# Patient Record
Sex: MALE | Race: WHITE | Employment: OTHER | URBAN - METROPOLITAN AREA
[De-identification: names, ages, dates, MRNs, and addresses within clinical notes are randomized per-mention and may not be internally consistent; named-entity substitution may affect disease eponyms.]

---

## 2017-01-03 ENCOUNTER — HOME CARE VISIT (OUTPATIENT)
Dept: HOME HEALTH SERVICES | Facility: HOME HEALTH | Age: 72
End: 2017-01-03
Payer: MEDICARE

## 2017-01-03 ENCOUNTER — OFFICE VISIT (OUTPATIENT)
Dept: CARDIOLOGY CLINIC | Age: 72
End: 2017-01-03

## 2017-01-03 VITALS
SYSTOLIC BLOOD PRESSURE: 126 MMHG | HEIGHT: 74 IN | OXYGEN SATURATION: 96 % | BODY MASS INDEX: 18.84 KG/M2 | DIASTOLIC BLOOD PRESSURE: 74 MMHG | WEIGHT: 146.8 LBS | HEART RATE: 72 BPM | RESPIRATION RATE: 18 BRPM

## 2017-01-03 DIAGNOSIS — E78.5 DYSLIPIDEMIA: ICD-10-CM

## 2017-01-03 DIAGNOSIS — I25.10 CORONARY ARTERY DISEASE INVOLVING NATIVE CORONARY ARTERY OF NATIVE HEART WITHOUT ANGINA PECTORIS: Primary | ICD-10-CM

## 2017-01-03 DIAGNOSIS — M79.7 FIBROMYALGIA: ICD-10-CM

## 2017-01-03 NOTE — PROGRESS NOTES
Masood Foreman MD    [address] [place], [address] [facility]    Office [phone],[facility] [phone]  Pager [phone]    Mackenzie Morris is a 66 y.o. male is here for f/u visit for    Primary care physician:  Emili Barrett MD    Patient Active Problem List   Diagnosis Code    Zenker's diverticulum K22.5    Spinal stenosis of lumbar region M48.06    Osteoarthritis M19.90    Fibromyalgia M79.7    DM type 2 causing neurological disease ([facility] [address] E11.49    Neuropathy G62.9    Post-polio syndrome G14    Bronchitis, chronic ([facility] [address] J42    Hyperlipidemia E78.5    Elevated troponin R79.89    Chest pain R07.9    Hyponatremia E87.1       Chief complaint:  Chief Complaint   Patient presents with   [facility] Follow Up    CHF       Assessment:  CAD - Patient was recently admitted to [facility] for altered mental status. His cardiac enzymes were elevated ( Trop minimally elevated 0.18/0.18/0.15/0.12 . However CK and CK MB mild elevation)-  Sinus Bradycardia  DM  Hyperlipidemia  Post polio syndrome - LLE  Fibromyalgia     Plan:  Stress nuc study12/7/16 - Mildly abnormal/low risk scan -   ASA/Lipitor  Unable to tolerate B Blocker sec to sinus bradycardia  F/u 1year    Lab Results   Component Value Date/Time    Cholesterol, total 99 12/05/2016 11:14 AM    HDL Cholesterol 30 12/05/2016 11:14 AM    LDL, calculated 55.8 12/05/2016 11:14 AM    VLDL, calculated 13.2 12/05/2016 11:14 AM    Triglyceride 66 12/05/2016 11:14 AM    CHOL/HDL Ratio 3.3 12/05/2016 11:14 AM       Patient understands the plan. All questions were answered to the patient's satisfaction. Medication Side Effects and Warnings were discussed with patient: yes  Patient Labs were reviewed and or requested:  yes  Patient Past Records were reviewed and or requested: yes    I appreciate the opportunity to be involved in [address] [place] [address]. See note below for details.  Please do not hesitate to contact us with questions or concerns. Mary Alva MD    Cardiac Testing/ Procedures: A. Cardiac Cath/PCI:    B.ECHO/HANS: 12/5/16 - EF 55-60%Grade 1 DD    C. StressNuclear/Stress ECHO/Stress test:12/7/16 IMPRESSION:1. Mildly abnormal Lexiscan gated SPECT myocardial perfusion study. No significant reversible ischemia. 2.Stress and resting tomograms showed a medium sized, moderate, persistent  defect with partial reversibility involving the mid to distal  inferoapical/inferoseptal wall in the area typically involving the RCA. SSS=5,  SRS=3, SDS=2  3. Low risk scan    D. Vascular:    E. EP:    F. Miscellaneous:    Subjective:  Karen Madsen is a 66 y.o. male who returns for follow up visit. Doing well. No chest pain, dyspnea. States that he has not yet started taking the aspirin but will start taking it. ROS:  (bold if positive, if negative)             Medications before admission:    Current Outpatient Prescriptions   Medication Sig Dispense    gabapentin (NEURONTIN) 600 mg tablet Take 600 mg by mouth four (4) times daily.  fentaNYL (DURAGESIC) 50 mcg/hr PATCH 1 Patch by TransDERmal route every seventy-two (72) hours.  fluticasone-salmeterol (ADVAIR) 100-50 mcg/dose diskus inhaler Take 1 Puff by inhalation two (2) times a day.  oxyCODONE-acetaminophen (PERCOCET 7.5) 7.5-325 mg per tablet 1 tabs every  6 hrs as needed for pain. Maximum daily dose 4 tablets. 100 Tab    atorvastatin (LIPITOR) 10 mg tablet Take 10 mg by mouth daily.  meloxicam (MOBIC) 15 mg tablet Take 15 mg by mouth daily.  albuterol (PROAIR HFA) 90 mcg/actuation inhaler Take 2 Puffs by inhalation every four (4) hours as needed.  DULoxetine (CYMBALTA) 60 mg capsule Take 60 mg by mouth daily.  metFORMIN (GLUCOPHAGE) 500 mg tablet Take 500 mg by mouth daily.  aspirin 81 mg chewable tablet Take 1 Tab by mouth daily. 30 Tab     No current facility-administered medications for this visit.         Physical Exam:  Visit Vitals    BP 126/74 (BP 1 Location: Left arm)    Pulse 72    Resp 18    Ht 6' 2\" (1.88 m)    Wt 146 lb 12.8 oz (66.6 kg)    SpO2 96%    BMI 18.85 kg/m2          Gen: Well-developed, well-nourished, in no acute distress  Neck: Supple,No JVD, No Carotid Bruit,   Resp: No accessory muscle use, Clear breath sounds, No rales or rhonchi  Card: Regular Rate,Rythm,Normal S1, S2 + ,No Murmur No rubs or gallop. No thrills.    Abd:  Soft, non-tender, non-distended,BS+,   MSK: No cyanosis  Skin: No rashes    Neuro: Left lower extremity in brace-(post polio)  Psych:  alert,  anxious  LE: No edema    EKG:       LABS:        Lab Results   Component Value Date/Time    WBC 5.7 12/07/2016 05:10 AM    HGB 11.0 12/07/2016 05:10 AM    HCT 32.9 12/07/2016 05:10 AM    PLATELET 989 77/22/4314 05:10 AM    MCV 88.9 12/07/2016 05:10 AM     Lab Results   Component Value Date/Time    Sodium 135 12/07/2016 05:10 AM    Potassium 4.0 12/07/2016 05:10 AM    Chloride 100 12/07/2016 05:10 AM    CO2 25 12/07/2016 05:10 AM    Anion gap 10 12/07/2016 05:10 AM    Glucose 168 12/07/2016 05:10 AM    BUN 18 12/07/2016 05:10 AM    Creatinine 0.88 12/07/2016 05:10 AM    BUN/Creatinine ratio 20 12/07/2016 05:10 AM    GFR est AA >60 12/07/2016 05:10 AM    GFR est non-AA >60 12/07/2016 05:10 AM    Calcium 8.2 12/07/2016 05:10 AM       Lab Results   Component Value Date/Time    aPTT 33.3 12/06/2016 11:05 AM     Lab Results   Component Value Date/Time    INR 1.1 12/06/2016 11:05 AM    INR 1.0 03/20/2014 10:13 AM    INR 1.4 02/02/2010 02:25 AM    INR (POC) 1.0 05/16/2011 12:42 PM    Prothrombin time 11.2 12/06/2016 11:05 AM    Prothrombin time 10.4 03/20/2014 10:13 AM    Prothrombin time 14.4 02/02/2010 02:25 AM     No components found for: Shaan Solis MD

## 2017-01-03 NOTE — MR AVS SNAPSHOT
Visit Information Date & Time Provider Department Dept. Phone Encounter #  
 1/3/2017 10:00 AM Lois Soni MD [facility] [phone] [id number] Upcoming Health Maintenance Date Due  
 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  
  
 Not reviewed this visit You Were Diagnosed With   
  
 Codes Comments Coronary artery disease involving native coronary artery of native heart without angina pectoris    -  Primary ICD-10-CM: I25.10 ICD-9-CM: 414.01 Dyslipidemia     ICD-10-CM: E78.5 ICD-9-CM: 272.4 Fibromyalgia     ICD-10-CM: M79.7 ICD-9-CM: 729.1 Vitals BP Pulse Resp Height(growth percentile) Weight(growth percentile) SpO2  
 126/74 (BP 1 Location: Left arm) 72 18 6' 2\" (1.88 m) 146 lb 12.8 oz (66.6 kg) 96% BMI Smoking Status 18.85 kg/m2 Former Smoker Vitals History BMI and BSA Data Body Mass Index Body Surface Area  
 18.85 kg/m 2 1.86 m 2 Preferred Pharmacy Pharmacy Name Phone [id number] [place], [address] AT [facility] [phone] Your Updated Medication List  
  
   
 This list is accurate as of: 1/3/17 10:37 AM.  Always use your most recent med list.  
  
  
  
  
 aspirin 81 mg chewable tablet Take 1 Tab by mouth daily. atorvastatin 10 mg tablet Commonly known as:  LIPITOR Take 10 mg by mouth daily. CYMBALTA 60 mg capsule Generic drug:  DULoxetine Take 60 mg by mouth daily. fentaNYL 50 mcg/hr PATCH Commonly known as:  DURAGESIC  
1 Patch by TransDERmal route every seventy-two (72) hours. fluticasone-salmeterol 100-50 mcg/dose diskus inhaler Commonly known as:  ADVAIR Take 1 Puff by inhalation two (2) times a day.  
  
 gabapentin 600 mg tablet Commonly known as:  NEURONTIN Take 600 mg by mouth four (4) times daily. meloxicam 15 mg tablet Commonly known as:  MOBIC Take 15 mg by mouth daily. metFORMIN 500 mg tablet Commonly known as:  GLUCOPHAGE Take 500 mg by mouth daily. oxyCODONE-acetaminophen 7.5-325 mg per tablet Commonly known as:  PERCOCET 7.5  
1 tabs every  6 hrs as needed for pain. Maximum daily dose 4 tablets. PROAIR HFA 90 mcg/actuation inhaler Generic drug:  albuterol Take 2 Puffs by inhalation every four (4) hours as needed. To-Do List   
 01/03/2017 To Be Determined Appointment with Ever Quevedo PT at [address] [place] [address]  
  
 01/05/2017 To Be Determined Appointment with Ever Quevedo PT at [address] [place] [address] 1400  
  
 01/10/2017 To Be Determined Appointment with Ever Quevedo PT at [address] [place] [address],[facility] 1400  
  
 01/12/2017 To Be Determined Appointment with Ever Quevedo PT at [address] [place] [address] 1400 Introducing [facility]! [employer] introduces "[employer]" patient portal. Now you can access parts of your medical record, email your doctor's office, and request medication refills online.    
 
1. In your internet browser, go to [email or web address] 2. Click on the First Time User? Click Here link in the Sign In box. You will see the New Member Sign Up page. 3. Enter your Park[employer] Access Code exactly as it appears below. You will not need to use this code after youve completed the sign-up process. If you do not sign up before the expiration date, you must request a new code. · [employer] Access Code: [email or web address] Expires: 1/3/2017 11:03 AM 
 
4. Enter the last four digits of your Social Security Number (xxxx) and Date of Birth (mm/dd/yyyy) as indicated and click Submit. You will be taken to the next sign-up page. 5. Create a "[employer]t ID. This will be your Parkzzz login ID and cannot be changed, so think of one that is secure and easy to remember. 6. Create a [employer] password. You can change your password at any time. 7. Enter your Password Reset Question and Answer. This can be used at a later time if you forget your password. 8. Enter your e-mail address. You will receive e-mail notification when new information is available in [address] 9. Click Sign Up. You can now view and download portions of your medical record. 10. Click the Download Summary menu link to download a portable copy of your medical information. If you have questions, please visit the Frequently Asked Questions section of the [employer] website. Remember, Park[employer] is NOT to be used for urgent needs. For medical emergencies, dial 911. Now available from your iPhone and Android! Please provide this summary of care documentation to your next provider. Your primary care clinician is listed as Yolanda Yang. If you have any questions after today's visit, please call [phone].

## 2017-01-05 ENCOUNTER — HOME CARE VISIT (OUTPATIENT)
Dept: SCHEDULING | Facility: HOME HEALTH | Age: 72
End: 2017-01-05
Payer: MEDICARE

## 2017-01-05 VITALS — DIASTOLIC BLOOD PRESSURE: 68 MMHG | HEART RATE: 76 BPM | OXYGEN SATURATION: 95 % | SYSTOLIC BLOOD PRESSURE: 120 MMHG

## 2017-01-05 PROCEDURE — G0151 HHCP-SERV OF PT,EA 15 MIN: HCPCS

## 2017-01-12 ENCOUNTER — HOME CARE VISIT (OUTPATIENT)
Dept: SCHEDULING | Facility: HOME HEALTH | Age: 72
End: 2017-01-12
Payer: MEDICARE

## 2017-01-12 PROCEDURE — G0151 HHCP-SERV OF PT,EA 15 MIN: HCPCS

## 2017-01-13 VITALS
DIASTOLIC BLOOD PRESSURE: 62 MMHG | RESPIRATION RATE: 18 BRPM | OXYGEN SATURATION: 97 % | SYSTOLIC BLOOD PRESSURE: 118 MMHG | HEART RATE: 62 BPM | TEMPERATURE: 97.8 F

## 2017-04-10 ENCOUNTER — HOME HEALTH ADMISSION (OUTPATIENT)
Dept: HOME HEALTH SERVICES | Facility: HOME HEALTH | Age: 72
End: 2017-04-10

## 2017-04-10 ENCOUNTER — HOSPITAL ENCOUNTER (INPATIENT)
Age: 72
LOS: 3 days | Discharge: NURSING, REHAB OR LONG-TERM CARE | DRG: 189 | End: 2017-04-13
Attending: EMERGENCY MEDICINE | Admitting: INTERNAL MEDICINE
Payer: MEDICARE

## 2017-04-10 ENCOUNTER — APPOINTMENT (OUTPATIENT)
Dept: GENERAL RADIOLOGY | Age: 72
DRG: 189 | End: 2017-04-10
Attending: EMERGENCY MEDICINE
Payer: MEDICARE

## 2017-04-10 DIAGNOSIS — J44.1 ACUTE EXACERBATION OF CHRONIC OBSTRUCTIVE PULMONARY DISEASE (COPD) (HCC): ICD-10-CM

## 2017-04-10 DIAGNOSIS — J96.00 ACUTE RESPIRATORY FAILURE, UNSPECIFIED WHETHER WITH HYPOXIA OR HYPERCAPNIA (HCC): Primary | ICD-10-CM

## 2017-04-10 DIAGNOSIS — D72.829 LEUKOCYTOSIS, UNSPECIFIED TYPE: ICD-10-CM

## 2017-04-10 PROBLEM — F41.8 DEPRESSION WITH ANXIETY: Status: ACTIVE | Noted: 2017-04-10

## 2017-04-10 LAB
ALBUMIN SERPL BCP-MCNC: 3.6 G/DL (ref 3.5–5)
ALBUMIN/GLOB SERPL: 0.9 {RATIO} (ref 1.1–2.2)
ALP SERPL-CCNC: 69 U/L (ref 45–117)
ALT SERPL-CCNC: 25 U/L (ref 12–78)
ANION GAP BLD CALC-SCNC: 12 MMOL/L (ref 5–15)
ARTERIAL PATENCY WRIST A: YES
AST SERPL W P-5'-P-CCNC: 23 U/L (ref 15–37)
ATRIAL RATE: 87 BPM
BASE DEFICIT BLDA-SCNC: 3.5 MMOL/L
BASOPHILS # BLD AUTO: 0 K/UL (ref 0–0.1)
BASOPHILS # BLD: 0 % (ref 0–1)
BDY SITE: ABNORMAL
BILIRUB SERPL-MCNC: 0.4 MG/DL (ref 0.2–1)
BUN SERPL-MCNC: 13 MG/DL (ref 6–20)
BUN/CREAT SERPL: 15 (ref 12–20)
CALCIUM SERPL-MCNC: 8.9 MG/DL (ref 8.5–10.1)
CALCULATED P AXIS, ECG09: 76 DEGREES
CALCULATED R AXIS, ECG10: -69 DEGREES
CALCULATED T AXIS, ECG11: 63 DEGREES
CHLORIDE SERPL-SCNC: 103 MMOL/L (ref 97–108)
CO2 SERPL-SCNC: 24 MMOL/L (ref 21–32)
CREAT SERPL-MCNC: 0.85 MG/DL (ref 0.7–1.3)
DIAGNOSIS, 93000: NORMAL
DIFFERENTIAL METHOD BLD: ABNORMAL
EOSINOPHIL # BLD: 2.1 K/UL (ref 0–0.4)
EOSINOPHIL NFR BLD: 13 % (ref 0–7)
EPAP/CPAP/PEEP, PAPEEP: 6
ERYTHROCYTE [DISTWIDTH] IN BLOOD BY AUTOMATED COUNT: 13.7 % (ref 11.5–14.5)
FIO2 ON VENT: 60 %
GAS FLOW.O2 SETTING OXYMISER: 12 L/MIN
GLOBULIN SER CALC-MCNC: 3.8 G/DL (ref 2–4)
GLUCOSE BLD STRIP.AUTO-MCNC: 123 MG/DL (ref 65–100)
GLUCOSE BLD STRIP.AUTO-MCNC: 136 MG/DL (ref 65–100)
GLUCOSE BLD STRIP.AUTO-MCNC: 215 MG/DL (ref 65–100)
GLUCOSE BLD STRIP.AUTO-MCNC: 247 MG/DL (ref 65–100)
GLUCOSE SERPL-MCNC: 110 MG/DL (ref 65–100)
HCO3 BLDA-SCNC: 23 MMOL/L (ref 22–26)
HCT VFR BLD AUTO: 39.2 % (ref 36.6–50.3)
HGB BLD-MCNC: 12.1 G/DL (ref 12.1–17)
IPAP/PIP, IPAPIP: 12
LYMPHOCYTES # BLD AUTO: 14 % (ref 12–49)
LYMPHOCYTES # BLD: 2.3 K/UL (ref 0.8–3.5)
MAGNESIUM SERPL-MCNC: 2.4 MG/DL (ref 1.6–2.4)
MCH RBC QN AUTO: 28.8 PG (ref 26–34)
MCHC RBC AUTO-ENTMCNC: 30.9 G/DL (ref 30–36.5)
MCV RBC AUTO: 93.3 FL (ref 80–99)
MONOCYTES # BLD: 1 K/UL (ref 0–1)
MONOCYTES NFR BLD AUTO: 6 % (ref 5–13)
NEUTS SEG # BLD: 11 K/UL (ref 1.8–8)
NEUTS SEG NFR BLD AUTO: 67 % (ref 32–75)
P-R INTERVAL, ECG05: 146 MS
PCO2 BLDA: 45 MMHG (ref 35–45)
PH BLDA: 7.32 [PH] (ref 7.35–7.45)
PLATELET # BLD AUTO: 213 K/UL (ref 150–400)
PO2 BLDA: 323 MMHG (ref 80–100)
POTASSIUM SERPL-SCNC: 4.3 MMOL/L (ref 3.5–5.1)
PROT SERPL-MCNC: 7.4 G/DL (ref 6.4–8.2)
Q-T INTERVAL, ECG07: 374 MS
QRS DURATION, ECG06: 98 MS
QTC CALCULATION (BEZET), ECG08: 450 MS
RBC # BLD AUTO: 4.2 M/UL (ref 4.1–5.7)
RBC MORPH BLD: ABNORMAL
SAO2 % BLD: 100 % (ref 92–97)
SAO2% DEVICE SAO2% SENSOR NAME: ABNORMAL
SERVICE CMNT-IMP: ABNORMAL
SODIUM SERPL-SCNC: 139 MMOL/L (ref 136–145)
SPECIMEN SITE: ABNORMAL
VENTRICULAR RATE, ECG03: 87 BPM
WBC # BLD AUTO: 16.4 K/UL (ref 4.1–11.1)

## 2017-04-10 PROCEDURE — 36600 WITHDRAWAL OF ARTERIAL BLOOD: CPT | Performed by: EMERGENCY MEDICINE

## 2017-04-10 PROCEDURE — 77030013140 HC MSK NEB VYRM -A

## 2017-04-10 PROCEDURE — 96375 TX/PRO/DX INJ NEW DRUG ADDON: CPT

## 2017-04-10 PROCEDURE — 94640 AIRWAY INHALATION TREATMENT: CPT

## 2017-04-10 PROCEDURE — 65660000000 HC RM CCU STEPDOWN

## 2017-04-10 PROCEDURE — 74011250636 HC RX REV CODE- 250/636: Performed by: EMERGENCY MEDICINE

## 2017-04-10 PROCEDURE — 82803 BLOOD GASES ANY COMBINATION: CPT | Performed by: EMERGENCY MEDICINE

## 2017-04-10 PROCEDURE — 99285 EMERGENCY DEPT VISIT HI MDM: CPT

## 2017-04-10 PROCEDURE — 96374 THER/PROPH/DIAG INJ IV PUSH: CPT

## 2017-04-10 PROCEDURE — 94762 N-INVAS EAR/PLS OXIMTRY CONT: CPT

## 2017-04-10 PROCEDURE — 97161 PT EVAL LOW COMPLEX 20 MIN: CPT

## 2017-04-10 PROCEDURE — 87040 BLOOD CULTURE FOR BACTERIA: CPT | Performed by: EMERGENCY MEDICINE

## 2017-04-10 PROCEDURE — 94660 CPAP INITIATION&MGMT: CPT

## 2017-04-10 PROCEDURE — 74011250637 HC RX REV CODE- 250/637: Performed by: INTERNAL MEDICINE

## 2017-04-10 PROCEDURE — 77030013048 HC MSK CPAP W/HDGR FISP -B

## 2017-04-10 PROCEDURE — 97166 OT EVAL MOD COMPLEX 45 MIN: CPT

## 2017-04-10 PROCEDURE — 82962 GLUCOSE BLOOD TEST: CPT

## 2017-04-10 PROCEDURE — 83735 ASSAY OF MAGNESIUM: CPT | Performed by: EMERGENCY MEDICINE

## 2017-04-10 PROCEDURE — 97165 OT EVAL LOW COMPLEX 30 MIN: CPT

## 2017-04-10 PROCEDURE — 93005 ELECTROCARDIOGRAM TRACING: CPT

## 2017-04-10 PROCEDURE — 71010 XR CHEST PORT: CPT

## 2017-04-10 PROCEDURE — 74011250636 HC RX REV CODE- 250/636: Performed by: INTERNAL MEDICINE

## 2017-04-10 PROCEDURE — 74011000250 HC RX REV CODE- 250: Performed by: EMERGENCY MEDICINE

## 2017-04-10 PROCEDURE — 74011000250 HC RX REV CODE- 250: Performed by: INTERNAL MEDICINE

## 2017-04-10 PROCEDURE — 36415 COLL VENOUS BLD VENIPUNCTURE: CPT | Performed by: EMERGENCY MEDICINE

## 2017-04-10 PROCEDURE — 85025 COMPLETE CBC W/AUTO DIFF WBC: CPT | Performed by: EMERGENCY MEDICINE

## 2017-04-10 PROCEDURE — 97116 GAIT TRAINING THERAPY: CPT

## 2017-04-10 PROCEDURE — 77010033678 HC OXYGEN DAILY

## 2017-04-10 PROCEDURE — 80053 COMPREHEN METABOLIC PANEL: CPT | Performed by: EMERGENCY MEDICINE

## 2017-04-10 PROCEDURE — 97535 SELF CARE MNGMENT TRAINING: CPT

## 2017-04-10 PROCEDURE — 74011636637 HC RX REV CODE- 636/637: Performed by: INTERNAL MEDICINE

## 2017-04-10 RX ORDER — ONDANSETRON 2 MG/ML
4 INJECTION INTRAMUSCULAR; INTRAVENOUS
Status: DISCONTINUED | OUTPATIENT
Start: 2017-04-10 | End: 2017-04-13 | Stop reason: HOSPADM

## 2017-04-10 RX ORDER — IPRATROPIUM BROMIDE AND ALBUTEROL SULFATE 2.5; .5 MG/3ML; MG/3ML
3 SOLUTION RESPIRATORY (INHALATION)
COMMUNITY

## 2017-04-10 RX ORDER — DEXTROSE 50 % IN WATER (D50W) INTRAVENOUS SYRINGE
12.5-25 AS NEEDED
Status: DISCONTINUED | OUTPATIENT
Start: 2017-04-10 | End: 2017-04-13 | Stop reason: HOSPADM

## 2017-04-10 RX ORDER — IPRATROPIUM BROMIDE AND ALBUTEROL SULFATE 2.5; .5 MG/3ML; MG/3ML
SOLUTION RESPIRATORY (INHALATION)
Status: DISPENSED
Start: 2017-04-10 | End: 2017-04-10

## 2017-04-10 RX ORDER — FENTANYL 25 UG/1
1 PATCH TRANSDERMAL
Status: DISCONTINUED | OUTPATIENT
Start: 2017-04-10 | End: 2017-04-13 | Stop reason: HOSPADM

## 2017-04-10 RX ORDER — SODIUM CHLORIDE 0.9 % (FLUSH) 0.9 %
5-10 SYRINGE (ML) INJECTION EVERY 8 HOURS
Status: DISCONTINUED | OUTPATIENT
Start: 2017-04-10 | End: 2017-04-13 | Stop reason: HOSPADM

## 2017-04-10 RX ORDER — SODIUM CHLORIDE 0.9 % (FLUSH) 0.9 %
5-10 SYRINGE (ML) INJECTION AS NEEDED
Status: DISCONTINUED | OUTPATIENT
Start: 2017-04-10 | End: 2017-04-13 | Stop reason: HOSPADM

## 2017-04-10 RX ORDER — IPRATROPIUM BROMIDE AND ALBUTEROL SULFATE 2.5; .5 MG/3ML; MG/3ML
3 SOLUTION RESPIRATORY (INHALATION)
Status: DISCONTINUED | OUTPATIENT
Start: 2017-04-10 | End: 2017-04-13 | Stop reason: HOSPADM

## 2017-04-10 RX ORDER — ENOXAPARIN SODIUM 100 MG/ML
40 INJECTION SUBCUTANEOUS EVERY 24 HOURS
Status: DISCONTINUED | OUTPATIENT
Start: 2017-04-10 | End: 2017-04-13 | Stop reason: HOSPADM

## 2017-04-10 RX ORDER — MAGNESIUM SULFATE HEPTAHYDRATE 40 MG/ML
2 INJECTION, SOLUTION INTRAVENOUS
Status: COMPLETED | OUTPATIENT
Start: 2017-04-10 | End: 2017-04-10

## 2017-04-10 RX ORDER — FENTANYL 25 UG/1
1 PATCH TRANSDERMAL
Status: ON HOLD | COMMUNITY
End: 2017-04-13

## 2017-04-10 RX ORDER — GABAPENTIN 300 MG/1
600 CAPSULE ORAL 4 TIMES DAILY
Status: DISCONTINUED | OUTPATIENT
Start: 2017-04-10 | End: 2017-04-13 | Stop reason: HOSPADM

## 2017-04-10 RX ORDER — ATORVASTATIN CALCIUM 10 MG/1
10 TABLET, FILM COATED ORAL DAILY
Status: DISCONTINUED | OUTPATIENT
Start: 2017-04-10 | End: 2017-04-12

## 2017-04-10 RX ORDER — DIPHENHYDRAMINE HCL 25 MG
25 CAPSULE ORAL
Status: DISCONTINUED | OUTPATIENT
Start: 2017-04-10 | End: 2017-04-13 | Stop reason: HOSPADM

## 2017-04-10 RX ORDER — ACETAMINOPHEN 325 MG/1
650 TABLET ORAL
Status: DISCONTINUED | OUTPATIENT
Start: 2017-04-10 | End: 2017-04-13 | Stop reason: HOSPADM

## 2017-04-10 RX ORDER — NALOXONE HYDROCHLORIDE 0.4 MG/ML
0.4 INJECTION, SOLUTION INTRAMUSCULAR; INTRAVENOUS; SUBCUTANEOUS AS NEEDED
Status: DISCONTINUED | OUTPATIENT
Start: 2017-04-10 | End: 2017-04-13 | Stop reason: HOSPADM

## 2017-04-10 RX ORDER — FLUTICASONE FUROATE AND VILANTEROL 200; 25 UG/1; UG/1
1 POWDER RESPIRATORY (INHALATION) DAILY
Status: DISCONTINUED | OUTPATIENT
Start: 2017-04-11 | End: 2017-04-13 | Stop reason: HOSPADM

## 2017-04-10 RX ORDER — SODIUM CHLORIDE 9 MG/ML
50 INJECTION, SOLUTION INTRAVENOUS CONTINUOUS
Status: DISCONTINUED | OUTPATIENT
Start: 2017-04-10 | End: 2017-04-10

## 2017-04-10 RX ORDER — IPRATROPIUM BROMIDE AND ALBUTEROL SULFATE 2.5; .5 MG/3ML; MG/3ML
3 SOLUTION RESPIRATORY (INHALATION)
Status: DISCONTINUED | OUTPATIENT
Start: 2017-04-10 | End: 2017-04-10

## 2017-04-10 RX ORDER — LORAZEPAM 2 MG/ML
0.5 INJECTION INTRAMUSCULAR
Status: DISCONTINUED | OUTPATIENT
Start: 2017-04-10 | End: 2017-04-13 | Stop reason: HOSPADM

## 2017-04-10 RX ORDER — ZOLPIDEM TARTRATE 5 MG/1
5 TABLET ORAL
Status: DISCONTINUED | OUTPATIENT
Start: 2017-04-10 | End: 2017-04-13 | Stop reason: HOSPADM

## 2017-04-10 RX ORDER — MAGNESIUM SULFATE 100 %
4 CRYSTALS MISCELLANEOUS AS NEEDED
Status: DISCONTINUED | OUTPATIENT
Start: 2017-04-10 | End: 2017-04-13 | Stop reason: HOSPADM

## 2017-04-10 RX ORDER — FLUTICASONE FUROATE AND VILANTEROL 100; 25 UG/1; UG/1
1 POWDER RESPIRATORY (INHALATION) DAILY
Status: DISCONTINUED | OUTPATIENT
Start: 2017-04-10 | End: 2017-04-10

## 2017-04-10 RX ORDER — OXYCODONE AND ACETAMINOPHEN 7.5; 325 MG/1; MG/1
1 TABLET ORAL
Status: DISCONTINUED | OUTPATIENT
Start: 2017-04-10 | End: 2017-04-13 | Stop reason: HOSPADM

## 2017-04-10 RX ORDER — INSULIN LISPRO 100 [IU]/ML
INJECTION, SOLUTION INTRAVENOUS; SUBCUTANEOUS
Status: DISCONTINUED | OUTPATIENT
Start: 2017-04-10 | End: 2017-04-13 | Stop reason: HOSPADM

## 2017-04-10 RX ORDER — METFORMIN HYDROCHLORIDE 500 MG/1
500 TABLET ORAL DAILY
Status: DISCONTINUED | OUTPATIENT
Start: 2017-04-10 | End: 2017-04-13 | Stop reason: HOSPADM

## 2017-04-10 RX ORDER — DULOXETIN HYDROCHLORIDE 30 MG/1
60 CAPSULE, DELAYED RELEASE ORAL DAILY
Status: DISCONTINUED | OUTPATIENT
Start: 2017-04-10 | End: 2017-04-13 | Stop reason: HOSPADM

## 2017-04-10 RX ORDER — GUAIFENESIN 100 MG/5ML
81 LIQUID (ML) ORAL DAILY
Status: DISCONTINUED | OUTPATIENT
Start: 2017-04-10 | End: 2017-04-13 | Stop reason: HOSPADM

## 2017-04-10 RX ADMIN — Medication 10 ML: at 15:28

## 2017-04-10 RX ADMIN — METHYLPREDNISOLONE SODIUM SUCCINATE 125 MG: 125 INJECTION, POWDER, FOR SOLUTION INTRAMUSCULAR; INTRAVENOUS at 02:30

## 2017-04-10 RX ADMIN — OXYCODONE HYDROCHLORIDE AND ACETAMINOPHEN 1 TABLET: 7.5; 325 TABLET ORAL at 06:44

## 2017-04-10 RX ADMIN — SODIUM CHLORIDE 50 ML/HR: 900 INJECTION, SOLUTION INTRAVENOUS at 03:36

## 2017-04-10 RX ADMIN — INSULIN LISPRO 4 UNITS: 100 INJECTION, SOLUTION INTRAVENOUS; SUBCUTANEOUS at 16:30

## 2017-04-10 RX ADMIN — GABAPENTIN 600 MG: 300 CAPSULE ORAL at 21:30

## 2017-04-10 RX ADMIN — MAGNESIUM SULFATE HEPTAHYDRATE 2 G: 40 INJECTION, SOLUTION INTRAVENOUS at 02:32

## 2017-04-10 RX ADMIN — GABAPENTIN 600 MG: 300 CAPSULE ORAL at 10:02

## 2017-04-10 RX ADMIN — LORAZEPAM 0.5 MG: 2 INJECTION, SOLUTION INTRAMUSCULAR; INTRAVENOUS at 13:44

## 2017-04-10 RX ADMIN — METHYLPREDNISOLONE SODIUM SUCCINATE 80 MG: 125 INJECTION, POWDER, FOR SOLUTION INTRAMUSCULAR; INTRAVENOUS at 15:21

## 2017-04-10 RX ADMIN — IPRATROPIUM BROMIDE AND ALBUTEROL SULFATE 3 ML: .5; 3 SOLUTION RESPIRATORY (INHALATION) at 07:39

## 2017-04-10 RX ADMIN — AZITHROMYCIN MONOHYDRATE 500 MG: 500 INJECTION, POWDER, LYOPHILIZED, FOR SOLUTION INTRAVENOUS at 03:37

## 2017-04-10 RX ADMIN — ENOXAPARIN SODIUM 40 MG: 40 INJECTION SUBCUTANEOUS at 08:07

## 2017-04-10 RX ADMIN — ALBUTEROL SULFATE 1 DOSE: 2.5 SOLUTION RESPIRATORY (INHALATION) at 02:30

## 2017-04-10 RX ADMIN — GABAPENTIN 600 MG: 300 CAPSULE ORAL at 13:43

## 2017-04-10 RX ADMIN — DULOXETINE HYDROCHLORIDE 60 MG: 30 CAPSULE, DELAYED RELEASE ORAL at 08:06

## 2017-04-10 RX ADMIN — Medication 10 ML: at 21:30

## 2017-04-10 RX ADMIN — IPRATROPIUM BROMIDE AND ALBUTEROL SULFATE 3 ML: .5; 3 SOLUTION RESPIRATORY (INHALATION) at 03:28

## 2017-04-10 RX ADMIN — IPRATROPIUM BROMIDE AND ALBUTEROL SULFATE 3 ML: .5; 3 SOLUTION RESPIRATORY (INHALATION) at 23:46

## 2017-04-10 RX ADMIN — OXYCODONE HYDROCHLORIDE AND ACETAMINOPHEN 1 TABLET: 7.5; 325 TABLET ORAL at 18:42

## 2017-04-10 RX ADMIN — IPRATROPIUM BROMIDE AND ALBUTEROL SULFATE 3 ML: .5; 3 SOLUTION RESPIRATORY (INHALATION) at 15:38

## 2017-04-10 RX ADMIN — METHYLPREDNISOLONE SODIUM SUCCINATE 125 MG: 125 INJECTION, POWDER, FOR SOLUTION INTRAMUSCULAR; INTRAVENOUS at 08:05

## 2017-04-10 RX ADMIN — FLUTICASONE FUROATE AND VILANTEROL TRIFENATATE 1 PUFF: 100; 25 POWDER RESPIRATORY (INHALATION) at 10:06

## 2017-04-10 RX ADMIN — METFORMIN HYDROCHLORIDE 500 MG: 500 TABLET ORAL at 08:06

## 2017-04-10 RX ADMIN — GABAPENTIN 600 MG: 300 CAPSULE ORAL at 18:34

## 2017-04-10 RX ADMIN — IPRATROPIUM BROMIDE AND ALBUTEROL SULFATE 3 ML: .5; 3 SOLUTION RESPIRATORY (INHALATION) at 19:06

## 2017-04-10 RX ADMIN — ASPIRIN 81 MG CHEWABLE TABLET 81 MG: 81 TABLET CHEWABLE at 08:07

## 2017-04-10 RX ADMIN — ATORVASTATIN CALCIUM 10 MG: 10 TABLET, FILM COATED ORAL at 08:07

## 2017-04-10 RX ADMIN — LORAZEPAM 0.5 MG: 2 INJECTION, SOLUTION INTRAMUSCULAR; INTRAVENOUS at 21:30

## 2017-04-10 RX ADMIN — METHYLPREDNISOLONE SODIUM SUCCINATE 80 MG: 125 INJECTION, POWDER, FOR SOLUTION INTRAMUSCULAR; INTRAVENOUS at 23:05

## 2017-04-10 RX ADMIN — INSULIN LISPRO 4 UNITS: 100 INJECTION, SOLUTION INTRAVENOUS; SUBCUTANEOUS at 11:30

## 2017-04-10 RX ADMIN — IPRATROPIUM BROMIDE AND ALBUTEROL SULFATE 3 ML: .5; 3 SOLUTION RESPIRATORY (INHALATION) at 12:24

## 2017-04-10 NOTE — ED PROVIDER NOTES
HPI Comments: 74 y.o. male with past medical history significant for fibromyalgia, type 2 DM, chronic bronchitis who presents from home via EMS with chief complaint of shortness of breath. Patient complains of increased shortness of breath since yesterday. Patient also complains of a dry cough. EMS reports they gave the patient a nebulizer treatment and, upon its completion, put him on CPAP which the patient states has provided some relief. Patient denies any fevers. There are no other acute medical concerns at this time. Social hx: former smoker, no alcohol  PCP: Nando Loya MD    Note written by Delilah Saucedo, as dictated by En Kumar DO 2:20 AM     The history is provided by the patient and the EMS personnel. No  was used. Past Medical History:   Diagnosis Date    Arthritis     All joints    Bronchitis, chronic (HCC)     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)     Fibromyalgia     Hyperlipidemia     Neuropathy     Post-polio syndrome        Past Surgical History:   Procedure Laterality Date    ABDOMEN SURGERY PROC UNLISTED  2/2010    Feeding Tube. removed 2011    CHEST SURGERY PROCEDURE UNLISTED  2/2010    RVATS, Bronchoscopy    HX APPENDECTOMY      HX ORTHOPAEDIC  2000    Cervical Discectomy    HX ORTHOPAEDIC      Left Arm Multiple Surgeries for Infection    HX ORTHOPAEDIC  2006    right heel fracture    TOTAL HIP ARTHROPLASTY  2007    left         Family History:   Problem Relation Age of Onset    Family history unknown: Yes       Social History     Social History    Marital status:      Spouse name: N/A    Number of children: N/A    Years of education: N/A     Occupational History    Not on file.      Social History Main Topics    Smoking status: Former Smoker     Packs/day: 2.00     Years: 20.00     Quit date: 4/30/1970    Smokeless tobacco: Never Used    Alcohol use No    Drug use: No    Sexual activity: Not on file Other Topics Concern    Not on file     Social History Narrative     ALLERGIES: Trazodone    Review of Systems   Constitutional: Negative for appetite change, chills, fever and unexpected weight change. HENT: Negative for ear pain, hearing loss, rhinorrhea and trouble swallowing. Eyes: Negative for pain and visual disturbance. Respiratory: Positive for cough and shortness of breath. Negative for chest tightness. Cardiovascular: Negative for chest pain and palpitations. Gastrointestinal: Negative for abdominal distention, abdominal pain, blood in stool and vomiting. Genitourinary: Negative for dysuria, hematuria and urgency. Musculoskeletal: Negative for back pain and myalgias. Skin: Negative for rash. Neurological: Negative for dizziness, syncope, weakness and numbness. Psychiatric/Behavioral: Negative for confusion and suicidal ideas. All other systems reviewed and are negative. Vitals:    04/10/17 0218   BP: (!) 176/121   Pulse: 94   Resp: 30   SpO2: 98%   Weight: 68 kg (150 lb)   Height: 6' 1\" (1.854 m)            Physical Exam   Constitutional: He is oriented to person, place, and time. He appears well-developed and well-nourished. No distress. HENT:   Head: Normocephalic and atraumatic. Right Ear: External ear normal.   Left Ear: External ear normal.   Nose: Nose normal.   Mouth/Throat: Oropharynx is clear and moist. No oropharyngeal exudate. Eyes: Conjunctivae and EOM are normal. Pupils are equal, round, and reactive to light. Right eye exhibits no discharge. Left eye exhibits no discharge. No scleral icterus. Neck: Normal range of motion. Neck supple. No JVD present. No tracheal deviation present. Cardiovascular: Normal rate, regular rhythm, normal heart sounds and intact distal pulses. Exam reveals no gallop and no friction rub. No murmur heard. Pulmonary/Chest: Accessory muscle usage present. No stridor. Tachypnea noted.  He is in respiratory distress (obvious). He has decreased breath sounds. He has wheezes. He has no rhonchi. He has no rales. He exhibits no tenderness. Abdominal breathing  Diffuse wheezes in all lung fields  Diminished breath sounds at the bases   Abdominal: Soft. Bowel sounds are normal. He exhibits no distension. There is no tenderness. There is no rebound and no guarding. Musculoskeletal: Normal range of motion. He exhibits no edema or tenderness. Neurological: He is alert and oriented to person, place, and time. He has normal strength and normal reflexes. No cranial nerve deficit or sensory deficit. He exhibits normal muscle tone. Coordination normal. GCS eye subscore is 4. GCS verbal subscore is 5. GCS motor subscore is 6. Skin: Skin is warm and dry. No rash noted. He is not diaphoretic. No erythema. No pallor. Psychiatric: He has a normal mood and affect. His behavior is normal. Judgment and thought content normal.   Nursing note and vitals reviewed.   Note written by Delilah Nieves, as dictated by Mary Carmen Snow DO 2:23 AM      MDM  Number of Diagnoses or Management Options  Acute exacerbation of chronic obstructive pulmonary disease (COPD) ([facility] [address]):   Acute respiratory failure, unspecified whether with hypoxia or hypercapnia ([facility] [address]):   Leukocytosis, unspecified type:      Amount and/or Complexity of Data Reviewed  Clinical lab tests: ordered and reviewed  Tests in the radiology section of CPT®: ordered and reviewed  Tests in the medicine section of CPT®: ordered and reviewed  Obtain history from someone other than the patient: yes  Review and summarize past medical records: yes  Discuss the patient with other providers: yes (Hospitalist)  Independent visualization of images, tracings, or specimens: yes (ekg)    Risk of Complications, Morbidity, and/or Mortality  Presenting problems: high  Diagnostic procedures: moderate  Management options: moderate    Critical Care  Total time providing critical care: 30-74 minutes (40 minutes of CCT regardless of any procedures that might have been done.)    Patient Progress  Patient progress: improved    ED Course       Procedures    Chief Complaint   Patient presents with    Shortness of Breath       3:24 AM  The patients presenting problems have been discussed, and they are in agreement with the care plan formulated and outlined with them. I have encouraged them to ask questions as they arise throughout their visit. MEDICATIONS GIVEN:  Medications   sodium chloride (NS) flush 5-10 mL (not administered)   sodium chloride (NS) flush 5-10 mL (not administered)   albuterol-ipratropium (DUO-NEB) 2.5 mg-0.5 mg/3 ml nebulizer solution (not administered)   methylPREDNISolone (PF) (SOLU-MEDROL) injection 125 mg (not administered)   albuterol-ipratropium (DUO-NEB) 2.5 MG-0.5 MG/3 ML (not administered)   albuterol 5mg / ipratropium 0.5mg neb solution (1 Dose Nebulization Given 4/10/17 0230)   magnesium sulfate 2 g/50 ml IVPB (premix or compounded) (0 g IntraVENous IV Completed 4/10/17 0247)   methylPREDNISolone (PF) (SOLU-MEDROL) injection 125 mg (125 mg IntraVENous Given 4/10/17 0230)       LABS REVIEWED:  Recent Results (from the past 24 hour(s))   BLOOD GAS, ARTERIAL    Collection Time: 04/10/17  2:30 AM   Result Value Ref Range    pH 7.32 (L) 7.35 - 7.45      PCO2 45 35.0 - 45.0 mmHg    PO2 323 (H) 80 - 100 mmHg    O2  (H) 92 - 97 %    BICARBONATE 23 22 - 26 mmol/L    BASE DEFICIT 3.5 mmol/L    O2 METHOD BIPAP      FIO2 60 %    SET RATE 12      IPAP/PIP 12.0      EPAP/CPAP/PEEP 6.0      Sample source ARTERIAL      SITE LEFT RADIAL      MORA'S TEST YES      Critical value read back Drew Kearney MD    CBC WITH AUTOMATED DIFF    Collection Time: 04/10/17  2:40 AM   Result Value Ref Range    WBC 16.4 (H) 4.1 - 11.1 K/uL    RBC 4.20 4. 10 - 5.70 M/uL    HGB 12.1 12.1 - 17.0 g/dL    HCT 39.2 36.6 - 50.3 %    MCV 93.3 80.0 - 99.0 FL    MCH 28.8 26.0 - 34.0 PG    MCHC 30.9 30.0 - 36.5 g/dL RDW 13.7 11.5 - 14.5 %    PLATELET 394 259 - 575 K/uL    NEUTROPHILS 67 32 - 75 %    LYMPHOCYTES 14 12 - 49 %    MONOCYTES 6 5 - 13 %    EOSINOPHILS 13 (H) 0 - 7 %    BASOPHILS 0 0 - 1 %    ABS. NEUTROPHILS 11.0 (H) 1.8 - 8.0 K/UL    ABS. LYMPHOCYTES 2.3 0.8 - 3.5 K/UL    ABS. MONOCYTES 1.0 0.0 - 1.0 K/UL    ABS. EOSINOPHILS 2.1 (H) 0.0 - 0.4 K/UL    ABS. BASOPHILS 0.0 0.0 - 0.1 K/UL    DF SMEAR SCANNED      RBC COMMENTS NORMOCYTIC, NORMOCHROMIC     METABOLIC PANEL, COMPREHENSIVE    Collection Time: 04/10/17  2:40 AM   Result Value Ref Range    Sodium 139 136 - 145 mmol/L    Potassium 4.3 3.5 - 5.1 mmol/L    Chloride 103 97 - 108 mmol/L    CO2 24 21 - 32 mmol/L    Anion gap 12 5 - 15 mmol/L    Glucose 110 (H) 65 - 100 mg/dL    BUN 13 6 - 20 MG/DL    Creatinine 0.85 0.70 - 1.30 MG/DL    BUN/Creatinine ratio 15 12 - 20      GFR est AA >60 >60 ml/min/1.73m2    GFR est non-AA >60 >60 ml/min/1.73m2    Calcium 8.9 8.5 - 10.1 MG/DL    Bilirubin, total 0.4 0.2 - 1.0 MG/DL    ALT (SGPT) 25 12 - 78 U/L    AST (SGOT) 23 15 - 37 U/L    Alk. phosphatase 69 45 - 117 U/L    Protein, total 7.4 6.4 - 8.2 g/dL    Albumin 3.6 3.5 - 5.0 g/dL    Globulin 3.8 2.0 - 4.0 g/dL    A-G Ratio 0.9 (L) 1.1 - 2.2     MAGNESIUM    Collection Time: 04/10/17  2:40 AM   Result Value Ref Range    Magnesium 2.4 1.6 - 2.4 mg/dL       VITAL SIGNS:  Patient Vitals for the past 12 hrs:   Temp Pulse Resp BP SpO2   04/10/17 0315 - 70 21 128/68 98 %   04/10/17 0300 - 72 24 135/72 98 %   04/10/17 0245 - 75 25 131/75 98 %   04/10/17 0242 - 78 22 129/74 98 %   04/10/17 0218 96.4 °F (35.8 °C) 94 30 (!) 176/121 98 %       RADIOLOGY RESULTS:  The following have been ordered and reviewed:  XR CHEST PORT   Final Result        Study Result      EXAM: XR CHEST PORT     INDICATION: Shortness of breath since yesterday.     COMPARISON: 12/5/2016     TECHNIQUE: Portable AP upright chest view at 0244 hours     FINDINGS: Cardiac monitoring wires overlie the thorax.  The cardiomediastinal  contours are stable. The pulmonary vasculature is within normal limits.      The lungs and pleural spaces are clear. The bones and upper abdomen are stable.     IMPRESSION  IMPRESSION:     No acute process.        ED EKG interpretation:  Rhythm: normal sinus rhythm and Left anterior fascicular block; and regular . Rate (approx.): 87; Axis: normal; P wave: normal; QRS interval: normal ; ST/T wave: normal; Other findings: abnormal ekg, septal infarct. This EKG was interpreted by Inna Rai DO,ED Provider. CONSULTATIONS:   Hospitalist    PROGRESS NOTES:  Pt feeling better. Work of breathing decreased with improved breath sounds. Pt still requiring BiPAP. Discussed results and plan with patient. Patient will be admitted/observed for further evaluation and treatment. DIAGNOSIS:    1. Acute respiratory failure, unspecified whether with hypoxia or hypercapnia ([address]    2. Acute exacerbation of chronic obstructive pulmonary disease (COPD) (HCC)    3. Leukocytosis, unspecified type        PLAN:  Admit/obs    ED COURSE: The patients hospital course has been uncomplicated.

## 2017-04-10 NOTE — CONSULTS
Name: Kimberlee 788: [address]   : 1945 Admit Date: 4/10/2017   Phone: [phone]  Room: [facility] [place]   PCP: Elizabeth Griffith MD  MRN: [id number]   Date: 4/10/2017  Code: Full Code        HPI:    Chart and notes reviewed. Data reviewed. I review the patient's current medications in the medical record at each encounter. I have evaluated and examined the patient. 11:23 AM       History was obtained from patient. History of Present Illness:    Mr. Mone Tobias presents with SOB that worsened yesterday with cough and wheezing. Denies fever, chills, N/V/D. Hypoxic on arrival to ED. Needed BiPAP. Off BiPAP now and on 2L NC this morning. He has a history of COPD, DM, dementia, exertional and nocturnal hypoxia (2L NC), PNA, and former smoker. He is managed on Breo 200, Incruse, Mucinex, ProAir/DuoNebs. He has not been feeling well beginning in 2016. He was at [facility] in 2016 and had a bronchoscopy completed which was positive for influenza A. He was discharged and then went to [address] [place] [address] in 2017 for recurring symptoms. Treated over the last few months with multiple prednisone tapers, azithromycin, Cefepime, and Levaquin x 2. Afebrile  BP stable  Oxygen saturations 93% on 2L NC  WBC 16.4  HgB 12.1  K 4.3  Mag 2.4  ABG pH 7.32, pCO2 45, pO2 323, HCO3 23, sO2 100 on BiPAP 12/6 FiO2 60%  BC pending    Images: CXR showed no acute process.        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 removed     CHEST SURGERY PROCEDURE UNLISTED  2010    RVATS, Bronchoscopy    HX APPENDECTOMY      HX ORTHOPAEDIC      Cervical Discectomy  HX ORTHOPAEDIC      Left Arm Multiple Surgeries for Infection    HX ORTHOPAEDIC  2006    right heel fracture    TOTAL HIP ARTHROPLASTY  2007    left       Family History   Problem Relation Age of Onset    Family history unknown: Yes       Social History   Substance Use Topics    Smoking status: Former Smoker     Packs/day: 2.00     Years: 20.00     Quit date: 4/30/1970    Smokeless tobacco: Never Used    Alcohol use No       Allergies   Allergen Reactions    Trazodone Other (comments)     Caused penile erection that needed surgical correction       Current Facility-Administered Medications   Medication Dose Route Frequency    sodium chloride (NS) flush 5-10 mL  5-10 mL IntraVENous Q8H    sodium chloride (NS) flush 5-10 mL  5-10 mL IntraVENous PRN    albuterol-ipratropium (DUO-NEB) 2.5 mg-0.5 mg/3 ml nebulizer solution        azithromycin (ZITHROMAX) 500 mg in 0.9% sodium chloride (MBP/ADV) 250 mL  500 mg IntraVENous Q24H    aspirin chewable tablet 81 mg  81 mg Oral DAILY    atorvastatin (LIPITOR) tablet 10 mg  10 mg Oral DAILY    DULoxetine (CYMBALTA) capsule 60 mg  60 mg Oral DAILY    fluticasone-vilanterol (BREO ELLIPTA) 100mcg-25mcg/puff  1 Puff Inhalation DAILY    gabapentin (NEURONTIN) capsule 600 mg  600 mg Oral QID    metFORMIN (GLUCOPHAGE) tablet 500 mg  500 mg Oral DAILY    oxyCODONE-acetaminophen (PERCOCET 7.5) 7.5-325 mg per tablet 1 Tab  1 Tab Oral Q6H PRN    naloxone (NARCAN) injection 0.4 mg  0.4 mg IntraVENous PRN    zolpidem (AMBIEN) tablet 5 mg  5 mg Oral QHS PRN    glucose chewable tablet 16 g  4 Tab Oral PRN    dextrose (D50W) injection syrg 12.5-25 g  12.5-25 g IntraVENous PRN    glucagon (GLUCAGEN) injection 1 mg  1 mg IntraMUSCular PRN    0.9% sodium chloride infusion  50 mL/hr IntraVENous CONTINUOUS    acetaminophen (TYLENOL) tablet 650 mg  650 mg Oral Q4H PRN    diphenhydrAMINE (BENADRYL) capsule 25 mg  25 mg Oral Q4H PRN    ondansetron (ZOFRAN) injection 4 mg 4 mg IntraVENous Q4H PRN    enoxaparin (LOVENOX) injection 40 mg  40 mg SubCUTAneous Q24H    insulin lispro (HUMALOG) injection   SubCUTAneous AC&HS    albuterol-ipratropium (DUO-NEB) 2.5 MG-0.5 MG/3 ML  3 mL Nebulization Q4H PRN    methylPREDNISolone (PF) (SOLU-MEDROL) injection 80 mg  80 mg IntraVENous Q8H    albuterol-ipratropium (DUO-NEB) 2.5 MG-0.5 MG/3 ML  3 mL Nebulization Q4H RT         REVIEW OF SYSTEMS   Negative except as stated in the HPI. Physical Exam:   Visit Vitals    /57    Pulse 76    Temp 98.7 °F (37.1 °C)    Resp 20    Ht 6' 1\" (1.854 m)    Wt 66.3 kg (146 lb 2.6 oz)    SpO2 95%    BMI 19.28 kg/m2       General:  Alert, cooperative, no distress, appears stated age. Head:  Normocephalic, without obvious abnormality, atraumatic. Eyes:  Conjunctivae/corneas clear. Nose: Nares normal. Septum midline. Mucosa normal.    Throat: Lips, mucosa, and tongue normal.    Neck: Supple, symmetrical, trachea midline, no adenopathy. Lungs:   Diminished bilaterally with scattered wheezing   Chest wall:  No tenderness or deformity. Heart:  Regular rate and rhythm, S1, S2 normal, no murmur, click, rub or gallop. Abdomen:   Soft, non-tender. Bowel sounds normal. No masses,  No organomegaly. Extremities: Extremities normal, atraumatic, no cyanosis or edema. Pulses: 2+ and symmetric all extremities.    Skin: Skin color, texture, turgor normal. No rashes or lesions   Lymph nodes: Cervical, supraclavicular nodes normal.   Neurologic: Grossly nonfocal       Lab Results   Component Value Date/Time    Sodium 139 04/10/2017 02:40 AM    Potassium 4.3 04/10/2017 02:40 AM    Chloride 103 04/10/2017 02:40 AM    CO2 24 04/10/2017 02:40 AM    BUN 13 04/10/2017 02:40 AM    Creatinine 0.85 04/10/2017 02:40 AM    Glucose 110 04/10/2017 02:40 AM    Calcium 8.9 04/10/2017 02:40 AM    Magnesium 2.4 04/10/2017 02:40 AM    Phosphorus 3.3 02/02/2010 02:25 AM    Lactic acid 0.9 12/05/2016 11:43 AM Lab Results   Component Value Date/Time    WBC 16.4 04/10/2017 02:40 AM    HGB 12.1 04/10/2017 02:40 AM    PLATELET 436 89/30/6885 02:40 AM    MCV 93.3 04/10/2017 02:40 AM       Lab Results   Component Value Date/Time    INR 1.1 12/06/2016 11:05 AM    aPTT 33.3 12/06/2016 11:05 AM    AST (SGOT) 23 04/10/2017 02:40 AM    Alk.  phosphatase 69 04/10/2017 02:40 AM    Protein, total 7.4 04/10/2017 02:40 AM    Albumin 3.6 04/10/2017 02:40 AM    Globulin 3.8 04/10/2017 02:40 AM       No results found for: IRON, FE, TIBC, IBCT, PSAT, FERR    Lab Results   Component Value Date/Time    C-Reactive protein >25.00 02/02/2010 02:25 AM    TSH 1.06 06/21/2016 06:48 AM        Lab Results   Component Value Date/Time    PH 7.32 (L) 04/10/2017 02:30 AM    PCO2 45 04/10/2017 02:30 AM    PO2 323 (H) 04/10/2017 02:30 AM    HCO3 23 04/10/2017 02:30 AM    FIO2 60 04/10/2017 02:30 AM       Lab Results   Component Value Date/Time     05/16/2011 02:10 PM    CK-MB Index 0.7 12/06/2016 02:37 AM    Troponin-I, Qt. 0.12 12/06/2016 02:37 AM        Lab Results   Component Value Date/Time    Culture result: NO GROWTH AFTER 6 HOURS 04/10/2017 02:40 AM    Culture result: FEW  NORMAL RESPIRATORY YEIMY   12/07/2016 03:53 PM    Culture result: SCANT  YEAST   12/07/2016 03:53 PM       No results found for: TOXA1, RPR, HBCM, HBSAG, HAAB, HCAB, HCAB1, HAAT, G6PD, CRYAC, HIVGT, HIVR, HIV1, HIV12, HIVPC, HIVRPI    Lab Results   Component Value Date/Time    Vancomycin,trough 14.2 02/06/2010 09:30 PM    Vancomycin,trough 11.3 02/04/2010 02:50 AM     05/16/2011 02:10 PM     05/16/2011 11:40 AM       Lab Results   Component Value Date/Time    Color YELLOW/STRAW 12/05/2016 09:38 AM    Appearance CLEAR 12/05/2016 09:38 AM    pH (UA) 6.0 12/05/2016 09:38 AM    Protein 30 12/05/2016 09:38 AM    Glucose NEGATIVE  12/05/2016 09:38 AM    Ketone NEGATIVE  12/05/2016 09:38 AM    Bilirubin NEGATIVE  12/05/2016 09:38 AM    Blood MODERATE 12/05/2016 09:38 AM    Urobilinogen 1.0 12/05/2016 09:38 AM    Nitrites NEGATIVE  12/05/2016 09:38 AM    Leukocyte Esterase NEGATIVE  12/05/2016 09:38 AM    WBC 0-4 12/05/2016 09:38 AM    RBC 0-5 12/05/2016 09:38 AM    Bacteria NEGATIVE  12/05/2016 09:38 AM       IMPRESSION  · Acute hypoxia respiratory failure r/t COPD exacerbation  · Exertional and nocturnal hypoxia (2L NC)  · Chronic bronchitis  · DM  · Dementia    PLAN  · Maintain oxygen saturations >90%  · Respiratory viral panel ordered  · Continue Breo 200  · Hold home Incruse while on nebs  · Wean IV Solumedrol to 80 mg Q8  · Continue Azithromycin  · Continue nebs Q4  · PT/OT  · GI prophylaxis: not indicated  · DVT prophylaxis: Lovenox      Thank you for allowing us to participate in the care of this patient. We will be happy to follow along in his progress with you.     Jay Jay Aceves NP

## 2017-04-10 NOTE — PROGRESS NOTES
Patient became extremely anxious and started hollering for help from his room. RN and other staff entered room and found patient upset and SOB. RT was present, patient on Kristan Mendez RN present as well. Notified Dr. Radha Green for anxiety meds, waiting a return call. Will also move patient closer to nurses station. Will cont. To check frequently on patient and cont. To monitor.

## 2017-04-10 NOTE — PROGRESS NOTES
Cooper Mobley mack [place] [address]  [address] [facility], [address] [place] [address]  [phone]      Medical Progress Note      NAME: Nguyễn Fernandez   :  1945  MRM:  [id number]    Date/Time: 4/10/2017  3:04 PM         Subjective:     Chief Complaint:  I had anxiety    Rapid improvement of respiratory status overnight. Currently feels his breathing is close to baseline. Had an anxiety attack earlier today because he needed help and was unable to contact the nurses          Objective:       Vitals:          Last 24hrs VS reviewed since prior progress note.  Most recent are:    Visit Vitals    /72 (BP 1 Location: Right arm, BP Patient Position: At rest)    Pulse 71    Temp 98.5 °F (36.9 °C)    Resp 20    Ht 6' 1\" (1.854 m)    Wt 66.3 kg (146 lb 2.6 oz)    SpO2 96%    BMI 19.28 kg/m2     SpO2 Readings from Last 6 Encounters:   04/10/17 96%   17 97%   17 95%   17 96%   16 93%   16 97%    O2 Flow Rate (L/min): 2 l/min     Intake/Output Summary (Last 24 hours) at 04/10/17 1505  Last data filed at 04/10/17 1547   Gross per 24 hour   Intake              480 ml   Output              300 ml   Net              180 ml          Exam:     Physical Exam:    Gen:  Well-developed, well-nourished, in no acute distress  HEENT:  Pink conjunctivae, PERRL, hearing intact to voice, moist mucous membranes  Neck:  Supple, without masses, thyroid non-tender  Resp:  No accessory muscle use, clear breath sounds without wheezes rales or rhonchi  Card:  No murmurs, normal S1, S2 without thrills, bruits or peripheral edema  Abd:  Soft, non-tender, non-distended, normoactive bowel sounds are present  Musc:  No cyanosis or clubbing  Skin:  No rashes or ulcers, skin turgor is good  Neuro:  Cranial nerves 3-12 are grossly intact,  strength is 5/5 bilaterally and dorsi / plantarflexion is 5/5 bilaterally, follows commands appropriately  Psych:  Good insight, oriented to person, place and time, alert           Medications Reviewed: (see below)    Lab Data Reviewed: (see below)    ______________________________________________________________________    Medications:     Current Facility-Administered Medications   Medication Dose Route Frequency    sodium chloride (NS) flush 5-10 mL  5-10 mL IntraVENous Q8H    sodium chloride (NS) flush 5-10 mL  5-10 mL IntraVENous PRN    azithromycin (ZITHROMAX) 500 mg in 0.9% sodium chloride (MBP/ADV) 250 mL  500 mg IntraVENous Q24H    aspirin chewable tablet 81 mg  81 mg Oral DAILY    atorvastatin (LIPITOR) tablet 10 mg  10 mg Oral DAILY    DULoxetine (CYMBALTA) capsule 60 mg  60 mg Oral DAILY    gabapentin (NEURONTIN) capsule 600 mg  600 mg Oral QID    metFORMIN (GLUCOPHAGE) tablet 500 mg  500 mg Oral DAILY    oxyCODONE-acetaminophen (PERCOCET 7.5) 7.5-325 mg per tablet 1 Tab  1 Tab Oral Q6H PRN    naloxone (NARCAN) injection 0.4 mg  0.4 mg IntraVENous PRN    zolpidem (AMBIEN) tablet 5 mg  5 mg Oral QHS PRN    glucose chewable tablet 16 g  4 Tab Oral PRN    dextrose (D50W) injection syrg 12.5-25 g  12.5-25 g IntraVENous PRN    glucagon (GLUCAGEN) injection 1 mg  1 mg IntraMUSCular PRN    0.9% sodium chloride infusion  50 mL/hr IntraVENous CONTINUOUS    acetaminophen (TYLENOL) tablet 650 mg  650 mg Oral Q4H PRN    diphenhydrAMINE (BENADRYL) capsule 25 mg  25 mg Oral Q4H PRN    ondansetron (ZOFRAN) injection 4 mg  4 mg IntraVENous Q4H PRN    enoxaparin (LOVENOX) injection 40 mg  40 mg SubCUTAneous Q24H    insulin lispro (HUMALOG) injection   SubCUTAneous AC&HS    albuterol-ipratropium (DUO-NEB) 2.5 MG-0.5 MG/3 ML  3 mL Nebulization Q4H PRN    methylPREDNISolone (PF) (SOLU-MEDROL) injection 80 mg  80 mg IntraVENous Q8H    albuterol-ipratropium (DUO-NEB) 2.5 MG-0.5 MG/3 ML  3 mL Nebulization Q4H RT    [START ON 4/11/2017] fluticasone-vilanterol (BREO ELLIPTA) 200mcg-25mcg/puff  1 Puff Inhalation DAILY    LORazepam (ATIVAN) injection 0.5 mg  0.5 mg IntraVENous Q6H PRN    fentaNYL (DURAGESIC) 25 mcg/hr patch 1 Patch  1 Patch TransDERmal Q72H            Lab Review:     Recent Labs      04/10/17   0240   WBC  16.4*   HGB  12.1   HCT  39.2   PLT  213     Recent Labs      04/10/17   0240   NA  139   K  4.3   CL  103   CO2  24   GLU  110*   BUN  13   CREA  0.85   CA  8.9   MG  2.4   ALB  3.6   SGOT  23   ALT  25     No components found for: GLPOC         Assessment / Plan:     COPD exacerbation / Bronchitis, chronic / Leukocytosis: POA. Continue IV steroids weaned today, prn duonebs and azithromycin. advair for breo. Pulmonary following     Acute respiratory failure with hypoxia: with rapid improvement overnight. Pulmonary consult. Check 6 minute walk prior to discharge.      DM type 2 causing neurological disease: Diabetic diet and counseling.  SSI per protocol.  Continue home metformin. Check A1c.     Failure to Thrive in Adult - Needs nutritional support.     Spinal stenosis of lumbar region / Neuropathy / Post-polio syndrome / Osteoarthritis - Continue cymbalta and half dose of gabapentin. Fall precautions. PT/OT eval. Pt requesting fentanyl patch be restarted     Depression with anxiety / Fibromyalgia - Continue cymbalta     Hyperlipidemia - On atorvastatin at home. Check lipid panel.       Total time spent with patient: 30 [address] [place] [address] discussed with: Patient and Family    Discussed:  Care Plan    Prophylaxis:  Lovenox    Disposition:   PT, OT, RN           ___________________________________________________    Attending Physician: Patricia Sam MD

## 2017-04-10 NOTE — PROGRESS NOTES
I met with pt to discuss future discharge needs. I also discussed pt with attending to see if he thought he would be a candidate for inpatient pulmonary rehab. Per the intensivist,home health would be more appropriate for pt. than inpatient pulmonary rehab. Pt states he is on home oxygen through [address]. I reminded pt to have his wife bring in his portable tank when pt is discharged home. Pt states he had home health in the past through [facility] and was very happy with [facility]. Pt would like to have home health through [facility] again for skilled nursing for COPD management & PT for energy conservation. [facility] [address] [facility]. The plan is for pt to be transferred to remote telemetry today. Thank you.   Samantha Meza

## 2017-04-10 NOTE — PROGRESS NOTES
7773 Received telephone report on this pt from Meme ED RN. Pt will go to room IVCU 3 upon arrival.  TRANSFER - IN REPORT:    Verbal report received from Meme (name) on [address]  being received from ED (unit) for routine progression of care      Report consisted of patients Situation, Background, Assessment and   Recommendations(SBAR). Information from the following report(s) SBAR, Kardex, ED Summary and Procedure Summary, MAR, lines, respiratory status, cardiac rhythm sinus rhythm was reviewed with the receiving nurse. Opportunity for questions and clarification was provided. Assessment completed upon patients arrival to unit and care assumed. 0615 Pt arrived to unit, into room 3.       0700 Bedside and Verbal shift change report given to Claire (oncoming nurse) by Taryn Sharp (offgoing nurse). Report included the following information SBAR, Kardex, ED Summary and Cardiac Rhythm sinus rhythm.

## 2017-04-10 NOTE — PROGRESS NOTES
6171: Assisting primary RN, performed dual skin assessment on admission to ICU. Primary Nurse Jayme Gregory RN and [employer], RN performed a dual skin assessment on this patient Impairment noted- see wound doc flow sheet  Salomón score is 17    0620: Assisting primary RN, paged Dr. Danyel Sesay regarding pts SOB on admission. Received orders for PRN Duo nebs Q4H.  RT at bedside to administer

## 2017-04-10 NOTE — PROGRESS NOTES
[facility]: MED RECONCILIATION    Comments/Recommendations:    Mr. Brandon Schwab states that he is responsible for his own medications and reports 100% adherence.  He states that he does not rinse his mouth out after using advair, but was aware of it, and will do so going forward.  Mr. Brandon Schwab was taking Breo inhaler in between but was advised to continue taking Advair by his pulmonologist, and so he has been taking advair instead.  In March, he completed a 5 day course of tamiflu and prednisone taper. Medications added:     · Ipratropium-albuterol nebulizer solution    Medications removed:    · None    Medications adjusted:    · Fentanyl patch dose changed from 50mcg to 25mcg and he reports that he changes it every 4 days     Information obtained from: Patient, RxQuery, EMR    Allergies: Trazodone    Prior to Admission Medications:   Prior to Admission Medications   Prescriptions Last Dose Informant Patient Reported? Taking? DULoxetine (CYMBALTA) 60 mg capsule 2017 at AM Self Yes Yes   Sig: Take 60 mg by mouth daily. albuterol (PROAIR HFA) 90 mcg/actuation inhaler 2017 at Unknown time Self Yes Yes   Sig: Take 2 Puffs by inhalation every four (4) hours as needed. albuterol-ipratropium (DUO-NEB) 2.5 mg-0.5 mg/3 ml nebu 2017 at Unknown time Self Yes Yes   Sig: 3 mL by Nebulization route every four (4) hours as needed (SOB). aspirin 81 mg chewable tablet 2017 at AM Self No Yes   Sig: Take 1 Tab by mouth daily. atorvastatin (LIPITOR) 10 mg tablet 2017 at AM Self Yes Yes   Sig: Take 10 mg by mouth daily. fentaNYL (DURAGESIC) 25 mcg/hr [facility] 2017 Self Yes Yes   Si Patch by TransDERmal route. Every 4 days (96 hours)   fluticasone-salmeterol (ADVAIR) 100-50 mcg/dose diskus inhaler 2017 at AM Self Yes Yes   Sig: Take 1 Puff by inhalation two (2) times a day.   gabapentin (NEURONTIN) 600 mg tablet 2017 at PM Self Yes Yes   Sig: Take 600 mg by mouth four (4) times daily. meloxicam (MOBIC) 15 mg tablet 2017 at AM Self Yes Yes   Sig: Take 15 mg by mouth daily. metFORMIN (GLUCOPHAGE) 500 mg tablet 2017 at AM Self Yes Yes   Sig: Take 500 mg by mouth daily. oxyCODONE-acetaminophen (PERCOCET 7.5) 7.5-325 mg per tablet 2017 at PM Self No Yes   Si tabs every  6 hrs as needed for pain. Maximum daily dose 4 tablets.       Facility-Administered Medications: None     Thank you,  Chandler Draper, PharmD     Contact: [phone]

## 2017-04-10 NOTE — PROGRESS NOTES
Problem: Mobility Impaired (Adult and Pediatric)  Goal: *Acute Goals and Plan of Care (Insert Text)  Physical Therapy Goals  Initiated 4/10/2017  1. Patient will move from supine to sit and sit to supine , scoot up and down and roll side to side in bed with independence within 7 day(s). 2. Patient will transfer from bed to chair and chair to bed with independence using the least restrictive device within 7 day(s). 3. Patient will perform sit to stand with modified independence within 7 day(s). 4. Patient will ambulate with modified independence for 100 feet with the least restrictive device within 7 day(s). PHYSICAL THERAPY EVALUATION  Patient: Sagrario Lindsey (42 y.o. male)  Date: 4/10/2017  Primary Diagnosis: COPD exacerbation (HCC)        Precautions: fall         ASSESSMENT :  Based on the objective data described below, the patient presents with generalized weakness and debility. Sit on he edge of bed CGA, ambulate with rolling walker mod assist. OOB to chair as tolerated educate and  Instructed patient to continue active range of motion exercise on both legs while up on chair or on bed. Patient on 2 liters oxygen saturations ranges between 95% to 92%. Notified nurse who agreed to monitor patient and getting ready to be transfer out of IVCU . Patient will benefit from skilled intervention to address the above impairments.   Patients rehabilitation potential is considered to be Excellent  Factors which may influence rehabilitation potential include:   [ ]         None noted  [ ]         Mental ability/status  [X]         Medical condition  [ ]         Home/family situation and support systems  [X]         Safety awareness  [ ]         Pain tolerance/management  [ ]         Other:        PLAN :  Recommendations and Planned Interventions:  [X]           Bed Mobility Training             [ ]    Neuromuscular Re-Education  [X]           Transfer Training                   [ ]    Orthotic/Prosthetic Training  [X]           Gait Training                         [ ]    Modalities  [X]           Therapeutic Exercises           [ ]    Edema Management/Control  [X]           Therapeutic Activities            [ ]    Patient and Family Training/Education  [ ]           Other (comment):     Frequency/Duration: Patient will be followed by physical therapy  5 times a week to address goals. Discharge Recommendations: Pulmonary Rehab  Further Equipment Recommendations for Discharge: already has DME's       SUBJECTIVE:   Patient stated I feel tired.       OBJECTIVE DATA SUMMARY:   HISTORY:    Past Medical History:   Diagnosis Date    Anxiety and depression      Arthritis       All joints    Bronchitis, chronic (HCC)      Dementia      DM type 2 causing neurological disease ([facility] [address]      Fibromyalgia      Hyperlipidemia      Neuropathy      Post-polio syndrome       Past Surgical History:   Procedure Laterality Date    ABDOMEN SURGERY PROC UNLISTED   2/2010     Feeding Tube. removed 2011    CHEST SURGERY PROCEDURE UNLISTED   2/2010     RVATS, Bronchoscopy    HX APPENDECTOMY        HX ORTHOPAEDIC   2000     Cervical Discectomy    HX ORTHOPAEDIC         Left Arm Multiple Surgeries for Infection    HX ORTHOPAEDIC   2006     right heel fracture    TOTAL HIP ARTHROPLASTY   2007     left     Prior Level of Function/Home Situation: modified independent at home with occasional use of single point cane and rolling walker.    Personal factors and/or comorbidities impacting plan of care:      Home Situation  Home Environment: Private residence  # Steps to Enter: 3  One/Two Story Residence: Two story  # of Interior Steps: 14  Living Alone: No  Support Systems: Spouse/Significant Other/Partner  Patient Expects to be Discharged to[de-identified] Unknown  Current DME Used/Available at Home: Walker, rolling, Wheelchair, Jena Ly, straight, Commode, bedside, Shower chair (scooter; L LE AFO)  Tub or Shower Type: Tub/Shower combination EXAMINATION/PRESENTATION/DECISION MAKING:   Critical Behavior:  Neurologic State: Alert  Orientation Level: Oriented X4 (confusion at times)  Cognition: Follows commands, Appropriate safety awareness, Appropriate for age attention/concentration     Hearing: Auditory  Auditory Impairment: Hard of hearing, bilateral, Hearing aid(s)  Hearing Aids/Status: With patient     Range Of Motion:  AROM: Within functional limits           PROM: Within functional limits           Strength:    Strength: Generally decreased, functional                    Tone & Sensation:                                  Coordination:  Coordination: Within functional limits  Vision:      Functional Mobility:  Bed Mobility:  Rolling: Contact guard assistance  Supine to Sit: Contact guard assistance  Sit to Supine: Contact guard assistance  Scooting: Contact guard assistance  Transfers:  Sit to Stand: Moderate assistance  Stand to Sit: Moderate assistance  Stand Pivot Transfers: Moderate assistance     Bed to Chair: Moderate assistance              Balance:   Sitting: Intact  Standing: Impaired; With support  Standing - Static: Fair  Standing - Dynamic : Fair  Ambulation/Gait Training:  Distance (ft): 5 Feet (ft)  Assistive Device: Walker, rolling;Gait belt  Ambulation - Level of Assistance: Moderate assistance     Gait Description (WDL): Exceptions to WDL  Gait Abnormalities: Path deviations; Step to gait        Base of Support: Widened                          Therapeutic Exercises:    Instructed patient to continue active range of motion exercise on both legs while up on chair or on bed.          Functional Measure:  Tinetti test:      Sitting Balance: 1  Arises: 1  Attempts to Rise: 2  Immediate Standing Balance: 1  Standing Balance: 1  Nudged: 1  Eyes Closed: 1  Turn 360 Degrees - Continuous/Discontinuous: 0  Turn 360 Degrees - Steady/Unsteady: 0  Sitting Down: 1  Balance Score: 9  Indication of Gait: 1  R Step Length/Height: 0  L Step Length/Height: 0  R Foot Clearance: 1  L Foot Clearance: 1  Step Symmetry: 0  Step Continuity: 0  Path: 1  Trunk: 1  Walking Time: 0  Gait Score: 5  Total Score: 14         Tinetti Test and G-code impairment scale:  Percentage of Impairment CH     0%    CI     1-19% CJ     20-39% CK     40-59% CL     60-79% CM     80-99% CN      100%   Tinetti  Score 0-28 28 23-27 17-22 12-16 6-11 1-5 0          Tinetti Tool Score Risk of Falls  <19 = High Fall Risk  19-24 = Moderate Fall Risk  25-28 = Low Fall Risk  Tinetti ME. Performance-Oriented Assessment of Mobility Problems in Elderly Patients. [facility]; [id number]. (Scoring Description: PT Bulletin Feb. 10, 1993)     Older adults: Yakelin Jane et al, 2009; n = 1000 [facility] elderly evaluated with ABC, BETHANY, ADL, and IADL)  · Mean BETHANY score for males aged 69-68 years = 26.21(3.40)  · Mean BETHANY score for females age 69-68 years = 25.16(4.30)  · Mean BETHANY score for males over 80 years = 23.29(6.02)  · Mean BETHANY score for females over 80 years = 17.20(8.32)         G codes: In compliance with CMSs Claims Based Outcome Reporting, the following G-code set was chosen for this patient based on their primary functional limitation being treated: The outcome measure chosen to determine the severity of the functional limitation was the Tinetti with a score of 14/28 which was correlated with the impairment scale.       · Mobility - Walking and Moving Around:               - CURRENT STATUS:    CK - 40%-59% impaired, limited or restricted               - GOAL STATUS:           CJ - 20%-39% impaired, limited or restricted               - D/C STATUS:                       ---------------To be determined---------------      Physical Therapy Evaluation Charge Determination   History Examination Presentation Decision-Making   MEDIUM  Complexity : 1-2 comorbidities / personal factors will impact the outcome/ POC  LOW Complexity : 1-2 Standardized tests and measures addressing body structure, function, activity limitation and / or participation in recreation  MEDIUM Complexity : Evolving with changing characteristics  Other outcome measures tinetti  HIGH       Based on the above components, the patient evaluation is determined to be of the following complexity level: MEDIUM     Pain:  Pain Scale 1: Numeric (0 - 10)  Pain Intensity 1: 0              Activity Tolerance:   Good. Please refer to the flowsheet for vital signs taken during this treatment. After treatment:   [X]         Patient left in no apparent distress sitting up in chair  [ ]         Patient left in no apparent distress in bed  [X]         Call bell left within reach  [X]         Nursing notified  [ ]         Caregiver present  [ ]         Bed alarm activated      COMMUNICATION/EDUCATION:   The patients plan of care was discussed with: Occupational Therapist, Registered Nurse,  and patient. [X]         Fall prevention education was provided and the patient/caregiver indicated understanding. [X]         Patient/family have participated as able in goal setting and plan of care. [X]         Patient/family agree to work toward stated goals and plan of care. [ ]         Patient understands intent and goals of therapy, but is neutral about his/her participation. [ ]         Patient is unable to participate in goal setting and plan of care. Thank you for this referral.  Jonathan Ramos, PT,WCC.    Time Calculation: 24 mins

## 2017-04-10 NOTE — H&P
[address]  [phone]    Hospitalist Admission Note      NAME:  Judith Honeycutt   :   1945   MRN:  [id number]     PCP:  Tiago Burgess MD     Date/Time:  4/10/2017 3:29 AM          Subjective:     CHIEF COMPLAINT: dyspnea     HISTORY OF PRESENT ILLNESS:     Mr. Jc Cortez is a 74 y.o.  male who presented to the Emergency Department complaining of dyspnea. He is a poor historian due to dementia and illness. He was dyspneic tonight, and EMS found him hypoxic, requiring use of CPAP en route and he was placed on BiPAP here. ABG showed acidosis. He reports poor breathing for 6 months. Worse since 3 days, not improved by nebs, had appointment with pulmonary today. Workup otherwise unremarkable. Hx of COPD. We will admit him for management. Past Medical History:   Diagnosis Date    Anxiety and depression     Arthritis     All joints    Bronchitis, chronic (HCC)     Dementia     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)     Fibromyalgia     Hyperlipidemia     Neuropathy     Post-polio syndrome         Past Surgical History:   Procedure Laterality Date    ABDOMEN SURGERY PROC UNLISTED  2010    Feeding Tube. removed     CHEST SURGERY PROCEDURE UNLISTED  2010    RVATS, Bronchoscopy    HX APPENDECTOMY      HX ORTHOPAEDIC      Cervical Discectomy    HX ORTHOPAEDIC      Left Arm Multiple Surgeries for Infection    HX ORTHOPAEDIC  2006    right heel fracture    TOTAL HIP ARTHROPLASTY  2007    left       Social History   Substance Use Topics    Smoking status: Former Smoker     Packs/day: 2.00     Years: 20.00     Quit date: 1970    Smokeless tobacco: Never Used    Alcohol use No        Family History   Problem Relation Age of Onset    Family history unknown:  Yes        Allergies   Allergen Reactions    Trazodone Other (comments)     Caused penile erection that needed surgical correction        Prior to Admission medications    Medication Sig Start Date End Date Taking? Authorizing Provider   aspirin 81 mg chewable tablet Take 1 Tab by mouth daily. 12/9/16   Monty Anderson MD   gabapentin (NEURONTIN) 600 mg tablet Take 600 mg by mouth four (4) times daily. Historical Provider   fentaNYL (DURAGESIC) 50 mcg/hr PATCH 1 Patch by TransDERmal route every seventy-two (72) hours. Historical Provider   fluticasone-salmeterol (ADVAIR) 100-50 mcg/dose diskus inhaler Take 1 Puff by inhalation two (2) times a day. Historical Provider   oxyCODONE-acetaminophen (PERCOCET 7.5) 7.5-325 mg per tablet 1 tabs every  6 hrs as needed for pain. Maximum daily dose 4 tablets. 8/30/15   Chelle Garcia MD   atorvastatin (LIPITOR) 10 mg tablet Take 10 mg by mouth daily. Historical Provider   meloxicam (MOBIC) 15 mg tablet Take 15 mg by mouth daily. Olinda Whitlock MD   albuterol (PROAIR HFA) 90 mcg/actuation inhaler Take 2 Puffs by inhalation every four (4) hours as needed. Olinda Whitlock MD   DULoxetine (CYMBALTA) 60 mg capsule Take 60 mg by mouth daily. Historical Provider   metFORMIN (GLUCOPHAGE) 500 mg tablet Take 500 mg by mouth daily.     Historical Provider       Review of Systems:  (bold if positive, if negative)    Gen:  Eyes:  ENT:  CVS:  Pulm:  CoughdyspneasputumwheezingGI:    :    MS:  arthritisSkin:  Psych:  depression, anxietyEndo:    Hem:  Renal:    Neuro:        Objective:      VITALS:    Vital signs reviewed; most recent are:    Visit Vitals    /68    Pulse 70    Temp 96.4 °F (35.8 °C)    Resp 21    Ht 6' 1\" (1.854 m)    Wt 68 kg (150 lb)    SpO2 98%    BMI 19.79 kg/m2     SpO2 Readings from Last 6 Encounters:   04/10/17 98%   01/12/17 97%   01/05/17 95%   01/03/17 96%   12/30/16 93%   12/30/16 97%        No intake or output data in the 24 hours ending 04/10/17 2292     Exam:     Physical Exam:    Gen:  Thin, frail, in no acute distress  HEENT:  Pink conjunctivae, PERRL, hearing intact to voice, moist mucous membranes  Neck:  Supple, without masses, thyroid non-tender  Resp:  Mild accessory muscle use, bilateral breath sounds with wheezes  Card:  No murmurs, normal S1, S2 without thrills, bruits or peripheral edema  Abd:  Soft, non-tender, non-distended, normoactive bowel sounds are present, no mass  Lymph:  No cervical or inguinal adenopathy  Musc:  No cyanosis or clubbing  Skin:  No rashes or ulcers, skin turgor is good  Neuro:  Cranial nerves are grossly intact, general motor weakness, follows commands vaguely  Psych:  Poor insight, oriented to person, place and time, alert     Labs:    Recent Labs      04/10/17   0240   WBC  16.4*   HGB  12.1   HCT  39.2   PLT  213     Recent Labs      04/10/17   0240   NA  139   K  4.3   CL  103   CO2  24   GLU  110*   BUN  13   CREA  0.85   CA  8.9   MG  2.4   ALB  3.6   TBILI  0.4   SGOT  23   ALT  25     Lab Results   Component Value Date/Time    Glucose (POC) 180 12/09/2016 11:06 AM    Glucose (POC) 83 12/09/2016 07:29 AM     Recent Labs      04/10/17   0230   PH  7.32*   PCO2  45   PO2  323*   HCO3  23   FIO2  60     No results for input(s): INR in the last 72 hours. No lab exists for component: INREXT  All Micro Results     Procedure Component Value Units Date/Time    CULTURE, BLOOD [[id number]] Collected:  04/10/17 0240    Order Status:  Completed Specimen:  Blood Updated:  04/10/17 0248          I have reviewed previous records       Assessment and Plan:      COPD exacerbation / Bronchitis, chronic / Leukocytosis - POA, recurrent, unclear trigger. Continue IV steroids, prn duonebs. Continue Advair. Acute respiratory failure with hypoxia - Continue BiPAP as needed, wean to oxygen as tolerated. Admit to step-down with high risk of deterioration to intubation. Pulmonary consult. Check 6 minute walk prior to discharge.      DM type 2 causing neurological disease - Diabetic diet and counseling.  SSI per protocol.  Continue home metformin. Check A1c. Failure to Thrive in Adult - Needs nutritional support. Spinal stenosis of lumbar region / Neuropathy / Post-polio syndrome / Osteoarthritis - Continue cymbalta and half dose of gabapentin. Fall precautions. PT/OT eval.  At home has taken mobic, fentanyl patch and percocet. Depression with anxiety / Fibromyalgia - Continue cymbalta    Hyperlipidemia - On atorvastatin at home. Check lipid panel. Dementia - I suspect mild undiagnosed dementia. He handles his own meds, and throws them together in one bottle.       Telemetry reviewed:   normal sinus rhythm    Risk of deterioration: high      Total time spent with patient: 79 [address] discussed with: Patient, Nursing Staff and >50% of time spent in counseling and coordination of care    Discussed:  Care Plan       ___________________________________________________    Attending Physician: Jimmy Eagle MD

## 2017-04-10 NOTE — ED NOTES
TRANSFER - OUT REPORT:    Verbal report given to MADI Pacheco(name) on Karen Cabrera  being transferred to ICU 3(unit) for routine progression of care       Report consisted of patients Situation, Background, Assessment and   Recommendations(SBAR). Information from the following report(s) SBAR, Kardex, ED Summary, MAR, Recent Results and Cardiac Rhythm NSR was reviewed with the receiving nurse. Lines:   Peripheral IV 12/07/16 Right Forearm (Active)       Peripheral IV 12/07/16 Left Forearm (Active)       Peripheral IV 04/10/17 Left Antecubital (Active)   Site Assessment Clean, dry, & intact 4/10/2017  2:22 AM   Phlebitis Assessment 0 4/10/2017  2:22 AM   Infiltration Assessment 0 4/10/2017  2:22 AM   Dressing Status Clean, dry, & intact 4/10/2017  2:22 AM   Dressing Type Transparent 4/10/2017  2:22 AM   Hub Color/Line Status Pink;Flushed;Patent 4/10/2017  2:22 AM       Peripheral IV 04/10/17 Left Forearm (Active)   Site Assessment Clean, dry, & intact 4/10/2017  2:49 AM   Phlebitis Assessment 0 4/10/2017  2:49 AM   Infiltration Assessment 0 4/10/2017  2:49 AM   Dressing Status Clean, dry, & intact 4/10/2017  2:49 AM   Hub Color/Line Status Pink;Flushed 4/10/2017  2:49 AM   Action Taken Blood drawn 4/10/2017  2:49 AM        Opportunity for questions and clarification was provided.       Patient transported with:   Monitor  Registered Nurse   Nonrebreather/BiPAP

## 2017-04-10 NOTE — PROGRESS NOTES
Nutrition Assessment:    RECOMMENDATIONS/INTERVENTION(S):   Continue Diabetic/Cardiac diet  Adjust ONS-add Ensure HP BID (chocolate) and Mighty Shake (vanilla) once daily;  D/C Glucerna Shakes  Monitor BG, electrolytes  Will continue to monitor appetite/PO intake, diet tolerance, and acceptance of ONS    ASSESSMENT:   Noted consult. Chart reviewed. This is a 75 yo male admitted with acute respiratory failure, exac COPD, FTT. Hx DM, OA, HLD, dementia. Visited pt this afternoon, pleasant and talkative. Reports feeling ill with poor PO intake and weight loss since November, \"my clothes are too big. \"  Pt was unable to state amount of wt loss and timeframe. Observed temporal, clavicular, and BLE muscle wasting. Agreeable to try ONS while here in hospital.  Emphasized importance of adequate Kcal/Pro intake in relation to wt loss and COPD. Coupons for oral supplement provided. Labs/Meds reviewed. IVF infusing. On Solumedrol. -123. New A1c ordered, last 6.4% 12/2016. Brittaney blevins.      Meets Criteria for Severe Chronic Malnutrition as evidenced by:   [x] Severe muscle wasting, loss of subcutaneous fat   [x] Nutritional intake of <75% of recommended intake for >1 month   [] Weight loss of  >5% in 1 month, >7.5% in 3 months, >10% in 6 months, >20% in 1 year   [] Severe edema           SUBJECTIVE/OBJECTIVE:     Diet Order: Consistent carb, Cardiac (1800 calorie)  % Eaten:  Patient Vitals for the past 72 hrs:   % Diet Eaten   04/10/17 0927 75 %     Pertinent Medications: [] Reviewed    Chemistries:  Lab Results   Component Value Date/Time    Sodium 139 04/10/2017 02:40 AM    Potassium 4.3 04/10/2017 02:40 AM    Chloride 103 04/10/2017 02:40 AM    CO2 24 04/10/2017 02:40 AM    Anion gap 12 04/10/2017 02:40 AM    Glucose 110 04/10/2017 02:40 AM    BUN 13 04/10/2017 02:40 AM    Creatinine 0.85 04/10/2017 02:40 AM    BUN/Creatinine ratio 15 04/10/2017 02:40 AM    GFR est AA >60 04/10/2017 02:40 AM    GFR est non-AA >60 04/10/2017 02:40 AM    Calcium 8.9 04/10/2017 02:40 AM    AST (SGOT) 23 04/10/2017 02:40 AM    Alk. phosphatase 69 04/10/2017 02:40 AM    Protein, total 7.4 04/10/2017 02:40 AM    Albumin 3.6 04/10/2017 02:40 AM    Globulin 3.8 04/10/2017 02:40 AM    A-G Ratio 0.9 04/10/2017 02:40 AM    ALT (SGPT) 25 04/10/2017 02:40 AM      Anthropometrics: Height: 6' 1\" (185.4 cm) Weight: 66.3 kg (146 lb 2.6 oz)    IBW (%IBW): 83.6 kg (184 lb 4.9 oz) ( ) UBW (%UBW):   (  %)    BMI: Body mass index is 19.28 kg/(m^2). This BMI is indicative of:   [x] Underweight-for age    [] Normal    [] Overweight    []  Obesity    []  Extreme Obesity (BMI>40)  Estimated Nutrition Needs (Based on): 2144 Kcals/day (BMR (1457) x 1.3 AF + 250) , 84 g (-101 g/d (1.0-1.2 g/Kg IBW)) Protein  Carbohydrate: At Least 130 g/day  Fluids: 2150 mL/day    Last BM: ?   [x]Active     []Hyperactive  []Hypoactive       [] Absent   BS  Skin:    [x] Intact   [] Incision  [] Breakdown   [] DTI   [] Tears/Excoriation/Abrasion  []Edema [] Other:    Wt Readings from Last 30 Encounters:   04/10/17 66.3 kg (146 lb 2.6 oz)   01/03/17 66.6 kg (146 lb 12.8 oz)   12/11/16 68 kg (150 lb)   12/08/16 65.5 kg (144 lb 6.4 oz)   06/20/16 74.8 kg (165 lb)   08/29/15 79.4 kg (175 lb)   08/30/15 79.4 kg (175 lb)   08/28/15 72.6 kg (160 lb)   12/05/14 76.2 kg (168 lb)   07/22/14 77.3 kg (170 lb 6 oz)   03/27/14 77.1 kg (170 lb)   03/24/14 76.7 kg (169 lb 1 oz)   03/20/14 76.7 kg (169 lb 1 oz)   03/12/14 79.4 kg (175 lb)   05/16/11 79.4 kg (175 lb)   05/13/11 80.3 kg (177 lb)   04/23/11 81.6 kg (180 lb)   05/14/10 66.7 kg (147 lb)   04/30/10 67.3 kg (148 lb 6 oz)      NUTRITION DIAGNOSES:   Problem:  Underweight Inadequate protein-energy intake   Etiology: related to hx COPD     Signs/Symptoms: as evidenced by BMI 19.3 clavicular, BLE, & temporal muscle wasting observed    NUTRITION INTERVENTIONS:  Meals/Snacks: General/healthful diet   Supplements: Commercial supplement GOAL:   Pt ward consume >=75% meals and >50% ONS in next 3-5 days    Cultural, Hindu, or Ethnic Dietary Needs: None     LEARNING NEEDS (Diet, Food/Nutrient-Drug Interaction):    [x] None Identified   [x] Identified and Education Provided/Documented---adequate Kcal/Pro intake   [] Identified and Pt declined/was not appropriate      [] Interdisciplinary Care Plan Reviewed/Documented    [x] Discharge Needs:    See dc order--coupons provided   [] No Nutrition Related Discharge Needs    NUTRITION RISK:   Pt Is At Nutrition Risk  [x]     No Nutrition Risk Identified  []       PT SEEN FOR:    [x]  MD Consult: []Calorie Count      []Diabetic Diet Education        []Diet Education     []Electrolyte Management     [x]General Nutrition Management and Supplements     []Management of Tube Feeding     []TPN Recommendations    [x]  RN Referral:  [x]MST score >=2     []Enteral/Parenteral Nutrition PTA     []Pregnant: Gestational DM or Multigestation                 [] Pressure Ulcer      []  Low BMI      []  Length of Stay       [] Dysphagia Diet         [] Ventilator      []  Follow-Up            Cynthia Garner Back, [address]   Pager [phone]

## 2017-04-10 NOTE — PROGRESS NOTES
0715 - Bedside and Verbal shift change report given to Osiris Toro RN (oncoming nurse) by Saima Rodriguez RN (offgoing nurse). Report included the following information SBAR, Kardex, Intake/Output, MAR, Recent Results and Cardiac Rhythm afib.   0802 - Pt transitioned to NC 2L and bipap removed. 0850  - Pt sitting up in bed eating breakfast. No c/o shortness of breath or pain. O2 sats 94% 2L NC.  1045 - PT/OT at bedside evaluating pt at this time. 1100 - TRANSFER - OUT REPORT:    Verbal report given to Ara Gipson RN-charge nurse(name) on Karen Quiroz  being transferred to [facility](unit) for routine progression of care       Report consisted of patients Situation, Background, Assessment and   Recommendations(SBAR). Information from the following report(s) SBAR, Kardex, Intake/Output, MAR, Recent Results and Cardiac Rhythm NSR was reviewed with the receiving nurse. Lines:   Peripheral IV 12/07/16 Right Forearm (Active)       Peripheral IV 12/07/16 Left Forearm (Active)       Peripheral IV 04/10/17 Left Antecubital (Active)   Site Assessment Clean, dry, & intact 4/10/2017  8:02 AM   Phlebitis Assessment 0 4/10/2017  8:02 AM   Infiltration Assessment 0 4/10/2017  8:02 AM   Dressing Status Clean, dry, & intact 4/10/2017  8:02 AM   Dressing Type Transparent;Tape 4/10/2017  8:02 AM   Hub Color/Line Status Pink; Infusing 4/10/2017  8:02 AM   Action Taken Open ports on tubing capped 4/10/2017  8:02 AM   Alcohol Cap Used Yes 4/10/2017  8:02 AM       Peripheral IV 04/10/17 Left Forearm (Active)   Site Assessment Clean, dry, & intact 4/10/2017  8:02 AM   Phlebitis Assessment 0 4/10/2017  8:02 AM   Infiltration Assessment 0 4/10/2017  8:02 AM   Dressing Status Clean, dry, & intact 4/10/2017  8:02 AM   Dressing Type Transparent;Tape 4/10/2017  8:02 AM   Hub Color/Line Status Pink;Flushed;Capped 4/10/2017  8:02 AM   Action Taken Open ports on tubing capped 4/10/2017  8:02 AM   Alcohol Cap Used Yes 4/10/2017  8:02 AM Opportunity for questions and clarification was provided.       Patient transported with:   Monitor  O2 @ 2 liters  Registered Nurse  [facility]

## 2017-04-10 NOTE — ED TRIAGE NOTES
Patient arrives via EMS with c/o SOB throughout the day; worsening tonight. History of COPD. Arrives on CPAP. Received a neb in route. RT at bedside to apply BIPAP.

## 2017-04-10 NOTE — DIABETES MGMT
DTC Consult Note     POC Glucose last 24hrs:     Lab Results   Component Value Date/Time     (H) 04/10/2017 02:40 AM    GLUCPOC 215 (H) 04/10/2017 11:39 AM    GLUCPOC 123 (H) 04/10/2017 07:46 AM        Recommendations/ Comments: Pt was agreeable to suggestions, engaged during interview. Was given an opportunity to ask questions. Verbalized understanding of suggestions. Wife also at bedside and was engaged and was given opportunities to ask questions. Consult received from Bonnie Virgen MD for  [x]    Assessment of home management  []    Meal planning  []    Meter / Monitoring  []    New Diagnosis  []    Insulin education  []    Insulin pump notification  []    Medication recommendations  []    Hospital glucose management  []    Katiana De Anda  []    Outpatient Education - Information forwarded to Ante Up who will follow-up with pt 1 week after discharge     Hospital (inpatient) medications:  1. Correction Scale: Lispro (Humalog) Insulin Resistant Sensitivity scale to cover for glucose > 139 mg/dL before meals and for glucose >199 at bedtime      2. Metformin 500 mg daily     Assessment / Plan:     Chart reviewed and initial evaluation complete on Karen Wilson . Radha Paul is a 75 yo  male with a past medical history significant for  DM type 2, per Dr. Bonnie Virgen MD's H&P dated 4/10/2017. Per patient, spouse/SO and past medical records home medications are  1. Metformin 500mg daily              A1C:   Lab Results   Component Value Date/Time    Hemoglobin A1c 6.4 12/05/2016 09:23 AM    Hemoglobin A1c 6.1 08/30/2015 02:40 AM       Assessed and provided patient verbal and/or written information on the following  · Diabetes: disease process   · Blood sugar goals   · Causes of high / low blood glucose and treatment  · Lab results: A1C - target   · Blood sugar monitoring: Mr. Josefa Mohamud does not monitor blood glucose at home.     · Use of sliding scale / correction formula  · Use of insulin to carbohydrate ratio  · Sharps disposal  · Sick day guidelines  · Meal planning: currently, pt eats meals, please see Renata [place]. ADAIR Spence's note   · Activity guidelines   · Foot care  · Chronic complications and Standards of Care  · Delayed healing        Provided patient with the following: [x]    Diabetes Self-Care Guide                [x]    Outpatient DTC contact number               []    Glucometer                [x]    Insulin Education Guide               []    Carbohydrate Counting Guide               []    Sample meal plan                 []    Chair exercises guide               []    [employer] Product Catalog      Thank you.     Khari Kate, Certified Diabetes Educator    [phone]

## 2017-04-10 NOTE — PROGRESS NOTES
Problem: Self Care Deficits Care Plan (Adult)  Goal: *Acute Goals and Plan of Care (Insert Text)  Occupational Therapy Goals  Initiated 4/10/2017  1. Patient will perform grooming with supervision/set-up standing at sink >5 minutes within 7 day(s). 2. Patient will perform upper body dressing and bathing with supervision/set-up within 7 day(s). 3. Patient will perform lower body dressing and bathing with minimal assistance within 7 day(s). 4. Patient will perform toilet transfers with minimal assistance with best technique within 7 day(s). 5. Patient will perform all aspects of toileting with minimal assistance within 7 day(s). 6. Patient will participate in upper extremity therapeutic exercise/activities with supervision/set-up for 10 minutes within 7 day(s). 7. Patient will utilize energy conservation techniques during functional activities with minimum verbal cues within 7 day(s). OCCUPATIONAL THERAPY EVALUATION  Patient: Brittany Chacon (36 y.o. male)  Date: 4/10/2017  Primary Diagnosis: COPD exacerbation ([facility])        Precautions: fall         ASSESSMENT :  Based on the objective data described below, the patient presents with decreased activity tolerance in completing ADLs and functional mobility. Patient was admitted on 4/10 for SOB due to COPD exacerbation. 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 He wears an AFO L LE at baseline with reports of progressive worsening L LE neuropathy. PTA, patient lives with his wife, reports he managed all ADLs independently, occasionally used a RW for transfers however mostly using no AD for transfers within the home. He does admit to a history of frequent falls; confirming 7-8 falls per week on last admission (Dec 2016). Patient expressing frustration and tearful during tx regarding declining health since Nov 2016.   He is agreeable to only minimal activity today stating, \"I need to get my health better before I can do anything else. \"  Patient required supervision for bed mobility and up to mod A for stand-pivot transfer to chair. He was impulsive with OOB activity and not agreeable to RW use. He requires increased time for recovery due to SOB; Cues of pacing and pursed lip breathing for recovery required. He remained on 2L O2 with all activity, SpO2 remaining >90%. Patient currently requires setup to min A for UB ADLs and mod to max A for LB ADLs, including donning L LE brace. Patient would benefit from continued skilled OT to progress towards goals and improve overall independence. Patient will benefit from skilled intervention to address the above impairments. Patients rehabilitation potential is considered to be Good  Factors which may influence rehabilitation potential include:   [ ]             None noted  [X]             Mental ability/status  [ ]             Medical condition  [ ]             Home/family situation and support systems  [ ]             Safety awareness  [ ]             Pain tolerance/management  [ ]             Other:        PLAN :  Recommendations and Planned Interventions:  [X]               Self Care Training                  [X]        Therapeutic Activities  [X]               Functional Mobility Training    [ ]        Cognitive Retraining  [X]               Therapeutic Exercises           [X]        Endurance Activities  [X]               Balance Training                   [ ]        Neuromuscular Re-Education  [ ]               Visual/Perceptual Training     [X]   Home Safety Training  [X]               Patient Education                 [X]        Family Training/Education  [ ]               Other (comment):     Frequency/Duration: Patient will be followed by occupational therapy 5 times a week to address goals.   Discharge Recommendations: Rehab- pulmonary based vs. Home health pending progress  Further Equipment Recommendations for Discharge: none at this time        SUBJECTIVE:   Patient stated I am so frustrated with all of this since November.       OBJECTIVE DATA SUMMARY:   HISTORY:   Past Medical History:   Diagnosis Date    Anxiety and depression      Arthritis       All joints    Bronchitis, chronic (HCC)      Dementia      DM type 2 causing neurological disease ([facility] [address]      Fibromyalgia      Hyperlipidemia      Neuropathy      Post-polio syndrome       Past Surgical History:   Procedure Laterality Date    ABDOMEN SURGERY PROC UNLISTED   2/2010     Feeding Tube. removed 2011    CHEST SURGERY PROCEDURE UNLISTED   2/2010     RVATS, Bronchoscopy    HX APPENDECTOMY        HX ORTHOPAEDIC   2000     Cervical Discectomy    HX ORTHOPAEDIC         Left Arm Multiple Surgeries for Infection    HX ORTHOPAEDIC   2006     right heel fracture    TOTAL HIP ARTHROPLASTY   2007     left        Prior Level of Function/Home Situation: Patient lives with his wife. See assessment section for PLOF information. Home Situation  Home Environment: Private residence  # Steps to Enter: 3  One/Two Story Residence: Two story  # of Interior Steps: 14  Living Alone: No  Support Systems: Spouse/Significant Other/Partner  Patient Expects to be Discharged to[de-identified] Unknown  Current DME Used/Available at Home: Walker, rolling, Wheelchair, Jadine Stagers, straight, Commode, bedside, Shower chair (scooter; L LE AFO)  Tub or Shower Type: Tub/Shower combination  [X]  Right hand dominant             [ ]  Left hand dominant     EXAMINATION OF PERFORMANCE DEFICITS:  Cognitive/Behavioral Status:  Neurologic State: Alert  Orientation Level: Oriented X4 (intermittent confusion)  Cognition: Follows commands;Decreased attention/concentration;Poor safety awareness  Perception: Appears intact  Perseveration: No perseveration noted  Safety/Judgement: Decreased awareness of environment     Skin: Intact int he uppers     Edema: None noted in the uppers     Hearing:   Auditory  Auditory Impairment: Hard of hearing, bilateral, Hearing aid(s)  Hearing Aids/Status: With patient     Vision/Perceptual:    Tracking: Able to track stimulus in all quadrants w/o difficulty    Diplopia: No    Acuity: Within Defined Limits       Range of Motion:  Right shoulder flexion, ~110 degrees; Left shoulder flexion, ~90 degrees, otherwise WDL in the uppers     Strength:  B shoulder flexion, 2/5; otherwise 3/5 overall for the uppers     Coordination:  Fine Motor Skills-Upper: Left Impaired;Right Impaired   Gross Motor Skills-Upper: Left Impaired;Right Impaired       Tone & Sensation:  Tone: normal  Sensation: intact in the uppers     Balance:  Sitting: Intact  Standing: Impaired  Standing - Static: Fair  Standing - Dynamic : Fair     Functional Mobility and Transfers for ADLs:  Bed Mobility:  Rolling: Supervision  Supine to Sit: Supervision  Sit to Supine:  (pt remained up at end of tx)  Scooting: Supervision     Transfers:  Sit to Stand: Moderate assistance;Assist x1  Stand to Sit: Moderate assistance;Assist x1  Bed to Chair: Moderate assistance;Assist x1  Toilet Transfer : Moderate assistance;Assist x1     ADL Assessment:  Feeding: Supervision;Setup     Oral Facial Hygiene/Grooming: Supervision;Setup     Bathing: Moderate assistance     Upper Body Dressing: Minimum assistance     Lower Body Dressing: Maximum assistance     Toileting: Moderate assistance     Cognitive Retraining  Safety/Judgement: Decreased awareness of environment     Functional Measure:  Barthel Index:      Bathin  Bladder: 10  Bowels: 10  Groomin  Dressin  Feedin  Mobility: 0  Stairs: 0  Toilet Use: 5  Transfer (Bed to Chair and Back): 5  Total: 35         Barthel and G-code impairment scale:  Percentage of impairment CH  0% CI  1-19% CJ  20-39% CK  40-59% CL  60-79% CM  80-99% CN  100%   Barthel Score 0-100 100 99-80 79-60 59-40 20-39 1-19    0   Barthel Score 0-20 20 17-19 13-16 9-12 5-8 1-4 0      The Barthel ADL Index: Guidelines  1.  The index should be used as a record of what a patient does, not as a record of what a patient could do. 2. The main aim is to establish degree of independence from any help, physical or verbal, however minor and for whatever reason. 3. The need for supervision renders the patient not independent. 4. A patient's performance should be established using the best available evidence. Asking the patient, friends/relatives and nurses are the usual sources, but direct observation and common sense are also important. However direct testing is not needed. 5. Usually the patient's performance over the preceding 24-48 hours is important, but occasionally longer periods will be relevant. 6. Middle categories imply that the patient supplies over 50 per cent of the effort. 7. Use of aids to be independent is allowed. Rosina Smith., Barthel, D.W. ([id number]). Functional evaluation: the Barthel Index. [address] [facility] (14)2. Penny Alves go RICHARD Rice, Ashley Delgado., Geni Muñoz., [place], [address] [place] [address] Ave (1999). Measuring the change indisability after inpatient rehabilitation; comparison of the responsiveness of the Barthel Index and Functional [place] Measure. Journal of Neurology, Neurosurgery, and Psychiatry, 66(4), 448-496. Gabriele Rico, N.J.A, ANASTACIO Prince.ESTRELLITA, & Bijal Yao, M.A. (2004.) Assessment of post-stroke quality of life in cost-effectiveness studies: The usefulness of the Barthel Index and the EuroQoL-5D. Quality of Life Research, 13, 572-77         G codes: In compliance with CMSs Claims Based Outcome Reporting, the following G-code set was chosen for this patient based on their primary functional limitation being treated: The outcome measure chosen to determine the severity of the functional limitation was the Barthel Index with a score of 35/100 which was correlated with the impairment scale.       · Self Care:               - CURRENT STATUS:    CL - 60%-79% impaired, limited or restricted               - GOAL STATUS:           CK - 40%-59% impaired, limited or restricted               - D/C STATUS:                       ---------------To be determined---------------      Occupational Therapy Evaluation Charge Determination   History Examination Decision-Making   MEDIUM Complexity : Expanded review of history including physical, cognitive and psychosocial  history  MEDIUM Complexity : 3-5 performance deficits relating to physical, cognitive , or psychosocial skils that result in activity limitations and / or participation restrictions MEDIUM Complexity : Patient may present with comorbidities that affect occupational performnce. Miniml to moderate modification of tasks or assistance (eg, physical or verbal ) with assesment(s) is necessary to enable patient to complete evaluation       Based on the above components, the patient evaluation is determined to be of the following complexity level: MEDIUM  Pain:  Pain Scale 1: Numeric (0 - 10)  Pain Intensity 1: 0              Activity Tolerance:   Patient tolerated eval well. Please refer to the flowsheet for vital signs taken during this treatment. After treatment:   [X] Patient left in no apparent distress sitting up in chair  [ ] Patient left in no apparent distress in bed  [X] Call bell left within reach  [X] Nursing notified  [ ] Caregiver present  [ ] Bed alarm activated      COMMUNICATION/EDUCATION:   The patients plan of care was discussed with: Physical Therapist, Registered Nurse and patient. .  [X] Home safety education was provided and the patient/caregiver indicated understanding. [X] Patient/family have participated as able in goal setting and plan of care. [X] Patient/family agree to work toward stated goals and plan of care. [ ] Patient understands intent and goals of therapy, but is neutral about his/her participation. [ ] Patient is unable to participate in goal setting and plan of care.   This patients plan of care is appropriate for delegation to PETER.      Thank you for this referral.  Virgie Chance, OTR/L  Time Calculation: 19 mins

## 2017-04-10 NOTE — IP AVS SNAPSHOT
[address] [place] [address] 104 1007 [facility] 
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 Recent Documentation Height Weight BMI Smoking Status 1.854 m 66.3 kg 19.28 kg/m2 Former Smoker Unresulted Labs Order Current Status CULTURE, BLOOD Preliminary result Emergency Contacts Name Discharge Info Relation Home Work Mobile Asher 26 CAREGIVER [3] Spouse [3] [phone] Ezequiel Hope  Child [2]   [phone] About your hospitalization You were admitted on:  April 10, 2017 You last received care in the:  [facility]  MED SURG 2 You were discharged on:  April 13, 2017 Unit phone number:  [phone] Why you were hospitalized Your primary diagnosis was:  Copd Exacerbation (Hcc) Your diagnoses also included:  Leukocytosis, Spinal Stenosis Of Lumbar Region, Post-Polio Syndrome, Osteoarthritis, Neuropathy, Hyperlipidemia, Depression With Anxiety, 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), Fibromyalgia, Anxiety And Depression, Arthritis, Bronchitis, Chronic (Hcc), Dementia Providers Seen During Your Hospitalizations Provider Role Specialty Primary office phone Elsa Garcia DO Attending Provider Emergency Medicine [phone] Evelia Levine MD Attending Provider Internal Medicine [phone] Elizabet Erazo MD Attending Provider Internal Medicine [phone] Your Primary Care Physician (PCP) Primary Care Physician Office Phone Office Fax Vincenzo Alfaro [phone] Follow-up Information Follow up With Details Comments Contact Info  [address]  Skilled nursing & home PT for energy conservation [address] [facility] [address] [facility] [address] 
[phone] Ingrid Mojica MD   [address] [facility] 1007 [facility] 
[phone] Loreto Chew MD Schedule an appointment as soon as possible for a visit As needed [address] [facility] [address] 1400 [address] [place] [address] 
[phone] Current Discharge Medication List  
  
START taking these medications Dose & Instructions Dispensing Information Comments Morning Noon Evening Bedtime  
 azithromycin 500 mg Tab Commonly known as:  Cooper Patel Your last dose was: Your next dose is:    
   
   
 Dose:  500 mg Take 1 Tab by mouth daily for 3 days. Quantity:  3 Tab Refills:  0  
     
   
   
   
  
 predniSONE 10 mg tablet Commonly known as:  Sofie Barrios Your last dose was: Your next dose is: Take 6 tabs a day for 3 days, then 5 a day for 3 days, then 4 a day for 3 days, then 3 a day for 3 days, then 2 a day for 3 days, then 1 a day for 6 days. Quantity:  66 Tab Refills:  0 CONTINUE these medications which have CHANGED Dose & Instructions Dispensing Information Comments Morning Noon Evening Bedtime  
 fentaNYL 25 mcg/hr PATCH Commonly known as:  Yulissa Wing What changed:  when to take this Your last dose was: Your next dose is:    
   
   
 Dose:  1 Patch 1 Patch by TransDERmal route every seventy-two (72) hours for 10 days. Max Daily Amount: 1 Patch. Every 4 days (96 hours) Quantity:  3 Patch Refills:  0 CONTINUE these medications which have NOT CHANGED Dose & Instructions Dispensing Information Comments Morning Noon Evening Bedtime  
 albuterol-ipratropium 2.5 mg-0.5 mg/3 ml Nebu Commonly known as:  Kayli Bui Your last dose was: Your next dose is:    
   
   
 Dose:  3 mL  
3 mL by Nebulization route every four (4) hours as needed (SOB). Refills:  0 aspirin 81 mg chewable tablet Your last dose was: Your next dose is:    
   
   
 Dose:  81 mg Take 1 Tab by mouth daily. Quantity:  30 Tab Refills:  0  
     
   
   
   
  
 atorvastatin 10 mg tablet Commonly known as:  LIPITOR Your last dose was: Your next dose is:    
   
   
 Dose:  10 mg Take 10 mg by mouth daily. Refills:  0  
     
   
   
   
  
 CYMBALTA 60 mg capsule Generic drug:  DULoxetine Your last dose was: Your next dose is:    
   
   
 Dose:  60 mg Take 60 mg by mouth daily. Refills:  0  
     
   
   
   
  
 fluticasone-salmeterol 100-50 mcg/dose diskus inhaler Commonly known as:  ADVAIR Your last dose was: Your next dose is:    
   
   
 Dose:  1 Puff Take 1 Puff by inhalation two (2) times a day. Refills:  0  
     
   
   
   
  
 gabapentin 600 mg tablet Commonly known as:  NEURONTIN Your last dose was: Your next dose is:    
   
   
 Dose:  600 mg Take 600 mg by mouth four (4) times daily. Refills:  0  
     
   
   
   
  
 meloxicam 15 mg tablet Commonly known as:  MOBIC Your last dose was: Your next dose is:    
   
   
 Dose:  15 mg Take 15 mg by mouth daily. Refills:  0  
     
   
   
   
  
 metFORMIN 500 mg tablet Commonly known as:  GLUCOPHAGE Your last dose was: Your next dose is:    
   
   
 Dose:  500 mg Take 500 mg by mouth daily. Refills:  0  
     
   
   
   
  
 oxyCODONE-acetaminophen 7.5-325 mg per tablet Commonly known as:  PERCOCET 7.5 Your last dose was: Your next dose is:    
   
   
 1 tabs every  6 hrs as needed for pain. Maximum daily dose 4 tablets. Quantity:  10 Tab Refills:  0 Do not drive or operate heavy machinery within 8 hours of taking this medication. PROAIR HFA 90 mcg/actuation inhaler Generic drug:  albuterol Your last dose was: Your next dose is:    
   
   
 Dose:  2 Puff Take 2 Puffs by inhalation every four (4) hours as needed. Refills:  0 Where to Get Your Medications Information on where to get these meds will be given to you by the nurse or doctor. ! Ask your nurse or doctor about these medications  
  azithromycin 500 mg Tab  
 fentaNYL 25 mcg/hr PATCH  
 oxyCODONE-acetaminophen 7.5-325 mg per tablet  
 predniSONE 10 mg tablet Discharge Instructions Nutrition Recommendations for Discharge:          
 
Continue Oral Nutrition Supplements at discharge: Ensure Active High Protein for Muscle Health 2-3 daily between meals 
for 30 days unless otherwise directed by your Primary Care Physician. This product can be purchased at your 
 local grocery store or drug store and online. Buffy Rosen [facility], [address] Patient Discharge Instructions Sharee Gabriele / [id number] : 1945 Admitted 4/10/2017 Discharged: 2017 Primary Diagnoses Problem List as of 2017  Date Reviewed: 4/10/2017 Codes Class Noted - Resolved * (Principal)COPD exacerbation ([facility] [address] Depression with anxiety Anxiety and depression Arthritis Dementia Bronchitis, chronic ([address] Leukocytosis DM type 2 causing neurological disease ([facility] [address] Neuropathy Post-polio syndrome Hyperlipidemia Osteoarthritis Fibromyalgia Spinal stenosis of lumbar region Take Home Medications · It is important that you take the medication exactly as they are prescribed. · Keep your medication in the bottles provided by the pharmacist and keep a list of the medication names, dosages, and times to be taken in your wallet. · Do not take other medications without consulting your doctor. What to do at [facility] Recommended diet: Cardiac Diabetic Diet and Low fat, Low cholesterol Recommended activity: PT/OT Eval and Treat If you experience worse breathing, please follow up with your lung doctor. Follow-up with your PCP in a few weeks Information obtained by : 
I understand that if any problems occur once I am at home I am to contact my physician. I understand and acknowledge receipt of the instructions indicated above. Physician's or R.N.'s Signature                                                                  Date/Time Patient or Representative Signature                                                          Date/Time [facility]charge Orders [facility] Ann[facility] We are excited to announce that we are making your provider's discharge notes available to you in Gydget. You will see these notes when they are completed and signed by the physician that discharged you from your recent hospital stay. If you have any questions or concerns about any information you see in Gydget, please call the Health Information Department where you were seen or reach out to your Primary Care Provider for more information about your plan of care. Introducing [facility]! [facility] introduces Gydget patient portal. Now you can access parts of your medical record, email your doctor's office, and request medication refills online. 1. In your internet browser, go to [email or web address] 2. Click on the First Time User? Click Here link in the Sign In box. You will see the New Member Sign Up page. 3. Enter your Gydget Access Code exactly as it appears below. You will not need to use this code after youve completed the sign-up process.  If you do not sign up before the expiration date, you must request a new code. · SaleStream Access Code: [email or web address] Expires: 7/9/2017  3:19 AM 
 
4. Enter the last four digits of your Social Security Number (xxxx) and Date of Birth (mm/dd/yyyy) as indicated and click Submit. You will be taken to the next sign-up page. 5. Create a SaleStream ID. This will be your SaleStream login ID and cannot be changed, so think of one that is secure and easy to remember. 6. Create a SaleStream password. You can change your password at any time. 7. Enter your Password Reset Question and Answer. This can be used at a later time if you forget your password. 8. Enter your e-mail address. You will receive e-mail notification when new information is available in [address] 9. Click Sign Up. You can now view and download portions of your medical record. 10. Click the Download Summary menu link to download a portable copy of your medical information. If you have questions, please visit the Frequently Asked Questions section of the [employer]Stream website. Remember, SaleStream is NOT to be used for urgent needs. For medical emergencies, dial 911. Now available from your iPhone and Android! General Information Please provide this summary of care documentation to your next provider. Patient Signature:  ____________________________________________________________ Date:  ____________________________________________________________  
  
Stacia Meehan Provider Signature:  ____________________________________________________________ Date:  ____________________________________________________________

## 2017-04-11 LAB
ALBUMIN SERPL BCP-MCNC: 3.3 G/DL (ref 3.5–5)
ALBUMIN/GLOB SERPL: 1.1 {RATIO} (ref 1.1–2.2)
ALP SERPL-CCNC: 57 U/L (ref 45–117)
ALT SERPL-CCNC: 23 U/L (ref 12–78)
ANION GAP BLD CALC-SCNC: 10 MMOL/L (ref 5–15)
AST SERPL W P-5'-P-CCNC: 22 U/L (ref 15–37)
BILIRUB SERPL-MCNC: 0.1 MG/DL (ref 0.2–1)
BUN SERPL-MCNC: 18 MG/DL (ref 6–20)
BUN/CREAT SERPL: 24 (ref 12–20)
CALCIUM SERPL-MCNC: 8.6 MG/DL (ref 8.5–10.1)
CHLORIDE SERPL-SCNC: 105 MMOL/L (ref 97–108)
CHOLEST SERPL-MCNC: 134 MG/DL
CO2 SERPL-SCNC: 25 MMOL/L (ref 21–32)
CREAT SERPL-MCNC: 0.75 MG/DL (ref 0.7–1.3)
ERYTHROCYTE [DISTWIDTH] IN BLOOD BY AUTOMATED COUNT: 13.5 % (ref 11.5–14.5)
GLOBULIN SER CALC-MCNC: 3 G/DL (ref 2–4)
GLUCOSE BLD STRIP.AUTO-MCNC: 146 MG/DL (ref 65–100)
GLUCOSE BLD STRIP.AUTO-MCNC: 149 MG/DL (ref 65–100)
GLUCOSE BLD STRIP.AUTO-MCNC: 185 MG/DL (ref 65–100)
GLUCOSE BLD STRIP.AUTO-MCNC: 195 MG/DL (ref 65–100)
GLUCOSE SERPL-MCNC: 173 MG/DL (ref 65–100)
HCT VFR BLD AUTO: 34.5 % (ref 36.6–50.3)
HDLC SERPL-MCNC: 38 MG/DL
HDLC SERPL: 3.5 {RATIO} (ref 0–5)
HGB BLD-MCNC: 11.6 G/DL (ref 12.1–17)
LDLC SERPL CALC-MCNC: 82.8 MG/DL (ref 0–100)
LIPID PROFILE,FLP: NORMAL
MAGNESIUM SERPL-MCNC: 1.9 MG/DL (ref 1.6–2.4)
MCH RBC QN AUTO: 30.4 PG (ref 26–34)
MCHC RBC AUTO-ENTMCNC: 33.6 G/DL (ref 30–36.5)
MCV RBC AUTO: 90.3 FL (ref 80–99)
PHOSPHATE SERPL-MCNC: 3.9 MG/DL (ref 2.6–4.7)
PLATELET # BLD AUTO: 239 K/UL (ref 150–400)
POTASSIUM SERPL-SCNC: 4.7 MMOL/L (ref 3.5–5.1)
PROT SERPL-MCNC: 6.3 G/DL (ref 6.4–8.2)
RBC # BLD AUTO: 3.82 M/UL (ref 4.1–5.7)
SERVICE CMNT-IMP: ABNORMAL
SODIUM SERPL-SCNC: 140 MMOL/L (ref 136–145)
TRIGL SERPL-MCNC: 66 MG/DL (ref ?–150)
VLDLC SERPL CALC-MCNC: 13.2 MG/DL
WBC # BLD AUTO: 12.6 K/UL (ref 4.1–11.1)

## 2017-04-11 PROCEDURE — 83735 ASSAY OF MAGNESIUM: CPT | Performed by: INTERNAL MEDICINE

## 2017-04-11 PROCEDURE — 65660000000 HC RM CCU STEPDOWN

## 2017-04-11 PROCEDURE — 36415 COLL VENOUS BLD VENIPUNCTURE: CPT | Performed by: INTERNAL MEDICINE

## 2017-04-11 PROCEDURE — 94640 AIRWAY INHALATION TREATMENT: CPT

## 2017-04-11 PROCEDURE — 74011250636 HC RX REV CODE- 250/636: Performed by: INTERNAL MEDICINE

## 2017-04-11 PROCEDURE — 74011250637 HC RX REV CODE- 250/637: Performed by: NURSE PRACTITIONER

## 2017-04-11 PROCEDURE — 85027 COMPLETE CBC AUTOMATED: CPT | Performed by: INTERNAL MEDICINE

## 2017-04-11 PROCEDURE — 83036 HEMOGLOBIN GLYCOSYLATED A1C: CPT | Performed by: INTERNAL MEDICINE

## 2017-04-11 PROCEDURE — 74011636637 HC RX REV CODE- 636/637: Performed by: INTERNAL MEDICINE

## 2017-04-11 PROCEDURE — 84100 ASSAY OF PHOSPHORUS: CPT | Performed by: INTERNAL MEDICINE

## 2017-04-11 PROCEDURE — 82962 GLUCOSE BLOOD TEST: CPT

## 2017-04-11 PROCEDURE — 74011250637 HC RX REV CODE- 250/637: Performed by: INTERNAL MEDICINE

## 2017-04-11 PROCEDURE — 97535 SELF CARE MNGMENT TRAINING: CPT | Performed by: OCCUPATIONAL THERAPIST

## 2017-04-11 PROCEDURE — 97116 GAIT TRAINING THERAPY: CPT

## 2017-04-11 PROCEDURE — 74011000250 HC RX REV CODE- 250: Performed by: INTERNAL MEDICINE

## 2017-04-11 PROCEDURE — 97530 THERAPEUTIC ACTIVITIES: CPT

## 2017-04-11 PROCEDURE — 77010033678 HC OXYGEN DAILY

## 2017-04-11 PROCEDURE — 80053 COMPREHEN METABOLIC PANEL: CPT | Performed by: INTERNAL MEDICINE

## 2017-04-11 PROCEDURE — 80061 LIPID PANEL: CPT | Performed by: INTERNAL MEDICINE

## 2017-04-11 RX ADMIN — GABAPENTIN 600 MG: 300 CAPSULE ORAL at 15:13

## 2017-04-11 RX ADMIN — ENOXAPARIN SODIUM 40 MG: 40 INJECTION SUBCUTANEOUS at 09:44

## 2017-04-11 RX ADMIN — OXYCODONE HYDROCHLORIDE AND ACETAMINOPHEN 1 TABLET: 7.5; 325 TABLET ORAL at 04:12

## 2017-04-11 RX ADMIN — IPRATROPIUM BROMIDE AND ALBUTEROL SULFATE 3 ML: .5; 3 SOLUTION RESPIRATORY (INHALATION) at 07:31

## 2017-04-11 RX ADMIN — INSULIN LISPRO 3 UNITS: 100 INJECTION, SOLUTION INTRAVENOUS; SUBCUTANEOUS at 11:49

## 2017-04-11 RX ADMIN — IPRATROPIUM BROMIDE AND ALBUTEROL SULFATE 3 ML: .5; 3 SOLUTION RESPIRATORY (INHALATION) at 15:28

## 2017-04-11 RX ADMIN — ASPIRIN 81 MG CHEWABLE TABLET 81 MG: 81 TABLET CHEWABLE at 09:42

## 2017-04-11 RX ADMIN — METHYLPREDNISOLONE SODIUM SUCCINATE 60 MG: 125 INJECTION, POWDER, FOR SOLUTION INTRAMUSCULAR; INTRAVENOUS at 22:24

## 2017-04-11 RX ADMIN — DULOXETINE HYDROCHLORIDE 60 MG: 30 CAPSULE, DELAYED RELEASE ORAL at 09:42

## 2017-04-11 RX ADMIN — IPRATROPIUM BROMIDE AND ALBUTEROL SULFATE 3 ML: .5; 3 SOLUTION RESPIRATORY (INHALATION) at 19:00

## 2017-04-11 RX ADMIN — GABAPENTIN 600 MG: 300 CAPSULE ORAL at 09:42

## 2017-04-11 RX ADMIN — ATORVASTATIN CALCIUM 10 MG: 10 TABLET, FILM COATED ORAL at 09:42

## 2017-04-11 RX ADMIN — GABAPENTIN 600 MG: 300 CAPSULE ORAL at 22:24

## 2017-04-11 RX ADMIN — IPRATROPIUM BROMIDE AND ALBUTEROL SULFATE 3 ML: .5; 3 SOLUTION RESPIRATORY (INHALATION) at 23:06

## 2017-04-11 RX ADMIN — IPRATROPIUM BROMIDE AND ALBUTEROL SULFATE 3 ML: .5; 3 SOLUTION RESPIRATORY (INHALATION) at 11:51

## 2017-04-11 RX ADMIN — INSULIN LISPRO 3 UNITS: 100 INJECTION, SOLUTION INTRAVENOUS; SUBCUTANEOUS at 08:03

## 2017-04-11 RX ADMIN — OXYCODONE HYDROCHLORIDE AND ACETAMINOPHEN 1 TABLET: 7.5; 325 TABLET ORAL at 18:21

## 2017-04-11 RX ADMIN — FLUTICASONE FUROATE AND VILANTEROL TRIFENATATE 1 PUFF: 200; 25 POWDER RESPIRATORY (INHALATION) at 11:20

## 2017-04-11 RX ADMIN — METFORMIN HYDROCHLORIDE 500 MG: 500 TABLET ORAL at 09:43

## 2017-04-11 RX ADMIN — IPRATROPIUM BROMIDE AND ALBUTEROL SULFATE 3 ML: .5; 3 SOLUTION RESPIRATORY (INHALATION) at 03:49

## 2017-04-11 RX ADMIN — METHYLPREDNISOLONE SODIUM SUCCINATE 60 MG: 125 INJECTION, POWDER, FOR SOLUTION INTRAMUSCULAR; INTRAVENOUS at 15:14

## 2017-04-11 RX ADMIN — METHYLPREDNISOLONE SODIUM SUCCINATE 80 MG: 125 INJECTION, POWDER, FOR SOLUTION INTRAMUSCULAR; INTRAVENOUS at 08:05

## 2017-04-11 RX ADMIN — GABAPENTIN 600 MG: 300 CAPSULE ORAL at 18:21

## 2017-04-11 RX ADMIN — AZITHROMYCIN MONOHYDRATE 500 MG: 500 INJECTION, POWDER, LYOPHILIZED, FOR SOLUTION INTRAVENOUS at 03:34

## 2017-04-11 RX ADMIN — INSULIN LISPRO 2 UNITS: 100 INJECTION, SOLUTION INTRAVENOUS; SUBCUTANEOUS at 16:58

## 2017-04-11 RX ADMIN — OXYCODONE HYDROCHLORIDE AND ACETAMINOPHEN 1 TABLET: 7.5; 325 TABLET ORAL at 11:47

## 2017-04-11 NOTE — PROGRESS NOTES
4/11/2017 6:08 PM BRIANNA met with this pt today. Pt agreeable to [facility] Pulmonary rehab. Referral was sent via [address]. RUSTY Ortiz     4/11/2017 12:18 PM BRIANNA spoke with Dr. Eloisa Grimaldo. He states that he would like for this pt to be evaluated by [facility] for Pulmonary Rehab.    RUSTY Ortiz

## 2017-04-11 NOTE — PROGRESS NOTES
Pulmonary / Critical Care Progress Note    Name: Sharee Suazo MRN: [id number]   : 1945 Hospital: [facility]   Date: 2017 12:08 PM Admission: 4/10/2017     IMPRESSION:   · Acute hypoxic respiratory failure  · COPD exacerbation  · Chronic bronchitis  · DM  · Dementia  · Polio      PLAN:   · Maintain oxygen saturations > 90%  · Respiratory viral panel ordered  · Continue Breo 200  · Hold home Incruse while on DuoNebs  · Wean IV Solumedrol to 60 mg Q8  · Continue Azithromycin  · PT/OT  · Would benefit from Pulmonary Rehab; will place order for referral to [facility]. Rehab  · GI prophylaxis: not indicated  · DVT prophylaxis: Lovenox     SUBJECTIVE:      Review of Systems:   A comprehensive review of systems was negative except for that written in the HPI. Patient feeling much improved today. Very worried about having to come back to the hospital again. Less SOB with exertion. Sitting up in chair. Discussed pulmonary rehab and interested in hearing more information about it.     OBJECTIVE:    Afebrile  BP stable  Oxygen saturations 97% on 2L NC  WBC 12.6  HgB 11.6  Na 140  K 4.7  Creatinine 0.75  Mag 1.9  Respiratory viral panel ordered         Vital Signs:     Visit Vitals    /59 (BP 1 Location: Right arm, BP Patient Position: At rest)    Pulse 80    Temp 98.1 °F (36.7 °C)    Resp 20    Ht 6' 1\" (1.854 m)    Wt 66.3 kg (146 lb 2.6 oz)    SpO2 97%    BMI 19.28 kg/m2      Temp (24hrs), Av.2 °F (36.8 °C), Min:97.7 °F (36.5 °C), Max:98.6 °F (37 °C)     Intake/Output:    Last shift:      701 - 1900  In: -   Out: 300 [Urine:300]   Last 3 shifts: 1901 - 700  In: 480 [P.O.:480]  Out: 700 [Urine:700]     Intake/Output Summary (Last 24 hours) at 17 1208  Last data filed at 17 0718   Gross per 24 hour   Intake                0 ml   Output              700 ml   Net -700 ml       Physical Exam:                                                    General: in no apparent distress, alert and oriented times 3, no accessory muscles use for respiration    HEENT: EOMI, Sclera clear, anicteric and Trachea midline    Neck: normal    Lungs: scattered wheezes bilaterally, improved aeration from yesterday    Heart: S1S2 present, RRR, no gallops/ rub/ murmur    Abdomen: soft, non-tender, without masses or organomegaly, non-distended, bowel sounds normoactive    Extremity: negative, No clubbing, no cyanosis    Neuro: alert, Awake, follows commands     Imaging:     [x] I have personally reviewed the patients radiographs    [x] Reviewed radiologist's report                  ABG:  No results for input(s): PHI, PCO2I, PO2I, HCO3I, SO2I, FIO2I in the last 72 hours.     Labs:  Recent Labs      04/11/17   0333  04/10/17   0240   WBC  12.6*  16.4*   HGB  11.6*  12.1   HCT  34.5*  39.2   PLT  239  213     Recent Labs      04/11/17   0333  04/10/17   0240   NA  140  139   K  4.7  4.3   CL  105  103   CO2  25  24   GLU  173*  110*   BUN  18  13   CREA  0.75  0.85   CA  8.6  8.9   MG  1.9  2.4   PHOS  3.9   --    ALB  3.3*  3.6   SGOT  22  23   ALT  23  25                  Kallie Guevara NP  4/11/2017

## 2017-04-11 NOTE — PROGRESS NOTES
Problem: Mobility Impaired (Adult and Pediatric)  Goal: *Acute Goals and Plan of Care (Insert Text)  Physical Therapy Goals  Initiated 4/10/2017  1. Patient will move from supine to sit and sit to supine , scoot up and down and roll side to side in bed with independence within 7 day(s). 2. Patient will transfer from bed to chair and chair to bed with independence using the least restrictive device within 7 day(s). 3. Patient will perform sit to stand with modified independence within 7 day(s). 4. Patient will ambulate with modified independence for 100 feet with the least restrictive device within 7 day(s). PHYSICAL THERAPY TREATMENT  Patient: Jason Gambino (81 y.o. male)  Date: 4/11/2017  Diagnosis: COPD exacerbation (HCC) COPD exacerbation (HCC)       Precautions:    Chart, physical therapy assessment, plan of care and goals were reviewed. ASSESSMENT:  Pt comes to stand with CGA. Pt ambulated 20ft with RW CGA to min assist.Pt is slightly unsteady. Pt mobilizes on 3L of 02. Pt left sitting in chair. Pt progressing with mobility. Continue goals. Progression toward goals:  [ ]      Improving appropriately and progressing toward goals  [ ]      Improving slowly and progressing toward goals  [ ]      Not making progress toward goals and plan of care will be adjusted       PLAN:  Patient continues to benefit from skilled intervention to address the above impairments. Continue treatment per established plan of care. Discharge Recommendations:Pulmonary Rehab  Further Equipment Recommendations for Discharge:  rolling walker       SUBJECTIVE:          OBJECTIVE DATA SUMMARY:   Critical Behavior:  Neurologic State: Alert  Orientation Level: Oriented X4  Cognition: Follows commands  Safety/Judgement: Decreased awareness of environment  Functional Mobility Training:  Pt  Sitting in chair on arrival OF PT                           Transfers:      Pt CGA stand.            Balance:     Ambulation/Gait Training:   Pt ambulated 20ft with RW  Min assist.              Pain:  Pain Scale 1: Numeric (0 - 10)  Pain Intensity 1: 4  Pain Location 1: Generalized      Pain Description 1: Aching;Constant  Pain Intervention(s) 1: Medication (see MAR)  Activity Tolerance:   PT tolerated treatment  fairly well. Please refer to the flowsheet for vital signs taken during this treatment.   After treatment:   [X] Patient left in no apparent distress sitting up in chair  [ ] Patient left in no apparent distress in bed  [ ] Call bell left within reach  [ ] Nursing notified  [ ] Caregiver present  [ ] Bed alarm activated      COMMUNICATION/COLLABORATION:   The patients plan of care was discussed with: Physical Therapist     Erin Neal PTA   Time Calculation: 23 mins

## 2017-04-11 NOTE — PROGRESS NOTES
Bedside and Verbal shift change report given to Michaela Haddad (oncoming nurse) by Sasha Cage RN (offgoing nurse). Report included the following information SBAR, Kardex, Intake/Output, MAR and Recent Results   .

## 2017-04-11 NOTE — DIABETES MGMT
DTC:   Provided Mr. Karly Anderson with [address] [employer] per his request. His wife is aware of how to use meter and declined return demonstration. Mr. Karly Anderson will require the following prescriptions prior to discharge:  1. Accu-chek Shawna Plus Test Strips #100  2. Accu-chek FastClix Lancets (Drums) 100+2  -Brands are medically necessary    Thank you. Kurt Romano, [employer], [address] [place] [address]   [phone] (office)  [phone] (pager)

## 2017-04-11 NOTE — PROGRESS NOTES
TRANSFER - IN REPORT:    Verbal report received from RN(name) on Karen Mohamud  being received from ICU(unit) for routine progression of care      Report consisted of patients Situation, Background, Assessment and   Recommendations(SBAR). Information from the following report(s) SBAR, Kardex, Intake/Output, MAR, Recent Results and Alarm Parameters  was reviewed with the receiving nurse. Opportunity for questions and clarification was provided. Assessment completed upon patients arrival to unit and care assumed.    Primary Nurse Gia Crockett RN and Marc Mccollum RN performed a dual skin assessment on this patient No impairment noted  Salomón score is 19

## 2017-04-11 NOTE — PROGRESS NOTES
[facility]  [facility], [place], [address] [place] [address]  [phone]      Medical Progress Note      NAME: Esme Elam   :  1945  MRM:  [id number]    Date/Time: 2017           Subjective:     Chief Complaint:  I don't want rehab    No acute events. Breathing continues to improve slowly. Denies sputum pdn, cp, abd pain       Objective:       Vitals:          Last 24hrs VS reviewed since prior progress note.  Most recent are:    Visit Vitals    /59 (BP 1 Location: Right arm, BP Patient Position: At rest)    Pulse 80    Temp 98.1 °F (36.7 °C)    Resp 20    Ht 6' 1\" (1.854 m)    Wt 66.3 kg (146 lb 2.6 oz)    SpO2 97%    BMI 19.28 kg/m2     SpO2 Readings from Last 6 Encounters:   17 97%   17 97%   17 95%   17 96%   16 93%   16 97%    O2 Flow Rate (L/min): 2 l/min       Intake/Output Summary (Last 24 hours) at 17 1201  Last data filed at 17 0718   Gross per 24 hour   Intake                0 ml   Output              700 ml   Net             -700 ml          Exam:     Physical Exam:    Gen:  Well-developed, well-nourished, in no acute distress  HEENT:  Pink conjunctivae, PERRL, hearing intact to voice, moist mucous membranes  Neck:  Supple, without masses, thyroid non-tender  Resp:  No accessory muscle use, clear breath sounds without wheezes rales or rhonchi  Card:  No murmurs, normal S1, S2 without thrills, bruits or peripheral edema  Abd:  Soft, non-tender, non-distended, normoactive bowel sounds are present  Musc:  No cyanosis or clubbing  Skin:  No rashes or ulcers, skin turgor is good  Neuro:  Cranial nerves 3-12 are grossly intact,  strength is 5/5 bilaterally and dorsi / plantarflexion is 5/5 bilaterally, follows commands appropriately  Psych:  Good insight, oriented to person, place and time, alert           Medications Reviewed: (see below)    Lab Data Reviewed: (see below)    ______________________________________________________________________    Medications:     Current Facility-Administered Medications   Medication Dose Route Frequency    sodium chloride (NS) flush 5-10 mL  5-10 mL IntraVENous Q8H    sodium chloride (NS) flush 5-10 mL  5-10 mL IntraVENous PRN    azithromycin (ZITHROMAX) 500 mg in 0.9% sodium chloride (MBP/ADV) 250 mL  500 mg IntraVENous Q24H    aspirin chewable tablet 81 mg  81 mg Oral DAILY    atorvastatin (LIPITOR) tablet 10 mg  10 mg Oral DAILY    DULoxetine (CYMBALTA) capsule 60 mg  60 mg Oral DAILY    gabapentin (NEURONTIN) capsule 600 mg  600 mg Oral QID    metFORMIN (GLUCOPHAGE) tablet 500 mg  500 mg Oral DAILY    oxyCODONE-acetaminophen (PERCOCET 7.5) 7.5-325 mg per tablet 1 Tab  1 Tab Oral Q6H PRN    naloxone (NARCAN) injection 0.4 mg  0.4 mg IntraVENous PRN    zolpidem (AMBIEN) tablet 5 mg  5 mg Oral QHS PRN    glucose chewable tablet 16 g  4 Tab Oral PRN    dextrose (D50W) injection syrg 12.5-25 g  12.5-25 g IntraVENous PRN    glucagon (GLUCAGEN) injection 1 mg  1 mg IntraMUSCular PRN    acetaminophen (TYLENOL) tablet 650 mg  650 mg Oral Q4H PRN    diphenhydrAMINE (BENADRYL) capsule 25 mg  25 mg Oral Q4H PRN    ondansetron (ZOFRAN) injection 4 mg  4 mg IntraVENous Q4H PRN    enoxaparin (LOVENOX) injection 40 mg  40 mg SubCUTAneous Q24H    insulin lispro (HUMALOG) injection   SubCUTAneous AC&HS    albuterol-ipratropium (DUO-NEB) 2.5 MG-0.5 MG/3 ML  3 mL Nebulization Q4H PRN    methylPREDNISolone (PF) (SOLU-MEDROL) injection 80 mg  80 mg IntraVENous Q8H    albuterol-ipratropium (DUO-NEB) 2.5 MG-0.5 MG/3 ML  3 mL Nebulization Q4H RT    fluticasone-vilanterol (BREO ELLIPTA) 200mcg-25mcg/puff  1 Puff Inhalation DAILY    LORazepam (ATIVAN) injection 0.5 mg  0.5 mg IntraVENous Q6H PRN    fentaNYL (DURAGESIC) 25 mcg/hr patch 1 Patch  1 Patch TransDERmal Q72H            Lab Review:     Recent Labs      04/11/17   0333  04/10/17 0240   WBC  12.6*  16.4*   HGB  11.6*  12.1   HCT  34.5*  39.2   PLT  239  213     Recent Labs      04/11/17   0333  04/10/17   0240   NA  140  139   K  4.7  4.3   CL  105  103   CO2  25  24   GLU  173*  110*   BUN  18  13   CREA  0.75  0.85   CA  8.6  8.9   MG  1.9  2.4   PHOS  3.9   --    ALB  3.3*  3.6   SGOT  22  23   ALT  23  25     No components found for: GLPOC         Assessment / Plan:     COPD exacerbation / Bronchitis, chronic / Leukocytosis: POA. Continue IV steroids weaned again today, prn duonebs and azithromycin. advair for breo. Pulmonary following     Acute respiratory failure with hypoxia: with rapid improvement overnight. Pulmonary consult. Anxiety is contributing significantly to symptoms     DM type 2 causing neurological disease: Diabetic diet and counseling.  SSI per protocol.  Continue home metformin. Check A1c.     Failure to Thrive in Adult - Needs nutritional support.     Spinal stenosis of lumbar region / Neuropathy / Post-polio syndrome / Osteoarthritis - Continue cymbalta and half dose of gabapentin. Fall precautions. PT/OT eval.      Depression with anxiety / Fibromyalgia - Continue cymbalta     Hyperlipidemia - On atorvastatin at home. Check lipid panel.       Total time spent with patient: 30 [address] discussed with: Patient and Family    Discussed:  Care Plan    Prophylaxis:  Lovenox    Disposition:   PT, OT, RN           ___________________________________________________    Attending Physician: Dashawn Agee MD

## 2017-04-12 LAB
ANION GAP BLD CALC-SCNC: 10 MMOL/L (ref 5–15)
BUN SERPL-MCNC: 19 MG/DL (ref 6–20)
BUN/CREAT SERPL: 21 (ref 12–20)
CALCIUM SERPL-MCNC: 8.9 MG/DL (ref 8.5–10.1)
CHLORIDE SERPL-SCNC: 104 MMOL/L (ref 97–108)
CO2 SERPL-SCNC: 27 MMOL/L (ref 21–32)
CREAT SERPL-MCNC: 0.89 MG/DL (ref 0.7–1.3)
ERYTHROCYTE [DISTWIDTH] IN BLOOD BY AUTOMATED COUNT: 13.6 % (ref 11.5–14.5)
EST. AVERAGE GLUCOSE BLD GHB EST-MCNC: 148 MG/DL
GLUCOSE BLD STRIP.AUTO-MCNC: 135 MG/DL (ref 65–100)
GLUCOSE BLD STRIP.AUTO-MCNC: 141 MG/DL (ref 65–100)
GLUCOSE BLD STRIP.AUTO-MCNC: 145 MG/DL (ref 65–100)
GLUCOSE BLD STRIP.AUTO-MCNC: 175 MG/DL (ref 65–100)
GLUCOSE SERPL-MCNC: 170 MG/DL (ref 65–100)
HBA1C MFR BLD: 6.8 % (ref 4.2–6.3)
HCT VFR BLD AUTO: 35.6 % (ref 36.6–50.3)
HGB BLD-MCNC: 11.7 G/DL (ref 12.1–17)
MCH RBC QN AUTO: 29.6 PG (ref 26–34)
MCHC RBC AUTO-ENTMCNC: 32.9 G/DL (ref 30–36.5)
MCV RBC AUTO: 90.1 FL (ref 80–99)
PLATELET # BLD AUTO: 230 K/UL (ref 150–400)
POTASSIUM SERPL-SCNC: 4.1 MMOL/L (ref 3.5–5.1)
RBC # BLD AUTO: 3.95 M/UL (ref 4.1–5.7)
SERVICE CMNT-IMP: ABNORMAL
SODIUM SERPL-SCNC: 141 MMOL/L (ref 136–145)
WBC # BLD AUTO: 15.9 K/UL (ref 4.1–11.1)

## 2017-04-12 PROCEDURE — 97535 SELF CARE MNGMENT TRAINING: CPT | Performed by: OCCUPATIONAL THERAPIST

## 2017-04-12 PROCEDURE — 74011000250 HC RX REV CODE- 250: Performed by: INTERNAL MEDICINE

## 2017-04-12 PROCEDURE — 65270000029 HC RM PRIVATE

## 2017-04-12 PROCEDURE — 74011636637 HC RX REV CODE- 636/637: Performed by: INTERNAL MEDICINE

## 2017-04-12 PROCEDURE — 74011250636 HC RX REV CODE- 250/636: Performed by: NURSE PRACTITIONER

## 2017-04-12 PROCEDURE — 74011250636 HC RX REV CODE- 250/636: Performed by: INTERNAL MEDICINE

## 2017-04-12 PROCEDURE — 94640 AIRWAY INHALATION TREATMENT: CPT

## 2017-04-12 PROCEDURE — 82962 GLUCOSE BLOOD TEST: CPT

## 2017-04-12 PROCEDURE — 97530 THERAPEUTIC ACTIVITIES: CPT

## 2017-04-12 PROCEDURE — 74011250637 HC RX REV CODE- 250/637: Performed by: INTERNAL MEDICINE

## 2017-04-12 PROCEDURE — 85027 COMPLETE CBC AUTOMATED: CPT | Performed by: INTERNAL MEDICINE

## 2017-04-12 PROCEDURE — 36415 COLL VENOUS BLD VENIPUNCTURE: CPT | Performed by: INTERNAL MEDICINE

## 2017-04-12 PROCEDURE — 77030027138 HC INCENT SPIROMETER -A

## 2017-04-12 PROCEDURE — 97116 GAIT TRAINING THERAPY: CPT

## 2017-04-12 PROCEDURE — 80048 BASIC METABOLIC PNL TOTAL CA: CPT | Performed by: INTERNAL MEDICINE

## 2017-04-12 RX ORDER — GUAIFENESIN 600 MG/1
600 TABLET, EXTENDED RELEASE ORAL EVERY 12 HOURS
Status: DISCONTINUED | OUTPATIENT
Start: 2017-04-12 | End: 2017-04-13 | Stop reason: HOSPADM

## 2017-04-12 RX ORDER — PREDNISONE 20 MG/1
40 TABLET ORAL
Status: DISCONTINUED | OUTPATIENT
Start: 2017-04-19 | End: 2017-04-13 | Stop reason: HOSPADM

## 2017-04-12 RX ORDER — PREDNISONE 20 MG/1
20 TABLET ORAL
Status: DISCONTINUED | OUTPATIENT
Start: 2017-04-25 | End: 2017-04-13 | Stop reason: HOSPADM

## 2017-04-12 RX ORDER — PREDNISONE 20 MG/1
60 TABLET ORAL
Status: DISCONTINUED | OUTPATIENT
Start: 2017-04-13 | End: 2017-04-13 | Stop reason: HOSPADM

## 2017-04-12 RX ORDER — PREDNISONE 5 MG/1
10 TABLET ORAL
Status: DISCONTINUED | OUTPATIENT
Start: 2017-04-28 | End: 2017-04-13 | Stop reason: HOSPADM

## 2017-04-12 RX ADMIN — ASPIRIN 81 MG CHEWABLE TABLET 81 MG: 81 TABLET CHEWABLE at 08:28

## 2017-04-12 RX ADMIN — METHYLPREDNISOLONE SODIUM SUCCINATE 60 MG: 125 INJECTION, POWDER, FOR SOLUTION INTRAMUSCULAR; INTRAVENOUS at 18:06

## 2017-04-12 RX ADMIN — GABAPENTIN 600 MG: 300 CAPSULE ORAL at 08:28

## 2017-04-12 RX ADMIN — IPRATROPIUM BROMIDE AND ALBUTEROL SULFATE 3 ML: .5; 3 SOLUTION RESPIRATORY (INHALATION) at 20:39

## 2017-04-12 RX ADMIN — INSULIN LISPRO 4 UNITS: 100 INJECTION, SOLUTION INTRAVENOUS; SUBCUTANEOUS at 08:27

## 2017-04-12 RX ADMIN — INSULIN LISPRO 3 UNITS: 100 INJECTION, SOLUTION INTRAVENOUS; SUBCUTANEOUS at 12:14

## 2017-04-12 RX ADMIN — GUAIFENESIN 600 MG: 600 TABLET, EXTENDED RELEASE ORAL at 21:12

## 2017-04-12 RX ADMIN — IPRATROPIUM BROMIDE AND ALBUTEROL SULFATE 3 ML: .5; 3 SOLUTION RESPIRATORY (INHALATION) at 04:30

## 2017-04-12 RX ADMIN — IPRATROPIUM BROMIDE AND ALBUTEROL SULFATE 3 ML: .5; 3 SOLUTION RESPIRATORY (INHALATION) at 07:35

## 2017-04-12 RX ADMIN — DULOXETINE HYDROCHLORIDE 60 MG: 30 CAPSULE, DELAYED RELEASE ORAL at 08:28

## 2017-04-12 RX ADMIN — AZITHROMYCIN MONOHYDRATE 500 MG: 500 INJECTION, POWDER, LYOPHILIZED, FOR SOLUTION INTRAVENOUS at 04:52

## 2017-04-12 RX ADMIN — LORAZEPAM 0.5 MG: 2 INJECTION, SOLUTION INTRAMUSCULAR; INTRAVENOUS at 01:14

## 2017-04-12 RX ADMIN — OXYCODONE HYDROCHLORIDE AND ACETAMINOPHEN 1 TABLET: 7.5; 325 TABLET ORAL at 05:40

## 2017-04-12 RX ADMIN — IPRATROPIUM BROMIDE AND ALBUTEROL SULFATE 3 ML: .5; 3 SOLUTION RESPIRATORY (INHALATION) at 13:30

## 2017-04-12 RX ADMIN — METHYLPREDNISOLONE SODIUM SUCCINATE 60 MG: 125 INJECTION, POWDER, FOR SOLUTION INTRAMUSCULAR; INTRAVENOUS at 08:27

## 2017-04-12 RX ADMIN — GABAPENTIN 600 MG: 300 CAPSULE ORAL at 12:14

## 2017-04-12 RX ADMIN — Medication 10 ML: at 04:52

## 2017-04-12 RX ADMIN — OXYCODONE HYDROCHLORIDE AND ACETAMINOPHEN 1 TABLET: 7.5; 325 TABLET ORAL at 13:12

## 2017-04-12 RX ADMIN — OXYCODONE HYDROCHLORIDE AND ACETAMINOPHEN 1 TABLET: 7.5; 325 TABLET ORAL at 19:41

## 2017-04-12 RX ADMIN — FLUTICASONE FUROATE AND VILANTEROL TRIFENATATE 1 PUFF: 200; 25 POWDER RESPIRATORY (INHALATION) at 08:27

## 2017-04-12 RX ADMIN — GABAPENTIN 600 MG: 300 CAPSULE ORAL at 18:06

## 2017-04-12 RX ADMIN — ENOXAPARIN SODIUM 40 MG: 40 INJECTION SUBCUTANEOUS at 08:28

## 2017-04-12 RX ADMIN — ATORVASTATIN CALCIUM 10 MG: 10 TABLET, FILM COATED ORAL at 08:28

## 2017-04-12 RX ADMIN — METFORMIN HYDROCHLORIDE 500 MG: 500 TABLET ORAL at 08:28

## 2017-04-12 RX ADMIN — GABAPENTIN 600 MG: 300 CAPSULE ORAL at 21:12

## 2017-04-12 RX ADMIN — Medication 10 ML: at 21:30

## 2017-04-12 RX ADMIN — IPRATROPIUM BROMIDE AND ALBUTEROL SULFATE 3 ML: .5; 3 SOLUTION RESPIRATORY (INHALATION) at 05:13

## 2017-04-12 RX ADMIN — IPRATROPIUM BROMIDE AND ALBUTEROL SULFATE 3 ML: .5; 3 SOLUTION RESPIRATORY (INHALATION) at 15:53

## 2017-04-12 NOTE — PROGRESS NOTES
Problem: Self Care Deficits Care Plan (Adult)  Goal: *Acute Goals and Plan of Care (Insert Text)  Occupational Therapy Goals  Initiated 4/10/2017  1. Patient will perform grooming with supervision/set-up standing at sink >5 minutes within 7 day(s). 2. Patient will perform upper body dressing and bathing with supervision/set-up within 7 day(s). 3. Patient will perform lower body dressing and bathing with minimal assistance within 7 day(s). 4. Patient will perform toilet transfers with minimal assistance with best technique within 7 day(s). 5. Patient will perform all aspects of toileting with minimal assistance within 7 day(s). 6. Patient will participate in upper extremity therapeutic exercise/activities with supervision/set-up for 10 minutes within 7 day(s). 7. Patient will utilize energy conservation techniques during functional activities with minimum verbal cues within 7 day(s). OCCUPATIONAL THERAPY TREATMENT  Patient: Nguyễn Fernandez (6 y.o. male)  Date: 4/12/2017  Diagnosis: COPD exacerbation (HCC) COPD exacerbation (HCC)       Precautions:  fall      ASSESSMENT:  Pt is making slow, steady progress toward goals. He stated he was too exhausted to participate in OOB activity due to \"frequent coughing and busy morning\", but agreeable to energy conservation re-education. Pt stated he re-read over the handout last night. Recommend IP pulmonary rehab at discharge. Progression toward goals:  [ ]       Improving appropriately and progressing toward goals  [X]       Improving slowly and progressing toward goals  [ ]       Not making progress toward goals and plan of care will be adjusted       PLAN:  Patient continues to benefit from skilled intervention to address the above impairments. Continue treatment per established plan of care.   Discharge Recommendations:  Inpatient Pulmonary Rehab  Further Equipment Recommendations for Discharge:  TBD       SUBJECTIVE:   Patient stated I am too tired to do anything.       OBJECTIVE DATA SUMMARY:   Cognitive/Behavioral Status:  Neurologic State: Alert; Appropriate for age  Orientation Level: Oriented X4  Cognition: Appropriate for age attention/concentration; Follows commands  Perception: Appears intact  Perseveration: No perseveration noted  Safety/Judgement: Awareness of environment; Fall prevention; Insight into deficits     Functional Mobility and Transfers for ADLs:  Transfers:  Sit to Stand: Contact guard assistance        Balance:  Sitting: Intact  Standing: Intact; With support     ADL Intervention:  Patient re-instructed and indicated understanding energy conservation techniques to increase independence and safety during all ADLs for end goal of returning back home. Provided instruction body is like a jar of marbles, marbles represent energy, at end of day need as many marbles as possible to obtain a good night sleep, REM sleep is when the body repairs itself. This ensures a full jar of marbles, full of energy when wake up to start a new day. Use energy conservation techniques during ADLs so can increase participation in life activities patient prefers, to ensure more frequent good days. If having a bad day, evaluate tasks completed day before and re-plan how to save energy to complete same tasks, for example if going grocery shopping do not complete full bathing/dressing/grooming. Visual handout provided. Patient indicated understanding by stating tasks already completing to save energy ie sitting to don all clothing and taking rest breaks. Cognitive Retraining  Safety/Judgement: Awareness of environment; Fall prevention; Insight into deficits     Pain:  Pain Scale 1: Numeric (0 - 10)  Pain Intensity 1: 0  Pain Location 1: Generalized     Pain Description 1: Aching  Pain Intervention(s) 1: Medication (see MAR)     Activity Tolerance:   Fair  Please refer to the flowsheet for vital signs taken during this treatment.   After treatment:   [ ] Patient left in no apparent distress sitting up in chair  [X] Patient left in no apparent distress in bed  [X] Call bell left within reach  [X] Nursing notified  [ ] Caregiver present  [X] Bed alarm activated      COMMUNICATION/COLLABORATION:   The patients plan of care was discussed with: Physical Therapy Assistant and Registered Nurse     Gerardo Ho OT  Time Calculation: 15 mins

## 2017-04-12 NOTE — PROGRESS NOTES
Pulmonary / Critical Care Progress Note    Name: Zach Molina MRN: [id number]   : 1945 Hospital: [address]    Date: 2017 12:08 PM Admission: 4/10/2017     IMPRESSION:   · Acute hypoxic respiratory failure  · COPD exacerbation  · Chronic bronchitis  · DM  · Dementia  · Polio      PLAN:   · Maintain oxygen saturations > 90%  · Continue Breo 200  · Hold home Incruse while on DuoNebs  · Continue IV Solumedrol to 60 mg Q8; will switch to PO prednisone taper starting tomorrow  · Continue Azithromycin  · PT/OT  · Would benefit from Pulmonary Rehab; will place order for referral to [facility]. Rehab  · GI prophylaxis: not indicated  · DVT prophylaxis: Lovenox     Patient stable from a Pulmonary perspective. Will sign off and see again if needed. Plan to for [facility]. Will have him follow up with Dr. Love Sanz in clinic following rehab. SUBJECTIVE:      Review of Systems:   A comprehensive review of systems was negative except for that written in the HPI. Patient feeling improved again today. Feeling positive about pulmonary rehab. Still anxious about leaving hospital. Less SOB with exertion. Had some dry coughing this morning, but again improved from yesterday.      OBJECTIVE:    Afebrile  BP stable  Oxygen saturations 97% on 2L NC  WBC 15.6  HgB 11.7  Na 141  K 4.1  Creatinine 0.89     Vital Signs:     Visit Vitals    /69 (BP 1 Location: Right arm, BP Patient Position: At rest)    Pulse 83    Temp 97.9 °F (36.6 °C)    Resp 18    Ht 6' 1\" (1.854 m)    Wt 66.3 kg (146 lb 2.6 oz)    SpO2 97%    BMI 19.28 kg/m2      Temp (24hrs), Av.9 °F (36.6 °C), Min:97.3 °F (36.3 °C), Max:98.1 °F (36.7 °C)     Intake/Output:    Last shift:      701 - 1900  In: 240 [P.O.:240]  Out: -    Last 3 shifts: 04/10 1901 -  0700  In: -   Out: 8703 [Urine:1750]       Intake/Output Summary (Last 24 hours) at 04/12/17 0919  Last data filed at 04/12/17 7830   Gross per 24 hour   Intake              240 ml   Output             1050 ml   Net             -810 ml       Physical Exam:                                                    General: in no apparent distress, alert and oriented times 3, no accessory muscles use for respiration    HEENT: EOMI, Sclera clear, anicteric and Trachea midline    Neck: normal    Lungs: scattered expiratory wheezes bilaterally, improved aeration from yesterday    Heart: S1S2 present, RRR, no gallops/ rub/ murmur    Abdomen: soft, non-tender, without masses or organomegaly, non-distended, bowel sounds normoactive    Extremity: negative, No clubbing, no cyanosis    Neuro: alert, Awake, follows commands     Imaging:     [x] I have personally reviewed the patients radiographs    [x] Reviewed radiologist's report                  ABG:  No results for input(s): PHI, PCO2I, PO2I, HCO3I, SO2I, FIO2I in the last 72 hours.     Labs:  Recent Labs      04/12/17   0711  04/11/17   0333  04/10/17   0240   WBC  15.9*  12.6*  16.4*   HGB  11.7*  11.6*  12.1   HCT  35.6*  34.5*  39.2   PLT  230  239  213     Recent Labs      04/12/17   0711  04/11/17   0333  04/10/17   0240   NA  141  140  139   K  4.1  4.7  4.3   CL  104  105  103   CO2  27  25  24   GLU  170*  173*  110*   BUN  19  18  13   CREA  0.89  0.75  0.85   CA  8.9  8.6  8.9   MG   --   1.9  2.4   PHOS   --   3.9   --    ALB   --   3.3*  3.6   SGOT   --   22  23   ALT   --   23  25                  Lg Beltran NP  4/12/2017

## 2017-04-12 NOTE — ROUTINE PROCESS
1942: Bedside and Verbal shift change report given to [address] (oncoming nurse) by Alexandra BARRAZA (offgoing nurse). Report included the following information SBAR, Kardex, Intake/Output and Recent Results.

## 2017-04-12 NOTE — PROGRESS NOTES
Problem: Patient Education: Go to Patient Education Activity  Goal: Patient/Family Education  PHYSICAL THERAPY TREATMENT  Patient: Kerwin Delaney (87 y.o. male)  Date: 4/12/2017  Diagnosis: COPD exacerbation (HCC) COPD exacerbation ([facility] [address]       Precautions:    Chart, physical therapy assessment, plan of care and goals were reviewed. ASSESSMENT:  Pt comes to stand with CGA. Pt ambulated 100ft with RW CGA. Pts SPO2 decreased from 96% on 3L at rest to 86% on 3L with ambulation. pt recovers after  95% on 3L. Post amb.sitting. Pt is very concerned about respiratory condition. Pt hoping to go to Pulmonary Rehab at D/C. Progression toward goals:  [ ]      Improving appropriately and progressing toward goals   [X]      Improving slowly and progressing toward goals  [ ]      Not making progress toward goals and plan of care will be adjusted       PLAN:  Patient continues to benefit from skilled intervention to address the above impairments. Continue treatment per established plan of care. Discharge Recommendations:Pulmonary Rehab   Further Equipment Recommendations for Discharge:  rolling walker       SUBJECTIVE:          OBJECTIVE DATA SUMMARY:   Critical Behavior:  Neurologic State: Alert  Orientation Level: Oriented X4  Cognition: Follows commands (Periodic confusion)  Safety/Judgement: Awareness of environment, Fall prevention, Insight into deficits  Functional Mobility Training:  Bed Mobility:                                Transfers:  Sit to Stand: Contact guard assistance  Stand to Sit: Contact guard assistance                             Balance:  Sitting: Intact  Standing: Intact; With support  Ambulation/Gait Training:  Distance (ft): 140 Feet (ft)  Assistive Device: Gait belt;Walker, rolling  Ambulation - Level of Assistance: Contact guard assistance        Gait Abnormalities: Decreased step clearance        Base of Support: Narrowed     Speed/Tiffanie: Pace decreased (<100 feet/min)  Step Length: Right shortened;Left shortened              Pain:  Pain Scale 1: Numeric (0 - 10)  Pain Intensity 1: 9  Pain Location 1: Generalized     Pain Description 1: Aching  Pain Intervention(s) 1: Medication (see MAR)   Activity Tolerance:   Pt tolerated treatment fairly well. Please refer to the flowsheet for vital signs taken during this treatment.   After treatment:   [X] Patient left in no apparent distress sitting up in chair  [ ] Patient left in no apparent distress in bed  [ ] Call bell left within reach  [ ] Nursing notified  [ ] Caregiver present  [ ] Bed alarm activated      COMMUNICATION/COLLABORATION:   The patients plan of care was discussed with: Physical Therapist     Gladys Posada PTA   Time Calculation: 23 mins

## 2017-04-12 NOTE — PROGRESS NOTES
4/12/2017   4:25 PM  I spoke w/ spouse Kelly Kimbrough to update on D/C tomorrow if medically stable and accepted by [facility]. She will be at hospital by 11:00AM w/ pt clothing. CM will follow  Norm Mcintosh  3:13 PM  I left  for pt's spouse to update on D/C plan. I have arranged WC transport w/ O2 via [employer], to [facility]. Pt cost is $79.00 he states he has credit card for payment. CM will follow. Norm Mcintosh    12:01 PM  I spoke w/ Tiburcio Stringer for [facility] who was here to eval pt for pulmonary rehab,  She has reviewed and submitted clinicals to their MD, she expects acceptance. Pt has home O2 through [employer], he has no portable, he returned them since he did not use them in 1 mo. Tiburcio Stringer said they will arrange for portables when he is inpatient. CM will arrange [address] [place] [address] w/ O2 for transport. I spoke w/ pt. To discuss D/C plan, he is agreeable. CM will follow.   Norm Mcintosh  Case Management

## 2017-04-12 NOTE — PROGRESS NOTES
[facility]  [address] [facility], [place], [address] [place] [address]  [phone]      Medical Progress Note      NAME: Macie Mckenzie   :  1945  MRM:  [id number]    Date/Time: 2017  11:37 AM       Assessment and Plan:     COPD exacerbation / Bronchitis, chronic / Leukocytosis - POA. Slowly better. Continue IV steroids weaned again today, prn duonebs and azithromycin. advair for breo. Pulmonary following. Should go to pulm rehab tomorrow.      Acute respiratory failure with hypoxia - POA and persistent. Appreciate Pulmonary consult. Anxiety is contributing significantly to symptoms      DM type 2 causing neurological disease - Diabetic diet and counseling.  SSI per protocol.  Continue home metformin.      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 Needs nutritional support.      Spinal stenosis of lumbar region / Neuropathy / Post-polio syndrome / Osteoarthritis - Continue cymbalta and half dose of gabapentin. Fall precautions. PT/OT eval.  Will go to rehab       Dementia / Depression with anxiety / Fibromyalgia - Continue cymbalta      Hyperlipidemia - On atorvastatin at home. LDL <100 on lipid panel. Lacking cause to keep <100, stop statin. Subjective:     Chief Complaint:  Still ROME but overall better    ROS:  (bold if positive, if negative)    CoughSOB/ROME  Tolerating some PT  Tolerating Diet        Objective:     Last 24hrs VS reviewed since prior progress note.  Most recent are:    Visit Vitals    /71 (BP 1 Location: Right arm, BP Patient Position: At rest)    Pulse 95    Temp 98 °F (36.7 °C)    Resp 20    Ht 6' 1\" (1.854 m)    Wt 66.3 kg (146 lb 2.6 oz)    SpO2 95%    BMI 19.28 kg/m2     SpO2 Readings from Last 6 Encounters:   17 95%   17 97%   17 95%   17 96%   16 93%   16 97%    O2 Flow Rate (L/min): 2 l/min     Intake/Output Summary (Last 24 hours) at 17 1334  Last data filed at 17 3102 Gross per 24 hour   Intake              240 ml   Output             1050 ml   Net             -810 ml        Physical Exam:    Gen:  Thin, frail, in no acute distress  HEENT:  Pink conjunctivae, PERRL, hearing intact to voice, moist mucous membranes, NC oxygen on face  Neck:  Supple, without masses, thyroid non-tender  Resp:  No accessory muscle use, bilateral breath sounds with some wheezes  Card:  No murmurs, tachycardic S1, S2 without thrills, bruits or peripheral edema  Abd:  Soft, non-tender, non-distended, normoactive bowel sounds are present, no mass  Lymph:  No cervical or inguinal adenopathy  Musc:  No cyanosis or clubbing, L leg in brace  Skin:  No rashes or ulcers, skin turgor is good  Neuro:  Cranial nerves are grossly intact, general motor weakness, follows commands appropriately  Psych:  Good insight, oriented to person, place and time, alert    Telemetry reviewed:   normal sinus rhythm  __________________________________________________________________  Medications Reviewed: (see below)  Medications:     Current Facility-Administered Medications   Medication Dose Route Frequency    [START ON 4/13/2017] predniSONE (DELTASONE) tablet 60 mg  60 mg Oral DAILY WITH BREAKFAST    [START ON 4/16/2017] predniSONE (DELTASONE) tablet 50 mg  50 mg Oral DAILY WITH BREAKFAST    [START ON 4/19/2017] predniSONE (DELTASONE) tablet 40 mg  40 mg Oral DAILY WITH BREAKFAST    [START ON 4/22/2017] predniSONE (DELTASONE) tablet 30 mg  30 mg Oral DAILY WITH BREAKFAST    [START ON 4/25/2017] predniSONE (DELTASONE) tablet 20 mg  20 mg Oral DAILY WITH BREAKFAST    [START ON 4/28/2017] predniSONE (DELTASONE) tablet 10 mg  10 mg Oral DAILY WITH BREAKFAST    methylPREDNISolone (PF) (SOLU-MEDROL) injection 60 mg  60 mg IntraVENous Q8H    sodium chloride (NS) flush 5-10 mL  5-10 mL IntraVENous Q8H    sodium chloride (NS) flush 5-10 mL  5-10 mL IntraVENous PRN    azithromycin (ZITHROMAX) 500 mg in 0.9% sodium chloride (MBP/ADV) 250 mL  500 mg IntraVENous Q24H    aspirin chewable tablet 81 mg  81 mg Oral DAILY    DULoxetine (CYMBALTA) capsule 60 mg  60 mg Oral DAILY    gabapentin (NEURONTIN) capsule 600 mg  600 mg Oral QID    metFORMIN (GLUCOPHAGE) tablet 500 mg  500 mg Oral DAILY    oxyCODONE-acetaminophen (PERCOCET 7.5) 7.5-325 mg per tablet 1 Tab  1 Tab Oral Q6H PRN    naloxone (NARCAN) injection 0.4 mg  0.4 mg IntraVENous PRN    zolpidem (AMBIEN) tablet 5 mg  5 mg Oral QHS PRN    glucose chewable tablet 16 g  4 Tab Oral PRN    dextrose (D50W) injection syrg 12.5-25 g  12.5-25 g IntraVENous PRN    glucagon (GLUCAGEN) injection 1 mg  1 mg IntraMUSCular PRN    acetaminophen (TYLENOL) tablet 650 mg  650 mg Oral Q4H PRN    diphenhydrAMINE (BENADRYL) capsule 25 mg  25 mg Oral Q4H PRN    ondansetron (ZOFRAN) injection 4 mg  4 mg IntraVENous Q4H PRN    enoxaparin (LOVENOX) injection 40 mg  40 mg SubCUTAneous Q24H    insulin lispro (HUMALOG) injection   SubCUTAneous AC&HS    albuterol-ipratropium (DUO-NEB) 2.5 MG-0.5 MG/3 ML  3 mL Nebulization Q4H PRN    albuterol-ipratropium (DUO-NEB) 2.5 MG-0.5 MG/3 ML  3 mL Nebulization Q4H RT    fluticasone-vilanterol (BREO ELLIPTA) 200mcg-25mcg/puff  1 Puff Inhalation DAILY    LORazepam (ATIVAN) injection 0.5 mg  0.5 mg IntraVENous Q6H PRN    fentaNYL (DURAGESIC) 25 mcg/hr patch 1 Patch  1 Patch TransDERmal Q72H        Lab Data Reviewed: (see below)  Lab Review:     Recent Labs      04/12/17   0711  04/11/17   0333  04/10/17   0240   WBC  15.9*  12.6*  16.4*   HGB  11.7*  11.6*  12.1   HCT  35.6*  34.5*  39.2   PLT  230  239  213     Recent Labs      04/12/17   0711  04/11/17   0333  04/10/17   0240   NA  141  140  139   K  4.1  4.7  4.3   CL  104  105  103   CO2  27  25  24   GLU  170*  173*  110*   BUN  19  18  13   CREA  0.89  0.75  0.85   CA  8.9  8.6  8.9   MG   --   1.9  2.4   PHOS   --   3.9   --    ALB   --   3.3*  3.6   TBILI   --   0.1*  0.4   SGOT   --   22  23   ALT --   23  25     Lab Results   Component Value Date/Time    Glucose (POC) 175 04/12/2017 08:10 AM    Glucose (POC) 195 04/11/2017 08:48 PM    Glucose (POC) 185 04/11/2017 04:32 PM    Glucose (POC) 146 04/11/2017 11:26 AM    Glucose (POC) 149 04/11/2017 07:19 AM     Recent Labs      04/10/17   0230   PH  7.32*   PCO2  45   PO2  323*   HCO3  23   FIO2  60     No results for input(s): INR in the last 72 hours. No lab exists for component: INREXT  All Micro Results     Procedure Component Value Units Date/Time    CULTURE, BLOOD [[id number]] Collected:  04/10/17 0240    Order Status:  Completed Specimen:  Blood from Blood Updated:  04/12/17 0714     Special Requests: NO SPECIAL REQUESTS        Culture result: NO GROWTH 2 DAYS       RESPIRATORY VIRAL PANEL, PCR [[id number]] Collected:  04/10/17 0845    Order Status:  Canceled Specimen:  Sputum           I have reviewed notes of prior 24hr.     Other pertinent lab: none    Total time spent with patient: [address] [place] [address] discussed with: Patient, Care Manager, Nursing Staff and >50% of time spent in counseling and coordination of care    Discussed:  Care Plan and D/C Planning    Prophylaxis:  H2B/PPI    Disposition:  [facility]/Rehab           ___________________________________________________    Attending Physician: Jimmy Eagle MD

## 2017-04-12 NOTE — PROGRESS NOTES
Bedside and Verbal shift change report given to TIN (oncoming nurse) by Sofya Driver (offgoing nurse). Report included the following information SBAR, Kardex, Procedure Summary, Intake/Output, MAR, Accordion, Recent Results and Med Rec Status.

## 2017-04-13 VITALS
BODY MASS INDEX: 19.37 KG/M2 | TEMPERATURE: 97.5 F | HEART RATE: 82 BPM | WEIGHT: 146.16 LBS | HEIGHT: 73 IN | OXYGEN SATURATION: 92 % | SYSTOLIC BLOOD PRESSURE: 124 MMHG | RESPIRATION RATE: 18 BRPM | DIASTOLIC BLOOD PRESSURE: 64 MMHG

## 2017-04-13 LAB
GLUCOSE BLD STRIP.AUTO-MCNC: 122 MG/DL (ref 65–100)
GLUCOSE BLD STRIP.AUTO-MCNC: 131 MG/DL (ref 65–100)
SERVICE CMNT-IMP: ABNORMAL
SERVICE CMNT-IMP: ABNORMAL

## 2017-04-13 PROCEDURE — 74011250637 HC RX REV CODE- 250/637: Performed by: INTERNAL MEDICINE

## 2017-04-13 PROCEDURE — 77010033678 HC OXYGEN DAILY

## 2017-04-13 PROCEDURE — 74011250636 HC RX REV CODE- 250/636: Performed by: INTERNAL MEDICINE

## 2017-04-13 PROCEDURE — 74011636637 HC RX REV CODE- 636/637: Performed by: NURSE PRACTITIONER

## 2017-04-13 PROCEDURE — 82962 GLUCOSE BLOOD TEST: CPT

## 2017-04-13 PROCEDURE — 94640 AIRWAY INHALATION TREATMENT: CPT

## 2017-04-13 PROCEDURE — 74011000250 HC RX REV CODE- 250: Performed by: INTERNAL MEDICINE

## 2017-04-13 RX ORDER — FENTANYL 25 UG/1
1 PATCH TRANSDERMAL
Qty: 3 PATCH | Refills: 0 | Status: SHIPPED | OUTPATIENT
Start: 2017-04-13 | End: 2017-04-23

## 2017-04-13 RX ORDER — OXYCODONE AND ACETAMINOPHEN 7.5; 325 MG/1; MG/1
TABLET ORAL
Qty: 10 TAB | Refills: 0 | Status: ON HOLD | OUTPATIENT
Start: 2017-04-13 | End: 2021-01-25

## 2017-04-13 RX ORDER — PREDNISONE 10 MG/1
TABLET ORAL
Qty: 66 TAB | Refills: 0 | Status: ON HOLD | OUTPATIENT
Start: 2017-04-13 | End: 2021-01-25

## 2017-04-13 RX ORDER — AZITHROMYCIN 250 MG/1
500 TABLET, FILM COATED ORAL DAILY
Status: DISCONTINUED | OUTPATIENT
Start: 2017-04-14 | End: 2017-04-13 | Stop reason: HOSPADM

## 2017-04-13 RX ORDER — AZITHROMYCIN 500 MG/1
500 TABLET, FILM COATED ORAL DAILY
Qty: 3 TAB | Refills: 0 | Status: SHIPPED | OUTPATIENT
Start: 2017-04-13 | End: 2017-04-16

## 2017-04-13 RX ADMIN — OXYCODONE HYDROCHLORIDE AND ACETAMINOPHEN 1 TABLET: 7.5; 325 TABLET ORAL at 13:52

## 2017-04-13 RX ADMIN — AZITHROMYCIN MONOHYDRATE 500 MG: 500 INJECTION, POWDER, LYOPHILIZED, FOR SOLUTION INTRAVENOUS at 03:13

## 2017-04-13 RX ADMIN — GABAPENTIN 600 MG: 300 CAPSULE ORAL at 08:35

## 2017-04-13 RX ADMIN — GUAIFENESIN 600 MG: 600 TABLET, EXTENDED RELEASE ORAL at 08:33

## 2017-04-13 RX ADMIN — METFORMIN HYDROCHLORIDE 500 MG: 500 TABLET ORAL at 08:34

## 2017-04-13 RX ADMIN — DULOXETINE HYDROCHLORIDE 60 MG: 30 CAPSULE, DELAYED RELEASE ORAL at 08:33

## 2017-04-13 RX ADMIN — IPRATROPIUM BROMIDE AND ALBUTEROL SULFATE 3 ML: .5; 3 SOLUTION RESPIRATORY (INHALATION) at 09:17

## 2017-04-13 RX ADMIN — OXYCODONE HYDROCHLORIDE AND ACETAMINOPHEN 1 TABLET: 7.5; 325 TABLET ORAL at 03:12

## 2017-04-13 RX ADMIN — PREDNISONE 60 MG: 20 TABLET ORAL at 08:34

## 2017-04-13 RX ADMIN — IPRATROPIUM BROMIDE AND ALBUTEROL SULFATE 3 ML: .5; 3 SOLUTION RESPIRATORY (INHALATION) at 04:23

## 2017-04-13 RX ADMIN — ASPIRIN 81 MG CHEWABLE TABLET 81 MG: 81 TABLET CHEWABLE at 08:34

## 2017-04-13 RX ADMIN — LORAZEPAM 0.5 MG: 2 INJECTION, SOLUTION INTRAMUSCULAR; INTRAVENOUS at 10:47

## 2017-04-13 RX ADMIN — Medication 10 ML: at 06:00

## 2017-04-13 RX ADMIN — IPRATROPIUM BROMIDE AND ALBUTEROL SULFATE 3 ML: .5; 3 SOLUTION RESPIRATORY (INHALATION) at 00:43

## 2017-04-13 RX ADMIN — FLUTICASONE FUROATE AND VILANTEROL TRIFENATATE 1 PUFF: 200; 25 POWDER RESPIRATORY (INHALATION) at 08:40

## 2017-04-13 RX ADMIN — LORAZEPAM 0.5 MG: 2 INJECTION, SOLUTION INTRAMUSCULAR; INTRAVENOUS at 03:07

## 2017-04-13 RX ADMIN — GABAPENTIN 600 MG: 300 CAPSULE ORAL at 13:52

## 2017-04-13 RX ADMIN — IPRATROPIUM BROMIDE AND ALBUTEROL SULFATE 3 ML: .5; 3 SOLUTION RESPIRATORY (INHALATION) at 12:51

## 2017-04-13 RX ADMIN — ENOXAPARIN SODIUM 40 MG: 40 INJECTION SUBCUTANEOUS at 08:35

## 2017-04-13 NOTE — PROGRESS NOTES
Bedside and Verbal shift change report given to suhail rn (oncoming nurse) by jase echevarria rn (offgoing nurse). Report included the following information SBAR and MAR.

## 2017-04-13 NOTE — PROGRESS NOTES
Interdisciplinary team rounds were held 4/13/2017 with the following team members:Care Management, Nutrition, Physical Therapy and Clinical Coordinator and the primary RN. Plan of care discussed. See clinical pathway and/or care plan for interventions and desired outcomes.     Discharge today

## 2017-04-13 NOTE — DISCHARGE SUMMARY
Physician Discharge Summary     Patient ID:  Faby De La Rosa  [id number]  74 y.o.  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 Slowly better. Improved IV steroids weaned off to PO, continue prn duonebs and guaifenesin, and complete azithromycin. advair for breo. Pulmonary following. Can go to pulm rehab today.      Acute respiratory failure with hypoxia - POA and persistent. Appreciate Pulmonary consult. Anxiety is contributing significantly to symptoms      DM type 2 causing neurological disease - Diabetic diet and counseling.  SSI per protocol.  Continue home metformin.      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 Needs nutritional support.      Spinal stenosis of lumbar region / Neuropathy / Post-polio syndrome / Osteoarthritis - Continue cymbalta and half dose of gabapentin. Fall precautions. PT/OT eval. Will go to rehab       Dementia / Depression with anxiety / Fibromyalgia - Continue cymbalta      Hyperlipidemia - On atorvastatin at home. LDL <100 on lipid panel. Lacking cause to keep <100, stop statin. PCP: Swapnil Montes MD    Consults: Pulmonary/Intensive care and Rehabilitation Medicine    Significant Diagnostic Studies: See Hospital Course    Discharged to pulmonary rehab in improved condition.     Discharge Exam:   BP 138/69 (BP 1 Location: Left arm, BP Patient Position: At rest)    Pulse 83    Temp 97.7 °F (36.5 °C)    Resp 18    Ht 6' 1\" (1.854 m)    Wt 66.3 kg (146 lb 2.6 oz)    SpO2 96%    BMI 19.28 kg/m2      Gen: Thin, frail, in no acute distress  HEENT: Pink conjunctivae, PERRL, hearing intact to voice, moist mucous membranes, NC oxygen on face  Neck: Supple, without masses, thyroid non-tender  Resp: No accessory muscle use, bilateral breath sounds with some wheezes  Card: No murmurs, tachycardic S1, S2 without thrills, bruits or peripheral edema  Abd: Soft, non-tender, non-distended, normoactive bowel sounds are present, no mass  Lymph: No cervical or inguinal adenopathy  Musc: No cyanosis or clubbing, L leg in brace  Skin: No rashes or ulcers, skin turgor is good  Neuro: Cranial nerves are grossly intact, general motor weakness, follows commands appropriately  Psych: Good insight, oriented to person, place and time, alert    Patient Instructions:   Current Discharge Medication List      START taking these medications    Details   azithromycin (ZITHROMAX) 500 mg tab Take 1 Tab by mouth daily for 3 days. Qty: 3 Tab, Refills: 0      predniSONE (DELTASONE) 10 mg tablet Take 6 tabs a day for 3 days, then 5 a day for 3 days, then 4 a day for 3 days, then 3 a day for 3 days, then 2 a day for 3 days, then 1 a day for 6 days. Qty: 66 Tab, Refills: 0         CONTINUE these medications which have CHANGED    Details   fentaNYL (DURAGESIC) 25 mcg/hr PATCH 1 Patch by TransDERmal route every seventy-two (72) hours for 10 days. Max Daily Amount: 1 Patch. Every 4 days (96 hours)  Qty: 3 Patch, Refills: 0      oxyCODONE-acetaminophen (PERCOCET 7.5) 7.5-325 mg per tablet 1 tabs every  6 hrs as needed for pain. Maximum daily dose 4 tablets. Qty: 10 Tab, Refills: 0    Comments: Do not drive or operate heavy machinery within 8 hours of taking this medication.          CONTINUE these medications which have NOT CHANGED    Details albuterol-ipratropium (DUO-NEB) 2.5 mg-0.5 mg/3 ml nebu 3 mL by Nebulization route every four (4) hours as needed (SOB). aspirin 81 mg chewable tablet Take 1 Tab by mouth daily. Qty: 30 Tab, Refills: 0      gabapentin (NEURONTIN) 600 mg tablet Take 600 mg by mouth four (4) times daily. fluticasone-salmeterol (ADVAIR) 100-50 mcg/dose diskus inhaler Take 1 Puff by inhalation two (2) times a day. atorvastatin (LIPITOR) 10 mg tablet Take 10 mg by mouth daily. meloxicam (MOBIC) 15 mg tablet Take 15 mg by mouth daily. albuterol (PROAIR HFA) 90 mcg/actuation inhaler Take 2 Puffs by inhalation every four (4) hours as needed. DULoxetine (CYMBALTA) 60 mg capsule Take 60 mg by mouth daily. metFORMIN (GLUCOPHAGE) 500 mg tablet Take 500 mg by mouth daily. Activity: Activity as tolerated and PT/OT Eval and Treat  Diet: Cardiac Diet, Diabetic Diet and Low fat, Low cholesterol  Wound Care: None needed    Follow-up with your PCP and pulmonary in a few weeks.   Follow-up tests/labs - none    Signed:  Governor Bull MD  4/13/2017  11:50 AM

## 2017-04-13 NOTE — PROGRESS NOTES
4/13/2017 1:37 PM AVS, discharge summary, scripts, MARS, PT/OT notes and chest xray has been sent via [facility]. RUSTY Kiser     4/13/2017 11:30 AM BRIANNA has informed the pt, MADI Morris and Dr. Randy Marcelino. RUSTY Kiser    4/13/2017 11:20 AM Call received from Juliette Lorenzana with [facility]. This pt has been accepted to pulmonary rehab at [facility] today. Transport is set for 2:00PM.   Discharge orders and AVS can be faxed to [phone]. Nurse can call report to [phone]. RUSTY Kiser       4/13/2017 9:16 AM BRIANNA spoke with Juliette Lorenzana clinical liaison with [facility]. She states that she is still waiting for a response from her Md to give an answer on this pt's case. Pt has not officially been accepted to the [facility] program. Transport arranged by my colleague ahead of time in anticipation that he'll be accepted today. Transport is arranged for 2PM today with [facility].    RUSTY Kiser

## 2017-04-13 NOTE — PROGRESS NOTES
[facility]  [address] [facility], [place], [address] [place] [address]  [phone]      Medical Progress Note      NAME: Braeden Vega   :  1945  MRM:  [id number]    Date/Time: 2017  11:40 AM       Assessment and Plan:     COPD exacerbation / Bronchitis, chronic / Leukocytosis - POA. Slowly better. Improved IV steroids weaned off to PO, continue prn duonebs and guaifenesin, and complete azithromycin. advair for breo. Pulmonary following. Can go to pulm rehab today.      Acute respiratory failure with hypoxia - POA and persistent. Appreciate Pulmonary consult. Anxiety is contributing significantly to symptoms      DM type 2 causing neurological disease - Diabetic diet and counseling.  SSI per protocol.  Continue home metformin.      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 Needs nutritional support.      Spinal stenosis of lumbar region / Neuropathy / Post-polio syndrome / Osteoarthritis - Continue cymbalta and half dose of gabapentin. Fall precautions. PT/OT eval.  Will go to rehab       Dementia / Depression with anxiety / Fibromyalgia - Continue cymbalta      Hyperlipidemia - On atorvastatin at home. LDL <100 on lipid panel. Lacking cause to keep <100, stop statin. Subjective:     Chief Complaint:  Still ROME, but overall better    ROS:  (bold if positive, if negative)    CoughSOB/ROEM  Tolerating some PT  Tolerating Diet        Objective:     Last 24hrs VS reviewed since prior progress note.  Most recent are:    Visit Vitals    /69 (BP 1 Location: Left arm, BP Patient Position: At rest)    Pulse 83    Temp 97.7 °F (36.5 °C)    Resp 18    Ht 6' 1\" (1.854 m)    Wt 66.3 kg (146 lb 2.6 oz)    SpO2 96%    BMI 19.28 kg/m2     SpO2 Readings from Last 6 Encounters:   17 96%   17 97%   17 95%   17 96%   16 93%   16 97%    O2 Flow Rate (L/min): 3 l/min       Intake/Output Summary (Last 24 hours) at 17 1140  Last data filed at 04/13/17 7381   Gross per 24 hour   Intake                0 ml   Output             1000 ml   Net            -1000 ml        Physical Exam:    Gen:  Thin, frail, in no acute distress  HEENT:  Pink conjunctivae, PERRL, hearing intact to voice, moist mucous membranes, NC oxygen on face  Neck:  Supple, without masses, thyroid non-tender  Resp:  No accessory muscle use, bilateral breath sounds with some wheezes  Card:  No murmurs, tachycardic S1, S2 without thrills, bruits or peripheral edema  Abd:  Soft, non-tender, non-distended, normoactive bowel sounds are present, no mass  Lymph:  No cervical or inguinal adenopathy  Musc:  No cyanosis or clubbing, L leg in brace  Skin:  No rashes or ulcers, skin turgor is good  Neuro:  Cranial nerves are grossly intact, general motor weakness, follows commands appropriately  Psych:  Good insight, oriented to person, place and time, alert    Telemetry reviewed:   normal sinus rhythm  __________________________________________________________________  Medications Reviewed: (see below)  Medications:     Current Facility-Administered Medications   Medication Dose Route Frequency    predniSONE (DELTASONE) tablet 60 mg  60 mg Oral DAILY WITH BREAKFAST    [START ON 4/16/2017] predniSONE (DELTASONE) tablet 50 mg  50 mg Oral DAILY WITH BREAKFAST    [START ON 4/19/2017] predniSONE (DELTASONE) tablet 40 mg  40 mg Oral DAILY WITH BREAKFAST    [START ON 4/22/2017] predniSONE (DELTASONE) tablet 30 mg  30 mg Oral DAILY WITH BREAKFAST    [START ON 4/25/2017] predniSONE (DELTASONE) tablet 20 mg  20 mg Oral DAILY WITH BREAKFAST    [START ON 4/28/2017] predniSONE (DELTASONE) tablet 10 mg  10 mg Oral DAILY WITH BREAKFAST    guaiFENesin ER (MUCINEX) tablet 600 mg  600 mg Oral Q12H    sodium chloride (NS) flush 5-10 mL  5-10 mL IntraVENous Q8H    sodium chloride (NS) flush 5-10 mL  5-10 mL IntraVENous PRN    azithromycin (ZITHROMAX) 500 mg in 0.9% sodium chloride (MBP/ADV) 250 mL 500 mg IntraVENous Q24H    aspirin chewable tablet 81 mg  81 mg Oral DAILY    DULoxetine (CYMBALTA) capsule 60 mg  60 mg Oral DAILY    gabapentin (NEURONTIN) capsule 600 mg  600 mg Oral QID    metFORMIN (GLUCOPHAGE) tablet 500 mg  500 mg Oral DAILY    oxyCODONE-acetaminophen (PERCOCET 7.5) 7.5-325 mg per tablet 1 Tab  1 Tab Oral Q6H PRN    naloxone (NARCAN) injection 0.4 mg  0.4 mg IntraVENous PRN    zolpidem (AMBIEN) tablet 5 mg  5 mg Oral QHS PRN    glucose chewable tablet 16 g  4 Tab Oral PRN    dextrose (D50W) injection syrg 12.5-25 g  12.5-25 g IntraVENous PRN    glucagon (GLUCAGEN) injection 1 mg  1 mg IntraMUSCular PRN    acetaminophen (TYLENOL) tablet 650 mg  650 mg Oral Q4H PRN    diphenhydrAMINE (BENADRYL) capsule 25 mg  25 mg Oral Q4H PRN    ondansetron (ZOFRAN) injection 4 mg  4 mg IntraVENous Q4H PRN    enoxaparin (LOVENOX) injection 40 mg  40 mg SubCUTAneous Q24H    insulin lispro (HUMALOG) injection   SubCUTAneous AC&HS    albuterol-ipratropium (DUO-NEB) 2.5 MG-0.5 MG/3 ML  3 mL Nebulization Q4H PRN    albuterol-ipratropium (DUO-NEB) 2.5 MG-0.5 MG/3 ML  3 mL Nebulization Q4H RT    fluticasone-vilanterol (BREO ELLIPTA) 200mcg-25mcg/puff  1 Puff Inhalation DAILY    LORazepam (ATIVAN) injection 0.5 mg  0.5 mg IntraVENous Q6H PRN    fentaNYL (DURAGESIC) 25 mcg/hr patch 1 Patch  1 Patch TransDERmal Q72H        Lab Data Reviewed: (see below)  Lab Review:     Recent Labs      04/12/17   0711  04/11/17 0333   WBC  15.9*  12.6*   HGB  11.7*  11.6*   HCT  35.6*  34.5*   PLT  230  239     Recent Labs      04/12/17   0711  04/11/17 0333   NA  141  140   K  4.1  4.7   CL  104  105   CO2  27  25   GLU  170*  173*   BUN  19  18   CREA  0.89  0.75   CA  8.9  8.6   MG   --   1.9   PHOS   --   3.9   ALB   --   3.3*   TBILI   --   0.1*   SGOT   --   22   ALT   --   23     Lab Results   Component Value Date/Time    Glucose (POC) 122 04/13/2017 11:20 AM    Glucose (POC) 131 04/13/2017 07:28 AM Glucose (POC) 145 04/12/2017 09:03 PM    Glucose (POC) 135 04/12/2017 04:34 PM    Glucose (POC) 141 04/12/2017 12:07 PM     No results for input(s): PH, PCO2, PO2, HCO3, FIO2 in the last 72 hours. No results for input(s): INR in the last 72 hours. No lab exists for component: Angelita Clore  All Micro Results     Procedure Component Value Units Date/Time    CULTURE, BLOOD [[id number]] Collected:  04/10/17 0240    Order Status:  Completed Specimen:  Blood from Blood Updated:  04/12/17 0714     Special Requests: NO SPECIAL REQUESTS        Culture result: NO GROWTH 2 DAYS       RESPIRATORY VIRAL PANEL, PCR [[id number]] Collected:  04/10/17 0845    Order Status:  Canceled Specimen:  Sputum           I have reviewed notes of prior 24hr.     Other pertinent lab: none    Total time spent with patient: [address] discussed with: Patient, Care Manager, Nursing Staff and >50% of time spent in counseling and coordination of care    Discussed:  Care Plan and D/C Planning    Prophylaxis:  H2B/PPI    Disposition:  [facility]/Rehab           ___________________________________________________    Attending Physician: Carolin Saeed MD

## 2017-04-13 NOTE — PROGRESS NOTES
Bedside and Verbal shift change report given to [employer], RN (oncoming nurse) by Ezra Salgado RN (offgoing nurse). Report included the following information SBAR, Kardex, ED Summary, Intake/Output, Recent Results and Med Rec Status.

## 2017-04-13 NOTE — DISCHARGE INSTRUCTIONS
Nutrition Recommendations for Discharge:             Continue Oral Nutrition Supplements at discharge:   Ensure Active High Protein for Muscle Health 2-3 daily between meals  for 30 days unless otherwise directed by your Primary Care Physician. This product can be purchased at your   local grocery store or drug store and online. Bony [employer], [address] [place] [address]         Patient Discharge Instructions    Sherry Powell / [id number] : 1945    Admitted 4/10/2017 Discharged: 2017     Primary Diagnoses  Problem List as of 2017  Date Reviewed: 4/10/2017          Codes Class Noted - Resolved   * (Principal)COPD exacerbation ([facility]   Depression with anxiety   Anxiety and depression   Arthritis   Dementia   Bronchitis, chronic (HCC)   Leukocytosis   DM type 2 causing neurological disease ([facility]   Neuropathy   Post-polio syndrome   Hyperlipidemia   Osteoarthritis   Fibromyalgia   Spinal stenosis of lumbar region          Take Home Medications     · It is important that you take the medication exactly as they are prescribed. · Keep your medication in the bottles provided by the pharmacist and keep a list of the medication names, dosages, and times to be taken in your wallet. · Do not take other medications without consulting your doctor. What to do at Home    Recommended diet: Cardiac Diabetic Diet and Low fat, Low cholesterol    Recommended activity: PT/OT Eval and Treat    If you experience worse breathing, please follow up with your lung doctor. Follow-up with your PCP in a few weeks    Information obtained by :  I understand that if any problems occur once I am at home I am to contact my physician. I understand and acknowledge receipt of the instructions indicated above.                                                                                                                                            Physician's or R.N.'s Signature Date/Time                                                                                                                                              Patient or Representative Signature                                                          Date/Time

## 2017-04-13 NOTE — PROGRESS NOTES
1344: TRANSFER - OUT REPORT:    Verbal report given to Gabriella Khan (name) on [address]  being transferred to [facility]) for routine progression of care       Report consisted of patients Situation, Background, Assessment and   Recommendations(SBAR). Information from the following report(s) SBAR, Kardex, Intake/Output and Recent Results was reviewed with the receiving nurse. Opportunity for questions and clarification was provided. Patient transported with:  Monitor     -I have reviewed discharge instructions with the patient and spouse. The patient and spouse verbalized understanding. Discharge medications reviewed with patient and spouse and appropriate educational materials and side effects teaching were provided. Signed copy of d/c instructions placed in pt's chart. RX/H&P in transport packet.

## 2017-04-16 LAB
BACTERIA SPEC CULT: NORMAL
SERVICE CMNT-IMP: NORMAL

## 2017-11-01 ENCOUNTER — HOSPITAL ENCOUNTER (OUTPATIENT)
Dept: CT IMAGING | Age: 72
Discharge: HOME | End: 2017-11-01
Attending: INTERNAL MEDICINE
Payer: MEDICARE

## 2017-11-01 DIAGNOSIS — R93.89 ABNORMAL CHEST CT: ICD-10-CM

## 2017-11-01 LAB — CREAT BLD-MCNC: 1 MG/DL (ref 0.6–1.3)

## 2017-11-01 PROCEDURE — 74011636320 HC RX REV CODE- 636/320: Performed by: RADIOLOGY

## 2017-11-01 PROCEDURE — 82565 ASSAY OF CREATININE: CPT

## 2017-11-01 PROCEDURE — 71260 CT THORAX DX C+: CPT

## 2017-11-01 RX ADMIN — IOPAMIDOL 91 ML: 612 INJECTION, SOLUTION INTRAVENOUS at 10:45

## 2019-07-31 ENCOUNTER — HOSPITAL ENCOUNTER (OUTPATIENT)
Dept: CT IMAGING | Age: 74
Discharge: HOME | End: 2019-07-31
Attending: INTERNAL MEDICINE
Payer: MEDICARE

## 2019-07-31 DIAGNOSIS — R63.4 WEIGHT LOSS: ICD-10-CM

## 2019-07-31 DIAGNOSIS — R06.02 SOB (SHORTNESS OF BREATH): ICD-10-CM

## 2019-07-31 DIAGNOSIS — R63.4 LOSS OF WEIGHT: ICD-10-CM

## 2019-07-31 DIAGNOSIS — R06.02 SHORTNESS OF BREATH: ICD-10-CM

## 2019-07-31 LAB — CREAT BLD-MCNC: 0.7 MG/DL (ref 0.6–1.3)

## 2019-07-31 PROCEDURE — 74177 CT ABD & PELVIS W/CONTRAST: CPT

## 2019-07-31 PROCEDURE — 82565 ASSAY OF CREATININE: CPT

## 2019-07-31 PROCEDURE — 74011636320 HC RX REV CODE- 636/320: Performed by: RADIOLOGY

## 2019-07-31 RX ADMIN — IOPAMIDOL 100 ML: 755 INJECTION, SOLUTION INTRAVENOUS at 09:41

## 2019-09-18 ENCOUNTER — HOSPITAL ENCOUNTER (OUTPATIENT)
Dept: WOUND CARE | Age: 74
Discharge: HOME | End: 2019-09-18
Payer: MEDICARE

## 2019-09-18 VITALS
TEMPERATURE: 97.8 F | SYSTOLIC BLOOD PRESSURE: 170 MMHG | HEART RATE: 68 BPM | HEIGHT: 73 IN | DIASTOLIC BLOOD PRESSURE: 78 MMHG | BODY MASS INDEX: 19.88 KG/M2 | RESPIRATION RATE: 17 BRPM | WEIGHT: 150 LBS

## 2019-09-18 PROCEDURE — 11042 DBRDMT SUBQ TIS 1ST 20SQCM/<: CPT | Performed by: PODIATRIST

## 2019-09-18 PROCEDURE — 74011000250 HC RX REV CODE- 250: Performed by: PODIATRIST

## 2019-09-18 RX ORDER — AZITHROMYCIN 500 MG/1
500 TABLET, FILM COATED ORAL
COMMUNITY

## 2019-09-18 RX ORDER — GABAPENTIN 600 MG/1
TABLET ORAL 3 TIMES DAILY
COMMUNITY
End: 2021-01-14

## 2019-09-18 RX ADMIN — Medication: at 08:32

## 2019-09-18 NOTE — WOUND CARE
Kirby Hutchison DPM - [facility] [place], [address] - H&P     Assessment/Plan:  Type 2 Diabetes with foot ulcer (E11.621)  Non pressure chronic ulcer left foot to the level of fat (L97.522)    - Pt evaluated and treated. - Wound debrided as noted in the Procedure Note to healthy granular bleeding margins.  - Dressing consisting of  GV/BW2D applied. - Offloading achieved with AFO, noted needs to be modified by , gave him Rx for this. - F/U 1 week. Subjective:  Pt complains of wound to LT foot. Previous tx include nothing. Negative for fever, chills, nausea, vomiting, chest pain, shortness of breath. HPI: 66 y.o. 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 He just got a new AFO from  about 3 months ago and it rubbed and caused the ulceration. Needs AFO to walk as he has post polio syndrome. ROS:  Consitutional: no weight loss, night sweats, fatigue / malaise / lethargy. Musculoskeletal: no joint / extremity pain, misalignment, stiffness, decreased ROM, crepitus. Integument: No pruritis, rashes, lesions, LT foot wound.   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 removed     CHEST SURGERY PROCEDURE UNLISTED  2010    RVATS, Bronchoscopy    HX APPENDECTOMY      HX ORTHOPAEDIC      Cervical Discectomy    HX ORTHOPAEDIC      Left Arm Multiple Surgeries for Infection    HX ORTHOPAEDIC  2006    right heel fracture    TOTAL HIP ARTHROPLASTY  2007    left     Social History     Tobacco Use    Smoking status: Former Smoker     Packs/day: 2.00     Years: 20.00     Pack years: 40.00     Last attempt to quit: 1970     Years since quittin.4    Smokeless tobacco: Never Used   Substance Use Topics    Alcohol use: No       Social History     Substance and Sexual Activity   Alcohol Use No     Social History     Substance and Sexual Activity   Drug Use No      Social History     Tobacco Use   Smoking Status Former Smoker    Packs/day: 2.00    Years: 20.00    Pack years: 40.00    Last attempt to quit: 1970    Years since quittin.4   Smokeless Tobacco Never Used     Current Outpatient Medications   Medication Sig    AZITHROMYCIN PO Take  by mouth. 3 times a week    guaifenesin/pseudoephedrne HCl (MUCINEX D PO) Take  by mouth two (2) times a day.  gabapentin (NEURONTIN) 600 mg tablet Take  by mouth three (3) times daily.  mometasone furoate (ASMANEX HFA IN) Take  by inhalation.  glycopyrrolate/formoterol fum (BEVESPI AEROSPHERE IN) Take  by inhalation.  oxyCODONE-acetaminophen (PERCOCET 7.5) 7.5-325 mg per tablet 1 tabs every  6 hrs as needed for pain. Maximum daily dose 4 tablets.  predniSONE (DELTASONE) 10 mg tablet Take 6 tabs a day for 3 days, then 5 a day for 3 days, then 4 a day for 3 days, then 3 a day for 3 days, then 2 a day for 3 days, then 1 a day for 6 days.     albuterol-ipratropium (DUO-NEB) 2.5 mg-0.5 mg/3 ml nebu 3 mL by Nebulization route every four (4) hours as needed (SOB).    aspirin 81 mg chewable tablet Take 1 Tab by mouth daily.  gabapentin (NEURONTIN) 600 mg tablet Take 600 mg by mouth four (4) times daily.  fluticasone-salmeterol (ADVAIR) 100-50 mcg/dose diskus inhaler Take 1 Puff by inhalation two (2) times a day.  atorvastatin (LIPITOR) 10 mg tablet Take 10 mg by mouth daily.  meloxicam (MOBIC) 15 mg tablet Take 15 mg by mouth daily.  albuterol (PROAIR HFA) 90 mcg/actuation inhaler Take 2 Puffs by inhalation every four (4) hours as needed.  DULoxetine (CYMBALTA) 60 mg capsule Take 60 mg by mouth daily.  metFORMIN (GLUCOPHAGE) 500 mg tablet Take 500 mg by mouth daily. No current facility-administered medications for this encounter. Objective:  Visit Vitals  /78 (BP Patient Position: Sitting)   Pulse 68   Temp 97.8 °F (36.6 °C)   Resp 17   Ht 6' 1\" (1.854 m)   Wt 68 kg (150 lb)   BMI 19.79 kg/m²       Vascular:  B/L LE  DP 0/4; PT 0/4  capillary fill time brisk, pitting edema is present, skin temperature is cool, varicosities are present. Dermatological:    Wound: 1  Location: LT 5th metatarsal base  Measurements: per RN note  Margins: keratotic, no erythema/edema  Drainage: minimal serous  Odor: none  Wound base: granular  Lymphangitic streaking? No.  Undermining? No.  Sinus tracts? No.  Exposed bone? No.  Subcutaneous crepitation on palpation? No.    Nails are debrided. Skin is dry and scaly, exhibits hemosiderin deposition. Multiple dried scabs RLE. There is no maceration of the interspaces of the feet b/l. Neurological:  DTR are absent, protective sensation per 5.07 Iliamna Clemnetina monofilament is diminished, patient is AAOx3, mood is normal. Epicritic sensation is diminished. MMT 3/5 b/l LE. Orthopedic:  B/L LE are symmetric, ROM of ankle, STJ, 1st MTPJ is limited. B/L elevated arch height and varus ankle. Constitutional: Pt is a well developed, thin elderly  male. Sara January     Lymphatics: negative tenderness to palpation of neck/axillary/inguinal nodes. Imaging / Labs / Cx / Px:  N/a    Procedure Note:  Excisional debridement through level of fat. Location / Ulcer: LT foot  Indication: to remove non-viable tissue from wound bed. Consent in chart. Anesthesia: topical lidocaine  Instrument: #15  Residual necrosis: none  Bleeding: minimal  Hemostasis: pressure  Pre-Procedure Pain: 0  Post-Procedure Pain: 0  Area debrided < 20 cm sq. Pre-Debridement measurements: see nursing notes  Post-Debridement measurements: see nursing notes  This is part of a series of staged procedures in an attempt at limb salvage.

## 2019-09-18 NOTE — WOUND CARE
09/18/19 0827   Wound Foot Left;Lateral   Date First Assessed/Time First Assessed: 09/18/19 0827   Present on Hospital Admission: Yes  Wound Approximate Age at First Assessment (Weeks): 3 weeks  Primary Wound Type: Diabetic Ulcer  Location: Foot  Wound Location Orientation: Left;Lateral   Dressing Status Removed   Dressing Type Dry dressing   Wound Length (cm) 1.5 cm   Wound Width (cm) 1 cm   Wound Depth (cm) 0.3 cm   Wound Volume (cm^3) 0.45 cm^3   Condition of Base Purple   Condition of Edges Closed;Calloused   Drainage Amount Small   Drainage Color Serous   Wound Odor None   Ngoc-wound Assessment Purple   Cleansing and Cleansing Agents  Normal saline       Visit Vitals  /78 (BP Patient Position: Sitting)   Pulse 68   Temp 97.8 °F (36.6 °C)   Resp 17   Ht 6' 1\" (1.854 m)   Wt 68 kg (150 lb)   BMI 19.79 kg/m²

## 2019-09-25 ENCOUNTER — HOSPITAL ENCOUNTER (OUTPATIENT)
Dept: WOUND CARE | Age: 74
Discharge: HOME | End: 2019-09-25
Payer: MEDICARE

## 2019-09-25 VITALS
HEART RATE: 70 BPM | RESPIRATION RATE: 16 BRPM | TEMPERATURE: 98 F | SYSTOLIC BLOOD PRESSURE: 149 MMHG | DIASTOLIC BLOOD PRESSURE: 76 MMHG

## 2019-09-25 PROCEDURE — 11042 DBRDMT SUBQ TIS 1ST 20SQCM/<: CPT | Performed by: PODIATRIST

## 2019-09-25 NOTE — DISCHARGE INSTRUCTIONS
Discharge Instructions for  [facility]  [place] [address] [place], [address] [facility]  Telephone: [phone]    NAME:  Carin KEENE Sr 23:  1945  MEDICAL RECORD NUMBER:  [id number]  DATE:  9/18/2019    Wound Cleansing:   Do not scrub or use excessive force.   Cleanse wound prior to applying a clean dressing with:  [] Normal Saline [] Keep Wound Dry in Shower    [] Wound Cleanser   [] Cleanse wound with Mild Soap & Water  [] May Shower at Discharge   [] Other:   [x] cleanse with baby shampoo and rinse        [x] Other: gentian violet to wound bed and periwound    Dressings:           Wound Location left lat foot  [] Apply Primary Dressing:        [] Cover and Secure with:     [x] Gauze [] Betina [] Kerlix   [] Ace Wrap [x] Cover Roll Tape [] ABD     [x] Other: betadine wet to dry  [] exudry   [] Change dressing: [] Daily    [x] Every Other Day [] Three times per week   [] Once a week [] Do Not Change Dressing   [] Other:        []   Off-Loading:   [x] Off-loading when [x] walking  [] in bed [] sitting  [] Total non-weight bearing  [] Right Leg  [] Left Leg   [] Assistive Device [] Walker [] Cane  [] Wheelchair  [] Crutches   [] Surgical shoe    [] Podus Boot(s)   [] Foam Boot(s)  [] Roll About    [] Cast Boot [] CROW Boot  [x] Other: splint    Dietary:  [x] Diet as tolerated: [] Calorie Diabetic Diet: [] No Added Salt:  [x] Increase Protein: [] Other:   Activity:  [x] Activity as tolerated:  [] Patient has no activity restrictions     [] Strict Bedrest: [] Remain off Work:     [] May return to full duty work:                                   [] Return to work with restrictions:             Return Appointment:  [] Wound and dressing supply provider:   [] Home Healthcare:  [] nurse visit at wound center in *** days   [x] Return Appointment: With Dr. Rebceca Ch in  1 Week(s)  [x] call  to adjust splint    Electronically signed on 9/25/2019 at 8:43 AM Wound Care Center Information: Should you experience any significant changes in your wound(s) or have questions about your wound care, please contact the [facility] at [address] [place] [address] 8:00 am - 4:30. If you need help with your wound outside these hours and cannot wait until we are again available, contact your PCP or go to the hospital emergency room. PLEASE NOTE: IF YOU ARE UNABLE TO OBTAIN WOUND SUPPLIES, CONTINUE TO USE THE SUPPLIES YOU HAVE AVAILABLE UNTIL YOU ARE ABLE TO REACH US. IT IS MOST IMPORTANT TO KEEP THE WOUND COVERED AT ALL TIMES.      Physician Signature:_______________________    Date: ___________ Time:  ____________

## 2019-09-25 NOTE — WOUND CARE
09/25/19 6100 Wound Foot Left;Lateral  
Date First Assessed/Time First Assessed: 09/18/19 0827   Present on Hospital Admission: Yes  Wound Approximate Age at First Assessment (Weeks): 3 weeks  Primary Wound Type: Diabetic Ulcer  Location: Foot  Wound Location Orientation: Left;Lateral  
Dressing Status Removed Dressing Type Dry dressing 
(betadine wet to dry ) Wound Length (cm) 0.2 cm Wound Width (cm) 0.3 cm Wound Depth (cm) 0.1 cm Wound Volume (cm^3) 0.01 cm^3 Condition of Base Other (comment); Pink 
(gentacin violet ) Condition of Edges Calloused Drainage Amount Small Drainage Color Serous Wound Odor None Ngoc-wound Assessment Intact Cleansing and Cleansing Agents  Normal saline Visit Vitals /76 (BP 1 Location: Left arm, BP Patient Position: Sitting) Pulse 70 Temp 98 °F (36.7 °C) Resp 16

## 2019-09-25 NOTE — WOUND CARE
Rogers Henao, PAULA - Donis Garcia. Henderson Sandifer, [place] 8 Note Assessment/Plan: 
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 - Wound on foot smaller. 
- Abrasion on leg drying out. - Wound debrided as noted in the Procedure Note to healthy granular bleeding margins. 
- Dressing consisting of  GV/BW2D applied. - Offloading achieved with AFO which has been modified. 
- F/U 1 week. Subjective: 
Pt complains of wound to LT foot. Previous tx include nothing. Negative for fever, chills, nausea, vomiting, chest pain, shortness of breath. New abrasion to LT leg. Got brace modified by Camilla. HPI: 66 y.o. 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 He just got a new AFO from  about 3 months ago and it rubbed and caused the ulceration. Needs AFO to walk as he has post polio syndrome. ROS: 
Consitutional: no weight loss, night sweats, fatigue / malaise / lethargy. Musculoskeletal: no joint / extremity pain, misalignment, stiffness, decreased ROM, crepitus. Integument: No pruritis, rashes, lesions, LT foot wound. Psychiatric: No depression, anxiety, paranoia History: 
Wound Care Allergies Allergen Reactions  Trazodone Other (comments) Caused penile erection that needed surgical correction Family History Problem Relation Age of Onset Guanacomonieus.Bitter Arthritis-osteo Mother  Cancer Father  Lung Disease Father Past Medical History:  
Diagnosis Date  Anxiety and depression  Arthritis All joints  Bronchitis, chronic ([address]  Cancer ([address] skin cancer removed  Chronic obstructive pulmonary disease ([address]  Chronic pain  Dementia  DM type 2 causing neurological disease [address]  Fibromyalgia  Hyperlipidemia  Neuropathy  Post-polio syndrome Past Surgical History:  
Procedure Laterality Date Yessi Branch ABDOMEN SURGERY PROC UNLISTED  2010 Feeding Tube. removed   CHEST SURGERY PROCEDURE UNLISTED  2010 RVATS, Bronchoscopy  HX [address]CTOMY [address] Cervical Discectomy  HX ORTHOPAEDIC Left Arm Multiple Surgeries for Infection  HX ORTHOPAEDIC  2006  
 right heel fracture [facility] ARTHROPLASTY  2007  
 left Social History Tobacco Use  Smoking status: Former Smoker Packs/day: 2.00 Years: 20.00 Pack years: 40.00 Last attempt to quit: 1970 Years since quittin.4  Smokeless tobacco: Never Used Substance Use Topics  Alcohol use: No  
   
Social History Substance and Sexual Activity Alcohol Use No  
 
Social History Substance and Sexual Activity Drug Use No  
  
Social History Tobacco Use Smoking Status Former Smoker  Packs/day: 2.00  Years: 20.00  Pack years: 40.00  Last attempt to quit: 1970  Years since quittin.4 Smokeless Tobacco Never Used Current Outpatient Medications Medication Sig  AZITHROMYCIN PO Take  by mouth. 3 times a week  guaifenesin/pseudoephedrne HCl (MUCINEX D PO) Take  by mouth two (2) times a day.  gabapentin (NEURONTIN) 600 mg tablet Take  by mouth three (3) times daily.  mometasone furoate (ASMANEX HFA IN) Take  by inhalation.  glycopyrrolate/formoterol fum (BEVESPI AEROSPHERE IN) Take  by inhalation.  oxyCODONE-acetaminophen (PERCOCET 7.5) 7.5-325 mg per tablet 1 tabs every  6 hrs as needed for pain. Maximum daily dose 4 tablets.  predniSONE (DELTASONE) 10 mg tablet Take 6 tabs a day for 3 days, then 5 a day for 3 days, then 4 a day for 3 days, then 3 a day for 3 days, then 2 a day for 3 days, then 1 a day for 6 days.  albuterol-ipratropium (DUO-NEB) 2.5 mg-0.5 mg/3 ml nebu 3 mL by Nebulization route every four (4) hours as needed (SOB).  aspirin 81 mg chewable tablet Take 1 Tab by mouth daily.  gabapentin (NEURONTIN) 600 mg tablet Take 600 mg by mouth four (4) times daily.  fluticasone-salmeterol (ADVAIR) 100-50 mcg/dose diskus inhaler Take 1 Puff by inhalation two (2) times a day.  atorvastatin (LIPITOR) 10 mg tablet Take 10 mg by mouth daily.  meloxicam (MOBIC) 15 mg tablet Take 15 mg by mouth daily.  albuterol (PROAIR HFA) 90 mcg/actuation inhaler Take 2 Puffs by inhalation every four (4) hours as needed.  DULoxetine (CYMBALTA) 60 mg capsule Take 60 mg by mouth daily.  metFORMIN (GLUCOPHAGE) 500 mg tablet Take 500 mg by mouth daily. No current facility-administered medications for this encounter. Objective: 
Visit Vitals /76 (BP 1 Location: Left arm, BP Patient Position: Sitting) Pulse 70 Temp 98 °F (36.7 °C) Resp 16 Vascular: B/L LE 
DP 0/4; PT 0/4 
capillary fill time brisk, pitting edema is present, skin temperature is cool, varicosities are present. Dermatological: 
 
Wound: 1 Location: LT 5th metatarsal base Measurements: per RN note Margins: keratotic, no erythema/edema Drainage: minimal serous Odor: none Wound base: granular Lymphangitic streaking? No. 
Undermining? No. 
Sinus tracts? No. 
Exposed bone? No. 
Subcutaneous crepitation on palpation? No. 
 
Skin abrasion to LT shin, no associated erythema/edema/malodor, minimal sanguinous drainage. Nails are debrided. Skin is dry and scaly, exhibits hemosiderin deposition. Multiple dried scabs RLE. There is no maceration of the interspaces of the feet b/l. Neurological: DTR are absent, protective sensation per 5.07 [place] Clementina monofilament is diminished, patient is AAOx3, mood is normal. Epicritic sensation is diminished. MMT 3/5 b/l LE. Orthopedic: 
 B/L LE are symmetric, ROM of ankle, STJ, 1st MTPJ is limited. B/L elevated arch height and varus ankle. Constitutional: Pt is a well developed, thin elderly  male. Yamile Mares Lymphatics: negative tenderness to palpation of neck/axillary/inguinal nodes. Imaging / Maulik Hymen / Cx / Px: 
Cezar Montefisher Procedure Note: 
Excisional debridement through level of fat. Location / Ulcer: LT foot Indication: to remove non-viable tissue from wound bed. Consent in chart. Anesthesia: topical lidocaine Instrument: #15 Residual necrosis: none Bleeding: minimal 
Hemostasis: pressure Pre-Procedure Pain: 0 Post-Procedure Pain: 0 Area debrided < 20 cm sq. Pre-Debridement measurements: see nursing notes Post-Debridement measurements: see nursing notes This is part of a series of staged procedures in an attempt at limb salvage.

## 2019-10-02 ENCOUNTER — HOSPITAL ENCOUNTER (OUTPATIENT)
Dept: WOUND CARE | Age: 74
Discharge: HOME | End: 2019-10-02
Payer: MEDICARE

## 2019-10-02 VITALS
DIASTOLIC BLOOD PRESSURE: 71 MMHG | HEART RATE: 69 BPM | SYSTOLIC BLOOD PRESSURE: 130 MMHG | RESPIRATION RATE: 16 BRPM | TEMPERATURE: 97.4 F

## 2019-10-02 PROCEDURE — 11055 PARING/CUTG B9 HYPRKER LES 1: CPT | Performed by: PODIATRIST

## 2019-10-02 NOTE — WOUND CARE
10/02/19 0935 Wound Foot Left;Lateral  
Date First Assessed/Time First Assessed: 09/18/19 0827   Present on Hospital Admission: Yes  Wound Approximate Age at First Assessment (Weeks): 3 weeks  Primary Wound Type: Diabetic Ulcer  Location: Foot  Wound Location Orientation: Left;Lateral  
Dressing Status Removed Dressing Type Dry dressing Wound Length (cm) 0.1 cm Wound Width (cm) 0.1 cm Wound Depth (cm) 0.1 cm Wound Volume (cm^3) 0 cm^3 Condition of Base Other (comment) (unable to visualize base due to gentian violet) Condition of Edges Calloused Drainage Amount None Wound Odor None Cleansing and Cleansing Agents  Normal saline Visit Vitals /71 (BP 1 Location: Left arm, BP Patient Position: Sitting) Pulse 69 Temp 97.4 °F (36.3 °C) Resp 16

## 2019-10-02 NOTE — DISCHARGE INSTRUCTIONS
Discharge Instructions for  [facility]  [address] [place], [address] [facility]  Telephone: [phone]    NAME:  Carin KEENE Sr 23:  1945  DATE:  10/2/2019       Off-Loading:   [x] Off-loading when [x] walking  [] in bed [] sitting    [] Total non-weight bearing  [] Right Leg  [] Left Leg        Dietary:  [x] Diet as tolerated: [] Calorie Diabetic Diet: [] No Added Salt:  [x] Increase Protein: [] Other:     Activity:  [x] Activity as tolerated:  [] Patient has no activity restrictions     [] Strict Bedrest: [] Remain off Work:     [] May return to full duty work:                                   [] Return to work with restrictions:             Return Appointment:                     Nia Figueroa    []  Electronically signed on 10/2/2019 at 8:04 AM     [address] [place] [address] Information: Should you experience any significant changes in your wound(s) or have questions about your wound care, please contact the [address] [place] [address] at [address] [place] [address] 8:00 am - 4:30. If you need help with your wound outside these hours and cannot wait until we are again available, contact your PCP or go to the hospital emergency room. PLEASE NOTE: IF YOU ARE UNABLE TO OBTAIN WOUND SUPPLIES, CONTINUE TO USE THE SUPPLIES YOU HAVE AVAILABLE UNTIL YOU ARE ABLE TO REACH US. IT IS MOST IMPORTANT TO KEEP THE WOUND COVERED AT ALL TIMES.      Physician Signature:_______________________  {alicia HAMEED list :[phone]}

## 2019-10-02 NOTE — WOUND CARE
[address] Your treatment has been completed at [facility] outpatient wound clinic on 10/2/2019 
, per Dr. See Duncan. We know patients have several options when choosing a health care provider. We would like to express our sincere appreciation for having had the chance to be yours. More importantly, we enjoy having you as part of our family. We also hope your experience with us has surpassed your expectations and that you have been pleased with our service. We appreciate the confidence you've shown in selecting us as your health care provider and we will continue or commitment to provide the highest quality of service. Again, thank you for choosing us for your health care needs. If you have any further questions or concerns, please call [phone]. It has been our pleasure serving you and we hope to continue our relationship in the future, if the need occurs. Sincerely, [address] Team

## 2019-10-02 NOTE — WOUND CARE
Radha Tejeda DPM - [facility] ASHISH Sterling. Anival Uriarte, [place] 8 Note Assessment/Plan: 
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 - Wound on foot resolved. Abrasion on leg almost resolved. - Calluses x 2 sharply debrided. - F/U prn further issues. Subjective: 
Pt complains of wound to LT foot. Previous tx include nothing. Negative for fever, chills, nausea, vomiting, chest pain, shortness of breath. New abrasion to LT leg. Got brace modified by [employer], thinks it is doing well, notes new hard spot on LT hallux. HPI: 66 y.o. 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 He just got a new AFO from [employer] about 3 months ago and it rubbed and caused the ulceration. Needs AFO to walk as he has post polio syndrome. ROS: 
Consitutional: no weight loss, night sweats, fatigue / malaise / lethargy. Musculoskeletal: no joint / extremity pain, misalignment, stiffness, decreased ROM, crepitus. Integument: No pruritis, rashes, lesions, LT foot wound. Psychiatric: No depression, anxiety, paranoia History: 
Wound Care Allergies Allergen Reactions  Trazodone Other (comments) Caused penile erection that needed surgical correction Family History Problem Relation Age of Onset Shimon Duran Arthritis-osteo Mother  Cancer Father  Lung Disease Father Past Medical History:  
Diagnosis Date  Anxiety and depression  Arthritis All joints  Bronchitis, chronic ([address]  Cancer ([address] skin cancer removed  Chronic obstructive pulmonary disease ([address]  Chronic pain  Dementia ([address]  DM type 2 causing neurological disease ([address]  Fibromyalgia  Hyperlipidemia  Neuropathy  Post-polio syndrome Past Surgical History:  
Procedure Laterality Date Karie Peabody ABDOMEN SURGERY PROC UNLISTED  2010 Feeding Tube. removed   CHEST SURGERY PROCEDURE UNLISTED  2010 RVATS, Bronchoscopy  HX [address]CTOMY [address] Cervical Discectomy  HX ORTHOPAEDIC Left Arm Multiple Surgeries for Infection  HX ORTHOPAEDIC  2006  
 right heel fracture [facility] ARTHROPLASTY  2007  
 left Social History Tobacco Use  Smoking status: Former Smoker Packs/day: 2.00 Years: 20.00 Pack years: 40.00 Last attempt to quit: 1970 Years since quittin.4  Smokeless tobacco: Never Used Substance Use Topics  Alcohol use: No  
   
Social History Substance and Sexual Activity Alcohol Use No  
 
Social History Substance and Sexual Activity Drug Use No  
  
Social History Tobacco Use Smoking Status Former Smoker  Packs/day: 2.00  Years: 20.00  Pack years: 40.00  Last attempt to quit: 1970  Years since quittin.4 Smokeless Tobacco Never Used Current Outpatient Medications Medication Sig  AZITHROMYCIN PO Take  by mouth. 3 times a week  guaifenesin/pseudoephedrne HCl (MUCINEX D PO) Take  by mouth two (2) times a day.  gabapentin (NEURONTIN) 600 mg tablet Take  by mouth three (3) times daily.  mometasone furoate (ASMANEX HFA IN) Take  by inhalation.  glycopyrrolate/formoterol fum (BEVESPI AEROSPHERE IN) Take  by inhalation.  oxyCODONE-acetaminophen (PERCOCET 7.5) 7.5-325 mg per tablet 1 tabs every  6 hrs as needed for pain. Maximum daily dose 4 tablets.  predniSONE (DELTASONE) 10 mg tablet Take 6 tabs a day for 3 days, then 5 a day for 3 days, then 4 a day for 3 days, then 3 a day for 3 days, then 2 a day for 3 days, then 1 a day for 6 days.   
 albuterol-ipratropium (DUO-NEB) 2.5 mg-0.5 mg/3 ml nebu 3 mL by Nebulization route every four (4) hours as needed (SOB).  aspirin 81 mg chewable tablet Take 1 Tab by mouth daily.  gabapentin (NEURONTIN) 600 mg tablet Take 600 mg by mouth four (4) times daily.  fluticasone-salmeterol (ADVAIR) 100-50 mcg/dose diskus inhaler Take 1 Puff by inhalation two (2) times a day.  atorvastatin (LIPITOR) 10 mg tablet Take 10 mg by mouth daily.  meloxicam (MOBIC) 15 mg tablet Take 15 mg by mouth daily.  albuterol (PROAIR HFA) 90 mcg/actuation inhaler Take 2 Puffs by inhalation every four (4) hours as needed.  DULoxetine (CYMBALTA) 60 mg capsule Take 60 mg by mouth daily.  metFORMIN (GLUCOPHAGE) 500 mg tablet Take 500 mg by mouth daily. No current facility-administered medications for this encounter. Objective: 
Visit Vitals /71 (BP 1 Location: Left arm, BP Patient Position: Sitting) Pulse 69 Temp 97.4 °F (36.3 °C) Resp 16 Vascular: B/L LE 
DP 0/4; PT 0/4 
capillary fill time brisk, pitting edema is present, skin temperature is cool, varicosities are present. Dermatological: 
 
Wound: 1 Location: LT 5th metatarsal base Measurements: healed Skin abrasion to LT shin, no associated erythema/edema/malodor, dry with well adhered peeling scab. Calluses at LT hallux and lateral LT foot. Nails are debrided. Skin is dry and scaly, exhibits hemosiderin deposition. Multiple dried scabs RLE. There is no maceration of the interspaces of the feet b/l. Neurological: DTR are absent, protective sensation per 5.07 [place] Clementina monofilament is diminished, patient is AAOx3, mood is normal. Epicritic sensation is diminished. MMT 3/5 b/l LE. Orthopedic: B/L LE are symmetric, ROM of ankle, STJ, 1st MTPJ is limited. B/L elevated arch height and varus ankle. Constitutional: Pt is a well developed, thin elderly  male. Lesle Gulling Lymphatics: negative tenderness to palpation of neck/axillary/inguinal nodes. Imaging / Kizzy Jointer / Cx / Px: 
Rachael Orestes

## 2019-10-08 ENCOUNTER — OFFICE VISIT (OUTPATIENT)
Dept: CARDIOLOGY CLINIC | Age: 74
End: 2019-10-08

## 2019-10-08 VITALS
DIASTOLIC BLOOD PRESSURE: 70 MMHG | BODY MASS INDEX: 21.1 KG/M2 | SYSTOLIC BLOOD PRESSURE: 142 MMHG | WEIGHT: 159.2 LBS | OXYGEN SATURATION: 96 % | HEIGHT: 73 IN | HEART RATE: 85 BPM

## 2019-10-08 DIAGNOSIS — M79.7 FIBROMYALGIA: ICD-10-CM

## 2019-10-08 DIAGNOSIS — E78.5 DYSLIPIDEMIA: ICD-10-CM

## 2019-10-08 DIAGNOSIS — I25.10 ATHEROSCLEROSIS OF NATIVE CORONARY ARTERY OF NATIVE HEART WITHOUT ANGINA PECTORIS: Primary | ICD-10-CM

## 2019-10-08 RX ORDER — FENTANYL 25 UG/1
PATCH TRANSDERMAL
COMMUNITY
Start: 2019-10-01 | End: 2021-01-14

## 2019-10-08 NOTE — PROGRESS NOTES
Herman Bhat MD    [address] [facility] [place], [address] [facility]    Office [phone],YWE [phone]  Pager [phone]    Sharyle Admire is a 66 y.o. male is here for f/u visit. Primary care physician:  Paula Carrasco MD    Patient Active Problem List   Diagnosis Code    Spinal stenosis of lumbar region M48.061    Osteoarthritis M19.90    Fibromyalgia M79.7    DM type 2 causing neurological disease ([address] E11.49    Neuropathy G62.9    Post-polio syndrome G14    Hyperlipidemia E78.5    Leukocytosis D72.829    COPD exacerbation ([address] J44.1    Depression with anxiety F41.8    Anxiety and depression F41.9, F32.9    Arthritis M19.90    Dementia ([facility] F03.90    Bronchitis, chronic ([facility] [address]       Chief complaint:  Chief Complaint   Patient presents with    Coronary Mango Anshul       Dear Dr Root Officer,    I had the pleasure of seeing Mr Sharyle Admire in the office today. Assessment:  CAD - Patient was  admitted to [facility] 12/2016 for altered mental status. His cardiac enzymes were elevated ( Trop minimally elevated 0.18/0.18/0.15/0.12 . However CK and CK MB mild elevation)-Stress nuc study12/7/16 - Mildly abnormal/low risk scan -   Sinus Bradycardia  COPD  DM  Hyperlipidemia  Post polio syndrome - LLE   Fibromyalgia  On Chronic narcotic     Plan:    Does no take ASA daily/On chronic Prednisone  On Lipitor  Unable to tolerate B Blocker sec to sinus bradycardia  Lipids per PCP  F/u 1year      Patient understands the plan. All questions were answered to the patient's satisfaction. Medication Side Effects and Warnings were discussed with patient: yes  Patient Labs were reviewed and or requested:  yes  Patient Past Records were reviewed and or requested: yes    I appreciate the opportunity to be involved in [address] [place] [address]. See note below for details.  Please do not hesitate to contact us with questions or concerns. Ana Paula Stroud MD    Cardiac Testing/ Procedures: A. Cardiac Cath/PCI:    B.ECHO/HANS: 12/5/16 - EF 55-60%Grade 1 DD    C. StressNuclear/Stress ECHO/Stress test:12/7/16 IMPRESSION:1. Mildly abnormal Lexiscan gated SPECT myocardial perfusion study. No significant reversible ischemia. 2.Stress and resting tomograms showed a medium sized, moderate, persistent  defect with partial reversibility involving the mid to distal  inferoapical/inferoseptal wall in the area typically involving the RCA. SSS=5,  SRS=3, SDS=2  3. Low risk scan    D. Vascular:    E. EP:    F. Miscellaneous:    Subjective:  Karen Gonzalez is a 66 y.o. male who returns for follow up visit. Doing well. No chest pain. Chronic dyspnea on exertion    ROS:  (bold if positive, if negative)             Medications before admission:    Current Outpatient Medications   Medication Sig Dispense    fentaNYL (DURAGESIC) 25 mcg/hr PATCH      AZITHROMYCIN PO Take  by mouth. 3 times a week     guaifenesin/pseudoephedrne HCl (MUCINEX D PO) Take  by mouth two (2) times a day.  gabapentin (NEURONTIN) 600 mg tablet Take  by mouth three (3) times daily.  mometasone furoate (ASMANEX HFA IN) Take  by inhalation.  glycopyrrolate/formoterol fum (BEVESPI AEROSPHERE IN) Take  by inhalation.  oxyCODONE-acetaminophen (PERCOCET 7.5) 7.5-325 mg per tablet 1 tabs every  6 hrs as needed for pain. Maximum daily dose 4 tablets. 10 Tab    predniSONE (DELTASONE) 10 mg tablet Take 6 tabs a day for 3 days, then 5 a day for 3 days, then 4 a day for 3 days, then 3 a day for 3 days, then 2 a day for 3 days, then 1 a day for 6 days. 66 Tab    albuterol-ipratropium (DUO-NEB) 2.5 mg-0.5 mg/3 ml nebu 3 mL by Nebulization route every four (4) hours as needed (SOB).  gabapentin (NEURONTIN) 600 mg tablet Take 600 mg by mouth four (4) times daily.      fluticasone-salmeterol (ADVAIR) 100-50 mcg/dose diskus inhaler Take 1 Puff by inhalation two (2) times a day.  atorvastatin (LIPITOR) 10 mg tablet Take 10 mg by mouth daily.  meloxicam (MOBIC) 15 mg tablet Take 15 mg by mouth daily.  albuterol (PROAIR HFA) 90 mcg/actuation inhaler Take 2 Puffs by inhalation every four (4) hours as needed.  DULoxetine (CYMBALTA) 60 mg capsule Take 60 mg by mouth daily.  metFORMIN (GLUCOPHAGE) 500 mg tablet Take 500 mg by mouth daily. No current facility-administered medications for this visit. Physical Exam:  Visit Vitals  /70 (BP 1 Location: Left arm, BP Patient Position: Sitting)   Pulse 85   Ht 6' 1\" (1.854 m)   Wt 159 lb 3.2 oz (72.2 kg)   SpO2 96%   BMI 21.00 kg/m²          Gen: Well-developed, well-nourished, in no acute distress  Neck: Supple,No JVD, No Carotid Bruit,   Resp: No accessory muscle use,Dec AE bilat  Card: Regular Rate,Rythm,Normal S1, S2 + ,No Murmur No rubs or gallop. No thrills.    Abd:  Soft, non-tender, non-distended,BS+,   MSK: No cyanosis  Skin: No rashes    Neuro: Left lower extremity in brace-(post polio)  Psych:  alert,  anxious  LE: No edema    EKG: Sinus rhythm,  septal MI age undetermined, nonspecific ST-T changes      LABS:        Lab Results   Component Value Date/Time    WBC 15.9 (H) 04/12/2017 07:11 AM    HGB 11.7 (L) 04/12/2017 07:11 AM    HCT 35.6 (L) 04/12/2017 07:11 AM    PLATELET 317 61/25/9683 07:11 AM    MCV 90.1 04/12/2017 07:11 AM     Lab Results   Component Value Date/Time    Sodium 141 04/12/2017 07:11 AM    Potassium 4.1 04/12/2017 07:11 AM    Chloride 104 04/12/2017 07:11 AM    CO2 27 04/12/2017 07:11 AM    Anion gap 10 04/12/2017 07:11 AM    Glucose 170 (H) 04/12/2017 07:11 AM    BUN 19 04/12/2017 07:11 AM    Creatinine 0.89 04/12/2017 07:11 AM    BUN/Creatinine ratio 21 (H) 04/12/2017 07:11 AM    GFR est AA >60 04/12/2017 07:11 AM    GFR est non-AA >60 04/12/2017 07:11 AM    Calcium 8.9 04/12/2017 07:11 AM       Lab Results   Component Value Date/Time    aPTT 33.3 (H) 12/06/2016 11:05 AM Lab Results   Component Value Date/Time    INR 1.1 12/06/2016 11:05 AM    INR 1.0 03/20/2014 10:13 AM    INR 1.4 (H) 02/02/2010 02:25 AM    INR (POC) 1.0 05/16/2011 12:42 PM    Prothrombin time 11.2 (H) 12/06/2016 11:05 AM    Prothrombin time 10.4 03/20/2014 10:13 AM    Prothrombin time 14.4 (H) 02/02/2010 02:25 AM     No components found for: Fredy Pepe MD

## 2019-10-08 NOTE — PROGRESS NOTES
Leilani Fisher is a 66 y.o. male    Chief Complaint   Patient presents with    Coronary Artery Disease    Other     Fibromalgia,Dyslipidemia       Chest pain  No    SOB with exertion; patient has COPD    Dizziness No    Swelling patient states sometimes with numbness    Refills No    Visit Vitals  /70 (BP 1 Location: Left arm, BP Patient Position: Sitting)   Pulse 85   Ht 6' 1\" (1.854 m)   Wt 159 lb 3.2 oz (72.2 kg)   SpO2 96%   BMI 21.00 kg/m²       1. Have you been to the ER, urgent care clinic since your last visit? Hospitalized since your last visit? ED 4/10/17 due to COPD     2. Have you seen or consulted any other health care providers outside of the [address] [place] [address] since your last visit? Include any pap smears or colon screening.  No

## 2019-10-09 ENCOUNTER — HOSPITAL ENCOUNTER (OUTPATIENT)
Dept: GENERAL RADIOLOGY | Age: 74
Discharge: HOME | End: 2019-10-09
Attending: NURSE PRACTITIONER
Payer: MEDICARE

## 2019-10-09 DIAGNOSIS — R13.10 DYSPHAGIA: ICD-10-CM

## 2019-10-09 PROCEDURE — 74230 X-RAY XM SWLNG FUNCJ C+: CPT

## 2019-10-09 PROCEDURE — 92611 MOTION FLUOROSCOPY/SWALLOW: CPT

## 2019-10-09 NOTE — PROGRESS NOTES
[address]    Speech Pathology Modified barium swallow Study  Patient: Antonio Devlin (88 y.o. male)  Date: 10/9/2019  Referring Provider:  Jodie Nettles    SUBJECTIVE:   Patient was a poor historian. With cues, he admitted that sometimes he coughs at meals. Possibly for years    OBJECTIVE:   Past Medical History:   Past Medical History:   Diagnosis Date    Anxiety and depression     Arthritis     All joints    Bronchitis, chronic (HCC)     Cancer ([address]     skin cancer removed     Chronic obstructive pulmonary disease (HCC)     Chronic pain     Dementia ([address]     DM type 2 causing neurological disease ([address]     Fibromyalgia     Hyperlipidemia     Neuropathy     Post-polio syndrome      Past Surgical History:   Procedure Laterality Date    ABDOMEN SURGERY PROC UNLISTED  2/2010    Feeding Tube. removed 2011    CHEST SURGERY PROCEDURE UNLISTED  2/2010    RVATS, Bronchoscopy    HX APPENDECTOMY      HX ORTHOPAEDIC  2000    Cervical Discectomy    HX ORTHOPAEDIC      Left Arm Multiple Surgeries for Infection    HX ORTHOPAEDIC  2006    right heel fracture    TOTAL HIP ARTHROPLASTY  2007    left     Current Dietary Status:  Regular, thins  Radiologist: [place]  Film Views: Lateral  Patient Position: upright in hausted chair    Trial 1: Trial 2:   Consistency Presented: Thin liquid; Solid;Puree;Pill/Tablet     How Presented: Self-fed/presented;Straw;Spoon; Successive swallows      ORAL PHASE:      Bolus Acceptance: No impairment     Bolus Formation/Control: (max lingual movements to propel bolus a-p.  ):    :     Propulsion: Tongue pumping;Delayed (# of seconds); Discoordination(moderate issues)     Oral Residue: Other (comment)(under dentures, under tongue, in frontal  and lateral sulci.  BOT residue noted)   PHARYNGEAL PHASE:      Initiation of Swallow: Triggered at base of tongue     Timing: Pooling 1-5 sec     Penetration: (noted occasionally towards end of study ? fatigue from post polio syndrome. no immediate cough)appeared to be more related to alternating consistencies as well. Pharyngeal Clearance: (mild vallecular and pyriform residue s/p swallow from oral residue. reduced awareness. pyriform residue cleared with additional boluses, valleculare residue was still mild and  appeared to increase with study ?fatigue. )     Attempted Modifications: Alternate liquids/solids     Effective Modifications: Alternate liquids/solids            Patient had coughing in MBS that was not related to aspiration/penetration. No coughing with slight penetration. Trial 3: Trial 4:                            :    :                                                                        Decreased Tongue Base Retraction?: Yes(mild)  Laryngeal Elevation: WFL (within functional limits)  Aspiration/Penetration Score: 2 (Penetration/No residue-Contrast enters the airway penetrates, remains above the folds/cords, and is cleared)(cleared with force of swallow)  Pharyngeal Symmetry: Symmetrical  Pharyngeal-Esophageal Segment: Zenker's Diverticulum(small-patient did not mention this, but after study, found it in his H&P)               ASSESSMENT :  Based on the objective data described above, the patient presents with mild pharyngeal dysphagia with occasional trace start of penetration that cleared with force of swallow. This started later in study, so can not r/o effects of fatigue with post polio syndrome. He has mild-moderate oral dysphagia with significant lingual movements to propel boluses a-p, moderate oral residue (on, around and under dentures and tongue). Coughing did not appear related to aspiration. Noted a small Zenckers diverticulum, which was previously diagnosed in 2010 upon chart review. [place] PLAN/RECOMMENDATIONS :  Continue diet as tolerated.    Recommend  SLP to determine full meal tolerance, effects of fatigue, provide compensatory strategies to reduce aspiration risk and therapy for increased oral stripping. COMMUNICATION/EDUCATION:   The above findings and recommendations were discussed with: patient who verbalized understanding. Will need reinforcment and review due to memory issues.      Thank you for this referral.  Leonel Victoria SLP  Time Calculation: 30 mins           '

## 2020-10-22 ENCOUNTER — OFFICE VISIT (OUTPATIENT)
Dept: NEUROLOGY | Age: 75
End: 2020-10-22
Payer: MEDICARE

## 2020-10-22 VITALS
DIASTOLIC BLOOD PRESSURE: 82 MMHG | HEART RATE: 85 BPM | OXYGEN SATURATION: 97 % | SYSTOLIC BLOOD PRESSURE: 126 MMHG | WEIGHT: 159 LBS | BODY MASS INDEX: 20.98 KG/M2 | TEMPERATURE: 97.2 F

## 2020-10-22 DIAGNOSIS — R53.1 WEAKNESS GENERALIZED: ICD-10-CM

## 2020-10-22 DIAGNOSIS — G14 POST-POLIO SYNDROME: Primary | ICD-10-CM

## 2020-10-22 DIAGNOSIS — R29.6 FALLS FREQUENTLY: ICD-10-CM

## 2020-10-22 PROCEDURE — G9717 DOC PT DX DEP/BP F/U NT REQ: HCPCS | Performed by: PSYCHIATRY & NEUROLOGY

## 2020-10-22 PROCEDURE — 99205 OFFICE O/P NEW HI 60 MIN: CPT | Performed by: PSYCHIATRY & NEUROLOGY

## 2020-10-22 PROCEDURE — G8420 CALC BMI NORM PARAMETERS: HCPCS | Performed by: PSYCHIATRY & NEUROLOGY

## 2020-10-22 PROCEDURE — G8536 NO DOC ELDER MAL SCRN: HCPCS | Performed by: PSYCHIATRY & NEUROLOGY

## 2020-10-22 PROCEDURE — 1101F PT FALLS ASSESS-DOCD LE1/YR: CPT | Performed by: PSYCHIATRY & NEUROLOGY

## 2020-10-22 PROCEDURE — G8428 CUR MEDS NOT DOCUMENT: HCPCS | Performed by: PSYCHIATRY & NEUROLOGY

## 2020-10-22 NOTE — PROGRESS NOTES
Name: Daryl ROSE      :  1945    PCP:   David Colby MD      Referring:   Ru Avery MD/ [facility]  MRN:   [id number]    Chief Complaint:   Chief Complaint   Patient presents with    Peripheral Neuropathy    Neurologic Problem       HISTORY OF PRESENT ILLNESS:     This is a 66 y.o. male with  has a past medical history of Anxiety and depression, Arthritis, Bronchitis, chronic ([address] Cancer ([address] Chronic obstructive pulmonary disease ([address] Chronic pain, Dementia ([address] DM type 2 causing neurological disease ([address] Fibromyalgia, Hyperlipidemia, Neuropathy, and Post-polio syndrome. who presents to discuss Peripheral Neuropathy and Neurologic Problem    Son accompanies patient and attempts to help with history. Patient directs the majority of conversation but it's hard to follow him when he's trying to relay symptoms. No records from Dr Jonnie Gee available to me to review at present (none at  or scanned into media section). From what I can put together, patient (and sister) both developed polio as children. Pt says it caused him to have weakness of both legs (left > right), wears AFO on left. 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 On questioning, says he saw a Neurologist around that time and was dx with having \"chronic neuropathy. \" He also developed COPD secondary to his post-polio (not a smoker). He has been under care of Dr Brock/ [facility]/ Rehab for many years and patient say that the last time he spoke with Dr Jonnie Gee (pt says 1.5 yrs ago, then he also says 2 week ago and mentions the [employer]) he was advised to see Neurologist for whatever symptoms he was describing. Again, no outside records to know what was discussed. Today he reports that he has numbness in feet and when he's sleeping his toes feel tight, like a knot. Dropping things very easily.   Very unsteady over past 6 months, falling. Son says mobility, ambulation, stability has gotten worse over past year. Starting to hallucinate. Son says patient has said thing to him like people are plotting against him or Lucero Aguirre being in the house,\" and that patient gets agitated. Patient cuts off son abruptly and says he didn't come her to talk about those things, wants to focus on his neurology symptoms. Pt does carry a Dx of Dementia but no further information in that regard. He is not on any dementia medication. Not sure if he had formal neuropsychological testing. Son says PCP/ Dr Patience Jones is retiring in 2021 and wanted to help patient get situated with the right providers/ specialists before he retires. Pt has started seeing Dr Asa Maria for his pain management. Pt taking Gabapentin 600 mg QID and Percocet 7.5 mg QID for pain. He also says he has a fentanyl patch (25 mcg/ hour) that he hasn't been using for the past few weeks and plans to ask his Pain Specialist to continue it.       Complete Review of Systems: + anxiety, depression, hearing problems, memory loss, movement disorder, neuropathy, skin problems, dyspnea, vision problem, muscle pain/ muscle weakness, falls, joint pain, fatigue; otherwise as noted in HPI     PMHx:  Past Medical History:   Diagnosis Date    Anxiety and depression     Arthritis     All joints    Bronchitis, chronic (HCC)     Cancer ([facility]     skin cancer removed     Chronic obstructive pulmonary disease ([facility]     Chronic pain     Dementia ([facility]     DM type 2 causing neurological disease ([facility]     Fibromyalgia     Hyperlipidemia     Neuropathy     Post-polio syndrome         PSH:  Past Surgical History:   Procedure Laterality Date    ABDOMEN SURGERY PROC UNLISTED  2/2010    Feeding Tube. removed 2011    CHEST SURGERY PROCEDURE UNLISTED  2/2010    RVATS, Bronchoscopy    HX APPENDECTOMY      HX ORTHOPAEDIC  2000    Cervical Discectomy    HX ORTHOPAEDIC      Left Arm Multiple Surgeries for Infection    HX ORTHOPAEDIC  2006    right heel fracture    TOTAL HIP ARTHROPLASTY  2007    left       FHx:  family history includes Arthritis-osteo in his mother; Cancer in his father; Lung Disease in his father. SHx:  reports that he quit smoking about 50 years ago. He has a 40.00 pack-year smoking history. He has never used smokeless tobacco. He reports that he does not drink alcohol or use drugs. Allergies: Allergies   Allergen Reactions    Trazodone Other (comments)     Caused penile erection that needed surgical correction       Meds:     Current Outpatient Medications   Medication Sig    fentaNYL (DURAGESIC) 25 mcg/hr PATCH     AZITHROMYCIN PO Take  by mouth. 3 times a week    guaifenesin/pseudoephedrne HCl (MUCINEX D PO) Take  by mouth two (2) times a day.  gabapentin (NEURONTIN) 600 mg tablet Take  by mouth three (3) times daily.  mometasone furoate (ASMANEX HFA IN) Take  by inhalation.  glycopyrrolate/formoterol fum (BEVESPI AEROSPHERE IN) Take  by inhalation.  oxyCODONE-acetaminophen (PERCOCET 7.5) 7.5-325 mg per tablet 1 tabs every  6 hrs as needed for pain. Maximum daily dose 4 tablets.  predniSONE (DELTASONE) 10 mg tablet Take 6 tabs a day for 3 days, then 5 a day for 3 days, then 4 a day for 3 days, then 3 a day for 3 days, then 2 a day for 3 days, then 1 a day for 6 days.  albuterol-ipratropium (DUO-NEB) 2.5 mg-0.5 mg/3 ml nebu 3 mL by Nebulization route every four (4) hours as needed (SOB).  gabapentin (NEURONTIN) 600 mg tablet Take 600 mg by mouth four (4) times daily.  fluticasone-salmeterol (ADVAIR) 100-50 mcg/dose diskus inhaler Take 1 Puff by inhalation two (2) times a day.  atorvastatin (LIPITOR) 10 mg tablet Take 10 mg by mouth daily.  meloxicam (MOBIC) 15 mg tablet Take 15 mg by mouth daily.  albuterol (PROAIR HFA) 90 mcg/actuation inhaler Take 2 Puffs by inhalation every four (4) hours as needed.     DULoxetine (CYMBALTA) 60 mg capsule Take 60 mg by mouth daily.  metFORMIN (GLUCOPHAGE) 500 mg tablet Take 500 mg by mouth daily. PHYSICAL EXAM    Vitals:    10/22/20 0913   BP: 126/82   BP 1 Location: Left arm   BP Patient Position: Sitting   Pulse: 85   Temp: 97.2 °F (36.2 °C)   SpO2: 97%   Weight: 72.1 kg (159 lb)       General:  Awake, alert, cooperative, talks somewhat tangentially   Head:  Normocephalic, atraumatic. Eyes:  Conjunctivae/corneas clear   Lungs:  Heart:  Not examined  Not examined   Extremities: No edema. Skin: No rashes    Neurologic Exam       Language: normal  Memory:  Alert, oriented to self, son    Cranial Nerves:  I: smell Not tested   II: visual fields Full to confrontation   II: pupils Equal, round, reactive to light   II: optic disc Not examined   III,VII: ptosis none   III,IV,VI: extraocular muscles  normal   V: facial light touch sensation  normal   VII: facial muscle function  symmetric   VIII: hearing Symmetric; using hearing aids   IX: soft palate elevation  Not examined   XI: sternocleidomastoid strength 5/5   XII: tongue  Not examined      Motor:   Upper exts: mild, age-related loss of muscle mass; 4/5 biceps and handgrip, can't abduct arms. RLE: 2/5 hip flexion, 1/5 dorsiflexion/ plantar flexion. LLE: 1-2/5 hip flexion, 0/5 dorsiflexion/ plantar flexion. Sensory: reduced temp up to knees, reduced vibration in feet and knees. Intact Temp, vibration in hands    Cerebellar:   Mild resting tremor in right leg/ foot  No visible resting tremor in arms  Mild cogwheel rigidity on left, minimal to none on right  No significant postural tremors (can really extend arms/ hands)    Reflexes: 1+ biceps, 1+ patellars, 0 right achilles (flaccid, ? Achilles tendon rupture, trace left achilles). Plantar response: neutral bilaterally    Gait: max assist to stand (uses rolling walker), left foot floppy w/o AFO, flops right foot mildly  Romberg not attempted.      ASSESSMENT AND PLAN ICD-10-CM ICD-9-CM    1. Post-polio syndrome  G14 138 MRI CERV SPINE WO CONT      EMG LIMITED   2. Weakness generalized  R53.1 780.79 MRI BRAIN WO CONT      MRI CERV SPINE WO CONT      EMG LIMITED   3. Falls frequently  R29.6 V15.88 MRI BRAIN WO CONT      MRI CERV SPINE WO CONT      EMG LIMITED       Referred to Dr Siria Childs / Neuromuscular for EMG re progressive weakness  (post-polio syndrome +/- other neuromuscular disorder). Check MRI C-spine to eval for any cervical cause of progressive weakness    Had resting right leg tremor and pt/ son report that one of his hands tremors at rest. ? Parkinsons, ? Parkinson-related dementia. Check MRI Brain w/o contrast re: hallucinations, delusions, hx of dementia.        Pt signed MAYA so I can get last 2 clinic notes from Dr Kwaku Kraft office/ [address]    F/u in 6 weeks to go over results      Signed By: Radha Ocampo MD     October 22, 2020

## 2020-10-26 ENCOUNTER — TELEPHONE (OUTPATIENT)
Dept: CARDIOLOGY CLINIC | Age: 75
End: 2020-10-26

## 2020-10-26 NOTE — TELEPHONE ENCOUNTER
Patient calling to schedule a test - patient does not know what test and I do not see an order. Please advise.     Best # [phone]

## 2020-11-12 ENCOUNTER — OFFICE VISIT (OUTPATIENT)
Dept: NEUROLOGY | Age: 75
End: 2020-11-12

## 2020-11-12 DIAGNOSIS — R53.1 WEAKNESS GENERALIZED: Primary | ICD-10-CM

## 2020-11-12 NOTE — LETTER
2020 1:04 PM 
 
Patient:  Ness Fernández YOB: 1945 Date of Visit: 2020 Dear Valeria Ruby MD 
[address] [facility] [address] [address] VIA Facsimile: [phone]: Thank you for referring Mr. Jim Gleason to me for EMG/NCS. EMG/ NCS Report [facility] - INPATIENT [address] [facility], [address] [place] Ph: [phone] FAX: [phone] Test Date:  2020 Patient: Katlin Martinez : 1945 Physician: Nano Booth MD  
Sex: Male Height: ' \" Ref Phys: Petra Bello M.D. ID#: [id number] Weight:  lbs. Technician: Emma Sage Patient History / Exam: 
Patient comes in with worsening of gait for the past 5 yrs associated with development of numbness in both feet. (+) post polio as a child affecting the lower extremities. (+) diabetes (+) lower back surgery (+) neck surgery. Exam: Patient awake, alert, (+) hearing loss (+) UE 4/5;(+) atrophy of FDI muscles and lower leg muscles. LE 1/5; Sensory reduced. Gait: uses walker. Patient is coming for neuropathy evaluation. EMG & NCV Findings: 
Evaluation of the left Fibular motor nerve showed no response (Ankle), no response (B Fib), and no response (Poplt). The right Fibular motor nerve showed no response (Ankle) and no response (B Fib). The left Fibular TA motor and the right Fibular TA motor nerves showed normal distal onset latency (L4.1, R3.7 ms), reduced amplitude (L1.1, R2.6 mV), and normal conduction velocity (Poplit-Fib Head, L40, R71 m/s). The right median motor nerve showed normal distal onset latency (4.3 ms), reduced amplitude (1.6 mV), and normal conduction velocity (Elbow-Wrist, 50 m/s).   The left tibial motor and the right tibial motor nerves showed normal distal onset latency (L5.4, R4.5 ms), normal amplitude (L5.6, R4.1 mV), and decreased conduction velocity (Knee-Ankle, L37, R33 m/s). The right ulnar motor nerve showed normal distal onset latency (3.0 ms), reduced amplitude (2.7 mV), decreased conduction velocity (B Elbow-Wrist, 48 m/s), and decreased conduction velocity (A Elbow-B Elbow, 45 m/s). The right median sensory and the right ulnar sensory nerves showed no response (Wrist). The right radial sensory nerve showed normal distal peak latency (2.0 ms) and normal amplitude (14.7 µV). The left Sup Fibular sensory nerve showed no response (Lower leg) and no response (Site 2). The right Sup Fibular sensory nerve showed no response (Lower leg). The left sural sensory and the right sural sensory nerves showed no response (Calf) and no response (Site 2). F Wave studies indicate that the right tibial F wave has prolonged latency (58.49 ms). All remaining F Wave latencies were within normal limits. All F Wave left vs. right side latency differences were within normal limits. H-reflex studies indicate that the left tibial H-reflex has no response. The right tibial H-reflex has no response. Needle evaluation of the right first dorsal interosseous, the right abductor pollicis brevis, the right abductor hallucis, the right anterior tibialis, the right vastus lateralis, and the right gluteus medius muscles showed diminished recruitment, Incr Duration, and increased motor unit amplitude. The right extensor indicis, the right biceps, the right triceps, and the right deltoid muscles showed diminished recruitment. The right extensor digitorum brevis muscle showed recruitment, - Duration, motor unit amplitude, and polyphasic potentials. The right medial gastrocnemius muscle showed recruitment and Incr Duration. All remaining muscles (as indicated in the following table) showed no evidence of electrical instability. Impression: ABNORMAL Extensive electrodiagnostic examination of the right upper and lower extremities, with additional nerve conduction study of the left lower extremity, shows the followin) Chronic, without active, motor axon loss changes in all muscles examined along with corresponding reduction of motor amplitude responses, consistent with history of polio (post-polio syndrome) 2) Absent sensory responses in both lower extremities and upper extremity (except for radial sensory response) consistent with superimposed generalized sensorimotor polyneuropathy, more likely axon loss in type. Severity difficult to determine due to above findings. Carrie Loo MD 
Diplomate, American Board of Psychiatry and Neurology Diplomate, Neuromuscular Medicine Diplomate, [address] [place] [address] Board of Electrodiagnostic Medicine Director, [address] [place] [address] Accredited Laboratory with Exemplary Status Nerve Conduction Studies Anti Sensory Summary Table Stim Site NR Peak (ms) Norm Peak (ms) P-T Amp (µV) Norm P-T Amp Site1 Site2 Dist (cm) Right Median Anti Sensory (2nd Digit)  27.1°C Wrist NR  <4  >13 Wrist 2nd Digit 14.0 Right Radial Anti Sensory (Base 1st Digit)  28.9°C Wrist    2.0 <2.8 14.7 >11 Wrist Base 1st Digit 10.0 Left Sup Fibular Anti Sensory (Lat ankle)  30.6°C Lower leg NR  <4.6  >4 Lower leg Lat ankle 10.0 Site 2 NR Right Sup Fibular Anti Sensory (Lat ankle)  29.9°C Lower leg NR  <4.6  >4 Lower leg Lat ankle 10.0 Left Sural Anti Sensory (Lat Mall)  31.3°C Calf NR  <4.5  >4.0 Calf Lat Mall 14.0 Site 2 NR Right Sural Anti Sensory (Lat Mall)  30.8°C Calf NR  <4.5  >4.0 Calf Lat Mall 14.0 Site 2 NR Right Ulnar Anti Sensory (5th Digit)  28°C Wrist NR  <4.0  >9 Wrist 5th Digit 14.0 Motor Summary Table Stim Site NR Onset (ms) Norm Onset (ms) O-P Amp (mV) Norm O-P Amp Amp (Prev) (%) Site1 Site2 Dist (cm) Gallito (m/s) Norm Gallito (m/s) Left Fibular Motor (Ext Dig Brev)  30.2°C Ankle NR  <6.5  >1.1  Ankle Ext Dig Brev 8.0 B Fib NR      B Fib Ankle 0.0  >38 Poplt NR      Poplt B Fib 10.0  >42 Right Fibular Motor (Ext Dig Brev)  29.6°C Ankle NR  <6.5  >1.1  Ankle Ext Dig Brev 8.0 B Fib NR      B Fib Ankle 0.0  >38 Lat ankle    13.8  0.0 Left Fibular TA Motor (Tib Ant)  23.1°C Fib Head    4.1 <4.5 1.1 >3.0 100.0 Fib Head Tib Ant 10.0 Poplit    6.6  1.0  90.9 Poplit Fib Head 10.0 40 >40 Right Fibular TA Motor (Tib Ant)  25.9°C Fib Head    3.7 <4.5 2.6 >3.0 100.0 Fib Head Tib Ant 10.0 Poplit    5.1  2.3  88.5 Poplit Fib Head 10.0 71 >40 Right Median Motor (Abd Poll Brev)  28.8°C Wrist    4.3 <4.5 1.6 >4.1 100.0 Wrist Abd Poll Brev 8.0 Elbow    8.7  1.7  106.3 Elbow Wrist 22.0 50 >49 Left Tibial Motor (Abd Law Brev)  29.9°C Ankle    5.4 <6.1 5.6 >1.1 100.0 Ankle Abd Law Brev 8.0 Knee    15.3  4.0  71.4 Knee Ankle 37.0 37 >39 Right Tibial Motor (Abd Law Brev)  29.2°C Ankle    4.5 <6.1 4.1 >1.1 100.0 Ankle Abd Law Brev 8.0 Knee    16.1  3.3  80.5 Knee Ankle 38.0 33 >39 Right Ulnar Motor (Abd Dig Minimi)  29.7°C Wrist    3.0 <3.1 2.7 >7.0 100.0 Wrist Abd Dig Minimi 8.0  >50 B Elbow    7.3  2.2  81.5 B Elbow Wrist 20.5 48 >50 A Elbow    9.5  2.5  113.6 A Elbow B Elbow 10.0 45 >50 F Wave Studies NR F-Lat (ms) Lat Norm (ms) L-R F-Lat (ms) L-R Lat Norm Left Tibial (Mrkrs) (Abd Hallucis)  29.6°C  
   54.13 <56 4.36 <5.7 Right Tibial (Mrkrs) (Abd Hallucis)  29°C  
   58.49 <56 4.36 <5.7 Right Ulnar (Mrkrs) (Abd Dig Min)  29.2°C  
   29.60 <32  <2.5 H Reflex Studies NR H-Lat (ms) L-R H-Lat (ms) L-R Lat Norm Left Tibial (Gastroc)  29.5°C  
NR   <2.0 Right Tibial (Gastroc)  28.9°C NR   <2.0 EMG Side Muscle Nerve Root Ins Act Fibs Psw Recrt Duration Amp Poly Comment Right 1stDorInt Ulnar C8-T1 Nml Nml Nml Reduced Incr Incr Nml Right ExtIndicis Radial (Post Int) C7-8 Nml Nml Nml Reduced Nml Nml Nml   
 Right Abd Poll Brev Median C8-T1 Nml Nml Nml Reduced Incr Incr Nml Right Biceps Musculocut C5-6 Nml Nml Nml Reduced Nml Nml Nml Right Triceps Radial C6-7-8 Nml Nml Nml Reduced Nml Nml Nml Right Deltoid Axillary C5-6 Nml Nml Nml Reduced Nml Nml Nml Right Lower Cerv Parasp Rami C7,T1 Nml Nml Nml Nml Nml Nml Nml Right Ext Dig Brev Dp Br Peron L5, S1 Nml Nml Nml No MUAP - - - Right AbdHallucis MedPlantar S1-2 Nml Nml Nml Reduced Incr Incr Nml Right AntTibialis Dp Br Peron L4-5 Nml Nml Nml Reduced Incr Incr Nml Right MedGastroc Tibial S1-2 Nml Nml Nml SMU Incr Nml Nml Right VastusLat Femoral L2-4 Nml Nml Nml Reduced Incr Incr Nml Right GluteusMed SupGluteal L4-S1 Nml Nml Nml Reduced Incr Incr Nml Right Lower Lumb Parasp Rami L5,S1 Nml Nml Nml Nml Nml Nml Nml Nerve Conduction Studies Anti Sensory Left/Right Comparison Stim Site L Lat (ms) R Lat (ms) L-R Lat (ms) L Amp (µV) R Amp (µV) L-R Amp (%) Site1 Site2 L Gallito (m/s) R Gallito (m/s) L-R Gallito (m/s) Median Anti Sensory (2nd Digit)  27.1°C Wrist       Wrist 2nd Digit Radial Anti Sensory (Base 1st Digit)  28.9°C Wrist  1.5   14.7  Wrist Base 1st Digit  67 Sup Fibular Anti Sensory (Lat ankle)  30.6°C Lower leg       Lower leg Lat ankle Site 2 Sural Anti Sensory (Lat Mall)  31.3°C Calf       Calf Lat Mall Site 2 Ulnar Anti Sensory (5th Digit)  28°C Wrist       Wrist 5th Digit Motor Left/Right Comparison Stim Site L Lat (ms) R Lat (ms) L-R Lat (ms) L Amp (mV) R Amp (mV) L-R Amp (%) Site1 Site2 L Gallito (m/s) R Gallito (m/s) L-R Gallito (m/s) Fibular Motor (Ext Dig Brev)  30.2°C Ankle       Ankle Ext Dig Brev     
B Fib       B Fib Ankle Poplt       Poplt B Fib Fibular TA Motor (Tib Ant)  23.1°C Fib Head 4.1 3.7 0.4 1.1 2.6 57.7 Fib Head Tib Ant Poplit 6.6 5.1 1.5 1.0 2.3 56.5 Poplit Fib Head 40 71 31 Median Motor (Abd Poll Brev)  28.8°C  
 Wrist  4.3   1.6  Wrist Abd Poll Brev     
Elbow  8.7   1.7  Elbow Wrist  50 Tibial Motor (Abd Law Brev)  29.9°C Ankle 5.4 4.5 0.9 5.6 4.1 26.8 Ankle Abd Law Brev     
Knee 15.3 16.1 0.8 4.0 3.3 17.5 Knee Ankle 37 33 4 Ulnar Motor (Abd Dig Minimi)  29.7°C Wrist  3.0   2.7  Wrist Abd Dig Minimi B Elbow  7.3   2.2  B Elbow Wrist  48 A Elbow  9.5   2.5  A Elbow B Elbow  45 Waveforms:

## 2020-11-12 NOTE — PROCEDURES
EMG/ NCS Report  [facility] - INPATIENT  [place]  [place], [address] [place], [address]   Ph: [phone]   FAX: [phone]  Test Date:  2020    Patient: Salina Montana : 1945 Physician: Reyes Horning, MD   Sex: Male Height: ' \" Ref Phys: Ethan Johnson M.D.   ID#: [id number] Weight:  lbs. Technician: Gauri Lowe     Patient History / Exam:  Patient comes in with worsening of gait for the past 5 yrs associated with development of numbness in both feet. (+) post polio as a child affecting the lower extremities. (+) diabetes (+) lower back surgery (+) neck surgery. Exam: Patient awake, alert, (+) hearing loss (+) UE 4/5;(+) atrophy of FDI muscles and lower leg muscles. LE 1/5; Sensory reduced. Gait: uses walker. Patient is coming for neuropathy evaluation. EMG & NCV Findings:  Evaluation of the left Fibular motor nerve showed no response (Ankle), no response (B Fib), and no response (Poplt). The right Fibular motor nerve showed no response (Ankle) and no response (B Fib). The left Fibular TA motor and the right Fibular TA motor nerves showed normal distal onset latency (L4.1, R3.7 ms), reduced amplitude (L1.1, R2.6 mV), and normal conduction velocity (Poplit-Fib Head, L40, R71 m/s). The right median motor nerve showed normal distal onset latency (4.3 ms), reduced amplitude (1.6 mV), and normal conduction velocity (Elbow-Wrist, 50 m/s). The left tibial motor and the right tibial motor nerves showed normal distal onset latency (L5.4, R4.5 ms), normal amplitude (L5.6, R4.1 mV), and decreased conduction velocity (Knee-Ankle, L37, R33 m/s). The right ulnar motor nerve showed normal distal onset latency (3.0 ms), reduced amplitude (2.7 mV), decreased conduction velocity (B Elbow-Wrist, 48 m/s), and decreased conduction velocity (A Elbow-B Elbow, 45 m/s).   The right median sensory and the right ulnar sensory nerves showed no response (Wrist). The right radial sensory nerve showed normal distal peak latency (2.0 ms) and normal amplitude (14.7 µV). The left Sup Fibular sensory nerve showed no response (Lower leg) and no response (Site 2). The right Sup Fibular sensory nerve showed no response (Lower leg). The left sural sensory and the right sural sensory nerves showed no response (Calf) and no response (Site 2). F Wave studies indicate that the right tibial F wave has prolonged latency (58.49 ms). All remaining F Wave latencies were within normal limits. All F Wave left vs. right side latency differences were within normal limits. H-reflex studies indicate that the left tibial H-reflex has no response. The right tibial H-reflex has no response. Needle evaluation of the right first dorsal interosseous, the right abductor pollicis brevis, the right abductor hallucis, the right anterior tibialis, the right vastus lateralis, and the right gluteus medius muscles showed diminished recruitment, Incr Duration, and increased motor unit amplitude. The right extensor indicis, the right biceps, the right triceps, and the right deltoid muscles showed diminished recruitment. The right extensor digitorum brevis muscle showed recruitment, - Duration, motor unit amplitude, and polyphasic potentials. The right medial gastrocnemius muscle showed recruitment and Incr Duration. All remaining muscles (as indicated in the following table) showed no evidence of electrical instability.         Impression:  ABNORMAL    Extensive electrodiagnostic examination of the right upper and lower extremities, with additional nerve conduction study of the left lower extremity, shows the followin) Chronic, without active, motor axon loss changes in all muscles examined along with corresponding reduction of motor amplitude responses, consistent with history of polio (post-polio syndrome)    2) Absent sensory responses in both lower extremities and upper extremity (except for radial sensory response) consistent with superimposed generalized sensorimotor polyneuropathy, more likely axon loss in type. Severity difficult to determine due to above findings.            Carrie Loo MD  Diplomate, American Board of Psychiatry and Neurology  Diplomate, Neuromuscular Medicine  Diplomate, American Board of Electrodiagnostic Medicine  Director, [address] [place] [address] Accredited Laboratory with Exemplary Status          Nerve Conduction Studies  Anti Sensory Summary Table     Stim Site NR Peak (ms) Norm Peak (ms) P-T Amp (µV) Norm P-T Amp Site1 Site2 Dist (cm)   Right Median Anti Sensory (2nd Digit)  27.1°C   Wrist NR  <4  >13 Wrist 2nd Digit 14.0   Right Radial Anti Sensory (Base 1st Digit)  28.9°C   Wrist    2.0 <2.8 14.7 >11 Wrist Base 1st Digit 10.0   Left Sup Fibular Anti Sensory (Lat ankle)  30.6°C   Lower leg NR  <4.6  >4 Lower leg Lat ankle 10.0   Site 2 NR          Right Sup Fibular Anti Sensory (Lat ankle)  29.9°C   Lower leg NR  <4.6  >4 Lower leg Lat ankle 10.0   Left Sural Anti Sensory (Lat Mall)  31.3°C   Calf NR  <4.5  >4.0 Calf Lat Mall 14.0   Site 2 NR          Right Sural Anti Sensory (Lat Mall)  30.8°C   Calf NR  <4.5  >4.0 Calf Lat Mall 14.0   Site 2 NR          Right Ulnar Anti Sensory (5th Digit)  28°C   Wrist NR  <4.0  >9 Wrist 5th Digit 14.0     Motor Summary Table     Stim Site NR Onset (ms) Norm Onset (ms) O-P Amp (mV) Norm O-P Amp Amp (Prev) (%) Site1 Site2 Dist (cm) Gallito (m/s) Norm Gallito (m/s)   Left Fibular Motor (Ext Dig Brev)  30.2°C   Ankle NR  <6.5  >1.1  Ankle Ext Dig Brev 8.0     B Fib NR      B Fib Ankle 0.0  >38   Poplt NR      Poplt B Fib 10.0  >42   Right Fibular Motor (Ext Dig Brev)  29.6°C   Ankle NR  <6.5  >1.1  Ankle Ext Dig Brev 8.0     B Fib NR      B Fib Ankle 0.0  >38   Lat ankle    13.8  0.0          Left Fibular TA Motor (Tib Ant)  23.1°C   Fib Head    4.1 <4.5 1.1 >3.0 100.0 Fib Head Tib Ant 10.0     Poplit    6.6  1.0  90.9 Poplit Fib Head 10.0 40 >40   Right Fibular TA Motor (Tib Ant)  25.9°C   Fib Head    3.7 <4.5 2.6 >3.0 100.0 Fib Head Tib Ant 10.0     Poplit    5.1  2.3  88.5 Poplit Fib Head 10.0 71 >40   Right Median Motor (Abd Poll Brev)  28.8°C   Wrist    4.3 <4.5 1.6 >4.1 100.0 Wrist Abd Poll Brev 8.0     Elbow    8.7  1.7  106.3 Elbow Wrist 22.0 50 >49   Left Tibial Motor (Abd Law Brev)  29.9°C   Ankle    5.4 <6.1 5.6 >1.1 100.0 Ankle Abd Law Brev 8.0     Knee    15.3  4.0  71.4 Knee Ankle 37.0 37 >39   Right Tibial Motor (Abd Law Brev)  29.2°C   Ankle    4.5 <6.1 4.1 >1.1 100.0 Ankle Abd Law Brev 8.0     Knee    16.1  3.3  80.5 Knee Ankle 38.0 33 >39   Right Ulnar Motor (Abd Dig Minimi)  29.7°C   Wrist    3.0 <3.1 2.7 >7.0 100.0 Wrist Abd Dig Minimi 8.0  >50   B Elbow    7.3  2.2  81.5 B Elbow Wrist 20.5 48 >50   A Elbow    9.5  2.5  113.6 A Elbow B Elbow 10.0 45 >50     F Wave Studies     NR F-Lat (ms) Lat Norm (ms) L-R F-Lat (ms) L-R Lat Norm   Left Tibial (Mrkrs) (Abd Hallucis)  29.6°C      54.13 <56 4.36 <5.7   Right Tibial (Mrkrs) (Abd Hallucis)  29°C      58.49 <56 4.36 <5.7   Right Ulnar (Mrkrs) (Abd Dig Min)  29.2°C      29.60 <32  <2.5     H Reflex Studies     NR H-Lat (ms) L-R H-Lat (ms) L-R Lat Norm   Left Tibial (Gastroc)  29.5°C   NR   <2.0   Right Tibial (Gastroc)  28.9°C   NR   <2.0     EMG     Side Muscle Nerve Root Ins Act Fibs Psw Recrt Duration Amp Poly Comment   Right 1stDorInt Ulnar C8-T1 Nml Nml Nml Reduced Incr Incr Nml    Right ExtIndicis Radial (Post Int) C7-8 Nml Nml Nml Reduced Nml Nml Nml    Right Abd Poll Brev Median C8-T1 Nml Nml Nml Reduced Incr Incr Nml    Right Biceps Musculocut C5-6 Nml Nml Nml Reduced Nml Nml Nml    Right Triceps Radial C6-7-8 Nml Nml Nml Reduced Nml Nml Nml    Right Deltoid Axillary C5-6 Nml Nml Nml Reduced Nml Nml Nml    Right Lower Cerv Parasp Rami C7,T1 Nml Nml Nml Nml Nml Nml Nml    Right Ext Dig Brev Dp Br Peron L5, S1 Nml Nml Nml No MUAP - - -    Right AbdHallucis MedPlantar S1-2 Nml Nml Nml Reduced Incr Incr Nml    Right AntTibialis Dp Br Peron L4-5 Nml Nml Nml Reduced Incr Incr Nml    Right MedGastroc Tibial S1-2 Nml Nml Nml SMU Incr Nml Nml    Right VastusLat Femoral L2-4 Nml Nml Nml Reduced Incr Incr Nml    Right GluteusMed SupGluteal L4-S1 Nml Nml Nml Reduced Incr Incr Nml    Right Lower Lumb Parasp Rami L5,S1 Nml Nml Nml Nml Nml Nml Nml                Nerve Conduction Studies  Anti Sensory Left/Right Comparison     Stim Site L Lat (ms) R Lat (ms) L-R Lat (ms) L Amp (µV) R Amp (µV) L-R Amp (%) Site1 Site2 L Gallito (m/s) R Gallito (m/s) L-R Gallito (m/s)   Median Anti Sensory (2nd Digit)  27.1°C   Wrist       Wrist 2nd Digit      Radial Anti Sensory (Base 1st Digit)  28.9°C   Wrist  1.5   14.7  Wrist Base 1st Digit  67    Sup Fibular Anti Sensory (Lat ankle)  30.6°C   Lower leg       Lower leg Lat ankle      Site 2              Sural Anti Sensory (Lat Mall)  31.3°C   Calf       Calf Lat Mall      Site 2              Ulnar Anti Sensory (5th Digit)  28°C   Wrist       Wrist 5th Digit        Motor Left/Right Comparison     Stim Site L Lat (ms) R Lat (ms) L-R Lat (ms) L Amp (mV) R Amp (mV) L-R Amp (%) Site1 Site2 L Gallito (m/s) R Gallito (m/s) L-R Gallito (m/s)   Fibular Motor (Ext Dig Brev)  30.2°C   Ankle       Ankle Ext Dig Brev      B Fib       B Fib Ankle      Poplt       Poplt B Fib      Fibular TA Motor (Tib Ant)  23.1°C   Fib Head 4.1 3.7 0.4 1.1 2.6 57.7 Fib Head Tib Ant      Poplit 6.6 5.1 1.5 1.0 2.3 56.5 Poplit Fib Head 40 71 31   Median Motor (Abd Poll Brev)  28.8°C   Wrist  4.3   1.6  Wrist Abd Poll Brev      Elbow  8.7   1.7  Elbow Wrist  50    Tibial Motor (Abd Law Brev)  29.9°C   Ankle 5.4 4.5 0.9 5.6 4.1 26.8 Ankle Abd Law Brev      Knee 15.3 16.1 0.8 4.0 3.3 17.5 Knee Ankle 37 33 4   Ulnar Motor (Abd Dig Minimi)  29.7°C   Wrist  3.0   2.7  Wrist Abd Dig Minimi      B Elbow  7.3   2.2  B Elbow Wrist  48    A Elbow  9.5   2.5  A Elbow B Elbow  45          Waveforms:

## 2020-11-19 ENCOUNTER — TELEPHONE (OUTPATIENT)
Dept: NEUROLOGY | Age: 75
End: 2020-11-19

## 2020-11-19 NOTE — TELEPHONE ENCOUNTER
----- Message from Edgardo Mortensen sent at 11/19/2020  4:19 PM EST -----  Regarding: Dr Tina Tillman first and last Matteo Calvert, pts son      Reason for call: regarding going over the two tests results with himself and his father together.       Callback required yes/no and why:yes for reason given above,       Best contact number(s):[phone]      Details to clarify the request:best time to call is between 9-12pm tomorrow or 3:30- 206 [address] [place] [address]

## 2020-11-19 NOTE — TELEPHONE ENCOUNTER
----- Message from Baylee Clark sent at 11/19/2020  4:18 PM EST -----  Regarding: Dr. Blas Don first and last name: Nadia Farmer      Reason for call: Pt following-up on neuropathy test results.        Callback required yes/no and why: Yes      Best contact number(s): [phone] or [phone]      Details to clarify the request:      Baylee Clark

## 2020-11-23 ENCOUNTER — VIRTUAL VISIT (OUTPATIENT)
Dept: NEUROLOGY | Age: 75
End: 2020-11-23
Payer: MEDICARE

## 2020-11-23 DIAGNOSIS — G14 POST-POLIO SYNDROME: ICD-10-CM

## 2020-11-23 DIAGNOSIS — R46.89 COGNITIVE AND BEHAVIORAL CHANGES: ICD-10-CM

## 2020-11-23 DIAGNOSIS — R53.1 WEAKNESS GENERALIZED: Primary | ICD-10-CM

## 2020-11-23 DIAGNOSIS — R41.89 COGNITIVE AND BEHAVIORAL CHANGES: ICD-10-CM

## 2020-11-23 PROCEDURE — 99442 PR PHYS/QHP TELEPHONE EVALUATION 11-20 MIN: CPT | Performed by: PSYCHIATRY & NEUROLOGY

## 2020-11-23 NOTE — PROGRESS NOTES
Lara Sepulveda is a 66 y.o. male evaluated via audio only technology on 2020. Consent: He and/or his health care decision maker is aware that he may receive a bill for this audio only encounter, depending on his insurance coverage, and has provided verbal consent to proceed: Yes    I communicated with the patient and/or health care decision maker about the nature and details of the following: EMG results      Subjective:     Karen Chung is a 66 y.o. male who was seen for Results    Pt with hx of Post-polio syndrome    See initial clinic note dated 2020 for full details. Son calling on behalf of patient/ Father (who carries a dx of Dementia) to discuss EMG results    MRI C-spine and Brain have not been completed yet    EMG (2020): ABNORMAL     Extensive electrodiagnostic examination of the right upper and lower extremities, with additional nerve conduction study of the left lower extremity, shows the followin) Chronic, without active, motor axon loss changes in all muscles examined along with corresponding reduction of motor amplitude responses, consistent with history of polio (post-polio syndrome)     2) Absent sensory responses in both lower extremities and upper extremity (except for radial sensory response) consistent with superimposed generalized sensorimotor polyneuropathy, more likely axon loss in type.  Severity difficult to determine due to above findings.        Discussed above findings with Son, Patient on conference call  Son says he isn't aware of any noticeable changes since the office visit 10-11 days ago  Son wasn't aware MRIs havent' been completed  He says he will help Patient get that scheduled so we can go over at next visit  I touched on the memory topic, which son was hesitant to discuss as he says pt is \"sensitive\" to that topic  He would like his father to have memory testing done to gauge where he's at cognitively  Discussed it could be either brief memory test during office visit or the full neuropsych testing  Son prefers to do the brief memory testing at next follow up visit    Brief ROS: as noted above    Allergies: Allergies   Allergen Reactions    Trazodone Other (comments)     Caused penile erection that needed surgical correction       Medications:   Current Outpatient Medications   Medication Sig    fentaNYL (DURAGESIC) 25 mcg/hr PATCH     AZITHROMYCIN PO Take  by mouth. 3 times a week    guaifenesin/pseudoephedrne HCl (MUCINEX D PO) Take  by mouth two (2) times a day.  gabapentin (NEURONTIN) 600 mg tablet Take  by mouth three (3) times daily.  mometasone furoate (ASMANEX HFA IN) Take  by inhalation.  glycopyrrolate/formoterol fum (BEVESPI AEROSPHERE IN) Take  by inhalation.  oxyCODONE-acetaminophen (PERCOCET 7.5) 7.5-325 mg per tablet 1 tabs every  6 hrs as needed for pain. Maximum daily dose 4 tablets.  predniSONE (DELTASONE) 10 mg tablet Take 6 tabs a day for 3 days, then 5 a day for 3 days, then 4 a day for 3 days, then 3 a day for 3 days, then 2 a day for 3 days, then 1 a day for 6 days.  albuterol-ipratropium (DUO-NEB) 2.5 mg-0.5 mg/3 ml nebu 3 mL by Nebulization route every four (4) hours as needed (SOB).  gabapentin (NEURONTIN) 600 mg tablet Take 600 mg by mouth four (4) times daily.  fluticasone-salmeterol (ADVAIR) 100-50 mcg/dose diskus inhaler Take 1 Puff by inhalation two (2) times a day.  atorvastatin (LIPITOR) 10 mg tablet Take 10 mg by mouth daily.  meloxicam (MOBIC) 15 mg tablet Take 15 mg by mouth daily.  albuterol (PROAIR HFA) 90 mcg/actuation inhaler Take 2 Puffs by inhalation every four (4) hours as needed.  DULoxetine (CYMBALTA) 60 mg capsule Take 60 mg by mouth daily.  metFORMIN (GLUCOPHAGE) 500 mg tablet Take 500 mg by mouth daily.      PMHx:   Past Medical History:   Diagnosis Date    Anxiety and depression     Arthritis     All joints    Bronchitis, chronic (HCC)     Cancer ([facility] skin cancer removed     Chronic obstructive pulmonary disease (HCC)     Chronic pain     [facility]     DM type 2 causing neurological disease ([facility]     Fibromyalgia     Hyperlipidemia     Neuropathy     Post-polio syndrome      PSHx:  has a past surgical history that includes hx orthopaedic (2000); hx orthopaedic; pr total hip arthroplasty (2007); hx orthopaedic (2006); pr chest surgery procedure unlisted (2/2010); pr abdomen surgery proc unlisted (2/2010); and hx appendectomy. SocHx:  reports that he quit smoking about 50 years ago. He has a 40.00 pack-year smoking history. He has never used smokeless tobacco. He reports that he does not drink alcohol or use drugs. FHx: family history includes Arthritis-osteo in his mother; Cancer in his father; Lung Disease in his father. Assessment & Plan:       ICD-10-CM ICD-9-CM    1. Weakness generalized  R53.1 780.79    2. Post-polio syndrome  G14 138    3. Cognitive and behavioral changes  R41.89 799.59     R46.89 312.9        Pt/ Son to schedule MRI Brain and Cervical Spine to evaluate for progressive gait difficulty, cognitive changes, hallucinations/ delusions. Follow up to go over those results. Will do MMSE at that visit for objective measure of memory. I affirm this is a Patient-Initiated Episode with a Patient who has not had a related appointment within my department in the past 7 days or scheduled within the next 24 hours.     Total Time: minutes: 11-20 minutes    Note: not billable if this call serves to triage the patient into an appointment for the relevant concern      Jody Robert MD

## 2020-12-02 ENCOUNTER — TELEPHONE (OUTPATIENT)
Dept: NEUROLOGY | Age: 75
End: 2020-12-02

## 2020-12-02 NOTE — TELEPHONE ENCOUNTER
----- Message from Alex Rosales sent at 12/2/2020 11:17 AM EST -----  Regarding: Dr Brooks Fill first and last Portia Quiet, pts spouse      Reason for call:said she had to cancel tomorrow visit to go over the results of her  MRI and EMG, he has not had his MRI yet and does not know who to call,  pts wife wants to confirm the MRI order is in the system and will also try central scheduling       Callback required yes/no and why:yes for reason given above      Best contact number(s):[phone]      Details to clarify the request: pts wife said they want to do a phone only  virtual visit to discuss his results       Alex Rosales

## 2020-12-04 ENCOUNTER — HOSPITAL ENCOUNTER (OUTPATIENT)
Dept: MRI IMAGING | Age: 75
Discharge: HOME | End: 2020-12-04
Attending: PSYCHIATRY & NEUROLOGY
Payer: MEDICARE

## 2020-12-04 DIAGNOSIS — R53.1 WEAKNESS GENERALIZED: ICD-10-CM

## 2020-12-04 DIAGNOSIS — G14 POST-POLIO SYNDROME: ICD-10-CM

## 2020-12-04 DIAGNOSIS — R29.6 FALLS FREQUENTLY: ICD-10-CM

## 2020-12-04 PROCEDURE — 72141 MRI NECK SPINE W/O DYE: CPT

## 2020-12-04 PROCEDURE — 70551 MRI BRAIN STEM W/O DYE: CPT

## 2020-12-11 ENCOUNTER — TELEPHONE (OUTPATIENT)
Dept: NEUROLOGY | Age: 75
End: 2020-12-11

## 2020-12-11 DIAGNOSIS — G95.9 CERVICAL MYELOPATHY (HCC): ICD-10-CM

## 2020-12-11 DIAGNOSIS — M48.02 CERVICAL SPINAL STENOSIS: ICD-10-CM

## 2020-12-11 DIAGNOSIS — R53.1 WEAKNESS GENERALIZED: Primary | ICD-10-CM

## 2020-12-11 NOTE — TELEPHONE ENCOUNTER
Brain MRI looks normal for patient's age (no evidence of stroke, tumor, et [employer]). MRI C-spine shows severe spinal canal stenosis at the C2-3 and C3-4 level (upper cervical spine) with pressure on spinal cord. He will need to see a Neurosurgeon to discuss surgical treatment options for the cervical spinal stenosis. Entered referral to see Dr Sindhu Paniagua/ Neurosurgery at [facility] to review MRI C-spine findings and discuss any surgical options.

## 2020-12-22 NOTE — TELEPHONE ENCOUNTER
Called patient. Reviewed results. Faxed information to dr. Hobson Pin office.  Patient will contact them for appt

## 2021-01-06 ENCOUNTER — TRANSCRIBE ORDER (OUTPATIENT)
Dept: SCHEDULING | Age: 76
End: 2021-01-06

## 2021-01-06 DIAGNOSIS — Z98.1 HISTORY OF FUSION OF CERVICAL SPINE: Primary | ICD-10-CM

## 2021-01-14 ENCOUNTER — VIRTUAL VISIT (OUTPATIENT)
Dept: NEUROLOGY | Age: 76
End: 2021-01-14
Payer: MEDICARE

## 2021-01-14 DIAGNOSIS — M48.02 CERVICAL SPINAL STENOSIS: ICD-10-CM

## 2021-01-14 DIAGNOSIS — G14 POST-POLIO SYNDROME: ICD-10-CM

## 2021-01-14 DIAGNOSIS — M48.061 SPINAL STENOSIS OF LUMBAR REGION, UNSPECIFIED WHETHER NEUROGENIC CLAUDICATION PRESENT: ICD-10-CM

## 2021-01-14 PROCEDURE — 99442 PR PHYS/QHP TELEPHONE EVALUATION 11-20 MIN: CPT | Performed by: PSYCHIATRY & NEUROLOGY

## 2021-01-14 NOTE — PROGRESS NOTES
Karen Wilson (: 1945) is a 66 y.o. male, established patient, here for evaluation of the following chief complaint(s):  Results     This visit was conducted via audio only technology on 2021. Consent: He and/or his health care decision maker is aware that he may receive a bill for this audio only encounter, depending on his insurance coverage, and has provided verbal consent to proceed: Yes    Patient identification was verified at the start of the visit: YES    I affirm this is a Patient-Initiated Episode with a Patient who has not had a related appointment within my department in the past 7 days or scheduled within the next 24 hours. ASSESSMENT/PLAN:  1. Cervical spinal stenosis    2. Post-polio syndrome    3. Spinal stenosis of lumbar region, unspecified whether neurogenic claudication present      No additional Neurology workup as patient has undergone EMG testing which did not reveal another neuromuscular cause for his weakness (ie; only post-polio syndrome). MRI Brain was normal for age. MRI C-spine as detailed below. Plans in place for patient to pursue C-spine surgery with Dr Jennifer Green, then have inpatient rehab (per wife). Follow up with Dr Zhao [facility]/ [facility] as scheduled         SUBJECTIVE/OBJECTIVE:  HPI    66 y.o. male with hx of Post-Polio Syndrome. Telephone visit to go over MRI C-spine findings. I called Patient and Wife to go over MRI C-spine findings. Discussed with them both via their speakerphone that MRI cervical spine showed severe spinal stenosis at the C3-C4 level with possible compression of the spinal cord at the same level (possible subtle increase signal within the spinal cord, per radiology). I had referred him on 2020 to see Dr. Zach Kang Neurosurgery to discuss these findings and any potential surgical options.   Wife says that they did meet with Dr. Jennifer Green and there is a plan in place for him to have surgery sometime later this month.  Wife had some questions regarding what to expect from surgery. She tells me that her understanding from discussion with Dr. Emy Paul is that surgery will hopefully further weakness, possibly improve current weakness. I discussed with patient wife that from a neurology standpoint the reason to pursue cervical spine surgery in the setting of severe cervical spinal stenosis is 1) to reduce pressure on the spinal cord which may be causing weakness, 2) to prevent developing new or additional weakness related to the severe spinal stenosis, and 3) avoid a catastrophic outcome of possible spinal cord injury if the patient were to fall and injure his head and neck with the current degree of spinal stenosis (possibly pinching his spinal cord in that situation). The patient's wife also asked whether he could expect any change or improvement in his overall pain levels. I discussed with him that he may not experience any reduction in his pain with the surgery but that can be dealt with after surgery by patient seeing pain management. She expressed she says she has a better understanding of what to expect from the surgery. Review of Systems     No flowsheet data found.     Past Medical History:   Diagnosis Date    Anxiety and depression     Arthritis     All joints    Bronchitis, chronic (HCC)     Cancer ([address]     skin cancer removed     Chronic obstructive pulmonary disease (HCC)     Chronic pain     Dementia ([address]     DM type 2 causing neurological disease ([address]     Fibromyalgia     Hyperlipidemia     Neuropathy     Post-polio syndrome      Past Surgical History:   Procedure Laterality Date    ABDOMEN SURGERY PROC UNLISTED  2/2010    Feeding Tube. removed 2011    CHEST SURGERY PROCEDURE UNLISTED  2/2010    RVATS, Bronchoscopy    HX APPENDECTOMY      HX ORTHOPAEDIC  2000    Cervical Discectomy    HX ORTHOPAEDIC      Left Arm Multiple Surgeries for Infection    HX ORTHOPAEDIC  2006    right heel fracture    TOTAL HIP ARTHROPLASTY  2007    left     Allergies   Allergen Reactions    Trazodone Other (comments)     Caused penile erection that needed surgical correction     Current Outpatient Medications   Medication Sig Dispense Refill    AZITHROMYCIN PO Take  by mouth. 3 times a week      guaifenesin/pseudoephedrne HCl (MUCINEX D PO) Take  by mouth two (2) times a day.  mometasone furoate (ASMANEX HFA IN) Take  by inhalation.  glycopyrrolate/formoterol fum (BEVESPI AEROSPHERE IN) Take  by inhalation.  oxyCODONE-acetaminophen (PERCOCET 7.5) 7.5-325 mg per tablet 1 tabs every  6 hrs as needed for pain. Maximum daily dose 4 tablets. 10 Tab 0    predniSONE (DELTASONE) 10 mg tablet Take 6 tabs a day for 3 days, then 5 a day for 3 days, then 4 a day for 3 days, then 3 a day for 3 days, then 2 a day for 3 days, then 1 a day for 6 days. 66 Tab 0    albuterol-ipratropium (DUO-NEB) 2.5 mg-0.5 mg/3 ml nebu 3 mL by Nebulization route every four (4) hours as needed (SOB).  gabapentin (NEURONTIN) 600 mg tablet Take 600 mg by mouth four (4) times daily.  fluticasone-salmeterol (ADVAIR) 100-50 mcg/dose diskus inhaler Take 1 Puff by inhalation two (2) times a day.  atorvastatin (LIPITOR) 10 mg tablet Take 10 mg by mouth daily.  meloxicam (MOBIC) 15 mg tablet Take 15 mg by mouth daily.  albuterol (PROAIR HFA) 90 mcg/actuation inhaler Take 2 Puffs by inhalation every four (4) hours as needed.  DULoxetine (CYMBALTA) 60 mg capsule Take 60 mg by mouth daily.  metFORMIN (GLUCOPHAGE) 500 mg tablet Take 500 mg by mouth daily. Physical Exam: not applicable               Karen SHERRIE Deandra Dial is being evaluated by a Virtual Visit (audio only) encounter to address concerns as mentioned above. A caregiver was present when appropriate.  Due to this being a TeleHealth encounter (During LNK-27 public health emergency), evaluation of the following organ systems was limited: Vitals/Constitutional/EENT/Resp/CV/GI//MS/Neuro/Skin/Heme-Lymph-Imm. Pursuant to the emergency declaration under the [address] [place] [address] authority and the [employer] Act, this Virtual Visit was conducted with patient's (and/or legal guardian's) consent, to reduce the patient's risk of exposure to COVID-19 and provide necessary medical care. The patient (and/or legal guardian) has also been advised to contact this office for worsening conditions or problems, and seek emergency medical treatment and/or call 911 if deemed necessary. Total Time: minutes: 21-30 minutes    Note: not billable if this call serves to triage the patient into an appointment for the relevant concern      An electronic signature was used to authenticate this note.   -- Xander Bethea MD

## 2021-01-17 ENCOUNTER — HOSPITAL ENCOUNTER (OUTPATIENT)
Dept: PREADMISSION TESTING | Age: 76
Discharge: HOME | End: 2021-01-17
Payer: MEDICARE

## 2021-01-17 PROCEDURE — U0003 INFECTIOUS AGENT DETECTION BY NUCLEIC ACID (DNA OR RNA); SEVERE ACUTE RESPIRATORY SYNDROME CORONAVIRUS 2 (SARS-COV-2) (CORONAVIRUS DISEASE [COVID-19]), AMPLIFIED PROBE TECHNIQUE, MAKING USE OF HIGH THROUGHPUT TECHNOLOGIES AS DESCRIBED BY CMS-2020-01-R: HCPCS

## 2021-01-18 ENCOUNTER — HOSPITAL ENCOUNTER (OUTPATIENT)
Dept: PREADMISSION TESTING | Age: 76
Discharge: HOME | DRG: 460 | End: 2021-01-18
Payer: MEDICARE

## 2021-01-18 ENCOUNTER — HOSPITAL ENCOUNTER (OUTPATIENT)
Dept: CT IMAGING | Age: 76
Discharge: HOME | DRG: 460 | End: 2021-01-18
Attending: NEUROLOGICAL SURGERY
Payer: MEDICARE

## 2021-01-18 VITALS
WEIGHT: 164.46 LBS | HEART RATE: 72 BPM | SYSTOLIC BLOOD PRESSURE: 159 MMHG | BODY MASS INDEX: 22.28 KG/M2 | DIASTOLIC BLOOD PRESSURE: 80 MMHG | TEMPERATURE: 98.6 F | RESPIRATION RATE: 20 BRPM | HEIGHT: 72 IN | OXYGEN SATURATION: 96 %

## 2021-01-18 DIAGNOSIS — Z98.1 HISTORY OF FUSION OF CERVICAL SPINE: ICD-10-CM

## 2021-01-18 LAB
ABO + RH BLD: NORMAL
ALBUMIN SERPL-MCNC: 3.9 G/DL (ref 3.5–5)
ALBUMIN/GLOB SERPL: 1.3 {RATIO} (ref 1.1–2.2)
ALP SERPL-CCNC: 100 U/L (ref 45–117)
ALT SERPL-CCNC: 28 U/L (ref 12–78)
ANION GAP SERPL CALC-SCNC: 5 MMOL/L (ref 5–15)
APPEARANCE UR: CLEAR
APTT PPP: 24.1 SEC (ref 22.1–31)
AST SERPL-CCNC: 18 U/L (ref 15–37)
ATRIAL RATE: 80 BPM
BACTERIA URNS QL MICRO: NEGATIVE /HPF
BASOPHILS # BLD: 0.1 K/UL (ref 0–0.1)
BASOPHILS NFR BLD: 0 % (ref 0–1)
BILIRUB SERPL-MCNC: 0.3 MG/DL (ref 0.2–1)
BILIRUB UR QL: NEGATIVE
BLOOD GROUP ANTIBODIES SERPL: NORMAL
BUN SERPL-MCNC: 22 MG/DL (ref 6–20)
BUN/CREAT SERPL: 23 (ref 12–20)
CALCIUM SERPL-MCNC: 9.1 MG/DL (ref 8.5–10.1)
CALCULATED P AXIS, ECG09: 71 DEGREES
CALCULATED R AXIS, ECG10: -55 DEGREES
CALCULATED T AXIS, ECG11: 62 DEGREES
CHLORIDE SERPL-SCNC: 108 MMOL/L (ref 97–108)
CO2 SERPL-SCNC: 25 MMOL/L (ref 21–32)
COLOR UR: ABNORMAL
CREAT SERPL-MCNC: 0.97 MG/DL (ref 0.7–1.3)
DIAGNOSIS, 93000: NORMAL
DIFFERENTIAL METHOD BLD: ABNORMAL
EOSINOPHIL # BLD: 0.1 K/UL (ref 0–0.4)
EOSINOPHIL NFR BLD: 0 % (ref 0–7)
EPITH CASTS URNS QL MICRO: ABNORMAL /LPF
ERYTHROCYTE [DISTWIDTH] IN BLOOD BY AUTOMATED COUNT: 12.9 % (ref 11.5–14.5)
EST. AVERAGE GLUCOSE BLD GHB EST-MCNC: 126 MG/DL
GLOBULIN SER CALC-MCNC: 3 G/DL (ref 2–4)
GLUCOSE SERPL-MCNC: 113 MG/DL (ref 65–100)
GLUCOSE UR STRIP.AUTO-MCNC: NEGATIVE MG/DL
HBA1C MFR BLD: 6 % (ref 4–5.6)
HCT VFR BLD AUTO: 39.4 % (ref 36.6–50.3)
HGB BLD-MCNC: 12.8 G/DL (ref 12.1–17)
HGB UR QL STRIP: NEGATIVE
HYALINE CASTS URNS QL MICRO: ABNORMAL /LPF (ref 0–5)
IMM GRANULOCYTES # BLD AUTO: 0 K/UL (ref 0–0.04)
IMM GRANULOCYTES NFR BLD AUTO: 0 % (ref 0–0.5)
INR PPP: 1 (ref 0.9–1.1)
KETONES UR QL STRIP.AUTO: ABNORMAL MG/DL
LEUKOCYTE ESTERASE UR QL STRIP.AUTO: NEGATIVE
LYMPHOCYTES # BLD: 1.5 K/UL (ref 0.8–3.5)
LYMPHOCYTES NFR BLD: 12 % (ref 12–49)
MCH RBC QN AUTO: 31.1 PG (ref 26–34)
MCHC RBC AUTO-ENTMCNC: 32.5 G/DL (ref 30–36.5)
MCV RBC AUTO: 95.9 FL (ref 80–99)
MONOCYTES # BLD: 0.8 K/UL (ref 0–1)
MONOCYTES NFR BLD: 7 % (ref 5–13)
NEUTS SEG # BLD: 9.9 K/UL (ref 1.8–8)
NEUTS SEG NFR BLD: 81 % (ref 32–75)
NITRITE UR QL STRIP.AUTO: NEGATIVE
NRBC # BLD: 0 K/UL (ref 0–0.01)
NRBC BLD-RTO: 0 PER 100 WBC
P-R INTERVAL, ECG05: 162 MS
PH UR STRIP: 6 [PH] (ref 5–8)
PLATELET # BLD AUTO: 265 K/UL (ref 150–400)
PMV BLD AUTO: 10.2 FL (ref 8.9–12.9)
POTASSIUM SERPL-SCNC: 5 MMOL/L (ref 3.5–5.1)
PROT SERPL-MCNC: 6.9 G/DL (ref 6.4–8.2)
PROT UR STRIP-MCNC: ABNORMAL MG/DL
PROTHROMBIN TIME: 10.3 SEC (ref 9–11.1)
Q-T INTERVAL, ECG07: 374 MS
QRS DURATION, ECG06: 108 MS
QTC CALCULATION (BEZET), ECG08: 431 MS
RBC # BLD AUTO: 4.11 M/UL (ref 4.1–5.7)
RBC #/AREA URNS HPF: ABNORMAL /HPF (ref 0–5)
SODIUM SERPL-SCNC: 138 MMOL/L (ref 136–145)
SP GR UR REFRACTOMETRY: 1.03 (ref 1–1.03)
SPECIMEN EXP DATE BLD: NORMAL
THERAPEUTIC RANGE,PTTT: NORMAL SECS (ref 58–77)
UA: UC IF INDICATED,UAUC: ABNORMAL
UROBILINOGEN UR QL STRIP.AUTO: 0.2 EU/DL (ref 0.2–1)
VENTRICULAR RATE, ECG03: 80 BPM
WBC # BLD AUTO: 12.3 K/UL (ref 4.1–11.1)
WBC URNS QL MICRO: ABNORMAL /HPF (ref 0–4)

## 2021-01-18 PROCEDURE — 86850 RBC ANTIBODY SCREEN: CPT

## 2021-01-18 PROCEDURE — 85025 COMPLETE CBC W/AUTO DIFF WBC: CPT

## 2021-01-18 PROCEDURE — 72125 CT NECK SPINE W/O DYE: CPT

## 2021-01-18 PROCEDURE — 81001 URINALYSIS AUTO W/SCOPE: CPT

## 2021-01-18 PROCEDURE — 80053 COMPREHEN METABOLIC PANEL: CPT

## 2021-01-18 PROCEDURE — 85730 THROMBOPLASTIN TIME PARTIAL: CPT

## 2021-01-18 PROCEDURE — 36415 COLL VENOUS BLD VENIPUNCTURE: CPT

## 2021-01-18 PROCEDURE — 93005 ELECTROCARDIOGRAM TRACING: CPT

## 2021-01-18 PROCEDURE — 85610 PROTHROMBIN TIME: CPT

## 2021-01-18 PROCEDURE — 83036 HEMOGLOBIN GLYCOSYLATED A1C: CPT

## 2021-01-18 NOTE — H&P
Preoperative Evaluation                     History and Physical with Surgical Risk Stratification     1/18/2021    CC: Leg weakness, frequent falls  Surgery: C2-4 LAMINECTOMY, C2-T2 INSTRUMENTED FUSION    HPI:   Venice Ibarra is a 66 y.o. male referred for pre-operative evaluation by Dr. Hilary Major for surgery on 1/2/21. Mr. Deandra Dial notes he has had several previous back and neck surgeries. He was doing well until 6 months ago when he developed difficulty with fine motor skills causing him to drop things frequently. He started having frequent falls. 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 He notes his left foot is turned inward making walking difficult. He uses a walker to help. He notes he also has numbness in both hands. The symptoms he is experiencing is causing a disruption to his life and making it difficult to do things for himself. Him and his wife live alone but his two sons will be coming in to help after surgery. He is followed by [facility] for his COPD which according to last note is stable. The patient was evaluated in the surgeon's office and it was determined that the most appropriate plan of care is to proceed with surgical intervention. Patient's PCP Cecy Starr MD    Review of Systems     Constitutional: Negative for chills and fever  HENT: Negative for congestion and sore throat  Eyes: negative for blurred vision and double vision  Respiratory: Negative for cough, shortness of breath and wheezing  Mouth: Negative for loose, broken or chipped teeth. Cardiovascular: Negative for chest pain and palpitations  Gastrointestinal: Negative for abdominal pain, nausea, diarrhea & constipation  Genitourinary: Negative for dysuria and hematuria  Musculoskeletal: Back pain  Skin: Negative for rash, open wounds. Neurological: Negative for dizziness, tremors and headaches.  Numbness, falls  Psychiatric: The patient is not nervous/anxious. Inherent Risk of Surgery     Surgical risk:  Intermediate  Low:  EIntermediate:   Spinal surgery  Patient Cardiac Risk Assessment     Revised Cardiac Risk Index (RCRI)  Hx of CVD  Rate if cardiac death, nonfatal MI, nonfatal cardiac arrest by number of risk factor- 1.0%    JOSLYN/AHA 2007 Guidelines:   1) Surgery Emergency, Non-cardiac -> to surgery  2) If not, look at clinical predictors    Intermediate Minor   Abnormal EKGDiabetes  Blood Thinner: NA    METS      EQUAL TO 4 Care for self Walk indoors around house Walk 2-3 blocks on level ground (2-3 mph) Light work around house (dust, dishes)     Other Risk Factors:   Screening for ETOH use:  Done and low risk  Smoking status:  Former     Personal or FH of bleeding problems:  No  Personal or FH of blood clots:  No  Personal or FH of anesthesia problems:   No    Pulmonary Risk:  Asthma or COPD:  Yes  Body mass index is 22.31 kg/m². Known ABDELRAHMAN:  No    Past Medical, Surgical, Social History     Allergies: Allergies   Allergen Reactions    Trazodone Other (comments)     Caused penile erection that needed surgical correction       Medication Documentation Review Audit     Reviewed by Jessica Delacruz RN (Registered Nurse) on 01/18/21 at [phone]    Medication Sig Documenting Provider Last Dose Status Taking? albuterol (PROAIR HFA) 90 mcg/actuation inhaler Take 2 Puffs by inhalation every four (4) hours as needed. Other, MD Olinda  Active    albuterol-ipratropium (DUO-NEB) 2.5 mg-0.5 mg/3 ml nebu 3 mL by Nebulization route every four (4) hours as needed (SOB). Provider, Historical  Active    atorvastatin (LIPITOR) 10 mg tablet Take 10 mg by mouth daily. Provider, Historical  Active    AZITHROMYCIN PO Take  by mouth. 3 times a week M-W-F Provider, Historical  Active    DULoxetine (CYMBALTA) 60 mg capsule Take 60 mg by mouth two (2) times a day.  Provider, Historical  Active    fluticasone-salmeterol (ADVAIR) 100-50 mcg/dose diskus inhaler Take 1 Puff by inhalation two (2) times a day. Provider, Historical  Active    gabapentin (NEURONTIN) 600 mg tablet Take 600 mg by mouth four (4) times daily. Provider, Historical  Active    glycopyrrolate/formoterol fum (BEVESPI AEROSPHERE IN) Take  by inhalation two (2) times a day. Provider, Historical  Active    guaifenesin/pseudoephedrne HCl (MUCINEX D PO) Take  by mouth two (2) times daily as needed. Provider, Historical  Active    meloxicam (MOBIC) 15 mg tablet Take 15 mg by mouth daily. Other, MD Olinda 1/18/2021 Active No   metFORMIN (GLUCOPHAGE) 500 mg tablet Take 500 mg by mouth daily. Provider, Historical  Active            Med Note (Mabel Paulino   Sat Aug 29, 2015  1:20 PM) . mometasone furoate (ASMANEX HFA IN) Take  by inhalation two (2) times a day. Provider, Historical  Active    oxyCODONE-acetaminophen (PERCOCET 7.5) 7.5-325 mg per tablet 1 tabs every  6 hrs as needed for pain. Maximum daily dose 4 tablets. Rina Cruz MD  Active    predniSONE (DELTASONE) 10 mg tablet Take 6 tabs a day for 3 days, then 5 a day for 3 days, then 4 a day for 3 days, then 3 a day for 3 days, then 2 a day for 3 days, then 1 a day for 6 days.  Rina Cruz MD  Active                 Past Medical History:   Diagnosis Date    Anxiety and depression     BCC (basal cell carcinoma of skin)     Bronchitis, chronic (HCC)     Chronic obstructive pulmonary disease (HCC)     Chronic pain     Fibromyalgia     Generalized arthritis     Hyperlipidemia     Neuropathy     Post-polio syndrome     Type 2 diabetes mellitus ([facility] [address]      Past Surgical History:   Procedure Laterality Date    HX APPENDECTOMY      HX CERVICAL DISKECTOMY  2000    HX FRACTURE TX Right 2006    Heel    HX LUMBAR LAMINECTOMY  2014    HX ORTHOPAEDIC Left     Arm multiple surgeries    HX OTHER SURGICAL      Penile surgery post trazodone use    HX THYROIDECTOMY  05/13/2010    HX WRIST FRACTURE TX Right 07/22/2014    IR BRONCHOSCOPY  02/2010    RI ABDOMEN SURGERY PROC UNLISTED  2010    Feeding Tube- removed 2011    NV TOTAL HIP ARTHROPLASTY Left      Social History     Tobacco Use    Smoking status: Former Smoker     Packs/day: 2.00     Years: 20.00     Pack years: 40.00     Quit date: 1970     Years since quittin.7    Smokeless tobacco: Never Used   Substance Use Topics    Alcohol use: Not Currently    Drug use: No     Family History   Problem Relation Age of Onset    Arthritis-osteo Mother     Cancer Father     Lung Disease Father        Objective     Vitals:    21 1530   BP: (!) 159/80   Pulse: 72   Resp: 20   Temp: 98.6 °F (37 °C)   SpO2: 96%   Weight: 74.6 kg (164 lb 7.4 oz)   Height: 6' (1.829 m)       Constitutional:  Appears well,  No Acute Distress, Vitals noted  Psychiatric:   Affect normal, Alert and Oriented to person/place/time    Eyes:   Pupils equally round and reactive, EOMI, conjunctiva clear, eyelids normal  ENT:   External ears and nose normal, teeth normal, gums normal, TMs and Orophyarynx normal  Neck:   General inspection and Thyroid normal.  No abnormal cervical or supraclavicular nodes    Lungs:   Clear to auscultation, good respiratory effort  Heart: Ausculation normal.  Regular rhythm. No cardiac murmurs. No carotid bruits or palpable thrills  Chest wall normal  Musculoskeletal: Gait antalgic.   weak bilaterally  Extremities:   Without edema, good peripheral pulses  Skin:   Warm to palpation, without rashes, bruising, or suspicious lesions     Recent Results (from the past 72 hour(s))   NOVEL CORONAVIRUS (COVID-19)    Collection Time: 21  7:54 AM   Result Value Ref Range    SARS-CoV-2 Not Detected Not Detected     EKG, 12 LEAD, INITIAL    Collection Time: 21  2:11 PM   Result Value Ref Range    Ventricular Rate 80 BPM    Atrial Rate 80 BPM    P-R Interval 162 ms    QRS Duration 108 ms    Q-T Interval 374 ms    QTC Calculation (Bezet) 431 ms    Calculated P Axis 71 degrees Calculated R Axis -55 degrees    Calculated T Axis 62 degrees    Diagnosis       Sinus rhythm with marked sinus arrhythmia  Left anterior fascicular block  Minimal voltage criteria for LVH, may be normal variant  Septal infarct (cited on or before 09-FEB-2010)  Abnormal ECG  When compared with ECG of 10-APR-2017 02:25,  No significant change was found  Confirmed by Navneet Hardy MD. ([id number]) on 1/18/2021 5:33:53 PM     CBC WITH AUTOMATED DIFF    Collection Time: 01/18/21  3:04 PM   Result Value Ref Range    WBC 12.3 (H) 4.1 - 11.1 K/uL    RBC 4.11 4.10 - 5.70 M/uL    HGB 12.8 12.1 - 17.0 g/dL    HCT 39.4 36.6 - 50.3 %    MCV 95.9 80.0 - 99.0 FL    MCH 31.1 26.0 - 34.0 PG    MCHC 32.5 30.0 - 36.5 g/dL    RDW 12.9 11.5 - 14.5 %    PLATELET 722 233 - 690 K/uL    MPV 10.2 8.9 - 12.9 FL    NRBC 0.0 0  WBC    ABSOLUTE NRBC 0.00 0.00 - 0.01 K/uL    NEUTROPHILS 81 (H) 32 - 75 %    LYMPHOCYTES 12 12 - 49 %    MONOCYTES 7 5 - 13 %    EOSINOPHILS 0 0 - 7 %    BASOPHILS 0 0 - 1 %    IMMATURE GRANULOCYTES 0 0.0 - 0.5 %    ABS. NEUTROPHILS 9.9 (H) 1.8 - 8.0 K/UL    ABS. LYMPHOCYTES 1.5 0.8 - 3.5 K/UL    ABS. MONOCYTES 0.8 0.0 - 1.0 K/UL    ABS. EOSINOPHILS 0.1 0.0 - 0.4 K/UL    ABS. BASOPHILS 0.1 0.0 - 0.1 K/UL    ABS. IMM. GRANS. 0.0 0.00 - 0.04 K/UL    DF AUTOMATED     METABOLIC PANEL, COMPREHENSIVE    Collection Time: 01/18/21  3:04 PM   Result Value Ref Range    Sodium 138 136 - 145 mmol/L    Potassium 5.0 3.5 - 5.1 mmol/L    Chloride 108 97 - 108 mmol/L    CO2 25 21 - 32 mmol/L    Anion gap 5 5 - 15 mmol/L    Glucose 113 (H) 65 - 100 mg/dL    BUN 22 (H) 6 - 20 MG/DL    Creatinine 0.97 0.70 - 1.30 MG/DL    BUN/Creatinine ratio 23 (H) 12 - 20      GFR est AA >60 >60 ml/min/1.73m2    GFR est non-AA >60 >60 ml/min/1.73m2    Calcium 9.1 8.5 - 10.1 MG/DL    Bilirubin, total 0.3 0.2 - 1.0 MG/DL    ALT (SGPT) 28 12 - 78 U/L    AST (SGOT) 18 15 - 37 U/L    Alk.  phosphatase 100 45 - 117 U/L    Protein, total 6.9 6.4 - 8.2 g/dL Albumin 3.9 3.5 - 5.0 g/dL    Globulin 3.0 2.0 - 4.0 g/dL    A-G Ratio 1.3 1.1 - 2.2     HEMOGLOBIN A1C WITH EAG    Collection Time: 01/18/21  3:04 PM   Result Value Ref Range    Hemoglobin A1c 6.0 (H) 4.0 - 5.6 %    Est. average glucose 126 mg/dL   PROTHROMBIN TIME + INR    Collection Time: 01/18/21  3:04 PM   Result Value Ref Range    INR 1.0 0.9 - 1.1      Prothrombin time 10.3 9.0 - 11.1 sec   PTT    Collection Time: 01/18/21  3:04 PM   Result Value Ref Range    aPTT 24.1 22.1 - 31.0 sec    aPTT, therapeutic range     58.0 - 77.0 SECS   URINALYSIS W/ REFLEX CULTURE    Collection Time: 01/18/21  3:04 PM    Specimen: Urine   Result Value Ref Range    Color YELLOW/STRAW      Appearance CLEAR CLEAR      Specific gravity 1.028 1.003 - 1.030      pH (UA) 6.0 5.0 - 8.0      Protein TRACE (A) NEG mg/dL    Glucose Negative NEG mg/dL    Ketone TRACE (A) NEG mg/dL    Bilirubin Negative NEG      Blood Negative NEG      Urobilinogen 0.2 0.2 - 1.0 EU/dL    Nitrites Negative NEG      Leukocyte Esterase Negative NEG      WBC 0-4 0 - 4 /hpf    RBC 0-5 0 - 5 /hpf    Epithelial cells FEW FEW /lpf    Bacteria Negative NEG /hpf    UA:UC IF INDICATED CULTURE NOT INDICATED BY UA RESULT CNI      Hyaline cast 0-2 0 - 5 /lpf   TYPE & SCREEN    Collection Time: 01/18/21  3:04 PM   Result Value Ref Range    Crossmatch Expiration 01/24/2021,2359     ABO/Rh(D) A POSITIVE     Antibody screen NEG        Assessment and Plan     Assessment/Plan:   1) Cervical Stenosis  2) Pre-Operative Evaluation    Labs and EKG reviewed. MRSA pending    WBC slightly elevated- routed to surgeon via EMR    Preoperative Clearance  Per RCRI, the patient has a 1.0% risk of cardiac death, nonfatal MI, nonfatal cardiac arrest based on one risk factors. Per ACC/AHA guidelines, patient is intermediate risk for a(n) intermediate risk surgery and may proceed to planned surgery with the above noted risk.     Amber Tinajero NP

## 2021-01-18 NOTE — H&P (VIEW-ONLY)
Preoperative Evaluation History and Physical with Surgical Risk Stratification 1/18/2021 CC: Leg weakness, frequent falls Surgery: C2-4 LAMINECTOMY, C2-T2 INSTRUMENTED FUSION 
 
HPI:  
Cleveland Forde is a 66 y.o. male referred for pre-operative evaluation by Dr. Erik Roberts for surgery on 1/2/21. Mr. Marilin Ahn notes he has had several previous back and neck surgeries. He was doing well until 6 months ago when he developed difficulty with fine motor skills causing him to drop things frequently. He started having frequent falls. 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 He notes his left foot is turned inward making walking difficult. He uses a walker to help. He notes he also has numbness in both hands. The symptoms he is experiencing is causing a disruption to his life and making it difficult to do things for himself. Him and his wife live alone but his two sons will be coming in to help after surgery. He is followed by [facility] for his COPD which according to last note is stable. The patient was evaluated in the surgeon's office and it was determined that the most appropriate plan of care is to proceed with surgical intervention. Patient's PCP Barnabas Bernheim, MD 
 
Review of Systems Constitutional: Negative for chills and fever HENT: Negative for congestion and sore throat Eyes: negative for blurred vision and double vision Respiratory: Negative for cough, shortness of breath and wheezing Mouth: Negative for loose, broken or chipped teeth. Cardiovascular: Negative for chest pain and palpitations Gastrointestinal: Negative for abdominal pain, nausea, diarrhea & constipation Genitourinary: Negative for dysuria and hematuria Musculoskeletal: Back pain Skin: Negative for rash, open wounds. Neurological: Negative for dizziness, tremors and headaches. Numbness, falls Psychiatric: The patient is not nervous/anxious. Inherent Risk of Surgery Surgical risk:  Intermediate Low:  EIntermediate:   Spinal surgery Patient Cardiac Risk Assessment Revised Cardiac Risk Index (RCRI) Hx of CVD Rate if cardiac death, nonfatal MI, nonfatal cardiac arrest by number of risk factor- 1.0% JOSLYN/AHA 2007 Guidelines:  
1) Surgery Emergency, Non-cardiac -> to surgery 2) If not, look at clinical predictors Intermediate Minor Abnormal EKGDiabetes Blood Thinner: NA 
 
METS  
 
 EQUAL TO 4 Care for self Walk indoors around house Walk 2-3 blocks on level ground (2-3 mph) Light work around house (dust, dishes) Other Risk Factors:  
Screening for ETOH use:  Done and low risk Smoking status:  Former Personal or FH of bleeding problems:  No 
Personal or FH of blood clots:  No 
Personal or FH of anesthesia problems:   No 
 
Pulmonary Risk: 
Asthma or COPD:  Yes Body mass index is 22.31 kg/m². Known ABDELRAHMAN:  No 
 
Past Medical, Surgical, Social History Allergies: Allergies Allergen Reactions  Trazodone Other (comments) Caused penile erection that needed surgical correction Medication Documentation Review Audit Reviewed by Deb Romeo RN (Registered Nurse) on 01/18/21 at 4045 Medication Sig Documenting Provider Last Dose Status Taking? albuterol (PROAIR HFA) 90 mcg/actuation inhaler Take 2 Puffs by inhalation every four (4) hours as needed. Other, MD Olinda  Active   
albuterol-ipratropium (DUO-NEB) 2.5 mg-0.5 mg/3 ml nebu 3 mL by Nebulization route every four (4) hours as needed (SOB). Provider, Historical  Active   
atorvastatin (LIPITOR) 10 mg tablet Take 10 mg by mouth daily. Provider, Historical  Active AZITHROMYCIN PO Take  by mouth. 3 times a week M-W-F Provider, Historical  Active DULoxetine (CYMBALTA) 60 mg capsule Take 60 mg by mouth two (2) times a day. Provider, Historical  Active fluticasone-salmeterol (ADVAIR) 100-50 mcg/dose diskus inhaler Take 1 Puff by inhalation two (2) times a day. Provider, Historical  Active   
gabapentin (NEURONTIN) 600 mg tablet Take 600 mg by mouth four (4) times daily. Provider, Historical  Active   
glycopyrrolate/formoterol fum (BEVESPI AEROSPHERE IN) Take  by inhalation two (2) times a day. Provider, Historical  Active   
guaifenesin/pseudoephedrne HCl (MUCINEX D PO) Take  by mouth two (2) times daily as needed. Provider, Historical  Active   
meloxicam (MOBIC) 15 mg tablet Take 15 mg by mouth daily. Other, MD Olinda 1/18/2021 Active No  
metFORMIN (GLUCOPHAGE) 500 mg tablet Take 500 mg by mouth daily. Provider, Historical  Active Med Note (Allison Mckeon   Sat Aug 29, 2015  1:20 PM) . mometasone furoate (ASMANEX HFA IN) Take  by inhalation two (2) times a day. Provider, Historical  Active   
oxyCODONE-acetaminophen (PERCOCET 7.5) 7.5-325 mg per tablet 1 tabs every  6 hrs as needed for pain. Maximum daily dose 4 tablets. Kaycee Gutiérrez MD  Active   
predniSONE (DELTASONE) 10 mg tablet Take 6 tabs a day for 3 days, then 5 a day for 3 days, then 4 a day for 3 days, then 3 a day for 3 days, then 2 a day for 3 days, then 1 a day for 6 days. Kaycee Gutiérrez MD  Active Past Medical History:  
Diagnosis Date  Anxiety and depression  BCC (basal cell carcinoma of skin)  Bronchitis, chronic ([address]  Chronic obstructive pulmonary disease ([address]  Chronic pain  Fibromyalgia  Generalized arthritis  Hyperlipidemia  Neuropathy  Post-polio syndrome  Type 2 diabetes mellitus ([address] Past Surgical History:  
Procedure Laterality Date  HX APPENDECTOMY  HX CERVICAL DISKECTOMY  2000 [place] FRACTURE TX Right 2006 Heel  HX LUMBAR LAMINECTOMY  2014  HX ORTHOPAEDIC Left Arm multiple surgeries  HX OTHER SURGICAL Penile surgery post trazodone use  HX THYROIDECTOMY  05/13/2010  HX WRIST FRACTURE TX Right 2014  IR BRONCHOSCOPY  2010  NE ABDOMEN SURGERY PROC UNLISTED  2010 Feeding Tube- removed   NE TOTAL HIP ARTHROPLASTY Left  Social History Tobacco Use  Smoking status: Former Smoker Packs/day: 2.00 Years: 20.00 Pack years: 40.00 Quit date: 1970 Years since quittin.7  Smokeless tobacco: Never Used Substance Use Topics  Alcohol use: Not Currently  Drug use: No  
 
Family History Problem Relation Age of Onset Dina Curl Arthritis-osteo Mother  Cancer Father  Lung Disease Father Objective Vitals:  
 21 1530 BP: (!) 159/80 Pulse: 72 Resp: 20 Temp: 98.6 °F (37 °C) SpO2: 96% Weight: 74.6 kg (164 lb 7.4 oz) Height: 6' (1.829 m) Constitutional:  Appears well,  No Acute Distress, Vitals noted Psychiatric:   Affect normal, Alert and Oriented to person/place/time Eyes:   Pupils equally round and reactive, EOMI, conjunctiva clear, eyelids normal 
ENT:   External ears and nose normal, teeth normal, gums normal, TMs and Orophyarynx normal 
Neck:   General inspection and Thyroid normal.  No abnormal cervical or supraclavicular nodes Lungs:   Clear to auscultation, good respiratory effort Heart: Ausculation normal.  Regular rhythm. No cardiac murmurs. No carotid bruits or palpable thrills Chest wall normal 
Musculoskeletal: Gait antalgic.  weak bilaterally Extremities:   Without edema, good peripheral pulses Skin:   Warm to palpation, without rashes, bruising, or suspicious lesions Recent Results (from the past 72 hour(s)) NOVEL CORONAVIRUS (COVID-19) Collection Time: 21  7:54 AM  
Result Value Ref Range SARS-CoV-2 Not Detected Not Detected EKG, 12 LEAD, INITIAL Collection Time: 21  2:11 PM  
Result Value Ref Range Ventricular Rate 80 BPM  
 Atrial Rate 80 BPM  
 P-R Interval 162 ms QRS Duration 108 ms Q-T Interval 374 ms QTC Calculation (Bezet) 431 ms Calculated P Axis 71 degrees Calculated R Axis -55 degrees Calculated T Axis 62 degrees Diagnosis Sinus rhythm with marked sinus arrhythmia Left anterior fascicular block Minimal voltage criteria for LVH, may be normal variant Septal infarct (cited on or before 09-FEB-2010) Abnormal ECG When compared with ECG of 10-APR-2017 02:25, No significant change was found Confirmed by Navneet Tomas MD. ([id number]) on 1/18/2021 5:33:53 PM 
  
CBC WITH AUTOMATED DIFF Collection Time: 01/18/21  3:04 PM  
Result Value Ref Range WBC 12.3 (H) 4.1 - 11.1 K/uL  
 RBC 4.11 4.10 - 5.70 M/uL  
 HGB 12.8 12.1 - 17.0 g/dL HCT 39.4 36.6 - 50.3 % MCV 95.9 80.0 - 99.0 FL  
 MCH 31.1 26.0 - 34.0 PG  
 MCHC 32.5 30.0 - 36.5 g/dL  
 RDW 12.9 11.5 - 14.5 % PLATELET 640 163 - 013 K/uL MPV 10.2 8.9 - 12.9 FL  
 NRBC 0.0 0  WBC ABSOLUTE NRBC 0.00 0.00 - 0.01 K/uL NEUTROPHILS 81 (H) 32 - 75 % LYMPHOCYTES 12 12 - 49 % MONOCYTES 7 5 - 13 % EOSINOPHILS 0 0 - 7 % BASOPHILS 0 0 - 1 % IMMATURE GRANULOCYTES 0 0.0 - 0.5 % ABS. NEUTROPHILS 9.9 (H) 1.8 - 8.0 K/UL  
 ABS. LYMPHOCYTES 1.5 0.8 - 3.5 K/UL  
 ABS. MONOCYTES 0.8 0.0 - 1.0 K/UL  
 ABS. EOSINOPHILS 0.1 0.0 - 0.4 K/UL  
 ABS. BASOPHILS 0.1 0.0 - 0.1 K/UL  
 ABS. IMM. GRANS. 0.0 0.00 - 0.04 K/UL  
 DF AUTOMATED METABOLIC PANEL, COMPREHENSIVE Collection Time: 01/18/21  3:04 PM  
Result Value Ref Range Sodium 138 136 - 145 mmol/L Potassium 5.0 3.5 - 5.1 mmol/L Chloride 108 97 - 108 mmol/L  
 CO2 25 21 - 32 mmol/L Anion gap 5 5 - 15 mmol/L Glucose 113 (H) 65 - 100 mg/dL BUN 22 (H) 6 - 20 MG/DL Creatinine 0.97 0.70 - 1.30 MG/DL  
 BUN/Creatinine ratio 23 (H) 12 - 20 GFR est AA >60 >60 ml/min/1.73m2 GFR est non-AA >60 >60 ml/min/1.73m2 Calcium 9.1 8.5 - 10.1 MG/DL  Bilirubin, total 0.3 0.2 - 1.0 MG/DL  
 ALT (SGPT) 28 12 - 78 U/L  
 AST (SGOT) 18 15 - 37 U/L  
 Alk. phosphatase 100 45 - 117 U/L Protein, total 6.9 6.4 - 8.2 g/dL Albumin 3.9 3.5 - 5.0 g/dL Globulin 3.0 2.0 - 4.0 g/dL A-G Ratio 1.3 1.1 - 2.2 HEMOGLOBIN A1C WITH EAG Collection Time: 01/18/21  3:04 PM  
Result Value Ref Range Hemoglobin A1c 6.0 (H) 4.0 - 5.6 % Est. average glucose 126 mg/dL PROTHROMBIN TIME + INR Collection Time: 01/18/21  3:04 PM  
Result Value Ref Range INR 1.0 0.9 - 1.1 Prothrombin time 10.3 9.0 - 11.1 sec PTT Collection Time: 01/18/21  3:04 PM  
Result Value Ref Range aPTT 24.1 22.1 - 31.0 sec  
 aPTT, therapeutic range     58.0 - 77.0 SECS  
URINALYSIS W/ REFLEX CULTURE Collection Time: 01/18/21  3:04 PM  
 Specimen: Urine Result Value Ref Range Color YELLOW/STRAW Appearance CLEAR CLEAR Specific gravity 1.028 1.003 - 1.030    
 pH (UA) 6.0 5.0 - 8.0 Protein TRACE (A) NEG mg/dL Glucose Negative NEG mg/dL Ketone TRACE (A) NEG mg/dL Bilirubin Negative NEG Blood Negative NEG Urobilinogen 0.2 0.2 - 1.0 EU/dL Nitrites Negative NEG Leukocyte Esterase Negative NEG    
 WBC 0-4 0 - 4 /hpf  
 RBC 0-5 0 - 5 /hpf Epithelial cells FEW FEW /lpf Bacteria Negative NEG /hpf  
 UA:UC IF INDICATED CULTURE NOT INDICATED BY UA RESULT CNI Hyaline cast 0-2 0 - 5 /lpf  
TYPE & SCREEN Collection Time: 01/18/21  3:04 PM  
Result Value Ref Range Crossmatch Expiration 01/24/2021,2359 ABO/Rh(D) A POSITIVE Antibody screen NEG Assessment and Plan Assessment/Plan:  
1) Cervical Stenosis 2) Pre-Operative Evaluation Labs and EKG reviewed. MRSA pending WBC slightly elevated- routed to surgeon via EMR Preoperative Clearance Per RCRI, the patient has a 1.0% risk of cardiac death, nonfatal MI, nonfatal cardiac arrest based on one risk factors. Per ACC/AHA guidelines, patient is intermediate risk for a(n) intermediate risk surgery and may proceed to planned surgery with the above noted risk.  
 
Rhoda Baum NP

## 2021-01-18 NOTE — PERIOP NOTES
[address] , [address] [facility]   MAIN OR                                  [phone]   MAIN PRE OP                          [phone]                                                                                AMBULATORY PRE OP          [phone]  PRE-ADMISSION TESTING    [phone]   Surgery Date:  Thursday, January 21th*       Is surgery arrival time given by surgeon? NO  If NO, [facility] staff will call you between 3 and 7pm the day before your surgery with your arrival time. (If your surgery is on a Monday, we will call you the Friday before.)    Call [phone] after 7pm Monday-Friday if you did not receive this call.     INSTRUCTIONS BEFORE YOUR SURGERY   When You  Arrive Arrive at the [address] [facility] on the day of your surgery  Have your insurance card, photo ID, and any copayment (if needed)   Food   and   Drink NO food or drink after midnight the night before surgery    This means NO water, gum, mints, coffee, juice, etc.  No alcohol (beer, wine, liquor) 24 hours before and after surgery   Medications to   TAKE   Morning of Surgery MEDICATIONS TO TAKE THE MORNING OF SURGERY WITH A SIP OF WATER:    Duloxetine   Gabapentin   Oxycodone   Prednisone   Albuterol-Ipratropium Nebulizer   Advair   Asmanex   Bevespi Sphere     Medications  To  STOP      7 days before surgery  Non-Steroidal anti-inflammatory Drugs (NSAID's): for example, Ibuprofen (Advil, Motrin), Naproxen (Aleve)   Aspirin, if taking for pain    Herbal supplements, vitamins, and fish oil   Other:  (Pain medications not listed above, including Tylenol may be taken)        Bathing Clothing  Jewelry  Valuables      If you shower the morning of surgery, please do not apply anything to your skin (lotions, powders, deodorant, or makeup, especially mascara)   Follow Chlorhexidine Care Fusion body wash instructions provided to you during PAT appointment. Begin 3 days prior to surgery.  Do not shave or trim anywhere 24 hours before surgery   Wear your hair loose or down; no pony-tails, buns, or metal hair clips   Wear loose, comfortable, clean clothes   Wear glasses instead of contacts  [employer] money, valuables, and jewelry, including body piercings, at home    Spending the Night    ADMITS: If your doctor is keeping you in the hospital after surgery, leave personal belongings/luggage in your car until you have a hospital room number. Hospital discharge time is 12 noon  Drivers must be here before 12 noon unless you are told differently   Special Instructions DO NOT TAKE GLUCOPHAGE ON THE DAY OF SURGERY  COVID19 Preautions     Follow all instructions so your surgery wont be cancelled. Please, be on time. If a situation occurs and you are delayed the day of surgery, call [phone] or [phone]. If your physical condition changes (like a fever, cold, flu, etc.) call your surgeon. Home medication(s) reviewed and verified via   [facility] [phone]   during PAT appointment. The patient was contacted by      IN-PERSON  The patient verbalizes understanding of all instructions and      DOES NOT   need reinforcement.

## 2021-01-19 LAB — SARS-COV-2, COV2NT: NOT DETECTED

## 2021-01-20 ENCOUNTER — ANESTHESIA EVENT (OUTPATIENT)
Dept: SURGERY | Age: 76
DRG: 460 | End: 2021-01-20
Payer: MEDICARE

## 2021-01-20 LAB
BACTERIA SPEC CULT: NORMAL
BACTERIA SPEC CULT: NORMAL
SERVICE CMNT-IMP: NORMAL

## 2021-01-21 ENCOUNTER — ANESTHESIA (OUTPATIENT)
Dept: SURGERY | Age: 76
DRG: 460 | End: 2021-01-21
Payer: MEDICARE

## 2021-01-21 ENCOUNTER — APPOINTMENT (OUTPATIENT)
Dept: GENERAL RADIOLOGY | Age: 76
DRG: 460 | End: 2021-01-21
Attending: NEUROLOGICAL SURGERY
Payer: MEDICARE

## 2021-01-21 ENCOUNTER — HOSPITAL ENCOUNTER (INPATIENT)
Age: 76
LOS: 5 days | Discharge: NURSING, REHAB OR LONG-TERM CARE | DRG: 460 | End: 2021-01-26
Attending: NEUROLOGICAL SURGERY | Admitting: NEUROLOGICAL SURGERY
Payer: MEDICARE

## 2021-01-21 PROBLEM — G95.9 CERVICAL MYELOPATHY (HCC): Status: ACTIVE | Noted: 2021-01-21

## 2021-01-21 LAB
GLUCOSE BLD STRIP.AUTO-MCNC: 128 MG/DL (ref 65–100)
GLUCOSE BLD STRIP.AUTO-MCNC: 142 MG/DL (ref 65–100)
SERVICE CMNT-IMP: ABNORMAL
SERVICE CMNT-IMP: ABNORMAL

## 2021-01-21 PROCEDURE — 82962 GLUCOSE BLOOD TEST: CPT

## 2021-01-21 PROCEDURE — C1713 ANCHOR/SCREW BN/BN,TIS/BN: HCPCS | Performed by: NEUROLOGICAL SURGERY

## 2021-01-21 PROCEDURE — 65270000029 HC RM PRIVATE

## 2021-01-21 PROCEDURE — 77030026438 HC STYL ET INTUB CARD -A: Performed by: ANESTHESIOLOGY

## 2021-01-21 PROCEDURE — 77030038222 HC BUR MIDSREX MEDT -D: Performed by: NEUROLOGICAL SURGERY

## 2021-01-21 PROCEDURE — 77030008684 HC TU ET CUF COVD -B: Performed by: ANESTHESIOLOGY

## 2021-01-21 PROCEDURE — L0172 CERV COL SR FOAM 2PC PRE OTS: HCPCS | Performed by: NEUROLOGICAL SURGERY

## 2021-01-21 PROCEDURE — 2709999900 HC NON-CHARGEABLE SUPPLY: Performed by: NEUROLOGICAL SURGERY

## 2021-01-21 PROCEDURE — 76060000039 HC ANESTHESIA 4 TO 4.5 HR: Performed by: NEUROLOGICAL SURGERY

## 2021-01-21 PROCEDURE — 74011250636 HC RX REV CODE- 250/636: Performed by: NURSE ANESTHETIST, CERTIFIED REGISTERED

## 2021-01-21 PROCEDURE — 74011250637 HC RX REV CODE- 250/637: Performed by: NEUROLOGICAL SURGERY

## 2021-01-21 PROCEDURE — 00NW0ZZ RELEASE CERVICAL SPINAL CORD, OPEN APPROACH: ICD-10-PCS | Performed by: NEUROLOGICAL SURGERY

## 2021-01-21 PROCEDURE — 77030028271 HC SRGFL HEMSTAT MTRX KT J&J -C: Performed by: NEUROLOGICAL SURGERY

## 2021-01-21 PROCEDURE — 76010000175 HC OR TIME 4 TO 4.5 HR INTENSV-TIER 1: Performed by: NEUROLOGICAL SURGERY

## 2021-01-21 PROCEDURE — 77030020061 HC IV BLD WRMR ADMIN SET 3M -B: Performed by: ANESTHESIOLOGY

## 2021-01-21 PROCEDURE — 2709999900 HC NON-CHARGEABLE SUPPLY

## 2021-01-21 PROCEDURE — 0RG4071 FUSION OF CERVICOTHORACIC VERTEBRAL JOINT WITH AUTOLOGOUS TISSUE SUBSTITUTE, POSTERIOR APPROACH, POSTERIOR COLUMN, OPEN APPROACH: ICD-10-PCS | Performed by: NEUROLOGICAL SURGERY

## 2021-01-21 PROCEDURE — 74011250636 HC RX REV CODE- 250/636: Performed by: ANESTHESIOLOGY

## 2021-01-21 PROCEDURE — 77030018673: Performed by: NEUROLOGICAL SURGERY

## 2021-01-21 PROCEDURE — 76210000002 HC OR PH I REC 3 TO 3.5 HR: Performed by: NEUROLOGICAL SURGERY

## 2021-01-21 PROCEDURE — 77030003029 HC SUT VCRL J&J -B: Performed by: NEUROLOGICAL SURGERY

## 2021-01-21 PROCEDURE — 74011000250 HC RX REV CODE- 250: Performed by: NEUROLOGICAL SURGERY

## 2021-01-21 PROCEDURE — 77030005513 HC CATH URETH FOL11 MDII -B: Performed by: NEUROLOGICAL SURGERY

## 2021-01-21 PROCEDURE — 77030040922 HC BLNKT HYPOTHRM STRY -A

## 2021-01-21 PROCEDURE — 77030034479 HC ADH SKN CLSR PRINEO J&J -B: Performed by: NEUROLOGICAL SURGERY

## 2021-01-21 PROCEDURE — 77030019908 HC STETH ESOPH SIMS -A: Performed by: ANESTHESIOLOGY

## 2021-01-21 PROCEDURE — 77030029099 HC BN WAX SSPC -A: Performed by: NEUROLOGICAL SURGERY

## 2021-01-21 PROCEDURE — 77030031139 HC SUT VCRL2 J&J -A: Performed by: NEUROLOGICAL SURGERY

## 2021-01-21 PROCEDURE — 74011250636 HC RX REV CODE- 250/636: Performed by: NEUROLOGICAL SURGERY

## 2021-01-21 PROCEDURE — 74011000250 HC RX REV CODE- 250: Performed by: NURSE ANESTHETIST, CERTIFIED REGISTERED

## 2021-01-21 PROCEDURE — 94640 AIRWAY INHALATION TREATMENT: CPT

## 2021-01-21 PROCEDURE — 77030040361 HC SLV COMPR DVT MDII -B

## 2021-01-21 PROCEDURE — 0RG2071 FUSION OF 2 OR MORE CERVICAL VERTEBRAL JOINTS WITH AUTOLOGOUS TISSUE SUBSTITUTE, POSTERIOR APPROACH, POSTERIOR COLUMN, OPEN APPROACH: ICD-10-PCS | Performed by: NEUROLOGICAL SURGERY

## 2021-01-21 PROCEDURE — 00NX0ZZ RELEASE THORACIC SPINAL CORD, OPEN APPROACH: ICD-10-PCS | Performed by: NEUROLOGICAL SURGERY

## 2021-01-21 PROCEDURE — 77030013079 HC BLNKT BAIR HGGR 3M -A: Performed by: ANESTHESIOLOGY

## 2021-01-21 PROCEDURE — 0RG6071 FUSION OF THORACIC VERTEBRAL JOINT WITH AUTOLOGOUS TISSUE SUBSTITUTE, POSTERIOR APPROACH, POSTERIOR COLUMN, OPEN APPROACH: ICD-10-PCS | Performed by: NEUROLOGICAL SURGERY

## 2021-01-21 DEVICE — IMPLANTABLE DEVICE: Type: IMPLANTABLE DEVICE | Site: SPINE CERVICAL | Status: FUNCTIONAL

## 2021-01-21 DEVICE — SCREW SPNL L14MM DIA3.5MM OCCIPITOCERVICOTHORACIC TI: Type: IMPLANTABLE DEVICE | Site: SPINE CERVICAL | Status: FUNCTIONAL

## 2021-01-21 DEVICE — SET SCR SPNL TI INNR FOR HD TO HD CONN 3.5MM ROD OCT SYS: Type: IMPLANTABLE DEVICE | Site: SPINE CERVICAL | Status: FUNCTIONAL

## 2021-01-21 DEVICE — SCREW SPNL OCCIPITOCERVICOTHORACIC TI INNR FOR 3.5MM ROD: Type: IMPLANTABLE DEVICE | Site: SPINE CERVICAL | Status: FUNCTIONAL

## 2021-01-21 DEVICE — GRAFT BNE SUB 15GM GRN CA COMPND PTTY AND SILICATE BASE INJ: Type: IMPLANTABLE DEVICE | Site: SPINE CERVICAL | Status: FUNCTIONAL

## 2021-01-21 DEVICE — SCREW SPNL L12MM DIA3.5MM MINI OCCIPITOCERVICOTHORACIC TI: Type: IMPLANTABLE DEVICE | Site: SPINE CERVICAL | Status: FUNCTIONAL

## 2021-01-21 DEVICE — NUT SPNL TI OUTER FOR HD TO HD CONN 3.5MM ROD OCT SYS: Type: IMPLANTABLE DEVICE | Site: SPINE CERVICAL | Status: FUNCTIONAL

## 2021-01-21 DEVICE — ROD SPNL L120MM DIA5.5MM TI STR SMOOTH FOR OCT SYS: Type: IMPLANTABLE DEVICE | Site: SPINE CERVICAL | Status: FUNCTIONAL

## 2021-01-21 RX ORDER — POLYETHYLENE GLYCOL 3350 17 G/17G
17 POWDER, FOR SOLUTION ORAL DAILY
Status: DISCONTINUED | OUTPATIENT
Start: 2021-01-22 | End: 2021-01-26 | Stop reason: HOSPADM

## 2021-01-21 RX ORDER — SODIUM CHLORIDE 0.9 % (FLUSH) 0.9 %
5-40 SYRINGE (ML) INJECTION AS NEEDED
Status: DISCONTINUED | OUTPATIENT
Start: 2021-01-21 | End: 2021-01-26 | Stop reason: HOSPADM

## 2021-01-21 RX ORDER — IPRATROPIUM BROMIDE AND ALBUTEROL SULFATE 2.5; .5 MG/3ML; MG/3ML
3 SOLUTION RESPIRATORY (INHALATION)
Status: DISCONTINUED | OUTPATIENT
Start: 2021-01-22 | End: 2021-01-22

## 2021-01-21 RX ORDER — EPHEDRINE SULFATE/0.9% NACL/PF 50 MG/5 ML
SYRINGE (ML) INTRAVENOUS AS NEEDED
Status: DISCONTINUED | OUTPATIENT
Start: 2021-01-21 | End: 2021-01-21 | Stop reason: HOSPADM

## 2021-01-21 RX ORDER — DULOXETIN HYDROCHLORIDE 30 MG/1
60 CAPSULE, DELAYED RELEASE ORAL 2 TIMES DAILY
Status: DISCONTINUED | OUTPATIENT
Start: 2021-01-21 | End: 2021-01-26 | Stop reason: HOSPADM

## 2021-01-21 RX ORDER — BUDESONIDE 0.5 MG/2ML
500 INHALANT ORAL
Status: DISCONTINUED | OUTPATIENT
Start: 2021-01-21 | End: 2021-01-26 | Stop reason: HOSPADM

## 2021-01-21 RX ORDER — HYDROMORPHONE HYDROCHLORIDE 1 MG/ML
.25-1 INJECTION, SOLUTION INTRAMUSCULAR; INTRAVENOUS; SUBCUTANEOUS
Status: DISCONTINUED | OUTPATIENT
Start: 2021-01-21 | End: 2021-01-21 | Stop reason: HOSPADM

## 2021-01-21 RX ORDER — PROPOFOL 10 MG/ML
INJECTION, EMULSION INTRAVENOUS AS NEEDED
Status: DISCONTINUED | OUTPATIENT
Start: 2021-01-21 | End: 2021-01-21 | Stop reason: HOSPADM

## 2021-01-21 RX ORDER — ATORVASTATIN CALCIUM 10 MG/1
10 TABLET, FILM COATED ORAL DAILY
Status: DISCONTINUED | OUTPATIENT
Start: 2021-01-22 | End: 2021-01-26 | Stop reason: HOSPADM

## 2021-01-21 RX ORDER — LIDOCAINE HYDROCHLORIDE 20 MG/ML
INJECTION, SOLUTION EPIDURAL; INFILTRATION; INTRACAUDAL; PERINEURAL AS NEEDED
Status: DISCONTINUED | OUTPATIENT
Start: 2021-01-21 | End: 2021-01-21 | Stop reason: HOSPADM

## 2021-01-21 RX ORDER — PSEUDOEPHEDRINE HCL 30 MG
60 TABLET ORAL
Status: DISCONTINUED | OUTPATIENT
Start: 2021-01-21 | End: 2021-01-26 | Stop reason: HOSPADM

## 2021-01-21 RX ORDER — LIDOCAINE HYDROCHLORIDE 10 MG/ML
0.1 INJECTION, SOLUTION EPIDURAL; INFILTRATION; INTRACAUDAL; PERINEURAL AS NEEDED
Status: DISCONTINUED | OUTPATIENT
Start: 2021-01-21 | End: 2021-01-21 | Stop reason: HOSPADM

## 2021-01-21 RX ORDER — FENTANYL CITRATE 50 UG/ML
INJECTION, SOLUTION INTRAMUSCULAR; INTRAVENOUS AS NEEDED
Status: DISCONTINUED | OUTPATIENT
Start: 2021-01-21 | End: 2021-01-21 | Stop reason: HOSPADM

## 2021-01-21 RX ORDER — IPRATROPIUM BROMIDE AND ALBUTEROL SULFATE 2.5; .5 MG/3ML; MG/3ML
3 SOLUTION RESPIRATORY (INHALATION)
Status: DISCONTINUED | OUTPATIENT
Start: 2021-01-21 | End: 2021-01-22

## 2021-01-21 RX ORDER — SODIUM CHLORIDE, SODIUM LACTATE, POTASSIUM CHLORIDE, CALCIUM CHLORIDE 600; 310; 30; 20 MG/100ML; MG/100ML; MG/100ML; MG/100ML
100 INJECTION, SOLUTION INTRAVENOUS CONTINUOUS
Status: DISCONTINUED | OUTPATIENT
Start: 2021-01-21 | End: 2021-01-21 | Stop reason: HOSPADM

## 2021-01-21 RX ORDER — ARFORMOTEROL TARTRATE 15 UG/2ML
15 SOLUTION RESPIRATORY (INHALATION)
Status: DISCONTINUED | OUTPATIENT
Start: 2021-01-21 | End: 2021-01-26 | Stop reason: HOSPADM

## 2021-01-21 RX ORDER — BUDESONIDE 0.5 MG/2ML
500 INHALANT ORAL
Status: DISCONTINUED | OUTPATIENT
Start: 2021-01-21 | End: 2021-01-21 | Stop reason: SDUPTHER

## 2021-01-21 RX ORDER — DIAZEPAM 5 MG/1
5 TABLET ORAL
Status: DISPENSED | OUTPATIENT
Start: 2021-01-21 | End: 2021-01-23

## 2021-01-21 RX ORDER — METFORMIN HYDROCHLORIDE 500 MG/1
500 TABLET ORAL DAILY
Status: DISCONTINUED | OUTPATIENT
Start: 2021-01-22 | End: 2021-01-26 | Stop reason: HOSPADM

## 2021-01-21 RX ORDER — GLYCOPYRROLATE 0.2 MG/ML
INJECTION INTRAMUSCULAR; INTRAVENOUS AS NEEDED
Status: DISCONTINUED | OUTPATIENT
Start: 2021-01-21 | End: 2021-01-21 | Stop reason: HOSPADM

## 2021-01-21 RX ORDER — SENNOSIDES 8.6 MG/1
2 TABLET ORAL
Status: DISCONTINUED | OUTPATIENT
Start: 2021-01-21 | End: 2021-01-26 | Stop reason: HOSPADM

## 2021-01-21 RX ORDER — PHENYLEPHRINE HCL IN 0.9% NACL 0.4MG/10ML
SYRINGE (ML) INTRAVENOUS AS NEEDED
Status: DISCONTINUED | OUTPATIENT
Start: 2021-01-21 | End: 2021-01-21 | Stop reason: HOSPADM

## 2021-01-21 RX ORDER — IPRATROPIUM BROMIDE 0.5 MG/2.5ML
500 SOLUTION RESPIRATORY (INHALATION)
Status: DISCONTINUED | OUTPATIENT
Start: 2021-01-21 | End: 2021-01-21

## 2021-01-21 RX ORDER — SUCCINYLCHOLINE CHLORIDE 20 MG/ML
INJECTION INTRAMUSCULAR; INTRAVENOUS AS NEEDED
Status: DISCONTINUED | OUTPATIENT
Start: 2021-01-21 | End: 2021-01-21 | Stop reason: HOSPADM

## 2021-01-21 RX ORDER — MIDAZOLAM HYDROCHLORIDE 1 MG/ML
INJECTION, SOLUTION INTRAMUSCULAR; INTRAVENOUS AS NEEDED
Status: DISCONTINUED | OUTPATIENT
Start: 2021-01-21 | End: 2021-01-21 | Stop reason: HOSPADM

## 2021-01-21 RX ORDER — BUPIVACAINE HYDROCHLORIDE AND EPINEPHRINE 5; 5 MG/ML; UG/ML
INJECTION, SOLUTION EPIDURAL; INTRACAUDAL; PERINEURAL AS NEEDED
Status: DISCONTINUED | OUTPATIENT
Start: 2021-01-21 | End: 2021-01-21 | Stop reason: HOSPADM

## 2021-01-21 RX ORDER — NALOXONE HYDROCHLORIDE 0.4 MG/ML
0.4 INJECTION, SOLUTION INTRAMUSCULAR; INTRAVENOUS; SUBCUTANEOUS AS NEEDED
Status: DISCONTINUED | OUTPATIENT
Start: 2021-01-21 | End: 2021-01-26 | Stop reason: HOSPADM

## 2021-01-21 RX ORDER — PROPOFOL 10 MG/ML
INJECTION, EMULSION INTRAVENOUS
Status: DISCONTINUED | OUTPATIENT
Start: 2021-01-21 | End: 2021-01-21 | Stop reason: HOSPADM

## 2021-01-21 RX ORDER — KETAMINE HYDROCHLORIDE 10 MG/ML
INJECTION, SOLUTION INTRAMUSCULAR; INTRAVENOUS AS NEEDED
Status: DISCONTINUED | OUTPATIENT
Start: 2021-01-21 | End: 2021-01-21 | Stop reason: HOSPADM

## 2021-01-21 RX ORDER — HYDROXYZINE HYDROCHLORIDE 10 MG/1
10 TABLET, FILM COATED ORAL
Status: DISCONTINUED | OUTPATIENT
Start: 2021-01-21 | End: 2021-01-26 | Stop reason: HOSPADM

## 2021-01-21 RX ORDER — SODIUM CHLORIDE 0.9 % (FLUSH) 0.9 %
5-40 SYRINGE (ML) INJECTION EVERY 8 HOURS
Status: DISCONTINUED | OUTPATIENT
Start: 2021-01-21 | End: 2021-01-26 | Stop reason: HOSPADM

## 2021-01-21 RX ORDER — ROCURONIUM BROMIDE 10 MG/ML
INJECTION, SOLUTION INTRAVENOUS AS NEEDED
Status: DISCONTINUED | OUTPATIENT
Start: 2021-01-21 | End: 2021-01-21 | Stop reason: HOSPADM

## 2021-01-21 RX ORDER — CYCLOBENZAPRINE HCL 10 MG
10 TABLET ORAL
Status: DISCONTINUED | OUTPATIENT
Start: 2021-01-21 | End: 2021-01-26 | Stop reason: HOSPADM

## 2021-01-21 RX ORDER — GABAPENTIN 300 MG/1
600 CAPSULE ORAL 4 TIMES DAILY
Status: DISCONTINUED | OUTPATIENT
Start: 2021-01-21 | End: 2021-01-26 | Stop reason: HOSPADM

## 2021-01-21 RX ORDER — ONDANSETRON 2 MG/ML
4 INJECTION INTRAMUSCULAR; INTRAVENOUS
Status: ACTIVE | OUTPATIENT
Start: 2021-01-21 | End: 2021-01-22

## 2021-01-21 RX ORDER — HYDROMORPHONE HCL/0.9% NACL/PF 0.5 MG/ML
PLASTIC BAG, INJECTION (ML) INTRAVENOUS CONTINUOUS
Status: DISCONTINUED | OUTPATIENT
Start: 2021-01-21 | End: 2021-01-22

## 2021-01-21 RX ORDER — DEXAMETHASONE SODIUM PHOSPHATE 4 MG/ML
INJECTION, SOLUTION INTRA-ARTICULAR; INTRALESIONAL; INTRAMUSCULAR; INTRAVENOUS; SOFT TISSUE AS NEEDED
Status: DISCONTINUED | OUTPATIENT
Start: 2021-01-21 | End: 2021-01-21 | Stop reason: HOSPADM

## 2021-01-21 RX ORDER — SODIUM CHLORIDE 9 MG/ML
125 INJECTION, SOLUTION INTRAVENOUS CONTINUOUS
Status: DISCONTINUED | OUTPATIENT
Start: 2021-01-21 | End: 2021-01-22

## 2021-01-21 RX ORDER — ALBUTEROL SULFATE 0.83 MG/ML
2.5 SOLUTION RESPIRATORY (INHALATION)
Status: DISCONTINUED | OUTPATIENT
Start: 2021-01-21 | End: 2021-01-21

## 2021-01-21 RX ORDER — GUAIFENESIN 600 MG/1
1200 TABLET, EXTENDED RELEASE ORAL
Status: DISCONTINUED | OUTPATIENT
Start: 2021-01-21 | End: 2021-01-26 | Stop reason: HOSPADM

## 2021-01-21 RX ADMIN — Medication 80 MCG: at 09:12

## 2021-01-21 RX ADMIN — KETAMINE HYDROCHLORIDE 20 MG: 10 INJECTION INTRAMUSCULAR; INTRAVENOUS at 07:45

## 2021-01-21 RX ADMIN — WATER 2 G: 1 INJECTION INTRAMUSCULAR; INTRAVENOUS; SUBCUTANEOUS at 13:47

## 2021-01-21 RX ADMIN — LIDOCAINE HYDROCHLORIDE 50 MG: 20 INJECTION, SOLUTION EPIDURAL; INFILTRATION; INTRACAUDAL; PERINEURAL at 07:45

## 2021-01-21 RX ADMIN — KETAMINE HYDROCHLORIDE 10 MG: 10 INJECTION INTRAMUSCULAR; INTRAVENOUS at 08:56

## 2021-01-21 RX ADMIN — ALBUTEROL SULFATE 2.5 MG: 2.5 SOLUTION RESPIRATORY (INHALATION) at 17:13

## 2021-01-21 RX ADMIN — MIDAZOLAM HYDROCHLORIDE 2 MG: 2 INJECTION, SOLUTION INTRAMUSCULAR; INTRAVENOUS at 07:39

## 2021-01-21 RX ADMIN — DEXAMETHASONE SODIUM PHOSPHATE 8 MG: 4 INJECTION, SOLUTION INTRAMUSCULAR; INTRAVENOUS at 07:55

## 2021-01-21 RX ADMIN — KETAMINE HYDROCHLORIDE 10 MG: 10 INJECTION INTRAMUSCULAR; INTRAVENOUS at 10:54

## 2021-01-21 RX ADMIN — KETAMINE HYDROCHLORIDE 10 MG: 10 INJECTION INTRAMUSCULAR; INTRAVENOUS at 08:15

## 2021-01-21 RX ADMIN — Medication 80 MCG: at 08:31

## 2021-01-21 RX ADMIN — KETAMINE HYDROCHLORIDE 10 MG: 10 INJECTION INTRAMUSCULAR; INTRAVENOUS at 08:44

## 2021-01-21 RX ADMIN — DIAZEPAM 5 MG: 5 TABLET ORAL at 17:18

## 2021-01-21 RX ADMIN — GABAPENTIN 600 MG: 300 CAPSULE ORAL at 22:18

## 2021-01-21 RX ADMIN — KETAMINE HYDROCHLORIDE 10 MG: 10 INJECTION INTRAMUSCULAR; INTRAVENOUS at 08:01

## 2021-01-21 RX ADMIN — Medication 10 ML: at 22:18

## 2021-01-21 RX ADMIN — ARFORMOTEROL TARTRATE 15 MCG: 15 SOLUTION RESPIRATORY (INHALATION) at 23:30

## 2021-01-21 RX ADMIN — BUDESONIDE 500 MCG: 0.5 INHALANT RESPIRATORY (INHALATION) at 23:30

## 2021-01-21 RX ADMIN — KETAMINE HYDROCHLORIDE 10 MG: 10 INJECTION INTRAMUSCULAR; INTRAVENOUS at 10:08

## 2021-01-21 RX ADMIN — KETAMINE HYDROCHLORIDE 10 MG: 10 INJECTION INTRAMUSCULAR; INTRAVENOUS at 09:23

## 2021-01-21 RX ADMIN — KETAMINE HYDROCHLORIDE 10 MG: 10 INJECTION INTRAMUSCULAR; INTRAVENOUS at 09:53

## 2021-01-21 RX ADMIN — SENNOSIDES 17.2 MG: 8.6 TABLET, FILM COATED ORAL at 22:19

## 2021-01-21 RX ADMIN — HYDROMORPHONE HYDROCHLORIDE 0.5 MG: 1 INJECTION, SOLUTION INTRAMUSCULAR; INTRAVENOUS; SUBCUTANEOUS at 19:40

## 2021-01-21 RX ADMIN — KETAMINE HYDROCHLORIDE 10 MG: 10 INJECTION INTRAMUSCULAR; INTRAVENOUS at 11:24

## 2021-01-21 RX ADMIN — GLYCOPYRROLATE 0.2 MG: 0.2 INJECTION INTRAMUSCULAR; INTRAVENOUS at 08:03

## 2021-01-21 RX ADMIN — Medication 10 MG: at 09:41

## 2021-01-21 RX ADMIN — PHENYLEPHRINE HYDROCHLORIDE 20 MCG/MIN: 10 INJECTION INTRAVENOUS at 09:41

## 2021-01-21 RX ADMIN — IPRATROPIUM BROMIDE AND ALBUTEROL SULFATE 3 ML: .5; 2.5 SOLUTION RESPIRATORY (INHALATION) at 23:25

## 2021-01-21 RX ADMIN — Medication 80 MCG: at 08:01

## 2021-01-21 RX ADMIN — FENTANYL CITRATE 50 MCG: 50 INJECTION, SOLUTION INTRAMUSCULAR; INTRAVENOUS at 08:15

## 2021-01-21 RX ADMIN — KETAMINE HYDROCHLORIDE 10 MG: 10 INJECTION INTRAMUSCULAR; INTRAVENOUS at 08:29

## 2021-01-21 RX ADMIN — Medication 80 MCG: at 08:35

## 2021-01-21 RX ADMIN — Medication 10 MG: at 08:28

## 2021-01-21 RX ADMIN — GABAPENTIN 600 MG: 300 CAPSULE ORAL at 17:54

## 2021-01-21 RX ADMIN — HYDROMORPHONE HYDROCHLORIDE 0.5 MG: 1 INJECTION, SOLUTION INTRAMUSCULAR; INTRAVENOUS; SUBCUTANEOUS at 13:46

## 2021-01-21 RX ADMIN — Medication: at 13:11

## 2021-01-21 RX ADMIN — ROCURONIUM BROMIDE 5 MG: 10 INJECTION INTRAVENOUS at 07:45

## 2021-01-21 RX ADMIN — FENTANYL CITRATE 100 MCG: 50 INJECTION, SOLUTION INTRAMUSCULAR; INTRAVENOUS at 07:45

## 2021-01-21 RX ADMIN — Medication 40 MCG: at 09:24

## 2021-01-21 RX ADMIN — PROPOFOL 100 MCG/KG/MIN: 10 INJECTION, EMULSION INTRAVENOUS at 07:53

## 2021-01-21 RX ADMIN — HYDROMORPHONE HYDROCHLORIDE 0.5 MG: 1 INJECTION, SOLUTION INTRAMUSCULAR; INTRAVENOUS; SUBCUTANEOUS at 13:09

## 2021-01-21 RX ADMIN — SODIUM CHLORIDE, POTASSIUM CHLORIDE, SODIUM LACTATE AND CALCIUM CHLORIDE 100 ML/HR: 600; 310; 30; 20 INJECTION, SOLUTION INTRAVENOUS at 07:03

## 2021-01-21 RX ADMIN — SODIUM CHLORIDE 125 ML/HR: 9 INJECTION, SOLUTION INTRAVENOUS at 13:10

## 2021-01-21 RX ADMIN — KETAMINE HYDROCHLORIDE 10 MG: 10 INJECTION INTRAMUSCULAR; INTRAVENOUS at 09:38

## 2021-01-21 RX ADMIN — KETAMINE HYDROCHLORIDE 10 MG: 10 INJECTION INTRAMUSCULAR; INTRAVENOUS at 10:38

## 2021-01-21 RX ADMIN — CEFAZOLIN SODIUM 2 G: 1 POWDER, FOR SOLUTION INTRAMUSCULAR; INTRAVENOUS at 08:25

## 2021-01-21 RX ADMIN — FENTANYL CITRATE 50 MCG: 50 INJECTION, SOLUTION INTRAMUSCULAR; INTRAVENOUS at 08:35

## 2021-01-21 RX ADMIN — FENTANYL CITRATE 50 MCG: 50 INJECTION, SOLUTION INTRAMUSCULAR; INTRAVENOUS at 08:32

## 2021-01-21 RX ADMIN — DULOXETINE HYDROCHLORIDE 60 MG: 30 CAPSULE, DELAYED RELEASE ORAL at 22:18

## 2021-01-21 RX ADMIN — SODIUM CHLORIDE, POTASSIUM CHLORIDE, SODIUM LACTATE AND CALCIUM CHLORIDE: 600; 310; 30; 20 INJECTION, SOLUTION INTRAVENOUS at 10:46

## 2021-01-21 RX ADMIN — ROCURONIUM BROMIDE 15 MG: 10 INJECTION INTRAVENOUS at 08:25

## 2021-01-21 RX ADMIN — WATER 2 G: 1 INJECTION INTRAMUSCULAR; INTRAVENOUS; SUBCUTANEOUS at 22:18

## 2021-01-21 RX ADMIN — Medication 40 MCG: at 08:03

## 2021-01-21 RX ADMIN — KETAMINE HYDROCHLORIDE 10 MG: 10 INJECTION INTRAMUSCULAR; INTRAVENOUS at 09:07

## 2021-01-21 RX ADMIN — PROPOFOL 120 MG: 10 INJECTION, EMULSION INTRAVENOUS at 07:46

## 2021-01-21 RX ADMIN — SUCCINYLCHOLINE CHLORIDE 120 MG: 20 INJECTION, SOLUTION INTRAMUSCULAR; INTRAVENOUS at 07:46

## 2021-01-21 RX ADMIN — KETAMINE HYDROCHLORIDE 10 MG: 10 INJECTION INTRAMUSCULAR; INTRAVENOUS at 11:09

## 2021-01-21 RX ADMIN — KETAMINE HYDROCHLORIDE 10 MG: 10 INJECTION INTRAMUSCULAR; INTRAVENOUS at 10:25

## 2021-01-21 RX ADMIN — Medication 10 MG: at 07:58

## 2021-01-21 NOTE — BRIEF OP NOTE
Brief Postoperative Note    Patient: Dina Hirsch  YOB: 1945  MRN: [id number]    Date of Procedure: 1/21/2021     Pre-Op Diagnosis: STENOSIS    Post-Op Diagnosis: cervical stenosis and myelopathy    Procedure(s):  C2-4 LAMINECTOMY, C2-T2 INSTRUMENTED FUSION    Surgeon(s):  Rosa Slo MD    Surgical Assistant: Surg Asst-1: Juan Ordoñez  Nurse Practitioner: Dhaval Reaves NP    Anesthesia: General     Estimated Blood Loss (mL): 871     Complications: npne    Specimens: * No specimens in log *     Implants:   Implant Name Type Inv. Item Serial No.  Lot No. LRB No. Used Action   signafuse   NA [id number] [facility] [id number] N/A 1 Implanted   LINDA SPNL L120MM DIA5. 5MM TI STR SMOOTH FOR OCT SYS - SNA  LINDA SPNL L120MM DIA5. 5MM TI STR SMOOTH FOR OCT SYS NA [employer]NJ DEPUY SYNTHES SPINE_WD NA N/A 2 Implanted   SCREW SPNL L12MM DIA3. 5MM MINI OCCIPITOCERVICOTHORACIC TI - SNA  SCREW SPNL L12MM DIA3. 5MM MINI OCCIPITOCERVICOTHORACIC TI NA JNJ DEPUY SYNTHES SPINE_WD NA N/A 2 Implanted       Drains:   Hemovac Posterior Neck (Active)   Site Assessment Clean, dry, & intact 01/21/21 1155   Dressing Status Clean, dry, & intact 01/21/21 1155   Status Patent;Draining 01/21/21 1155       Findings: stenosis    Electronically Signed by Olivier De Leon MD on 1/21/2021 at 12:06 PM

## 2021-01-21 NOTE — OP NOTES
[facility]  OPERATIVE REPORT    Name:  Ryan Amaya  MR#:  [id number]  :  1945  ACCOUNT #:  [de-identified]  DATE OF SERVICE:  2021    PREOPERATIVE DIAGNOSES:  Cervical stenosis, myelopathy, previous C4 to C7 fusion, C7-T1 spondylolisthesis. POSTOPERATIVE DIAGNOSES:  Cervical stenosis, myelopathy, previous C4 to C7 fusion, C7-T1 spondylolisthesis. PROCEDURES PERFORMED:  C2 to C4 laminectomy and C2 to T2 instrumented fusion. R T1-2 foraminotomy    SURGEON:  Josh Castrejon MD PhD    ASSISTANT:  Carmen Tsang    ANESTHESIA:  General.    COMPLICATIONS:  None. IMPLANTS:  Instrumentation system:  DePuy RocketBuxeer. ESTIMATED BLOOD LOSS:  300 mL. FINDINGS:  Stenosis. HISTORY:  The patient is a 53-year-old gentleman who has had previous cervical surgery. He has developed severe stenosis above this area at the C2 to C4 level with cord compression and signs of progressive myelopathy, balance problems, hand weakness, loss of dexterity and coordination. He has also developed spondylolisthesis below the fusion at C7-T1. Given the progressive myelopathy and cord compression, he was not felt appropriate for further conservative treatment and was offered surgery. Our plan was to decompress the affected area. He had multiple anterior previous surgeries and so further anterior approaches were not felt to be safe. As a result, we elected for posterior decompression from C2 to C4 with posterior instrumented arthrodesis. Given the spondylolisthesis further down, this was felt appropriate to include as well, necessitating C2 to C4 laminectomy and CT to T2 instrumented fusion. The risks, benefits, and alternatives were discussed. Informed consent was obtained.   The risks included but were not limited to bleeding, infection, nerve or spinal injury, need for further surgery including CSF leak repair, pseudoarthrosis, hardware malposition repair, recurrent or additional problems. Prior to the procedure, the patient received prophylactic antibiotics and bilateral lower extremity SCDs were placed. Antibiotics will be discontinued within 24 hours and the SCDs will be continued while he is nonambulatory. PROCEDURE:  The patient was positively identified in the preop holding area, brought to the operating theater. After successful induction of anesthesia, he was placed in Penn pins and placed prone on the operating table. All pressure points were adequately padded. The arms were padded and tucked loose. The posterior cervical region was prepped and draped in the usual sterile fashion. A midline incision was marked out and infiltrated with local anesthetic with epinephrine. This was opened with a scalpel and carried down to the soft tissues with electrocautery. The midline was entered and careful dissection was used to get down to the spinous processes. We dissected out the spinous process of C2 down to T2 and dissection was carried out to the lateral border of the lateral masses and transverse processes of T1 and T2. A self-retaining retractor was placed. A Leksell rongeur was used to remove the spinous process of C2, C3, and the upper portion of C4. Careful laminectomy was completed with small Kerrisons in order to avoid any undue pressure on the dura. Decompression was carried out until the spinal cord was completely decompressed. After this was completed, we created pathways for the lateral mass screws on both sides as well as T1 and T2 pedicle screws. In order to directly palpate the R T2 pedicle a small foraminotomy was performed. Screws were also placed in the lateral masses at C2. The C2 screws were 22-24 mm long. The lateral mass screws all were between 12 and 14 mm with the exception of the left C4 which was 16 mm. T1 and T2 pedicle screws were placed as well. These range anywhere from 22-28 mm.   All these had excellent purchase and there was no evidence of any significant breakout. If there was any breakout on the lateral masses, a shorter screw was used. After all the screws were placed, final imaging studies were taken showing the hardware to be in appropriate position. The wound was copiously irrigated with antibiotic irrigation. The facet joints were rasped. Rods were measured and placed bilaterally. Screw caps were affixed and final-tightened. A crosslink was applied at C5 and final-tightened. The lateral masses, lamina, transverse processes, and facet joints were decorticated with a high-speed drill and covered with a combination of morselized autograft bone from the laminectomy as well as Signafuse allograft. The spinous process at C5 was also removed with a crosslink and morselized. After the autograft and allograft were placed over all the decorticated regions, a medium Hemovac drain was placed and tunneled out through the skin. The fascia was closed with 0 Vicryl, the subcutaneous tissue with 2-0 Vicryl, and the skin was closed with Dermabond and tape and covered with a sterile dressing. The drain was secured. The patient was then placed in a hard collar, flipped back on the gurney, removed from the pins, extubated, and taken to the recovery room in stable condition. At the end of the procedure, all counts were correct.       Mica Winchester MD PhD      [id number]  D:  01/21/2021 12:12  T:  01/21/2021 16:19  JOB #:  [id number]

## 2021-01-21 NOTE — INTERVAL H&P NOTE
Update History & Physical 
 
The Patient's History and Physical of January 20 was reviewed with the patient and I examined the patient. There was no change. The surgical site was confirmed by the patient and me. Plan:  The risk, benefits, expected outcome, and alternative to the recommended procedure have been discussed with the patient. Patient understands and wants to proceed with the procedure.  
 
Electronically signed by Milan Muñoz MD on 1/21/2021 at 7:15 AM

## 2021-01-21 NOTE — PERIOP NOTES
Went to waiting area to inform family of post o status but no one was present, I then called son Uday Bond as listed as contact by cell phone as ;isted but no reply. I left voice note.

## 2021-01-21 NOTE — PERIOP NOTES
Patient fitted with bilateral hearing aids, dentures and glasses in pacu to aid in  In post op management

## 2021-01-21 NOTE — ANESTHESIA PREPROCEDURE EVALUATION
Relevant Problems   No relevant active problems       Anesthetic History   No history of anesthetic complications            Review of Systems / Medical History  Patient summary reviewed and pertinent labs reviewed    Pulmonary    COPD: moderate               Neuro/Psych         Psychiatric history     Cardiovascular              CAD    Exercise tolerance: >4 METS     GI/Hepatic/Renal  Within defined limits              Endo/Other    Diabetes: type 2    Arthritis     Other Findings            Physical Exam    Airway  Mallampati: I  TM Distance: 4 - 6 cm  Neck ROM: normal range of motion   Mouth opening: Normal     Cardiovascular    Rhythm: regular  Rate: normal         Dental    Dentition: Full lower dentures and Full upper dentures     Pulmonary  Breath sounds clear to auscultation               Abdominal  GI exam deferred       Other Findings            Anesthetic Plan    ASA: 3  Anesthesia type: general          Induction: Intravenous  Anesthetic plan and risks discussed with: Patient

## 2021-01-22 PROBLEM — J44.1 COPD EXACERBATION (HCC): Status: RESOLVED | Noted: 2017-04-10 | Resolved: 2021-01-22

## 2021-01-22 PROBLEM — D64.9 ANEMIA: Status: ACTIVE | Noted: 2021-01-22

## 2021-01-22 PROBLEM — F41.8 DEPRESSION WITH ANXIETY: Status: RESOLVED | Noted: 2017-04-10 | Resolved: 2021-01-22

## 2021-01-22 LAB
ANION GAP SERPL CALC-SCNC: 3 MMOL/L (ref 5–15)
BUN SERPL-MCNC: 12 MG/DL (ref 6–20)
BUN/CREAT SERPL: 16 (ref 12–20)
CALCIUM SERPL-MCNC: 8.2 MG/DL (ref 8.5–10.1)
CHLORIDE SERPL-SCNC: 109 MMOL/L (ref 97–108)
CO2 SERPL-SCNC: 27 MMOL/L (ref 21–32)
CREAT SERPL-MCNC: 0.75 MG/DL (ref 0.7–1.3)
ERYTHROCYTE [DISTWIDTH] IN BLOOD BY AUTOMATED COUNT: 12.9 % (ref 11.5–14.5)
GLUCOSE BLD STRIP.AUTO-MCNC: 114 MG/DL (ref 65–100)
GLUCOSE BLD STRIP.AUTO-MCNC: 128 MG/DL (ref 65–100)
GLUCOSE BLD STRIP.AUTO-MCNC: 99 MG/DL (ref 65–100)
GLUCOSE SERPL-MCNC: 101 MG/DL (ref 65–100)
HCT VFR BLD AUTO: 33.9 % (ref 36.6–50.3)
HGB BLD-MCNC: 10.8 G/DL (ref 12.1–17)
LDH SERPL L TO P-CCNC: 175 U/L (ref 85–241)
MCH RBC QN AUTO: 30.9 PG (ref 26–34)
MCHC RBC AUTO-ENTMCNC: 31.9 G/DL (ref 30–36.5)
MCV RBC AUTO: 97.1 FL (ref 80–99)
NRBC # BLD: 0 K/UL (ref 0–0.01)
NRBC BLD-RTO: 0 PER 100 WBC
PLATELET # BLD AUTO: 215 K/UL (ref 150–400)
PMV BLD AUTO: 10 FL (ref 8.9–12.9)
POTASSIUM SERPL-SCNC: 4.2 MMOL/L (ref 3.5–5.1)
RBC # BLD AUTO: 3.49 M/UL (ref 4.1–5.7)
SERVICE CMNT-IMP: ABNORMAL
SERVICE CMNT-IMP: ABNORMAL
SERVICE CMNT-IMP: NORMAL
SODIUM SERPL-SCNC: 139 MMOL/L (ref 136–145)
TSH SERPL DL<=0.05 MIU/L-ACNC: 2.68 UIU/ML (ref 0.36–3.74)
WBC # BLD AUTO: 14.1 K/UL (ref 4.1–11.1)

## 2021-01-22 PROCEDURE — 83036 HEMOGLOBIN GLYCOSYLATED A1C: CPT

## 2021-01-22 PROCEDURE — 51798 US URINE CAPACITY MEASURE: CPT

## 2021-01-22 PROCEDURE — 97535 SELF CARE MNGMENT TRAINING: CPT

## 2021-01-22 PROCEDURE — 82962 GLUCOSE BLOOD TEST: CPT

## 2021-01-22 PROCEDURE — 65270000029 HC RM PRIVATE

## 2021-01-22 PROCEDURE — 82746 ASSAY OF FOLIC ACID SERUM: CPT

## 2021-01-22 PROCEDURE — 74011250637 HC RX REV CODE- 250/637: Performed by: NEUROLOGICAL SURGERY

## 2021-01-22 PROCEDURE — 74011250636 HC RX REV CODE- 250/636: Performed by: NEUROLOGICAL SURGERY

## 2021-01-22 PROCEDURE — 97530 THERAPEUTIC ACTIVITIES: CPT

## 2021-01-22 PROCEDURE — 82607 VITAMIN B-12: CPT

## 2021-01-22 PROCEDURE — 85027 COMPLETE CBC AUTOMATED: CPT

## 2021-01-22 PROCEDURE — 94640 AIRWAY INHALATION TREATMENT: CPT

## 2021-01-22 PROCEDURE — 74011250636 HC RX REV CODE- 250/636: Performed by: INTERNAL MEDICINE

## 2021-01-22 PROCEDURE — 83540 ASSAY OF IRON: CPT

## 2021-01-22 PROCEDURE — 80048 BASIC METABOLIC PNL TOTAL CA: CPT

## 2021-01-22 PROCEDURE — 74011000250 HC RX REV CODE- 250: Performed by: INTERNAL MEDICINE

## 2021-01-22 PROCEDURE — 83615 LACTATE (LD) (LDH) ENZYME: CPT

## 2021-01-22 PROCEDURE — 84443 ASSAY THYROID STIM HORMONE: CPT

## 2021-01-22 PROCEDURE — 82728 ASSAY OF FERRITIN: CPT

## 2021-01-22 PROCEDURE — 74011000258 HC RX REV CODE- 258: Performed by: INTERNAL MEDICINE

## 2021-01-22 PROCEDURE — 74011000250 HC RX REV CODE- 250: Performed by: NEUROLOGICAL SURGERY

## 2021-01-22 PROCEDURE — 74011000250 HC RX REV CODE- 250: Performed by: HOSPITALIST

## 2021-01-22 PROCEDURE — 97165 OT EVAL LOW COMPLEX 30 MIN: CPT

## 2021-01-22 PROCEDURE — 36415 COLL VENOUS BLD VENIPUNCTURE: CPT

## 2021-01-22 PROCEDURE — 83010 ASSAY OF HAPTOGLOBIN QUANT: CPT

## 2021-01-22 PROCEDURE — 94760 N-INVAS EAR/PLS OXIMETRY 1: CPT

## 2021-01-22 RX ORDER — ACETAMINOPHEN 500 MG
500 TABLET ORAL
Status: DISCONTINUED | OUTPATIENT
Start: 2021-01-22 | End: 2021-01-26 | Stop reason: HOSPADM

## 2021-01-22 RX ORDER — HYDROMORPHONE HYDROCHLORIDE 1 MG/ML
1 INJECTION, SOLUTION INTRAMUSCULAR; INTRAVENOUS; SUBCUTANEOUS
Status: DISCONTINUED | OUTPATIENT
Start: 2021-01-22 | End: 2021-01-26 | Stop reason: HOSPADM

## 2021-01-22 RX ORDER — OXYCODONE HYDROCHLORIDE 5 MG/1
10 TABLET ORAL
Status: DISCONTINUED | OUTPATIENT
Start: 2021-01-22 | End: 2021-01-25

## 2021-01-22 RX ORDER — OXYCODONE HYDROCHLORIDE 5 MG/1
5 TABLET ORAL
Status: DISCONTINUED | OUTPATIENT
Start: 2021-01-22 | End: 2021-01-25

## 2021-01-22 RX ORDER — HYDROMORPHONE HYDROCHLORIDE 1 MG/ML
0.5 INJECTION, SOLUTION INTRAMUSCULAR; INTRAVENOUS; SUBCUTANEOUS
Status: DISCONTINUED | OUTPATIENT
Start: 2021-01-22 | End: 2021-01-26 | Stop reason: HOSPADM

## 2021-01-22 RX ORDER — INSULIN LISPRO 100 [IU]/ML
INJECTION, SOLUTION INTRAVENOUS; SUBCUTANEOUS
Status: DISCONTINUED | OUTPATIENT
Start: 2021-01-22 | End: 2021-01-26 | Stop reason: HOSPADM

## 2021-01-22 RX ORDER — IPRATROPIUM BROMIDE AND ALBUTEROL SULFATE 2.5; .5 MG/3ML; MG/3ML
3 SOLUTION RESPIRATORY (INHALATION)
Status: DISCONTINUED | OUTPATIENT
Start: 2021-01-22 | End: 2021-01-23

## 2021-01-22 RX ORDER — DEXTROSE 50 % IN WATER (D50W) INTRAVENOUS SYRINGE
25-50 AS NEEDED
Status: DISCONTINUED | OUTPATIENT
Start: 2021-01-22 | End: 2021-01-26 | Stop reason: HOSPADM

## 2021-01-22 RX ORDER — MAGNESIUM SULFATE 100 %
4 CRYSTALS MISCELLANEOUS AS NEEDED
Status: DISCONTINUED | OUTPATIENT
Start: 2021-01-22 | End: 2021-01-26 | Stop reason: HOSPADM

## 2021-01-22 RX ADMIN — GABAPENTIN 600 MG: 300 CAPSULE ORAL at 21:01

## 2021-01-22 RX ADMIN — Medication 10 ML: at 06:00

## 2021-01-22 RX ADMIN — ATORVASTATIN CALCIUM 10 MG: 10 TABLET, FILM COATED ORAL at 09:22

## 2021-01-22 RX ADMIN — HYDROMORPHONE HYDROCHLORIDE 0.5 MG: 1 INJECTION, SOLUTION INTRAMUSCULAR; INTRAVENOUS; SUBCUTANEOUS at 09:24

## 2021-01-22 RX ADMIN — METFORMIN HYDROCHLORIDE 500 MG: 500 TABLET ORAL at 09:23

## 2021-01-22 RX ADMIN — GABAPENTIN 600 MG: 300 CAPSULE ORAL at 13:12

## 2021-01-22 RX ADMIN — WATER 2 G: 1 INJECTION INTRAMUSCULAR; INTRAVENOUS; SUBCUTANEOUS at 06:46

## 2021-01-22 RX ADMIN — ARFORMOTEROL TARTRATE 15 MCG: 15 SOLUTION RESPIRATORY (INHALATION) at 07:40

## 2021-01-22 RX ADMIN — DULOXETINE HYDROCHLORIDE 60 MG: 30 CAPSULE, DELAYED RELEASE ORAL at 17:57

## 2021-01-22 RX ADMIN — BUDESONIDE 500 MCG: 0.5 INHALANT RESPIRATORY (INHALATION) at 07:40

## 2021-01-22 RX ADMIN — IPRATROPIUM BROMIDE AND ALBUTEROL SULFATE 3 ML: .5; 2.5 SOLUTION RESPIRATORY (INHALATION) at 07:35

## 2021-01-22 RX ADMIN — POLYETHYLENE GLYCOL 3350 17 G: 17 POWDER, FOR SOLUTION ORAL at 09:22

## 2021-01-22 RX ADMIN — Medication 10 ML: at 21:03

## 2021-01-22 RX ADMIN — POTASSIUM CHLORIDE: 2 INJECTION, SOLUTION, CONCENTRATE INTRAVENOUS at 18:01

## 2021-01-22 RX ADMIN — Medication 10 ML: at 14:00

## 2021-01-22 RX ADMIN — GABAPENTIN 600 MG: 300 CAPSULE ORAL at 17:57

## 2021-01-22 RX ADMIN — HYDROMORPHONE HYDROCHLORIDE 1 MG: 1 INJECTION, SOLUTION INTRAMUSCULAR; INTRAVENOUS; SUBCUTANEOUS at 21:58

## 2021-01-22 RX ADMIN — SENNOSIDES 17.2 MG: 8.6 TABLET, FILM COATED ORAL at 21:01

## 2021-01-22 RX ADMIN — CYCLOBENZAPRINE 10 MG: 10 TABLET, FILM COATED ORAL at 06:47

## 2021-01-22 RX ADMIN — DULOXETINE HYDROCHLORIDE 60 MG: 30 CAPSULE, DELAYED RELEASE ORAL at 09:22

## 2021-01-22 RX ADMIN — ARFORMOTEROL TARTRATE 15 MCG: 15 SOLUTION RESPIRATORY (INHALATION) at 20:40

## 2021-01-22 RX ADMIN — GABAPENTIN 600 MG: 300 CAPSULE ORAL at 09:22

## 2021-01-22 RX ADMIN — BUDESONIDE 500 MCG: 0.5 INHALANT RESPIRATORY (INHALATION) at 20:40

## 2021-01-22 RX ADMIN — OXYCODONE 5 MG: 5 TABLET ORAL at 21:10

## 2021-01-22 RX ADMIN — HYDROMORPHONE HYDROCHLORIDE 0.5 MG: 1 INJECTION, SOLUTION INTRAMUSCULAR; INTRAVENOUS; SUBCUTANEOUS at 13:31

## 2021-01-22 RX ADMIN — DIAZEPAM 5 MG: 5 TABLET ORAL at 21:59

## 2021-01-22 NOTE — PROGRESS NOTES
POD1 C2-4 lami, C2-T2 fusion  afvss  Drain 230  C/o pain  Not on pca    Myelopathic with HI atrophy and weakness  D/B/T4+  Drain and collar in place    A/p  neurostable  Pt/ot/oob  Collar, can dc to eat if needed  Dc baker and pca  xr tomorrow  Continue drain  Rehab consult, ok with me for any rehab - [facility], [facility], [facility], etc - would likely be ready ~sunday

## 2021-01-22 NOTE — PROGRESS NOTES
Problem: Self Care Deficits Care Plan (Adult)  Goal: *Acute Goals and Plan of Care (Insert Text)  Description:   FUNCTIONAL STATUS PRIOR TO ADMISSION: Per pt, w/c and rollator use for mobility with occasional assist. Per son, pt sits on edge of garden tub for bathing. Pt reports mostly mod I for ADLs, including dressing and donning L LE brace. Per chart, pt with multiple falls. HOME SUPPORT: The patient lived with wife. 1.  Patient will perform lower body dressing with minimal assistance/contact guard assist using within 7 days. 2.  Patient will perform upper body dressing with minimal assistance/contact guard assist within 7 days. 3.  Patient will standing ADLs 5 mins at minimal assistance/contact guard assist within 7 days. 4.  Patient will don/doff neck brace at moderate assistance  within 7 days. 5.  Patient will verbalize/demonstrate 3/3 cervical precautions during ADL tasks without cues within  7 days. Outcome: Progressing Towards Goal     OCCUPATIONAL THERAPY EVALUATION  Patient: Mireille Ospina (66 y.o. male)  Date: 1/22/2021  Primary Diagnosis: Cervical myelopathy (HCC) [G95.9]  Procedure(s) (LRB):  C2-4 LAMINECTOMY, C2-T2 INSTRUMENTED FUSION (N/A) 1 Day Post-Op   Precautions:  Fall, Spinal(cervical; hard aspen collar; [place])    ASSESSMENT  Based on the objective data described below, the patient presents with intermittent confusion, impaired balance, decreased activity tolerance, decreased strength, impaired coordination, decreased ROM, and decreased independence with ADLs and mobility following admission. Pt is POD 1 s/p C2-4 lami and C2-T2 fusion, now cleared to participate. Pt also with h/o post polio syndrome and reports bilateral hand numbness and use of L AFO. Pt requires increased physical and cognitive assistance for all ADLs and mobility this session, including total A for collar mgmt and A x 2 for transfers. Pt reports dizziness during session; BP monitored.  Increased cueing required to adhere to back precautions and for safety during all tasks. At this time, do not feel pt is safe to return home with wife's assistance. Recommend rehab at discharge to maximize return to highest level of function prior to return home. 01/22/21 1045 01/22/21 1057 01/22/21 1200   BP: 136/78 127/75 (!) 176/79   BP 1 Location:   Left arm   BP Patient Position: Sitting Standing At rest   Pulse: 80 85 77       Current Level of Function Impacting Discharge (ADLs/self-care): up to max A for ADLs    Functional Outcome Measure: The patient scored 35/100 on the Barthel outcome measure which is indicative of severe functional impairment. Other factors to consider for discharge: spinal precautions; h/o post polio syndrome; h/o frequent falls; high fall risk; confusion; [place]     Patient will benefit from skilled therapy intervention to address the above noted impairments. PLAN :  Recommendations and Planned Interventions: self care training, functional mobility training, therapeutic exercise, balance training, therapeutic activities, endurance activities, patient education, home safety training and family training/education    Frequency/Duration: Patient will be followed by occupational therapy 5 times a week to address goals. Recommendation for discharge: (in order for the patient to meet his/her long term goals)  Therapy 3 hours per day 5-7 days per week    This discharge recommendation:  Has been made in collaboration with the attending provider and/or case management    IF patient discharges home will need the following DME: TBD       SUBJECTIVE:   Patient stated I have to use that brace or I'll fall.     OBJECTIVE DATA SUMMARY:   HISTORY:   Past Medical History:   Diagnosis Date    Anxiety and depression     BCC (basal cell carcinoma of skin)     Bronchitis, chronic (HCC)     Chronic obstructive pulmonary disease (HCC)     Chronic pain     Fibromyalgia     Generalized arthritis  Hyperlipidemia     Neuropathy     Post-polio syndrome     Type 2 diabetes mellitus ([facility] [address]      Past Surgical History:   Procedure Laterality Date    HX APPENDECTOMY      HX CERVICAL DISKECTOMY  2000    HX FRACTURE TX Right 2006    Heel    HX LUMBAR LAMINECTOMY  2014    HX ORTHOPAEDIC Left     Arm multiple surgeries    HX OTHER SURGICAL      Penile surgery post trazodone use    HX THYROIDECTOMY  05/13/2010    HX WRIST FRACTURE TX Right 07/22/2014    IR BRONCHOSCOPY  02/2010    [place] ABDOMEN SURGERY PROC UNLISTED  02/2010    Feeding Tube- removed 2011   [address] ARTHROPLASTY Left 2007       Expanded or extensive additional review of patient history:     Home Situation  Home Environment: Private residence  # Steps to Enter: 4  Rails to Enter: Yes  Hand Rails : Bilateral  One/Two Story Residence: (3 story; bedroom on second floor)  Living Alone: No  Support Systems: Spouse/Significant Other/Partner, Child(maddy)  Current DME Used/Available at Home: Shower chair, Wheelchair, [place]elle Cramp, rollator, Raised toilet seat  Tub or Shower Type: Tub    Hand dominance: Right    EXAMINATION OF PERFORMANCE DEFICITS:  Cognitive/Behavioral Status:  Neurologic State: Alert  Orientation Level: Oriented X4  Cognition: Appropriate decision making; Follows commands  Perception: Appears intact  Perseveration: No perseveration noted  Safety/Judgement: Decreased awareness of environment; Fall prevention    Hearing:   Auditory  Auditory Impairment: Hard of hearing, bilateral, Hearing aid(s)  Hearing Aids/Status: With patient    Range of Motion:  AROM: Generally decreased, functional  PROM: Generally decreased, functional    Strength:  Strength: Generally decreased, functional    Coordination:  Coordination: Generally decreased, functional  Fine Motor Skills-Upper: Left Impaired;Right Impaired(tremors)    Gross Motor Skills-Upper: Left Impaired;Right Impaired    Tone & Sensation:  Tone: Abnormal   Sensation: impaired (numbness in bilateral hands)    Balance:  Sitting: Intact  Standing: With support; Impaired  Standing - Static: Constant support; Fair  Standing - Dynamic : Constant support;Fair;Poor    Functional Mobility and Transfers for ADLs:  Bed Mobility:  Supine to Sit: Moderate assistance;Assist x1;Additional time  Sit to Supine: Moderate assistance;Assist x2;Bed Modified  Scooting: Minimum assistance    Transfers:  Sit to Stand: Moderate assistance; Additional time;Assist x2; Other (comment)(bed height higher)  Stand to Sit: Minimum assistance;Assist x2  Toilet Transfer : Minimum assistance; Moderate assistance;Assist x2; Additional time(infer based on observations)    ADL Assessment:  Feeding: Setup;Minimum assistance    Oral Facial Hygiene/Grooming: Setup;Minimum assistance(seated)    Bathing: Moderate assistance;Maximum assistance    Upper Body Dressing: Moderate assistance(with up to max/total A for [employer] collar mgmt)    Lower Body Dressing: Maximum assistance; Total assistance    Toileting: Maximum assistance    ADL Intervention and task modifications:  Patient instructed and demonstrated 3/3 cervical spine precautions during ADLs and functional mobility with increased multi-modal cues. Patient instructed and indicated understanding the benefits of maintaining activity tolerance, functional mobility, and independence with self care tasks during acute stay  to ensure safe return home and to baseline. Encouraged patient to increase frequency and duration OOB, not sitting longer than 30 mins without marching/walking with staff, be out of bed for all meals, perform daily ADLs (as approved by RN/MD regarding bathing etc), and performing functional mobility to/from bathroom. Patient instruction and indicated understanding on body mechanics, ergonomics and gravitational force on the spine during different body positions to plan activities in prep for return home to complete basic ADLs, instrumental ADLs and back to work safely. Functional Measure:  Barthel Index:    Bathin  Bladder: 10  Bowels: 10  Groomin  Dressin  Feedin  Mobility: 0  Stairs: 0  Toilet Use: 0  Transfer (Bed to Chair and Back): 5  Total: 35/100        The Barthel ADL Index: Guidelines  1. The index should be used as a record of what a patient does, not as a record of what a patient could do. 2. The main aim is to establish degree of independence from any help, physical or verbal, however minor and for whatever reason. 3. The need for supervision renders the patient not independent. 4. A patient's performance should be established using the best available evidence. Asking the patient, friends/relatives and nurses are the usual sources, but direct observation and common sense are also important. However direct testing is not needed. 5. Usually the patient's performance over the preceding 24-48 hours is important, but occasionally longer periods will be relevant. 6. Middle categories imply that the patient supplies over 50 per cent of the effort. 7. Use of aids to be independent is allowed. Judie Alejo., Barthel, D.W. (4075). Functional evaluation: the Barthel Index. [address] [facility] (14)2. RICHARD Caraballo, Suzanne Wasserman., Odell Cervantes., [place], [address] [place] [address] (). Measuring the change indisability after inpatient rehabilitation; comparison of the responsiveness of the Barthel Index and Functional [place] Measure. Journal of Neurology, Neurosurgery, and Psychiatry, 66(4), 899-156. Yuko Coe, N.[place].A, NORMA Prince, & Gina Nguyen MElA. (2004.) Assessment of post-stroke quality of life in cost-effectiveness studies: The usefulness of the Barthel Index and the EuroQoL-5D.  Quality of Life Research, 15, 046-34     Occupational Therapy Evaluation Charge Determination   History Examination Decision-Making   LOW Complexity : Brief history review  HIGH Complexity : 5 or more performance deficits relating to physical, cognitive , or psychosocial skils that result in activity limitations and / or participation restrictions HIGH Complexity : Patient presents with comorbidities that affect occupational performance. Signifigant modification of tasks or assistance (eg, physical or verbal) with assessment (s) is necessary to enable patient to complete evaluation       Based on the above components, the patient evaluation is determined to be of the following complexity level: LOW   Pain Rating:  Pt reporting increased pain    Activity Tolerance:   Fair    After treatment patient left in no apparent distress:    Call bell within reach, Bed / chair alarm activated, Side rails x 3 and bed in modified chair position    COMMUNICATION/EDUCATION:   The patients plan of care was discussed with: Physical therapist and Registered nurse. Home safety education was provided and the patient/caregiver indicated understanding., Patient/family have participated as able in goal setting and plan of care. and Patient/family agree to work toward stated goals and plan of care. This patients plan of care is appropriate for delegation to [facility].     Thank you for this referral.  Jeremy Torrez OT  Time Calculation: 38 mins

## 2021-01-22 NOTE — PROGRESS NOTES
Occupational Therapy:  01/22/21    Orders received, chart reviewed and patient evaluated by occupational therapy. Pending progression with skilled acute occupational therapy, recommend:  Therapy 3 hours per day 5-7 days per week     Recommend with nursing patient to complete as able in order to maintain strength, endurance and independence: OOB to chair 3x/day with 2 person assist and functional SPT to [facility] vs bedpan pending pain for toileting with 2 person assist (+gait belt, RW, and L LE brace). Thank you for your assistance. Full evaluation to follow.      Thank you,  Freddy Mendoza OTR/L

## 2021-01-22 NOTE — PROGRESS NOTES
Problem: Mobility Impaired (Adult and Pediatric)  Goal: *Acute Goals and Plan of Care (Insert Text)  Description: FUNCTIONAL STATUS PRIOR TO ADMISSION: At baseline patient uses a rollator and assistance with gait. Uses wheelchair out of the home. HOME SUPPORT PRIOR TO ADMISSION: The patient lived with wife. Physical Therapy Goals  Initiated 1/22/2021  1. Patient will move from supine to sit and sit to supine  in bed with minimal assistance/contact guard assist within 7 day(s). 2.  Patient will transfer from bed to chair and chair to bed with minimal assistance/contact guard assist using the least restrictive device within 7 day(s). 3.  Patient will perform sit to stand with minimal assistance/contact guard assist within 7 day(s). 4.  Patient will ambulate with minimal assistance/contact guard assist for 100 feet with the least restrictive device within 7 day(s). 5.  Patient will ascend/descend 4 stairs with handrail(s) with minimal assistance/contact guard assist within 7 day(s). Outcome: Progressing Towards Goal   PHYSICAL THERAPY EVALUATION  Patient: Maria G Sánchez (66 y.o. male)  Date: 1/22/2021  Primary Diagnosis: Cervical myelopathy (HCC) [G95.9]  Procedure(s) (LRB):  C2-4 LAMINECTOMY, C2-T2 INSTRUMENTED FUSION (N/A) 1 Day Post-Op   Precautions:   Fall, Spinal(cervical; hard aspen collar; [place])    ASSESSMENT  Based on the objective data described below, the patient presents with decreased functional mobility, decreased balance, decreased strength, decreased range of motion and high fall risk. Patient has had multiple back surgeries in the past and is now POD 1 C2 - T2 spinal fusion. He has bilateral hand numbness and left foot AFO due to internal rotation. History includes post polio syndrome and multiple falls. Todays mobility was limited by signs and symptoms of orthostatic hypotension - see vitals for details.     Current Level of Function Impacting Discharge (mobility/balance): moderate assist with transfers and gait     Functional Outcome Measure: The patient scored 35 out of 100  on the Barthel outcome measure which is indicative of functional impairment. Other factors to consider for discharge: history of falls     Patient will benefit from skilled therapy intervention to address the above noted impairments. PLAN :  Recommendations and Planned Interventions: bed mobility training, transfer training, gait training, therapeutic exercises, and neuromuscular re-education      Frequency/Duration: Patient will be followed by physical therapy:  daily to address goals. Recommendation for discharge: (in order for the patient to meet his/her long term goals)  Therapy 3 hours per day 5-7 days per week    This discharge recommendation:  Has not yet been discussed the attending provider and/or case management    IF patient discharges home will need the following DME: to be determined (TBD)         SUBJECTIVE:   Patient stated I couldn't walk on uneven surfaces.     OBJECTIVE DATA SUMMARY:   HISTORY:    Past Medical History:   Diagnosis Date    Anxiety and depression     BCC (basal cell carcinoma of skin)     Bronchitis, chronic (HCC)     Chronic obstructive pulmonary disease (HCC)     Chronic pain     Fibromyalgia     Generalized arthritis     Hyperlipidemia     Neuropathy     Post-polio syndrome     Type 2 diabetes mellitus ([facility] [address]      Past Surgical History:   Procedure Laterality Date    HX APPENDECTOMY      HX CERVICAL DISKECTOMY  2000    HX FRACTURE TX Right 2006    Heel    HX LUMBAR LAMINECTOMY  2014    HX ORTHOPAEDIC Left     Arm multiple surgeries    HX OTHER SURGICAL      Penile surgery post trazodone use    HX THYROIDECTOMY  05/13/2010    HX WRIST FRACTURE TX Right 07/22/2014    IR BRONCHOSCOPY  02/2010    MT ABDOMEN SURGERY PROC UNLISTED  02/2010    Feeding Tube- removed 2011    MT TOTAL HIP ARTHROPLASTY Left 2007       Personal factors and/or comorbidities impacting plan of care:     Home Situation  Home Environment: Private residence  # Steps to Enter: 4  Rails to Enter: Yes  Hand Rails : Bilateral  One/Two Story Residence: (3 story; bedroom on second floor)  Living Alone: No  Support Systems: Spouse/Significant Other/Partner, Child(maddy)  Current DME Used/Available at Home: Shower chair, Wheelchair, Walker, rollator, Raised toilet seat  Tub or Shower Type: Tub    EXAMINATION/PRESENTATION/DECISION MAKING:   Critical Behavior:  Neurologic State: Alert  Orientation Level: Oriented X4  Cognition: Appropriate decision making, Follows commands  Safety/Judgement: Decreased awareness of environment, Fall prevention  Hearing: Auditory  Auditory Impairment: Hard of hearing, bilateral, Hearing aid(s)  Hearing Aids/Status: With patient    Range Of Motion:  AROM: Generally decreased, functional           PROM: Generally decreased, functional           Strength:    Strength: Generally decreased, functional                    Tone & Sensation:   Tone: Abnormal                              Coordination:  Coordination: Generally decreased, functional  Vision:      Functional Mobility:  Bed Mobility:     Supine to Sit: Moderate assistance;Assist x1;Additional time  Sit to Supine: Moderate assistance;Assist x2;Bed Modified  Scooting: Minimum assistance  Transfers:  Sit to Stand: Moderate assistance; Additional time;Assist x2; Other (comment)(bed height higher)  Stand to Sit: Minimum assistance;Assist x2                       Balance:   Sitting: Intact  Standing: With support; Impaired  Standing - Static: Constant support; Fair  Standing - Dynamic : Constant support;Fair;Poor  Ambulation/Gait Training:  Distance (ft): 2 Feet (ft)(along bedside)  Assistive Device: Walker, rolling;Gait belt;Brace/Splint(has left AFO from home)  Ambulation - Level of Assistance: Moderate assistance;Assist x2; Additional time        Gait Abnormalities: Decreased step clearance; Step to gait        Base of Support: Center of gravity altered  Stance: Left increased;Right increased  Speed/Tiffanie: Slow;Delayed  Step Length: Right shortened;Left shortened                Functional Measure:  Barthel Index:    Bathin  Bladder: 10  Bowels: 10  Groomin  Dressin  Feedin  Mobility: 0  Stairs: 0  Toilet Use: 0  Transfer (Bed to Chair and Back): 5  Total: 35/100       The Barthel ADL Index: Guidelines  1. The index should be used as a record of what a patient does, not as a record of what a patient could do. 2. The main aim is to establish degree of independence from any help, physical or verbal, however minor and for whatever reason. 3. The need for supervision renders the patient not independent. 4. A patient's performance should be established using the best available evidence. Asking the patient, friends/relatives and nurses are the usual sources, but direct observation and common sense are also important. However direct testing is not needed. 5. Usually the patient's performance over the preceding 24-48 hours is important, but occasionally longer periods will be relevant. 6. Middle categories imply that the patient supplies over 50 per cent of the effort. 7. Use of aids to be independent is allowed. Robina Sadler., Barthel, D.W. (7500). Functional evaluation: the Barthel Index. [address] [facility] (14)2. RICHARD Oliveira, Jenniffer Stubbs., Severa Mouse., [place], [address] [place] [address] (). Measuring the change indisability after inpatient rehabilitation; comparison of the responsiveness of the Barthel Index and Functional [place] Measure. Journal of Neurology, Neurosurgery, and Psychiatry, 66(4), 715-142. Cheryl Tinajero, N.J.A, NORMA Prince, & Megan Mchugh, MElA. (2004.) Assessment of post-stroke quality of life in cost-effectiveness studies: The usefulness of the Barthel Index and the EuroQoL-5D.  Quality of Life Research, 15, 333-36           Physical Therapy Evaluation Charge Determination   History Examination Presentation Decision-Making   MEDIUM  Complexity : 1-2 comorbidities / personal factors will impact the outcome/ POC  LOW Complexity : 1-2 Standardized tests and measures addressing body structure, function, activity limitation and / or participation in recreation  LOW Complexity : Stable, uncomplicated  LOW Complexity : FOTO score of       Based on the above components, the patient evaluation is determined to be of the following complexity level: LOW     Pain Rating:  Complains of pain in bilateral upper back across his shoulder blades    Activity Tolerance:   Fair and signs and symptoms of orthostatic hypotension    After treatment patient left in no apparent distress:   Sitting up in the bed, call bell in reach, 3 rails up    COMMUNICATION/EDUCATION:   The patients plan of care was discussed with: Occupational therapist and Registered nurse. Fall prevention education was provided and the patient/caregiver indicated understanding., Patient/family have participated as able in goal setting and plan of care. , and Patient/family agree to work toward stated goals and plan of care.     Thank you for this referral.  Stanislav Gilmore, PT

## 2021-01-22 NOTE — PERIOP NOTES
TRANSFER - OUT REPORT:    Verbal report given to MADI Stevens(name) on Karen Stone  being transferred to [phone] for routine post - op       Report consisted of patients Situation, Background, Assessment and   Recommendations(SBAR). Information from the following report(s) SBAR, OR Summary, Procedure Summary, Intake/Output, MAR and Cardiac Rhythm nsr,pac was reviewed with the receiving nurse. Lines:   Peripheral IV 01/21/21 Right Forearm (Active)   Site Assessment Clean, dry, & intact 01/21/21 1946   Phlebitis Assessment 0 01/21/21 1946   Infiltration Assessment 0 01/21/21 1946   Dressing Status Clean, dry, & intact; Occlusive 01/21/21 1946   Dressing Type Tape;Transparent 01/21/21 1946   Hub Color/Line Status Pink;Capped 01/21/21 1946   Alcohol Cap Used Yes 01/21/21 0702       Peripheral IV 01/21/21 Left Hand (Active)   Site Assessment Clean, dry, & intact 01/21/21 1946   Phlebitis Assessment 0 01/21/21 1946   Infiltration Assessment 0 01/21/21 1946   Dressing Status Clean, dry, & intact; Occlusive 01/21/21 1946   Dressing Type Tape;Transparent 01/21/21 1946   Hub Color/Line Status Pink; Infusing 01/21/21 1946   Alcohol Cap Used Yes 01/21/21 0713        Opportunity for questions and clarification was provided.       Patient transported with:   O2 @ 2 liters  Registered Nurse

## 2021-01-22 NOTE — CONSULTS
[facility]  [address], [facility] [address]  [phone]    Hospitalist Consult Note      NAME:  Noah Moreau   :   1945   MRN:  [id number]     Requesting Physician Charmayne Sham, MD   Reason for Consult:  Manage DM     PCP:  Bethany Candelaria MD     Date/Time of service  2021 7:09 AM       Assessment and Plan:      Cervical myelopathy / Spinal stenosis of cervical region / Neuropathy / Post-polio syndrome / Osteoarthritis - Tolerated surgery. Ortho will be attending to pain control, DVT prophylaxis and rehab decisions as usual. Continue cymbalta and half dose of gabapentin. Fall precautions. PT/OT eval.  Will go to rehab       Dementia / Depression with anxiety / Fibromyalgia - POA and currently severe. Perseverating and unless he improves he is a poor candidate for any rehab. Continue Cymbalta. Needs outpatient neuropsych testing. I do not think he has capacity today for any decision making.      Anemia - Moderate and noted post op. Monitor and check serologies. Likely chronic nutritional issue    Chronic obstructive pulmonary disease / Bronchitis, chronic / Leukocytosis - POA. Stable so far. Continue brovana, pulmicort and prn duonebs and guaifenesin      DM type 2 causing neurological disease - Diabetic diet and counseling.  SSI per protocol.  Continue home metformin.      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 Needs nutritional support. This will significantly limit rehab      Hyperlipidemia - On atorvastatin at home. LDL <100 on lipid panel. Lacking cause to keep <100, stop statin. Subjective:     CHIEF COMPLAINT: post op pain     HISTORY OF PRESENT ILLNESS:     Mr. Patrice Duckworth is a 66 y.o.  male who is admitted to the Ortho Service with neck pain. He is a poor historian. We are asked to evaluate for management of DM. Patient tolerated surgery without complications.   Felt well except for pain, prior to surgery. Takes medications consistently at home. Pre-op labs were unremarkable. We will follow along. Past Medical History:   Diagnosis Date    Anxiety and depression     BCC (basal cell carcinoma of skin)     Bronchitis, chronic (HCC)     Chronic obstructive pulmonary disease (HCC)     Chronic pain     Fibromyalgia     Generalized arthritis     Hyperlipidemia     Neuropathy     Post-polio syndrome     Type 2 diabetes mellitus ([facility] [address]         Past Surgical History:   Procedure Laterality Date    HX APPENDECTOMY      HX CERVICAL DISKECTOMY      HX FRACTURE TX Right 2006    Heel    HX LUMBAR LAMINECTOMY      HX ORTHOPAEDIC Left     Arm multiple surgeries    HX OTHER SURGICAL      Penile surgery post trazodone use    HX THYROIDECTOMY  2010    HX WRIST FRACTURE TX Right 2014    IR BRONCHOSCOPY  2010    NM ABDOMEN SURGERY PROC UNLISTED  2010    Feeding Tube- removed     NM TOTAL HIP ARTHROPLASTY Left        Social History     Tobacco Use    Smoking status: Former Smoker     Packs/day: 2.00     Years: 20.00     Pack years: 40.00     Quit date: 1970     Years since quittin.7    Smokeless tobacco: Never Used   Substance Use Topics    Alcohol use: Not Currently        Family History   Problem Relation Age of Onset    Arthritis-osteo Mother     Cancer Father     Lung Disease Father         Allergies   Allergen Reactions    Trazodone Other (comments)     Caused penile erection that needed surgical correction        Prior to Admission medications    Medication Sig Start Date End Date Taking? Authorizing Provider   mometasone furoate (ASMANEX HFA IN) Take  by inhalation two (2) times a day. Yes Provider, Historical   glycopyrrolate/formoterol fum (BEVESPI AEROSPHERE IN) Take  by inhalation two (2) times a day. Yes Provider, Historical   oxyCODONE-acetaminophen (PERCOCET 7.5) 7.5-325 mg per tablet 1 tabs every  6 hrs as needed for pain.   Maximum daily dose 4 tablets. 4/13/17  Yes Shavonne Durbin MD   predniSONE (DELTASONE) 10 mg tablet Take 6 tabs a day for 3 days, then 5 a day for 3 days, then 4 a day for 3 days, then 3 a day for 3 days, then 2 a day for 3 days, then 1 a day for 6 days. 4/13/17  Yes Shavonne Durbin MD   albuterol-ipratropium (DUO-NEB) 2.5 mg-0.5 mg/3 ml nebu 3 mL by Nebulization route every four (4) hours as needed (SOB). Yes Provider, Historical   gabapentin (NEURONTIN) 600 mg tablet Take 600 mg by mouth four (4) times daily. Yes Provider, Historical   DULoxetine (CYMBALTA) 60 mg capsule Take 60 mg by mouth two (2) times a day. Yes Provider, Historical   metFORMIN (GLUCOPHAGE) 500 mg tablet Take 500 mg by mouth daily. Yes Provider, Historical   AZITHROMYCIN PO Take  by mouth. 3 times a week M-W-F    Provider, Historical   guaifenesin/pseudoephedrne HCl (MUCINEX D PO) Take  by mouth two (2) times daily as needed. Provider, Historical   fluticasone-salmeterol (ADVAIR) 100-50 mcg/dose diskus inhaler Take 1 Puff by inhalation two (2) times a day. Provider, Historical   atorvastatin (LIPITOR) 10 mg tablet Take 10 mg by mouth daily. Provider, Historical   meloxicam (MOBIC) 15 mg tablet Take 15 mg by mouth daily. OtherOlinda MD   albuterol (PROAIR HFA) 90 mcg/actuation inhaler Take 2 Puffs by inhalation every four (4) hours as needed.     Olinda Whitlock MD         Current Facility-Administered Medications:     glucose chewable tablet 16 g, 4 Tab, Oral, PRN, Shavonne Durbin MD    dextrose (D50W) injection syrg 12.5-25 g, 25-50 mL, IntraVENous, PRN, Shavonne Durbin MD    glucagon [facility]) injection 1 mg, 1 mg, IntraMUSCular, PRN, Shavonne Durbin MD    insulin lispro (HUMALOG) injection, , SubCUTAneous, AC&HS, Shavonne Durbin MD    albuterol-ipratropium (DUO-NEB) 2.5 MG-0.5 MG/3 ML, 3 mL, Nebulization, Q4H PRN, Shasta Rod MD, 3 mL at 01/21/21 1652    atorvastatin (LIPITOR) tablet 10 mg, 10 mg, Oral, DAILY, Rae Clark MD    DULoxetine (CYMBALTA) capsule 60 mg, 60 mg, Oral, BID, Rae Clark MD, 60 mg at 01/21/21 2218    gabapentin (NEURONTIN) capsule 600 mg, 600 mg, Oral, QID, Rae Clark MD, 600 mg at 01/21/21 2218    guaiFENesin ER (MUCINEX) tablet 1,200 mg, 1,200 mg, Oral, Q12H PRN, Rae Clark MD    metFORMIN (GLUCOPHAGE) tablet 500 mg, 500 mg, Oral, DAILY, Beena Jaramillo MD    budesonide (PULMICORT) 500 mcg/2 ml nebulizer suspension, 500 mcg, Nebulization, BID RT, Rae Clark MD, 500 mcg at 01/21/21 2330    0.9% sodium chloride infusion, 125 mL/hr, IntraVENous, CONTINUOUS, Rae Clark MD, Last Rate: 125 mL/hr at 01/21/21 1310, 125 mL/hr at 01/21/21 1310    sodium chloride (NS) flush 5-40 mL, 5-40 mL, IntraVENous, Q8H, Rae Clark MD, 10 mL at 01/22/21 0600    sodium chloride (NS) flush 5-40 mL, 5-40 mL, IntraVENous, PRN, Rae Clark MD    naloxone [facility]) injection 0.4 mg, 0.4 mg, IntraVENous, PRN, Rae Clark MD    arformoteroL (BROVANA) neb solution 15 mcg, 15 mcg, Nebulization, BID RT, Rae Clark MD, 15 mcg at 01/21/21 2330    ondansetron (ZOFRAN) injection 4 mg, 4 mg, IntraVENous, Q4H PRN, Rae Clark MD    hydrOXYzine HCL (ATARAX) tablet 10 mg, 10 mg, Oral, Q8H PRN, Rae Clark MD    polyethylene glycol (MIRALAX) packet 17 g, 17 g, Oral, DAILY, Beena Jaramillo MD    phenol throat spray (CHLORASEPTIC) 1 Spray, 1 Spray, Oral, PRN, Rae Clark MD    cyclobenzaprine (FLEXERIL) tablet 10 mg, 10 mg, Oral, TID PRN, Rae Clark MD, 10 mg at 01/22/21 9295    diazePAM (VALIUM) tablet 5 mg, 5 mg, Oral, Q6H PRN, Rae Clark MD, 5 mg at 01/21/21 1718    senna (SENOKOT) tablet 17.2 mg, 2 Tab, Oral, QHS, Rae Clark MD, 17.2 mg at 01/21/21 6233    HYDROmorphone (PF) 25 mg/50 mL (DILAUDID) PCA, , IntraVENous, CONTINUOUS, Shasta Rod MD, PCA Stopped at 01/22/21 2621    pseudoephedrine (SUDAFED) tablet 60 mg, 60 mg, Oral, Q6H PRN, Shasta Rod MD    albuterol-ipratropium (DUO-NEB) 2.5 MG-0.5 MG/3 ML, 3 mL, Nebulization, Q6H RT, Elfego Wayne MD    Review of Systems: Cannot obtain well  (bold if positive, if negative)    Gen:  Eyes:  ENT:  CVS:  Pulm:  GI:  :  MS:  ain, weakness, arthritisSkin:  Psych:  Endo:  Hem:  Renal:  Neuro:            Objective:      VITALS:    Vital signs reviewed; most recent are:    Visit Vitals  /72 (BP 1 Location: Right arm, BP Patient Position: At rest)   Pulse 66   Temp 98.3 °F (36.8 °C)   Resp 16   Ht 6' (1.829 m)   Wt 73.5 kg (162 lb)   SpO2 97%   BMI 21.97 kg/m²     SpO2 Readings from Last 6 Encounters:   01/22/21 97%   01/18/21 96%   10/22/20 97%   10/08/19 96%   04/13/17 92%   01/12/17 97%    O2 Flow Rate (L/min): 2 l/min       Intake/Output Summary (Last 24 hours) at 1/22/2021 0708  Last data filed at 1/21/2021 1946  Gross per 24 hour   Intake 3000 ml   Output 1430 ml   Net 1570 ml      All Micro Results     None            Exam:     Physical Exam:    Gen:  Thin, in mild acute distress  HEENT:  Pink conjunctivae, PERRL, hearing barely intact to voice, moist mucous membranes  Neck:  C collar, without masses, thyroid non-tender  Resp:  No accessory muscle use, clear breath sounds without wheezes rales or rhonchi  Card:  No murmurs, normal S1, S2 without thrills, bruits or peripheral edema  Abd:  Soft, non-tender, non-distended, normoactive bowel sounds are present, no mass  Lymph:  No cervical or inguinal adenopathy  Musc:  No cyanosis or clubbing  Skin:  No rashes or ulcers, skin turgor is good  Neuro:  Cranial nerves are grossly intact, severe general motor weakness, follows commands   Psych:  Poor insight, oriented to person, confused, perseverating       Labs:    Recent Labs     01/22/21  0240   WBC 14.1*   HGB 10.8*   HCT 33.9*        Recent Labs 01/22/21  0240      K 4.2   *   CO2 27   *   BUN 12   CREA 0.75   CA 8.2*     Lab Results   Component Value Date/Time    Glucose (POC) 142 (H) 01/21/2021 12:22 PM    Glucose (POC) 128 (H) 01/21/2021 07:01 AM     No results for input(s): PH, PCO2, PO2, HCO3, FIO2 in the last 72 hours. No results for input(s): INR, INREXT in the last 72 hours. I have reviewed previous records,    Telemetry reviewed:   normal sinus rhythm    Risk of deterioration: high      Total time spent with patient: 38 minutes I personally reviewed chart, notes, data and current medications in the medical record. I have personally examined and treated the patient at bedside during this period.                  Care Plan discussed with: Patient, Nursing Staff and >50% of time spent in counseling and coordination of care    Discussed:  Care Plan       ___________________________________________________    Attending Physician: Robbie Saucedo MD

## 2021-01-22 NOTE — PROGRESS NOTES
Orders received, chart reviewed and patient evaluated by physical therapy. Pending progression with skilled acute physical therapy, recommend:  Therapy 3 hours per day 5-7 days per week    Recommend with nursing patient to complete as able in order to maintain strength, endurance and independence: OOB to chair 3x/day with  assist of two and ambulating with rolling walker and assist of two. Thank you for your assistance. Full evaluation to follow.

## 2021-01-22 NOTE — PROGRESS NOTES
1/22/2021  1:10 PM  Reason for Admission: Elective - Stenosis requiring Surgery    Assessment:   []In person with pt   []Via p/c with pt   []With family member in person. Who/Relation:     [x]With family member via p/c. Who/Relation:  Ana M [place] / wife  []Chart Review    RUR: 12%  Risk Level: [x]Low []Moderate []High  Value-based purchasing: [] Yes [x] No  Bundle patient: [] Yes [x] No   Specify:     Advance Directive: Full Code. Would like to complete one. Assessment:    Age: 66    Sex: [x] Male []Female     Residency: [x]Private residence []Apartment []Assisted Living []LTC []Other:   Exterior Steps:4  Interior Steps: 1 flight    Lives With: [x]With spouse []Other family members []Underage children []Alone []Care provider []Other:    Prior functioning:  [x]Independent []Dependent []Partial dependence   Pt requires assistance with: []Bathing []Dressing []Toileting []Ambulation     Prior DME required:  []None [x]RW []Cane []Crutches []Bedside commode []CPAP []Home O2 (Liter/Provider: ) []Nebulizer   []Shower Chair []Wheelchair []Hospital Bed []Lilian []Stair lift [x]Rollator []Other:    DME available: []None [x]RW []Cane []Crutches []Bedside commode []CPAP []Home O2 (Liter/Provider: ) []Nebulizer   [x]Shower Chair []Wheelchair []Hospital Bed []Lilian []Stair lift [x]Rollator []Other:    Rehab history: []None []Outpatient PT []Home Health (Provider/Date: ) []SNF (Provider/Date: ) [x]IPR (Provider/Date: [facility] /  unknown) []LTC (Provider/Date: )    Discharge Concerns: []Yes [x]No []Unknown   Describe: Insurer:  Medicare / [employer]    PCP: Obed Nichols MD   Name of Practice: [address] [facility] [place]   Current patient: [x]Yes []No   Approximate date of last visit: 11/2020   Access to virtual PCP visits: [x]Yes []No    Pharmacy:  [address] [facility] Transport: Family        Transition of care plan:    []Unable to determine at this time.  Awaiting clinical progress, and disposition recommendations. [] Home with outpatient follow-up    [] Home with Outpatient PT and outpatient follow-up   Pt aware of OP appt? []Yes, Provider:   []Not scheduled   Transport provider:     [] Home with family assistance as needed and outpatient follow-up   Family able to assist:    Schedule:  Transport provider:      [] Home with Home Health   - Provider:     [x]SNF/IPR   -[x]Preferences given: ZEE CAICEDO  []Listing provided and preferences requested   -Status: [x]Pending []Accepted:    -Auth required: []Yes [x]No    -Auth initiated date:   -3 midnight stay required: []Yes [x]No  Date satisfied:     [] Other:     Michael Leong MA    Care Management Interventions  PCP Verified by CM: Yes([employer])  Mode of Transport at Discharge:  Other (see comment)(TBD)  Transition of Care Consult (CM Consult): Discharge Planning, Other(IPR)  MyChart Signup: No  Discharge Durable Medical Equipment: No  Physical Therapy Consult: Yes  Occupational Therapy Consult: Yes  Speech Therapy Consult: No  Current Support Network: Lives with Spouse  Confirm Follow Up Transport: Family  The Plan for Transition of Care is Related to the Following Treatment Goals : Stenosis  The Patient and/or Patient Representative was Provided with a Choice of Provider and Agrees with the Discharge Plan?: (S) Yes  Name of the Patient Representative Who was Provided with a Choice of Provider and Agrees with the Discharge Plan: Family  Freedom of Choice List was Provided with Basic Dialogue that Supports the Patient's Individualized Plan of Care/Goals, Treatment Preferences and Shares the Quality Data Associated with the Providers?: (S) Yes  Discharge Location  Discharge Placement: Rehab hospital/unit acute

## 2021-01-22 NOTE — PERIOP NOTES
TRANSFER - OUT REPORT: 
 
Verbal report given to RN(name) on Karen Wilson  being transferred to [facility](unit) for {TRANSFER RNWP:08826} Report consisted of patients Situation, Background, Assessment and  
Recommendations(SBAR). Information from the following report(s) {SBAR REPORTS LVBT:47702} was reviewed with the receiving nurse. Lines:  
Peripheral IV 01/21/21 Right Forearm (Active) Site Assessment Clean, dry, & intact 01/21/21 1725 Phlebitis Assessment 0 01/21/21 1725 Infiltration Assessment 0 01/21/21 1725 Dressing Status Clean, dry, & intact; Occlusive 01/21/21 1725 Dressing Type Tape;Transparent 01/21/21 1725 Hub Color/Line Status Pink;Capped 01/21/21 1725 Alcohol Cap Used Yes 01/21/21 5900 Peripheral IV 01/21/21 Left Hand (Active) Site Assessment Clean, dry, & intact 01/21/21 1725 Phlebitis Assessment 0 01/21/21 1725 Infiltration Assessment 0 01/21/21 1725 Dressing Status Clean, dry, & intact; Occlusive 01/21/21 1725 Dressing Type Tape;Transparent 01/21/21 1725 Hub Color/Line Status Pink; Infusing 01/21/21 1725 Alcohol Cap Used Yes 01/21/21 8313 Opportunity for questions and clarification was provided. Patient transported with: 
 {TRANSPORTDETAILS:81943}

## 2021-01-23 ENCOUNTER — APPOINTMENT (OUTPATIENT)
Dept: GENERAL RADIOLOGY | Age: 76
DRG: 460 | End: 2021-01-23
Attending: NEUROLOGICAL SURGERY
Payer: MEDICARE

## 2021-01-23 LAB
ALBUMIN SERPL-MCNC: 3 G/DL (ref 3.5–5)
ALBUMIN/GLOB SERPL: 1.1 {RATIO} (ref 1.1–2.2)
ALP SERPL-CCNC: 73 U/L (ref 45–117)
ALT SERPL-CCNC: 16 U/L (ref 12–78)
ANION GAP SERPL CALC-SCNC: 5 MMOL/L (ref 5–15)
AST SERPL-CCNC: 22 U/L (ref 15–37)
BILIRUB SERPL-MCNC: 0.5 MG/DL (ref 0.2–1)
BUN SERPL-MCNC: 13 MG/DL (ref 6–20)
BUN/CREAT SERPL: 19 (ref 12–20)
CALCIUM SERPL-MCNC: 8.6 MG/DL (ref 8.5–10.1)
CHLORIDE SERPL-SCNC: 105 MMOL/L (ref 97–108)
CO2 SERPL-SCNC: 28 MMOL/L (ref 21–32)
CREAT SERPL-MCNC: 0.69 MG/DL (ref 0.7–1.3)
ERYTHROCYTE [DISTWIDTH] IN BLOOD BY AUTOMATED COUNT: 12.8 % (ref 11.5–14.5)
EST. AVERAGE GLUCOSE BLD GHB EST-MCNC: 123 MG/DL
FERRITIN SERPL-MCNC: 36 NG/ML (ref 26–388)
FOLATE SERPL-MCNC: 8.7 NG/ML (ref 5–21)
GLOBULIN SER CALC-MCNC: 2.8 G/DL (ref 2–4)
GLUCOSE BLD STRIP.AUTO-MCNC: 103 MG/DL (ref 65–100)
GLUCOSE BLD STRIP.AUTO-MCNC: 120 MG/DL (ref 65–100)
GLUCOSE BLD STRIP.AUTO-MCNC: 126 MG/DL (ref 65–100)
GLUCOSE BLD STRIP.AUTO-MCNC: 97 MG/DL (ref 65–100)
GLUCOSE SERPL-MCNC: 119 MG/DL (ref 65–100)
HAPTOGLOB SERPL-MCNC: 198 MG/DL (ref 30–200)
HBA1C MFR BLD: 5.9 % (ref 4–5.6)
HCT VFR BLD AUTO: 35.4 % (ref 36.6–50.3)
HGB BLD-MCNC: 11.3 G/DL (ref 12.1–17)
IRON SATN MFR SERPL: 15 % (ref 20–50)
IRON SERPL-MCNC: 42 UG/DL (ref 35–150)
MAGNESIUM SERPL-MCNC: 1.9 MG/DL (ref 1.6–2.4)
MCH RBC QN AUTO: 31 PG (ref 26–34)
MCHC RBC AUTO-ENTMCNC: 31.9 G/DL (ref 30–36.5)
MCV RBC AUTO: 97 FL (ref 80–99)
NRBC # BLD: 0 K/UL (ref 0–0.01)
NRBC BLD-RTO: 0 PER 100 WBC
PHOSPHATE SERPL-MCNC: 2.8 MG/DL (ref 2.6–4.7)
PLATELET # BLD AUTO: 205 K/UL (ref 150–400)
PMV BLD AUTO: 10.2 FL (ref 8.9–12.9)
POTASSIUM SERPL-SCNC: 3.7 MMOL/L (ref 3.5–5.1)
PROT SERPL-MCNC: 5.8 G/DL (ref 6.4–8.2)
RBC # BLD AUTO: 3.65 M/UL (ref 4.1–5.7)
SERVICE CMNT-IMP: ABNORMAL
SERVICE CMNT-IMP: NORMAL
SODIUM SERPL-SCNC: 138 MMOL/L (ref 136–145)
TIBC SERPL-MCNC: 280 UG/DL (ref 250–450)
VIT B12 SERPL-MCNC: 177 PG/ML (ref 193–986)
WBC # BLD AUTO: 11.3 K/UL (ref 4.1–11.1)

## 2021-01-23 PROCEDURE — 94760 N-INVAS EAR/PLS OXIMETRY 1: CPT

## 2021-01-23 PROCEDURE — 74011000250 HC RX REV CODE- 250: Performed by: NEUROLOGICAL SURGERY

## 2021-01-23 PROCEDURE — 97535 SELF CARE MNGMENT TRAINING: CPT

## 2021-01-23 PROCEDURE — 74011250637 HC RX REV CODE- 250/637: Performed by: NEUROLOGICAL SURGERY

## 2021-01-23 PROCEDURE — 97530 THERAPEUTIC ACTIVITIES: CPT

## 2021-01-23 PROCEDURE — 74011250636 HC RX REV CODE- 250/636: Performed by: NEUROLOGICAL SURGERY

## 2021-01-23 PROCEDURE — 65270000029 HC RM PRIVATE

## 2021-01-23 PROCEDURE — 36415 COLL VENOUS BLD VENIPUNCTURE: CPT

## 2021-01-23 PROCEDURE — 74011250637 HC RX REV CODE- 250/637: Performed by: INTERNAL MEDICINE

## 2021-01-23 PROCEDURE — 94640 AIRWAY INHALATION TREATMENT: CPT

## 2021-01-23 PROCEDURE — 74011250636 HC RX REV CODE- 250/636: Performed by: INTERNAL MEDICINE

## 2021-01-23 PROCEDURE — 80053 COMPREHEN METABOLIC PANEL: CPT

## 2021-01-23 PROCEDURE — 72040 X-RAY EXAM NECK SPINE 2-3 VW: CPT

## 2021-01-23 PROCEDURE — 85027 COMPLETE CBC AUTOMATED: CPT

## 2021-01-23 PROCEDURE — 83735 ASSAY OF MAGNESIUM: CPT

## 2021-01-23 PROCEDURE — 84100 ASSAY OF PHOSPHORUS: CPT

## 2021-01-23 PROCEDURE — 74011000250 HC RX REV CODE- 250: Performed by: INTERNAL MEDICINE

## 2021-01-23 PROCEDURE — 82962 GLUCOSE BLOOD TEST: CPT

## 2021-01-23 PROCEDURE — 74011000258 HC RX REV CODE- 258: Performed by: INTERNAL MEDICINE

## 2021-01-23 RX ORDER — FERROUS GLUCONATE 324(38)MG
1 TABLET ORAL EVERY OTHER DAY
Status: DISCONTINUED | OUTPATIENT
Start: 2021-01-24 | End: 2021-01-26 | Stop reason: HOSPADM

## 2021-01-23 RX ORDER — AZITHROMYCIN 250 MG/1
250 TABLET, FILM COATED ORAL
Status: DISCONTINUED | OUTPATIENT
Start: 2021-01-23 | End: 2021-01-25

## 2021-01-23 RX ORDER — IPRATROPIUM BROMIDE AND ALBUTEROL SULFATE 2.5; .5 MG/3ML; MG/3ML
3 SOLUTION RESPIRATORY (INHALATION) EVERY 8 HOURS
Status: DISCONTINUED | OUTPATIENT
Start: 2021-01-23 | End: 2021-01-26 | Stop reason: HOSPADM

## 2021-01-23 RX ADMIN — Medication 10 ML: at 12:52

## 2021-01-23 RX ADMIN — AZITHROMYCIN 250 MG: 250 TABLET, FILM COATED ORAL at 10:00

## 2021-01-23 RX ADMIN — OXYCODONE 5 MG: 5 TABLET ORAL at 03:42

## 2021-01-23 RX ADMIN — GABAPENTIN 600 MG: 300 CAPSULE ORAL at 09:35

## 2021-01-23 RX ADMIN — METFORMIN HYDROCHLORIDE 500 MG: 500 TABLET ORAL at 09:35

## 2021-01-23 RX ADMIN — BUDESONIDE 500 MCG: 0.5 INHALANT RESPIRATORY (INHALATION) at 20:07

## 2021-01-23 RX ADMIN — IPRATROPIUM BROMIDE AND ALBUTEROL SULFATE 3 ML: .5; 2.5 SOLUTION RESPIRATORY (INHALATION) at 16:29

## 2021-01-23 RX ADMIN — HYDROMORPHONE HYDROCHLORIDE 1 MG: 1 INJECTION, SOLUTION INTRAMUSCULAR; INTRAVENOUS; SUBCUTANEOUS at 17:28

## 2021-01-23 RX ADMIN — Medication 10 ML: at 06:26

## 2021-01-23 RX ADMIN — GABAPENTIN 600 MG: 300 CAPSULE ORAL at 17:09

## 2021-01-23 RX ADMIN — POTASSIUM CHLORIDE: 2 INJECTION, SOLUTION, CONCENTRATE INTRAVENOUS at 17:17

## 2021-01-23 RX ADMIN — HYDROMORPHONE HYDROCHLORIDE 1 MG: 1 INJECTION, SOLUTION INTRAMUSCULAR; INTRAVENOUS; SUBCUTANEOUS at 22:50

## 2021-01-23 RX ADMIN — GABAPENTIN 600 MG: 300 CAPSULE ORAL at 12:52

## 2021-01-23 RX ADMIN — Medication 10 ML: at 22:55

## 2021-01-23 RX ADMIN — ARFORMOTEROL TARTRATE 15 MCG: 15 SOLUTION RESPIRATORY (INHALATION) at 20:07

## 2021-01-23 RX ADMIN — ARFORMOTEROL TARTRATE 15 MCG: 15 SOLUTION RESPIRATORY (INHALATION) at 08:59

## 2021-01-23 RX ADMIN — HYDROMORPHONE HYDROCHLORIDE 1 MG: 1 INJECTION, SOLUTION INTRAMUSCULAR; INTRAVENOUS; SUBCUTANEOUS at 14:02

## 2021-01-23 RX ADMIN — IPRATROPIUM BROMIDE AND ALBUTEROL SULFATE 3 ML: .5; 2.5 SOLUTION RESPIRATORY (INHALATION) at 08:54

## 2021-01-23 RX ADMIN — POLYETHYLENE GLYCOL 3350 17 G: 17 POWDER, FOR SOLUTION ORAL at 09:35

## 2021-01-23 RX ADMIN — DULOXETINE HYDROCHLORIDE 60 MG: 30 CAPSULE, DELAYED RELEASE ORAL at 09:35

## 2021-01-23 RX ADMIN — BUDESONIDE 500 MCG: 0.5 INHALANT RESPIRATORY (INHALATION) at 08:59

## 2021-01-23 RX ADMIN — GABAPENTIN 600 MG: 300 CAPSULE ORAL at 22:50

## 2021-01-23 RX ADMIN — DULOXETINE HYDROCHLORIDE 60 MG: 30 CAPSULE, DELAYED RELEASE ORAL at 17:09

## 2021-01-23 RX ADMIN — ATORVASTATIN CALCIUM 10 MG: 10 TABLET, FILM COATED ORAL at 09:35

## 2021-01-23 RX ADMIN — IPRATROPIUM BROMIDE AND ALBUTEROL SULFATE 3 ML: .5; 2.5 SOLUTION RESPIRATORY (INHALATION) at 20:04

## 2021-01-23 RX ADMIN — SENNOSIDES 17.2 MG: 8.6 TABLET, FILM COATED ORAL at 22:49

## 2021-01-23 NOTE — PROGRESS NOTES
11:14 AM   01/23/21     RUR 11%    Transition Care Plan   1. Referrals sent to Inpatient rehab [facility] awaiting responses   2. Patient family supportive will transport   3. Patient will follow up with PCP and specialities   4.  CM will follow for discharge needs     Ike Ro RN CCM

## 2021-01-23 NOTE — PROGRESS NOTES
POD2 C2-4 lami, C2-T2 fusion  afvss x occ htn  Drain 150  C/o neck pain  Reports unable to get up yesterday with therapy    Myelopathic with HI atrophy and weakness  D/B/T4+  Drain and collar in place    A/p  neurostable   Hx polio with baseline L sided weakness  Pt/ot/oob  Collar, can dc to eat if needed  Postop xr today  Continue drain, likely out tomorrow  Rehab consult, ok with me for any rehab - [facility], [facility], [facility], etc -   Based on severe preop myelopathy in the setting of previous deficits from postpolio suspect inpt rehab will be most appropriate posthospital setting

## 2021-01-23 NOTE — PROGRESS NOTES
Problem: Mobility Impaired (Adult and Pediatric)  Goal: *Acute Goals and Plan of Care (Insert Text)  Description: FUNCTIONAL STATUS PRIOR TO ADMISSION: At baseline patient uses a rollator and assistance with gait. Uses wheelchair out of the home. HOME SUPPORT PRIOR TO ADMISSION: The patient lived with wife. Physical Therapy Goals  Initiated 1/22/2021  1. Patient will move from supine to sit and sit to supine  in bed with minimal assistance/contact guard assist within 7 day(s). 2.  Patient will transfer from bed to chair and chair to bed with minimal assistance/contact guard assist using the least restrictive device within 7 day(s). 3.  Patient will perform sit to stand with minimal assistance/contact guard assist within 7 day(s). 4.  Patient will ambulate with minimal assistance/contact guard assist for 100 feet with the least restrictive device within 7 day(s). 5.  Patient will ascend/descend 4 stairs with handrail(s) with minimal assistance/contact guard assist within 7 day(s). Note:   PHYSICAL THERAPY TREATMENT  Patient: Whitley Santillan (61 y.o. male)  Date: 1/23/2021  Diagnosis: Cervical myelopathy (HCC) [G95.9] <principal problem not specified>  Procedure(s) (LRB):  C2-4 LAMINECTOMY, C2-T2 INSTRUMENTED FUSION (N/A) 2 Days Post-Op  Precautions: Fall, Spinal(cervical; hard aspen collar; [place])  Chart, physical therapy assessment, plan of care and goals were reviewed. ASSESSMENT  Patient continues with skilled PT services. Pt vitals stable. Pt comes to sit with mod assist of 2. Pt to stand with bed elevated min assist of 2. Pt took 4 labored steps to chair with RW min assist of 2. Pt is unsteady and moves slowly. Pt reporting pain in front of head multiple times during treatment. Nurse notified. Continue goals. Current Level of Function Impacting Discharge (mobility/balance): Pt mod assist of 2 for bed mobility.              PLAN :  Patient continues to benefit from skilled intervention to address the above impairments. Continue treatment per established plan of care. to address goals. Recommendation for discharge: (in order for the patient to meet his/her long term goals)  Per MD recommendation. This discharge recommendation:  Has been made in collaboration with the attending provider and/or case management    IF patient discharges home will need the following DME: rolling walker       SUBJECTIVE:       OBJECTIVE DATA SUMMARY:   Critical Behavior:  Neurologic State: Alert  Orientation Level: Oriented X4  Cognition: Appropriate decision making, Follows commands  Safety/Judgement: Decreased awareness of environment, Fall prevention  Functional Mobility Training:  Bed Mobility:  Rolling:  Moderate assistance  Supine to Sit: Moderate assistance;Assist x2              Transfers:  Sit to Stand: Minimum assistance;Assist x2  Stand to Sit: Minimum assistance;Assist x2        Bed to Chair: Minimum assistance;Assist x2                    Balance:  Sitting: Intact  Standing: With support  Ambulation/Gait Training:  Distance (ft): 3 Feet (ft)  Assistive Device: Walker, rolling;Gait belt  Ambulation - Level of Assistance: Minimal assistance;Assist x2        Gait Abnormalities: Decreased step clearance        Base of Support: Narrowed     Speed/Tiffanie: Pace decreased (<100 feet/min)  Step Length: Right shortened;Left shortened            Activity Tolerance:   Fair    After treatment patient left in no apparent distress:   Sitting in chair    COMMUNICATION/COLLABORATION:   The patients plan of care was discussed with: Physical therapist.     José Miguel Padron PTA

## 2021-01-23 NOTE — PROGRESS NOTES
Problem: Self Care Deficits Care Plan (Adult)  Goal: *Acute Goals and Plan of Care (Insert Text)  Description:   FUNCTIONAL STATUS PRIOR TO ADMISSION: Per pt, w/c and rollator use for mobility with occasional assist. Per son, pt sits on edge of garden tub for bathing. Pt reports mostly mod I for ADLs, including dressing and donning L LE brace. Per chart, pt with multiple falls. HOME SUPPORT: The patient lived with wife. 1.  Patient will perform lower body dressing with minimal assistance/contact guard assist using within 7 days. 2.  Patient will perform upper body dressing with minimal assistance/contact guard assist within 7 days. 3.  Patient will standing ADLs 5 mins at minimal assistance/contact guard assist within 7 days. 4.  Patient will don/doff neck brace at moderate assistance  within 7 days. 5.  Patient will verbalize/demonstrate 3/3 cervical precautions during ADL tasks without cues within  7 days. Outcome: Progressing Towards Goal   OCCUPATIONAL THERAPY TREATMENT  Patient: Maria G Sánchez (66 y.o. male)  Date: 1/23/2021  Diagnosis: Cervical myelopathy (HCC) [G95.9] <principal problem not specified>  Procedure(s) (LRB):  C2-4 LAMINECTOMY, C2-T2 INSTRUMENTED FUSION (N/A) 2 Days Post-Op  Precautions: Fall, Spinal(cervical; hard aspen collar; [place])  Chart, occupational therapy assessment, plan of care, and goals were reviewed. ASSESSMENT  Patient continues with skilled OT services and is progressing towards goals. Pt received supine in bed, cervical collar on. Lunch arrived. Goal is to place pt in chair for lunch. Pt with c/o of head/neck pain. Pt felt his UEs were moving well, but felt his head had. increase pressure. Nursing notified. Mod assist with bed mobility, total assist with donning shoes, socks and L AFO brace seated on EOB. Transferred to chair using RW at min assist x 2.  BP remained stable. Pt set-up for lunch.  Instructed nurse to put pt back to bed after her eats to allow pt to rest d/t increase headahce. Verbalized understanding. Will con't to follow and address listed goals  Vitals:    01/23/21 1100 01/23/21 1207 01/23/21 1215 01/23/21 1241   BP: (!) 144/77 (!) 148/80 122/83 128/73   BP 1 Location: Right arm Right arm Right arm Right arm   BP Patient Position: At rest;Supine Sitting Sitting;Post activity Sitting   Pulse: 90 88  85   Resp:    16   Temp:    98.3 °F (36.8 °C)   SpO2: 95% 96%  95%   Weight:       Height:             Current Level of Function Impacting Discharge (ADLs): mod to mod assist x 2 with mobility, total assist with LB self-care    Other factors to consider for discharge:          PLAN :  Patient continues to benefit from skilled intervention to address the above impairments. Continue treatment per established plan of care. to address goals. Recommend with staff:   Recommend patient be OOB to chair as frequently as tolerated; Goal of 3x/day for all meals. For toileting needs, recommend transfers to [facility]. Encourage patient involvement in personal care as able. Encourage exercises frequently throughout the day. Recommend next OT session: access bathroom    Recommendation for discharge: (in order for the patient to meet his/her long term goals)  Therapy 3 hours per day 5-7 days per week    This discharge recommendation:  A follow-up discussion with the attending provider and/or case management is planned    IF patient discharges home will need the following DME: TBD at rehab       SUBJECTIVE:   Patient stated The top of my head hurts.     OBJECTIVE DATA SUMMARY:   Cognitive/Behavioral Status:  Neurologic State: Alert  Orientation Level: Oriented X4  Cognition: Appropriate decision making; Follows commands             Functional Mobility and Transfers for ADLs:  Bed Mobility:  Rolling:  Moderate assistance  Supine to Sit: Moderate assistance;Assist x2    Transfers:  Sit to Stand: Minimum assistance;Assist x2     Bed to Chair: Minimum assistance;Assist x2    Balance:  Sitting: Intact  Standing: Impaired; With support    ADL Intervention:  Feeding  Feeding Assistance: Set-up  Cutting Food: Set-up  Food to Mouth: Independent         Lower Body Dressing Assistance  Socks: Total assistance (dependent)  Shoes with Velcro: Total assistance (dependent)  Position Performed: Seated edge of bed              Therapeutic Exercises:       Pain:  Head/neck p! Activity Tolerance:   Fair    After treatment patient left in no apparent distress:   Sitting in chair and Call bell within reach    COMMUNICATION/COLLABORATION:   The patients plan of care was discussed with: Physical therapist and Registered nurse.      Jerone Cooks, OTR/L  Time Calculation: 25 mins

## 2021-01-23 NOTE — PROGRESS NOTES
[facility]    Medical Progress Note      NAME: Jamey Sinclair   :  1945  MRM:  [id number]    Date/Time of service 2021  8:27 AM          Assessment and Plan:     Cervical myelopathy / Spinal stenosis of cervical region / Neuropathy / Post-polio syndrome / Osteoarthritis - Tolerated surgery. Ortho will be attending to pain control, DVT prophylaxis and rehab decisions as usual. Continue cymbalta and half dose of gabapentin. Fall precautions. PT/OT eval.  Will go to rehab       Dementia / Depression with anxiety / Fibromyalgia - POA and currently severe. Perseverating about his nebs and his pain. Unless he improves he is a poor candidate for any rehab. Continue Cymbalta. Needs outpatient neuropsych testing.        Anemia - Moderate and noted post op, but stable. Monitor. Low iron on serologies, I will start some PO iron. Likely chronic nutritional issue    Chronic obstructive pulmonary disease / Bronchitis, chronic / Leukocytosis - POA. Stable so far. Continue brovana, pulmicort and prn duonebs and guaifenesin. He requests scheduled nebs      DM type 2 causing neurological disease - Diabetic diet and counseling.  SSI per protocol.  Continue home metformin.      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 Needs nutritional support. This will significantly limit rehab      Hyperlipidemia - On atorvastatin at home. LDL <100 on lipid panel. Lacking cause to keep <100, stop statin. Subjective:     Chief Complaint:  Pain all over, didn't sleep, wants more nebs    ROS:  (bold if positive, if negative)    Tolerating PT  Tolerating Diet        Objective:     Last 24hrs VS reviewed since prior progress note.  Most recent are:    Visit Vitals  /83 (BP 1 Location: Right arm, BP Patient Position: At rest)   Pulse 79   Temp 98.5 °F (36.9 °C)   Resp 18   Ht 6' (1.829 m)   Wt 73.5 kg (162 lb)   SpO2 92%   BMI 21.97 kg/m²     SpO2 Readings from Last 6 Encounters:   21 92%   01/18/21 96%   10/22/20 97%   10/08/19 96%   04/13/17 92%   01/12/17 97%    O2 Flow Rate (L/min): 2 l/min       Intake/Output Summary (Last 24 hours) at 1/23/2021 0827  Last data filed at 1/23/2021 7354  Gross per 24 hour   Intake 1706.25 ml   Output 1800 ml   Net -93.75 ml        Physical Exam:    Gen:  Frail, thin, in mild acute distress  HEENT:  Pink conjunctivae, PERRL, hearing barely intact to voice, moist mucous membranes  Neck:  Supple, without masses, thyroid non-tender  Resp:  No accessory muscle use, bilateral breath sounds without wheezes rales or rhonchi  Card:  No murmurs, normal S1, S2 without thrills, bruits or peripheral edema  Abd:  Soft, non-tender, non-distended, normoactive bowel sounds are present, no mass  Lymph:  No cervical or inguinal adenopathy  Musc:  No cyanosis or clubbing  Skin:  No rashes or ulcers, skin turgor is good  Neuro:  Cranial nerves are grossly intact, general motor weakness, follows commands vaguely  Psych:  Poor insight, oriented to person, place    Telemetry reviewed:   normal sinus rhythm  __________________________________________________________________  Medications Reviewed: (see below)  Medications:     Current Facility-Administered Medications   Medication Dose Route Frequency    [START ON 1/24/2021] ferrous gluconate 324 mg (38 mg iron) tablet 1 Tab  1 Tab Oral EVERY OTHER DAY    glucose chewable tablet 16 g  4 Tab Oral PRN    dextrose (D50W) injection syrg 12.5-25 g  25-50 mL IntraVENous PRN    glucagon (GLUCAGEN) injection 1 mg  1 mg IntraMUSCular PRN    insulin lispro (HUMALOG) injection   SubCUTAneous AC&HS    oxyCODONE IR (ROXICODONE) tablet 5 mg  5 mg Oral Q4H PRN    oxyCODONE IR (ROXICODONE) tablet 10 mg  10 mg Oral Q4H PRN    acetaminophen (TYLENOL) tablet 500 mg  500 mg Oral Q4H PRN    HYDROmorphone (DILAUDID) syringe 0.5 mg  0.5 mg IntraVENous Q4H PRN    HYDROmorphone (PF) (DILAUDID) injection 1 mg  1 mg IntraVENous Q4H PRN    dextrose 5 % - 0.45% NaCl 1,000 mL with mvi, adult no. 4 with vit K 10 mL, thiamine 204 mg, folic acid 1 mg, potassium chloride 20 mEq infusion   IntraVENous Q24H    albuterol-ipratropium (DUO-NEB) 2.5 MG-0.5 MG/3 ML  3 mL Nebulization Q6H PRN    atorvastatin (LIPITOR) tablet 10 mg  10 mg Oral DAILY    DULoxetine (CYMBALTA) capsule 60 mg  60 mg Oral BID    gabapentin (NEURONTIN) capsule 600 mg  600 mg Oral QID    guaiFENesin ER (MUCINEX) tablet 1,200 mg  1,200 mg Oral Q12H PRN    metFORMIN (GLUCOPHAGE) tablet 500 mg  500 mg Oral DAILY    budesonide (PULMICORT) 500 mcg/2 ml nebulizer suspension  500 mcg Nebulization BID RT    sodium chloride (NS) flush 5-40 mL  5-40 mL IntraVENous Q8H    sodium chloride (NS) flush 5-40 mL  5-40 mL IntraVENous PRN    naloxone (NARCAN) injection 0.4 mg  0.4 mg IntraVENous PRN    arformoteroL (BROVANA) neb solution 15 mcg  15 mcg Nebulization BID RT    hydrOXYzine HCL (ATARAX) tablet 10 mg  10 mg Oral Q8H PRN    polyethylene glycol (MIRALAX) packet 17 g  17 g Oral DAILY    phenol throat spray (CHLORASEPTIC) 1 Spray  1 Spray Oral PRN    cyclobenzaprine (FLEXERIL) tablet 10 mg  10 mg Oral TID PRN    diazePAM (VALIUM) tablet 5 mg  5 mg Oral Q6H PRN    senna (SENOKOT) tablet 17.2 mg  2 Tab Oral QHS    pseudoephedrine (SUDAFED) tablet 60 mg  60 mg Oral Q6H PRN        Lab Data Reviewed: (see below)  Lab Review:     Recent Labs     01/23/21  0321 01/22/21  0240   WBC 11.3* 14.1*   HGB 11.3* 10.8*   HCT 35.4* 33.9*    215     Recent Labs     01/23/21  0321 01/22/21  0240    139   K 3.7 4.2    109*   CO2 28 27   * 101*   BUN 13 12   CREA 0.69* 0.75   CA 8.6 8.2*   MG 1.9  --    PHOS 2.8  --    ALB 3.0*  --    TBILI 0.5  --    ALT 16  --      Lab Results   Component Value Date/Time    Glucose (POC) 120 (H) 01/23/2021 06:31 AM    Glucose (POC) 128 (H) 01/22/2021 09:48 PM    Glucose (POC) 99 01/22/2021 04:39 PM    Glucose (POC) 114 (H) 01/22/2021 12:52 PM    Glucose (POC) 142 (H) 01/21/2021 12:22 PM     No results for input(s): PH, PCO2, PO2, HCO3, FIO2 in the last 72 hours. No results for input(s): INR, INREXT in the last 72 hours. All Micro Results     None          Other pertinent lab: none    Total time spent with patient: 29 Minutes I personally reviewed chart, notes, data and current medications in the medical record. I have personally examined and treated the patient at bedside during this period.                  Care Plan discussed with: Patient, Care Manager, Nursing Staff and >50% of time spent in counseling and coordination of care    Discussed:  Care Plan and D/C Planning    Prophylaxis:  H2B/PPI    Disposition:  [facility]/Rehab           ___________________________________________________    Attending Physician: Serena Salamanca MD

## 2021-01-24 LAB
GLUCOSE BLD STRIP.AUTO-MCNC: 125 MG/DL (ref 65–100)
GLUCOSE BLD STRIP.AUTO-MCNC: 139 MG/DL (ref 65–100)
GLUCOSE BLD STRIP.AUTO-MCNC: 151 MG/DL (ref 65–100)
GLUCOSE BLD STRIP.AUTO-MCNC: 179 MG/DL (ref 65–100)
SERVICE CMNT-IMP: ABNORMAL

## 2021-01-24 PROCEDURE — 94640 AIRWAY INHALATION TREATMENT: CPT

## 2021-01-24 PROCEDURE — 74011250637 HC RX REV CODE- 250/637: Performed by: INTERNAL MEDICINE

## 2021-01-24 PROCEDURE — 74011000250 HC RX REV CODE- 250: Performed by: NEUROLOGICAL SURGERY

## 2021-01-24 PROCEDURE — 74011000250 HC RX REV CODE- 250: Performed by: INTERNAL MEDICINE

## 2021-01-24 PROCEDURE — 65270000029 HC RM PRIVATE

## 2021-01-24 PROCEDURE — 74011250637 HC RX REV CODE- 250/637: Performed by: NEUROLOGICAL SURGERY

## 2021-01-24 PROCEDURE — 97530 THERAPEUTIC ACTIVITIES: CPT

## 2021-01-24 PROCEDURE — 82962 GLUCOSE BLOOD TEST: CPT

## 2021-01-24 PROCEDURE — 74011250636 HC RX REV CODE- 250/636: Performed by: NEUROLOGICAL SURGERY

## 2021-01-24 PROCEDURE — 74011636637 HC RX REV CODE- 636/637: Performed by: INTERNAL MEDICINE

## 2021-01-24 RX ORDER — DIAZEPAM 5 MG/1
5 TABLET ORAL
Status: DISCONTINUED | OUTPATIENT
Start: 2021-01-24 | End: 2021-01-26 | Stop reason: HOSPADM

## 2021-01-24 RX ORDER — DEXAMETHASONE SODIUM PHOSPHATE 4 MG/ML
10 INJECTION, SOLUTION INTRA-ARTICULAR; INTRALESIONAL; INTRAMUSCULAR; INTRAVENOUS; SOFT TISSUE ONCE
Status: COMPLETED | OUTPATIENT
Start: 2021-01-24 | End: 2021-01-24

## 2021-01-24 RX ORDER — ENOXAPARIN SODIUM 100 MG/ML
40 INJECTION SUBCUTANEOUS EVERY 24 HOURS
Status: DISCONTINUED | OUTPATIENT
Start: 2021-01-25 | End: 2021-01-26 | Stop reason: HOSPADM

## 2021-01-24 RX ORDER — DEXAMETHASONE SODIUM PHOSPHATE 4 MG/ML
4 INJECTION, SOLUTION INTRA-ARTICULAR; INTRALESIONAL; INTRAMUSCULAR; INTRAVENOUS; SOFT TISSUE EVERY 8 HOURS
Status: COMPLETED | OUTPATIENT
Start: 2021-01-24 | End: 2021-01-26

## 2021-01-24 RX ADMIN — IPRATROPIUM BROMIDE AND ALBUTEROL SULFATE 3 ML: .5; 2.5 SOLUTION RESPIRATORY (INHALATION) at 20:39

## 2021-01-24 RX ADMIN — GABAPENTIN 600 MG: 300 CAPSULE ORAL at 22:17

## 2021-01-24 RX ADMIN — ARFORMOTEROL TARTRATE 15 MCG: 15 SOLUTION RESPIRATORY (INHALATION) at 08:29

## 2021-01-24 RX ADMIN — OXYCODONE 5 MG: 5 TABLET ORAL at 12:01

## 2021-01-24 RX ADMIN — METFORMIN HYDROCHLORIDE 500 MG: 500 TABLET ORAL at 09:59

## 2021-01-24 RX ADMIN — FERROUS GLUCONATE 1 TABLET: 324 TABLET ORAL at 09:59

## 2021-01-24 RX ADMIN — Medication 10 ML: at 22:17

## 2021-01-24 RX ADMIN — DEXAMETHASONE SODIUM PHOSPHATE 4 MG: 4 INJECTION, SOLUTION INTRAMUSCULAR; INTRAVENOUS at 22:17

## 2021-01-24 RX ADMIN — BUDESONIDE 500 MCG: 0.5 INHALANT RESPIRATORY (INHALATION) at 20:30

## 2021-01-24 RX ADMIN — GABAPENTIN 600 MG: 300 CAPSULE ORAL at 14:55

## 2021-01-24 RX ADMIN — IPRATROPIUM BROMIDE AND ALBUTEROL SULFATE 3 ML: .5; 2.5 SOLUTION RESPIRATORY (INHALATION) at 08:24

## 2021-01-24 RX ADMIN — HYDROMORPHONE HYDROCHLORIDE 1 MG: 1 INJECTION, SOLUTION INTRAMUSCULAR; INTRAVENOUS; SUBCUTANEOUS at 22:17

## 2021-01-24 RX ADMIN — ATORVASTATIN CALCIUM 10 MG: 10 TABLET, FILM COATED ORAL at 09:59

## 2021-01-24 RX ADMIN — HYDROMORPHONE HYDROCHLORIDE 1 MG: 1 INJECTION, SOLUTION INTRAMUSCULAR; INTRAVENOUS; SUBCUTANEOUS at 02:13

## 2021-01-24 RX ADMIN — GABAPENTIN 600 MG: 300 CAPSULE ORAL at 09:59

## 2021-01-24 RX ADMIN — INSULIN LISPRO 2 UNITS: 100 INJECTION, SOLUTION INTRAVENOUS; SUBCUTANEOUS at 16:30

## 2021-01-24 RX ADMIN — SENNOSIDES 17.2 MG: 8.6 TABLET, FILM COATED ORAL at 22:17

## 2021-01-24 RX ADMIN — HYDROMORPHONE HYDROCHLORIDE 1 MG: 1 INJECTION, SOLUTION INTRAMUSCULAR; INTRAVENOUS; SUBCUTANEOUS at 17:26

## 2021-01-24 RX ADMIN — HYDROMORPHONE HYDROCHLORIDE 1 MG: 1 INJECTION, SOLUTION INTRAMUSCULAR; INTRAVENOUS; SUBCUTANEOUS at 13:57

## 2021-01-24 RX ADMIN — Medication 10 ML: at 06:27

## 2021-01-24 RX ADMIN — DULOXETINE HYDROCHLORIDE 60 MG: 30 CAPSULE, DELAYED RELEASE ORAL at 17:26

## 2021-01-24 RX ADMIN — BUDESONIDE 500 MCG: 0.5 INHALANT RESPIRATORY (INHALATION) at 08:29

## 2021-01-24 RX ADMIN — DEXAMETHASONE SODIUM PHOSPHATE 10 MG: 4 INJECTION, SOLUTION INTRAMUSCULAR; INTRAVENOUS at 15:18

## 2021-01-24 RX ADMIN — CYCLOBENZAPRINE 10 MG: 10 TABLET, FILM COATED ORAL at 12:01

## 2021-01-24 RX ADMIN — HYDROMORPHONE HYDROCHLORIDE 1 MG: 1 INJECTION, SOLUTION INTRAMUSCULAR; INTRAVENOUS; SUBCUTANEOUS at 09:59

## 2021-01-24 RX ADMIN — POLYETHYLENE GLYCOL 3350 17 G: 17 POWDER, FOR SOLUTION ORAL at 09:59

## 2021-01-24 RX ADMIN — DULOXETINE HYDROCHLORIDE 60 MG: 30 CAPSULE, DELAYED RELEASE ORAL at 09:59

## 2021-01-24 RX ADMIN — ARFORMOTEROL TARTRATE 15 MCG: 15 SOLUTION RESPIRATORY (INHALATION) at 20:30

## 2021-01-24 RX ADMIN — GABAPENTIN 600 MG: 300 CAPSULE ORAL at 17:26

## 2021-01-24 RX ADMIN — Medication 10 ML: at 14:00

## 2021-01-24 RX ADMIN — IPRATROPIUM BROMIDE AND ALBUTEROL SULFATE 3 ML: .5; 2.5 SOLUTION RESPIRATORY (INHALATION) at 15:41

## 2021-01-24 NOTE — PROGRESS NOTES
10:28 AM   01/24/21     RUR 11%     Transition Care Plan   1. Referrals sent to Inpatient rehab [facility] awaiting responses   2. Patient family supportive will transport   3. Patient will follow up with PCP and specialities   4.  CM will follow for discharge needs      Mariola Ballantine RN CCM

## 2021-01-24 NOTE — PROGRESS NOTES
POD3 C2-4 lami, C2-T2 fusion  afvss x occ htn  Drain 100/10  C/o neck pain  Reportedly up in chair for a little while but worse today than yesterday    Myelopathic with HI atrophy and weakness  Drain and collar in place    A/p  neurostable   Hx polio with baseline L sided weakness and superimposed severe myelopathy  Pt/ot/oob  Collar, can dc to eat if needed  Postop xr look ok  Dc drain  Rehab consult, ok with me for any rehab - [facility], [facility], [facility], etc -   Based on severe preop myelopathy in the setting of previous deficits from postpolio suspect inpt rehab will be most appropriate posthospital setting  Start lovenox in AM  Will add short course of steroids x 48h  Cont nutritional monitoring  Appreciate hospitalist assistance

## 2021-01-24 NOTE — PROGRESS NOTES
Problem: Falls - Risk of  Goal: *Absence of Falls  Description: Document Orlando Harp Fall Risk and appropriate interventions in the flowsheet.   Outcome: Progressing Towards Goal  Note: Fall Risk Interventions:  Mobility Interventions: Bed/chair exit alarm, Utilize walker, cane, or other assistive device         Medication Interventions: Evaluate medications/consider consulting pharmacy, Patient to call before getting OOB, Teach patient to arise slowly, Utilize gait belt for transfers/ambulation    Elimination Interventions: Bed/chair exit alarm, Call light in reach, Patient to call for help with toileting needs

## 2021-01-24 NOTE — PROGRESS NOTES
Problem: Mobility Impaired (Adult and Pediatric)  Goal: *Acute Goals and Plan of Care (Insert Text)  Description: FUNCTIONAL STATUS PRIOR TO ADMISSION: At baseline patient uses a rollator and assistance with gait. Uses wheelchair out of the home. HOME SUPPORT PRIOR TO ADMISSION: The patient lived with wife. Physical Therapy Goals  Initiated 1/22/2021  1. Patient will move from supine to sit and sit to supine  in bed with minimal assistance/contact guard assist within 7 day(s). 2.  Patient will transfer from bed to chair and chair to bed with minimal assistance/contact guard assist using the least restrictive device within 7 day(s). 3.  Patient will perform sit to stand with minimal assistance/contact guard assist within 7 day(s). 4.  Patient will ambulate with minimal assistance/contact guard assist for 100 feet with the least restrictive device within 7 day(s). 5.  Patient will ascend/descend 4 stairs with handrail(s) with minimal assistance/contact guard assist within 7 day(s). Outcome: Progressing Towards Goal     PHYSICAL THERAPY TREATMENT  Patient: Zach Reyes (66 y.o. male)  Date: 1/24/2021  Diagnosis: Cervical myelopathy (HCC) [G95.9] <principal problem not specified>  Procedure(s) (LRB):  C2-4 LAMINECTOMY, C2-T2 INSTRUMENTED FUSION (N/A) 3 Days Post-Op  Precautions: Fall, Spinal(cervical; hard aspen collar; [place])  Chart, physical therapy assessment, plan of care and goals were reviewed. ASSESSMENT  Patient continues with skilled PT services and is not progressing towards goals. Pt received IV pain medication immediately prior to therapy. Agreeable to participate. Required increased assistance overall today, Max x 2 for bed mobility and Mod x 2 to stand with RW ahead. Once standing, pt unable to extend trunk to upright and unable to extend L knee fully. After multiple attempts to weight shift and advance LLE, pt remained unable to do so despite Max x 2.  Per chart, this appears to be a significant decline from pt's performance with PT yesterday. Additionally, he displayed difficulty lifting his AFO and shoe with B hands and had difficulty grasping the small cups on his bedside table. Pt returned to supine with Max A x 2 after 3 standing trials, each progressively with decreased trunk extension and L knee extension. Pt visibly frustrated with current function level but remains motivated to participate and improve. RN notified of pt's motor changes. Acute PT will continue to follow pt while he remains in the acute setting. Rec Rehab once medically stable. Current Level of Function Impacting Discharge (mobility/balance): Max x 2 for bed mobility and sit<>stand transfers    Other factors to consider for discharge:          PLAN :  Patient continues to benefit from skilled intervention to address the above impairments. Continue treatment per established plan of care. to address goals. Recommendation for discharge: (in order for the patient to meet his/her long term goals)  Therapy 3 hours per day 5-7 days per week    This discharge recommendation:  Has been made in collaboration with the attending provider and/or case management    IF patient discharges home will need the following DME: to be determined (TBD)       SUBJECTIVE:   Patient stated I need to get over to that chair and my legs won't move.     OBJECTIVE DATA SUMMARY:   Critical Behavior:  Neurologic State: Alert  Orientation Level: Oriented X4  Cognition: Appropriate decision making, Follows commands  Safety/Judgement: Decreased awareness of environment, Fall prevention  Functional Mobility Training:  Bed Mobility:     Supine to Sit: Moderate assistance;Assist x2  Sit to Supine: Assist x2;Maximum assistance  Scooting:  Moderate assistance;Assist x2        Transfers:  Sit to Stand: Maximum assistance;Assist x2  Stand to Sit: Maximum assistance;Assist x2                             Balance:  Sitting: Impaired  Sitting - Static: Fair (occasional)  Sitting - Dynamic: Poor (constant support)  Standing: Impaired  Standing - Static: Poor;Constant support  Standing - Dynamic : Poor;Constant support                               Pain Ratin/10 RUE and neck pain    Activity Tolerance:   Poor and requires rest breaks    After treatment patient left in no apparent distress:   Supine in bed, Call bell within reach, and Bed / chair alarm activated    COMMUNICATION/COLLABORATION:   The patients plan of care was discussed with: Registered nurse and Rehabilitation technician.      Noelle Cox PT, DPT   Time Calculation: 30 mins

## 2021-01-24 NOTE — PROGRESS NOTES
[facility]    Medical Progress Note      NAME: Maria G Sánchez   :  1945  MRM:  [id number]    Date/Time of service 2021  8:27 AM          Assessment and Plan:     Cervical myelopathy / Spinal stenosis of cervical region / Neuropathy / Post-polio syndrome / Osteoarthritis - Tolerated surgery. Ortho will be attending to pain control, DVT prophylaxis and rehab decisions as usual. Continue cymbalta and half dose of gabapentin. Fall precautions. PT/OT eval.  Will go to rehab       Dementia / Depression with anxiety / Fibromyalgia - POA and currently better. No longer perseverating. Continue Cymbalta. Out of town son, present today, states that his mother has not mentioned any concerns about dementia. Needs outpatient neuropsych testing.        Anemia - Moderate and noted post op, but stable. Monitor. Low iron on serologies, I will start some PO iron. Likely chronic nutritional issue    Chronic obstructive pulmonary disease / Bronchitis, chronic / Leukocytosis - POA. Stable so far. Continue brovana, pulmicort and prn duonebs and guaifenesin. He requests scheduled nebs      DM type 2 causing neurological disease - Diabetic diet and counseling.  SSI per protocol.  Continue home metformin.      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 Needs nutritional support. Change to soft diet due to lack of dentures. This may significantly limit rehab      Hyperlipidemia - On atorvastatin at home. LDL <100 on lipid panel. Lacking cause to keep <100, stop statin. Subjective:     Chief Complaint:  Pain better, having trouble with regular food texture    ROS:  (bold if positive, if negative)    Tolerating PT  Tolerating Diet        Objective:     Last 24hrs VS reviewed since prior progress note.  Most recent are:    Visit Vitals  /65 (BP 1 Location: Left arm, BP Patient Position: At rest)   Pulse 92   Temp 98.6 °F (37 °C)   Resp 16   Ht 6' (1.829 m)   Wt 73.5 kg (162 lb)   SpO2 97%   BMI 21.97 kg/m²     SpO2 Readings from Last 6 Encounters:   01/24/21 97%   01/18/21 96%   10/22/20 97%   10/08/19 96%   04/13/17 92%   01/12/17 97%    O2 Flow Rate (L/min): 2 l/min       Intake/Output Summary (Last 24 hours) at 1/24/2021 1231  Last data filed at 1/24/2021 1211  Gross per 24 hour   Intake 3165 ml   Output 2100 ml   Net 1065 ml        Physical Exam:    Gen:  Frail, thin, in mild acute distress  HEENT:  Pink conjunctivae, PERRL, hearing barely intact to voice, moist mucous membranes  Neck:  Supple, without masses, thyroid non-tender  Resp:  No accessory muscle use, bilateral breath sounds without wheezes rales or rhonchi  Card:  No murmurs, tachycardic S1, S2 without thrills, bruits or peripheral edema  Abd:  Soft, non-tender, non-distended, normoactive bowel sounds are present, no mass  Lymph:  No cervical or inguinal adenopathy  Musc:  No cyanosis or clubbing  Skin:  No rashes or ulcers, skin turgor is good  Neuro:  Cranial nerves are grossly intact, general motor weakness, follows commands vaguely  Psych:  Poor insight, oriented to person, place    Telemetry reviewed:   normal sinus rhythm  __________________________________________________________________  Medications Reviewed: (see below)  Medications:     Current Facility-Administered Medications   Medication Dose Route Frequency    ferrous gluconate 324 mg (38 mg iron) tablet 1 Tab  1 Tab Oral EVERY OTHER DAY    albuterol-ipratropium (DUO-NEB) 2.5 MG-0.5 MG/3 ML  3 mL Nebulization Q8H    azithromycin (ZITHROMAX) tablet 250 mg  250 mg Oral Q MON, WED & FRI    glucose chewable tablet 16 g  4 Tab Oral PRN    dextrose (D50W) injection syrg 12.5-25 g  25-50 mL IntraVENous PRN    glucagon (GLUCAGEN) injection 1 mg  1 mg IntraMUSCular PRN    insulin lispro (HUMALOG) injection   SubCUTAneous AC&HS    oxyCODONE IR (ROXICODONE) tablet 5 mg  5 mg Oral Q4H PRN    oxyCODONE IR (ROXICODONE) tablet 10 mg  10 mg Oral Q4H PRN    acetaminophen (TYLENOL) tablet 500 mg  500 mg Oral Q4H PRN    HYDROmorphone (DILAUDID) syringe 0.5 mg  0.5 mg IntraVENous Q4H PRN    HYDROmorphone (PF) (DILAUDID) injection 1 mg  1 mg IntraVENous Q4H PRN    dextrose 5 % - 0.45% NaCl 1,000 mL with mvi, adult no. 4 with vit K 10 mL, thiamine 597 mg, folic acid 1 mg, potassium chloride 20 mEq infusion   IntraVENous Q24H    atorvastatin (LIPITOR) tablet 10 mg  10 mg Oral DAILY    DULoxetine (CYMBALTA) capsule 60 mg  60 mg Oral BID    gabapentin (NEURONTIN) capsule 600 mg  600 mg Oral QID    guaiFENesin ER (MUCINEX) tablet 1,200 mg  1,200 mg Oral Q12H PRN    metFORMIN (GLUCOPHAGE) tablet 500 mg  500 mg Oral DAILY    budesonide (PULMICORT) 500 mcg/2 ml nebulizer suspension  500 mcg Nebulization BID RT    sodium chloride (NS) flush 5-40 mL  5-40 mL IntraVENous Q8H    sodium chloride (NS) flush 5-40 mL  5-40 mL IntraVENous PRN    naloxone (NARCAN) injection 0.4 mg  0.4 mg IntraVENous PRN    arformoteroL (BROVANA) neb solution 15 mcg  15 mcg Nebulization BID RT    hydrOXYzine HCL (ATARAX) tablet 10 mg  10 mg Oral Q8H PRN    polyethylene glycol (MIRALAX) packet 17 g  17 g Oral DAILY    phenol throat spray (CHLORASEPTIC) 1 Spray  1 Spray Oral PRN    cyclobenzaprine (FLEXERIL) tablet 10 mg  10 mg Oral TID PRN    senna (SENOKOT) tablet 17.2 mg  2 Tab Oral QHS    pseudoephedrine (SUDAFED) tablet 60 mg  60 mg Oral Q6H PRN        Lab Data Reviewed: (see below)  Lab Review:     Recent Labs     01/23/21  0321 01/22/21  0240   WBC 11.3* 14.1*   HGB 11.3* 10.8*   HCT 35.4* 33.9*    215     Recent Labs     01/23/21  0321 01/22/21  0240    139   K 3.7 4.2    109*   CO2 28 27   * 101*   BUN 13 12   CREA 0.69* 0.75   CA 8.6 8.2*   MG 1.9  --    PHOS 2.8  --    ALB 3.0*  --    TBILI 0.5  --    ALT 16  --      Lab Results   Component Value Date/Time    Glucose (POC) 139 (H) 01/24/2021 11:25 AM    Glucose (POC) 125 (H) 01/24/2021 06:28 AM    Glucose (POC) 126 (H) 01/23/2021 11:22 PM    Glucose (POC) 103 (H) 01/23/2021 03:54 PM    Glucose (POC) 97 01/23/2021 12:47 PM     No results for input(s): PH, PCO2, PO2, HCO3, FIO2 in the last 72 hours. No results for input(s): INR, INREXT, INREXT in the last 72 hours. All Micro Results     None          Other pertinent lab: none    Total time spent with patient: 29 Minutes I personally reviewed chart, notes, data and current medications in the medical record. I have personally examined and treated the patient at bedside during this period.                  Care Plan discussed with: Patient, Care Manager, Nursing Staff and >50% of time spent in counseling and coordination of care    Discussed:  Care Plan and D/C Planning    Prophylaxis:  H2B/PPI    Disposition:  [facility]/Rehab           ___________________________________________________    Attending Physician: Matilda Aguirre MD

## 2021-01-25 LAB
ANION GAP SERPL CALC-SCNC: 8 MMOL/L (ref 5–15)
BUN SERPL-MCNC: 18 MG/DL (ref 6–20)
BUN/CREAT SERPL: 25 (ref 12–20)
CALCIUM SERPL-MCNC: 9.2 MG/DL (ref 8.5–10.1)
CHLORIDE SERPL-SCNC: 104 MMOL/L (ref 97–108)
CO2 SERPL-SCNC: 24 MMOL/L (ref 21–32)
COMMENT, HOLDF: NORMAL
COVID-19 RAPID TEST, COVR: NOT DETECTED
CREAT SERPL-MCNC: 0.71 MG/DL (ref 0.7–1.3)
ERYTHROCYTE [DISTWIDTH] IN BLOOD BY AUTOMATED COUNT: 12.6 % (ref 11.5–14.5)
GLUCOSE BLD STRIP.AUTO-MCNC: 133 MG/DL (ref 65–100)
GLUCOSE BLD STRIP.AUTO-MCNC: 151 MG/DL (ref 65–100)
GLUCOSE BLD STRIP.AUTO-MCNC: 156 MG/DL (ref 65–100)
GLUCOSE BLD STRIP.AUTO-MCNC: 173 MG/DL (ref 65–100)
GLUCOSE SERPL-MCNC: 134 MG/DL (ref 65–100)
HCT VFR BLD AUTO: 36.3 % (ref 36.6–50.3)
HGB BLD-MCNC: 11.7 G/DL (ref 12.1–17)
MAGNESIUM SERPL-MCNC: 2.1 MG/DL (ref 1.6–2.4)
MCH RBC QN AUTO: 31.5 PG (ref 26–34)
MCHC RBC AUTO-ENTMCNC: 32.2 G/DL (ref 30–36.5)
MCV RBC AUTO: 97.8 FL (ref 80–99)
NRBC # BLD: 0 K/UL (ref 0–0.01)
NRBC BLD-RTO: 0 PER 100 WBC
PHOSPHATE SERPL-MCNC: 3.6 MG/DL (ref 2.6–4.7)
PLATELET # BLD AUTO: 190 K/UL (ref 150–400)
PMV BLD AUTO: 10.7 FL (ref 8.9–12.9)
POTASSIUM SERPL-SCNC: 4 MMOL/L (ref 3.5–5.1)
RBC # BLD AUTO: 3.71 M/UL (ref 4.1–5.7)
SAMPLES BEING HELD,HOLD: NORMAL
SARS-COV-2, COV2: NORMAL
SERVICE CMNT-IMP: ABNORMAL
SODIUM SERPL-SCNC: 136 MMOL/L (ref 136–145)
SOURCE, COVRS: NORMAL
WBC # BLD AUTO: 8.4 K/UL (ref 4.1–11.1)

## 2021-01-25 PROCEDURE — 84100 ASSAY OF PHOSPHORUS: CPT

## 2021-01-25 PROCEDURE — 74011250636 HC RX REV CODE- 250/636: Performed by: NEUROLOGICAL SURGERY

## 2021-01-25 PROCEDURE — 74011000250 HC RX REV CODE- 250: Performed by: INTERNAL MEDICINE

## 2021-01-25 PROCEDURE — 65270000029 HC RM PRIVATE

## 2021-01-25 PROCEDURE — 94640 AIRWAY INHALATION TREATMENT: CPT

## 2021-01-25 PROCEDURE — 87635 SARS-COV-2 COVID-19 AMP PRB: CPT

## 2021-01-25 PROCEDURE — 85027 COMPLETE CBC AUTOMATED: CPT

## 2021-01-25 PROCEDURE — 2709999900 HC NON-CHARGEABLE SUPPLY

## 2021-01-25 PROCEDURE — 74011250637 HC RX REV CODE- 250/637: Performed by: INTERNAL MEDICINE

## 2021-01-25 PROCEDURE — 97530 THERAPEUTIC ACTIVITIES: CPT

## 2021-01-25 PROCEDURE — 82962 GLUCOSE BLOOD TEST: CPT

## 2021-01-25 PROCEDURE — 74011250637 HC RX REV CODE- 250/637: Performed by: NEUROLOGICAL SURGERY

## 2021-01-25 PROCEDURE — 97535 SELF CARE MNGMENT TRAINING: CPT

## 2021-01-25 PROCEDURE — 83735 ASSAY OF MAGNESIUM: CPT

## 2021-01-25 PROCEDURE — 74011636637 HC RX REV CODE- 636/637: Performed by: INTERNAL MEDICINE

## 2021-01-25 PROCEDURE — 80048 BASIC METABOLIC PNL TOTAL CA: CPT

## 2021-01-25 PROCEDURE — 97116 GAIT TRAINING THERAPY: CPT

## 2021-01-25 PROCEDURE — 36415 COLL VENOUS BLD VENIPUNCTURE: CPT

## 2021-01-25 PROCEDURE — 74011000250 HC RX REV CODE- 250: Performed by: NEUROLOGICAL SURGERY

## 2021-01-25 RX ORDER — OXYCODONE AND ACETAMINOPHEN 7.5; 325 MG/1; MG/1
1 TABLET ORAL EVERY 6 HOURS
COMMUNITY

## 2021-01-25 RX ORDER — PREDNISONE 10 MG/1
10 TABLET ORAL DAILY
COMMUNITY

## 2021-01-25 RX ORDER — AZITHROMYCIN 250 MG/1
500 TABLET, FILM COATED ORAL
Status: DISCONTINUED | OUTPATIENT
Start: 2021-01-25 | End: 2021-01-26 | Stop reason: HOSPADM

## 2021-01-25 RX ORDER — OXYCODONE AND ACETAMINOPHEN 7.5; 325 MG/1; MG/1
1 TABLET ORAL EVERY 6 HOURS
Status: DISCONTINUED | OUTPATIENT
Start: 2021-01-25 | End: 2021-01-26 | Stop reason: HOSPADM

## 2021-01-25 RX ADMIN — INSULIN LISPRO 2 UNITS: 100 INJECTION, SOLUTION INTRAVENOUS; SUBCUTANEOUS at 17:22

## 2021-01-25 RX ADMIN — METFORMIN HYDROCHLORIDE 500 MG: 500 TABLET ORAL at 08:23

## 2021-01-25 RX ADMIN — DULOXETINE HYDROCHLORIDE 60 MG: 30 CAPSULE, DELAYED RELEASE ORAL at 17:22

## 2021-01-25 RX ADMIN — IPRATROPIUM BROMIDE AND ALBUTEROL SULFATE 3 ML: .5; 2.5 SOLUTION RESPIRATORY (INHALATION) at 15:39

## 2021-01-25 RX ADMIN — IPRATROPIUM BROMIDE AND ALBUTEROL SULFATE 3 ML: .5; 2.5 SOLUTION RESPIRATORY (INHALATION) at 07:28

## 2021-01-25 RX ADMIN — DEXAMETHASONE SODIUM PHOSPHATE 4 MG: 4 INJECTION, SOLUTION INTRAMUSCULAR; INTRAVENOUS at 21:05

## 2021-01-25 RX ADMIN — OXYCODONE 5 MG: 5 TABLET ORAL at 14:15

## 2021-01-25 RX ADMIN — DULOXETINE HYDROCHLORIDE 60 MG: 30 CAPSULE, DELAYED RELEASE ORAL at 08:23

## 2021-01-25 RX ADMIN — ARFORMOTEROL TARTRATE 15 MCG: 15 SOLUTION RESPIRATORY (INHALATION) at 22:16

## 2021-01-25 RX ADMIN — Medication 10 ML: at 06:43

## 2021-01-25 RX ADMIN — GABAPENTIN 600 MG: 300 CAPSULE ORAL at 17:22

## 2021-01-25 RX ADMIN — POLYETHYLENE GLYCOL 3350 17 G: 17 POWDER, FOR SOLUTION ORAL at 08:24

## 2021-01-25 RX ADMIN — OXYCODONE 5 MG: 5 TABLET ORAL at 10:43

## 2021-01-25 RX ADMIN — ENOXAPARIN SODIUM 40 MG: 40 INJECTION SUBCUTANEOUS at 08:23

## 2021-01-25 RX ADMIN — OXYCODONE AND ACETAMINOPHEN 1 TABLET: 7.5; 325 TABLET ORAL at 17:22

## 2021-01-25 RX ADMIN — SENNOSIDES 17.2 MG: 8.6 TABLET, FILM COATED ORAL at 21:05

## 2021-01-25 RX ADMIN — DEXAMETHASONE SODIUM PHOSPHATE 4 MG: 4 INJECTION, SOLUTION INTRAMUSCULAR; INTRAVENOUS at 06:43

## 2021-01-25 RX ADMIN — BUDESONIDE 500 MCG: 0.5 INHALANT RESPIRATORY (INHALATION) at 07:33

## 2021-01-25 RX ADMIN — AZITHROMYCIN MONOHYDRATE 500 MG: 250 TABLET ORAL at 21:05

## 2021-01-25 RX ADMIN — BUDESONIDE 500 MCG: 0.5 INHALANT RESPIRATORY (INHALATION) at 22:16

## 2021-01-25 RX ADMIN — ARFORMOTEROL TARTRATE 15 MCG: 15 SOLUTION RESPIRATORY (INHALATION) at 07:33

## 2021-01-25 RX ADMIN — GABAPENTIN 600 MG: 300 CAPSULE ORAL at 13:34

## 2021-01-25 RX ADMIN — GABAPENTIN 600 MG: 300 CAPSULE ORAL at 21:05

## 2021-01-25 RX ADMIN — GABAPENTIN 600 MG: 300 CAPSULE ORAL at 08:23

## 2021-01-25 RX ADMIN — DIAZEPAM 5 MG: 5 TABLET ORAL at 02:20

## 2021-01-25 RX ADMIN — IPRATROPIUM BROMIDE AND ALBUTEROL SULFATE 3 ML: .5; 2.5 SOLUTION RESPIRATORY (INHALATION) at 22:15

## 2021-01-25 RX ADMIN — ATORVASTATIN CALCIUM 10 MG: 10 TABLET, FILM COATED ORAL at 08:23

## 2021-01-25 RX ADMIN — Medication 10 ML: at 21:05

## 2021-01-25 RX ADMIN — OXYCODONE AND ACETAMINOPHEN 1 TABLET: 7.5; 325 TABLET ORAL at 23:56

## 2021-01-25 RX ADMIN — DEXAMETHASONE SODIUM PHOSPHATE 4 MG: 4 INJECTION, SOLUTION INTRAMUSCULAR; INTRAVENOUS at 13:34

## 2021-01-25 NOTE — PROGRESS NOTES
PHYSICAL THERAPY:Pt was just back to bed with  pain issues on arrival of PT. Pt not up for second treatment. PT will follow in AM.

## 2021-01-25 NOTE — PROGRESS NOTES
Problem: Mobility Impaired (Adult and Pediatric)  Goal: *Acute Goals and Plan of Care (Insert Text)  Description: FUNCTIONAL STATUS PRIOR TO ADMISSION: At baseline patient uses a rollator and assistance with gait. Uses wheelchair out of the home. HOME SUPPORT PRIOR TO ADMISSION: The patient lived with wife. Physical Therapy Goals  Initiated 1/22/2021  1. Patient will move from supine to sit and sit to supine  in bed with minimal assistance/contact guard assist within 7 day(s). 2.  Patient will transfer from bed to chair and chair to bed with minimal assistance/contact guard assist using the least restrictive device within 7 day(s). 3.  Patient will perform sit to stand with minimal assistance/contact guard assist within 7 day(s). 4.  Patient will ambulate with minimal assistance/contact guard assist for 100 feet with the least restrictive device within 7 day(s). 5.  Patient will ascend/descend 4 stairs with handrail(s) with minimal assistance/contact guard assist within 7 day(s). Outcome: Progressing Towards Goal   PHYSICAL THERAPY TREATMENT  Patient: Nuvia Leonard (61 y.o. male)  Date: 1/25/2021  Diagnosis: Cervical myelopathy (HCC) [G95.9] <principal problem not specified>  Procedure(s) (LRB):  C2-4 LAMINECTOMY, C2-T2 INSTRUMENTED FUSION (N/A) 4 Days Post-Op  Precautions: Fall, Spinal(cervical; hard aspen collar; [place]) No bending, no lifting greater than 5 lbs, no twisting, log-roll technique, repositioning every 20-30 min except when sleeping, brace when OOB (if ordered)  Chart, physical therapy assessment, plan of care and goals were reviewed. ASSESSMENT  Patient continues with skilled PT services and is slowly progressing towards goals. Pt received supine in bed with c-collar in place. Pt tolerated session fairly well, but continues to be limited by significant LE weakness, decreased functional mobility, impaired balance and gait.  Pt demonstrated improvement in bed mobility with min-mod A x 2 and demonstrated fair seated balance. Pt performed sit<>stand with RW with mod A x 2 from elevated bed height. Pt requires increased time to assume standing position. Provided tactile cues to facilitate L knee extension in standing. Pt was able to tolerate short gait training forward and to the chair with min A x 2. Noted impaired step clearance on the L, short shuffle steps. Pt remained seated in the chair with bed alarm set for safety. Continue to recommend inpatient rehab upon discharge to progress mobility as tolerated. Current Level of Function Impacting Discharge (mobility/balance): min-mod A x 2 supine to sit, mod A x 2 sit<>stand with RW, gait training x 3 ft with min A x 2 with RW    Other factors to consider for discharge: previously mod I with rollator         PLAN :  Patient continues to benefit from skilled intervention to address the above impairments. Continue treatment per established plan of care. to address goals. Recommendation for discharge: (in order for the patient to meet his/her long term goals)  Therapy 3 hours per day 5-7 days per week    This discharge recommendation:  Has been made in collaboration with the attending provider and/or case management    IF patient discharges home will need the following DME: to be determined (TBD)       SUBJECTIVE:   Patient stated it's 1923.     OBJECTIVE DATA SUMMARY:   Critical Behavior:  Neurologic State: Alert  Orientation Level: Oriented X4  Cognition: Follows commands, Decreased attention/concentration  Safety/Judgement: Fall prevention    Spinal diagnosis intervention:  The patient stated 0/3 back precautions when prompted. Reviewed all 3 back precautions, log roll technique, and sitting for 30 minutes at a time. Functional Mobility Training:    Bed Mobility:  Log Rolling: Moderate assistance;Assist x2  Supine to Sit: Minimum assistance; Moderate assistance;Assist x2  Sit to Supine: (pt seated up in chair at end of session)           Transfers:  Sit to Stand: Moderate assistance;Assist x2; Additional time  Stand to Sit: Minimum assistance        Bed to Chair: Minimum assistance;Assist x2; Additional time                    Balance:  Sitting: Impaired  Sitting - Static: Good (unsupported); Fair (occasional)  Sitting - Dynamic: Good (unsupported); Fair (occasional)  Standing: Impaired; With support  Standing - Static: Fair  Standing - Dynamic : Fair  Ambulation/Gait Training:  Distance (ft): 3 Feet (ft)  Assistive Device: Gait belt;Walker, rolling;Brace/Splint(L AFO)  Ambulation - Level of Assistance: Minimal assistance;Assist x2        Gait Abnormalities: Decreased step clearance; Step to gait        Base of Support: Center of gravity altered;Narrowed     Speed/Tiffanie: Slow;Pace decreased (<100 feet/min)  Step Length: Right shortened;Left shortened        Therapeutic Exercises:     Pain Rating:  Pt denied pain    Activity Tolerance:   Fair    After treatment patient left in no apparent distress:   Sitting in chair, Call bell within reach, Bed / chair alarm activated, and Side rails x 3    COMMUNICATION/COLLABORATION:   The patients plan of care was discussed with: Occupational therapist and Registered nurse.      Cj Fuller PT, DPT   Time Calculation: 30 mins

## 2021-01-25 NOTE — PROGRESS NOTES
4:34 PM   01/25/21     Spoke to [facility] at [address] [phone] and they can accept patient tomorrow .   Lizeth Estrada RN CCM

## 2021-01-25 NOTE — PROGRESS NOTES
Neurosurgery Progress Note  Jaida Mccullough [facility]  [phone]        Admit Date: 2021   LOS: 4 days        Daily Progress Note: 2021    POD:4 Days Post-Op    S/P: Procedure(s):  C2-4 LAMINECTOMY, C2-T2 INSTRUMENTED FUSION    Subjective:     Pain controlled      Objective:     Vital signs  Temp (24hrs), Av °F (36.7 °C), Min:97.5 °F (36.4 °C), Max:98.6 °F (37 °C)   No intake/output data recorded.  1901 -  0700  In: 3185 [P.O.:1920; I.V.:1265]  Out: 2485 [Urine:2450; Drains:35]    Visit Vitals  BP (!) 108/46   Pulse 64   Temp 97.9 °F (36.6 °C)   Resp 17   Ht 6' (1.829 m)   Wt 73.5 kg (162 lb)   SpO2 99%   BMI 21.97 kg/m²    O2 Flow Rate (L/min): 2 l/min O2 Device: Room air     Pain control  Pain Assessment  Pain Scale 1: Numeric (0 - 10)  Pain Intensity 1: 3  Pain Onset 1: acute  Pain Location 1: Generalized, Shoulder  Pain Orientation 1: Anterior  Pain Description 1: Aching, Sore  Pain Intervention(s) 1: Medication (see MAR)    PT/OT  Gait     Gait  Base of Support: Narrowed  Speed/Tiffanie: Pace decreased (<100 feet/min)  Step Length: Right shortened, Left shortened  Stance: Left increased, Right increased  Gait Abnormalities: Decreased step clearance  Ambulation - Level of Assistance: Minimal assistance, Assist x2  Distance (ft): 3 Feet (ft)  Assistive Device: Walker, rolling, Gait belt           Physical Exam:  Gen:NAD. Resting comfortably in bed. Not wearing cervical collar  Neuro: Awake, alert.  MAEW (grossly), myelopathic BUE   : voiding without baker  Skin: Incision with dressing C/D/I    24 hour results:    Recent Results (from the past 24 hour(s))   GLUCOSE, POC    Collection Time: 21 11:25 AM   Result Value Ref Range    Glucose (POC) 139 (H) 65 - 100 mg/dL    Performed by Mike Wray    GLUCOSE, POC    Collection Time: 21  3:42 PM   Result Value Ref Range    Glucose (POC) 151 (H) 65 - 100 mg/dL    Performed by Vallery Ormond GLUCOSE, POC    Collection Time: 01/24/21 10:40 PM   Result Value Ref Range    Glucose (POC) 179 (H) 65 - 100 mg/dL    Performed by Whit Claros    CBC W/O DIFF    Collection Time: 01/25/21  6:33 AM   Result Value Ref Range    WBC 8.4 4.1 - 11.1 K/uL    RBC 3.71 (L) 4.10 - 5.70 M/uL    HGB 11.7 (L) 12.1 - 17.0 g/dL    HCT 36.3 (L) 36.6 - 50.3 %    MCV 97.8 80.0 - 99.0 FL    MCH 31.5 26.0 - 34.0 PG    MCHC 32.2 30.0 - 36.5 g/dL    RDW 12.6 11.5 - 14.5 %    PLATELET 362 049 - 992 K/uL    MPV 10.7 8.9 - 12.9 FL    NRBC 0.0 0  WBC    ABSOLUTE NRBC 0.00 0.00 - 0.01 K/uL          Assessment:     Active Problems:    Spinal stenosis of lumbar region (3/20/2014)      Osteoarthritis (8/29/2015)      Fibromyalgia (8/29/2015)      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) ()      Neuropathy ()      Post-polio syndrome ()      Hyperlipidemia ()      Leukocytosis (6/20/2016)      Anxiety and depression ()      Dementia (HCC) ()      Cervical myelopathy (HCC) (1/21/2021)      Anemia (1/22/2021)      Chronic pain ()      Type 2 diabetes mellitus (HCC) ()      Chronic obstructive pulmonary disease (HCC) ()        Plan:   POD4 C2-4 lami, C2-T2 fusion  afvss x occ hypotension (dbp 40s)  Pain controlled  PT a little worse yesterday compared to day before- standing with max assist     Myelopathic with HI atrophy and weakness  Collar placed back on patient     A/p  neurostable              Hx polio with baseline L sided weakness and superimposed severe myelopathy  Pt/ot/oob  Collar, can dc to eat if needed, can dc to bathe  Postop xr look ok  Rehab consult, ok for any rehab - [facility], [facility], [facility], etc -              Based on severe preop myelopathy in the setting of previous deficits from postpolio suspect inpt rehab will be most appropriate posthospital setting  Lovenox  Steroids x 48h (1/24-1/26)  Cont nutritional monitoring  Appreciate hospitalist assistance    Activity: up with nursing  DVT ppx: SCDs, Lovenox  Dispo: pending    Dr. Laci Alejandro participated in the evaluation and management of this patient.        Yinka Meza NP

## 2021-01-25 NOTE — PROGRESS NOTES
Problem: Self Care Deficits Care Plan (Adult)  Goal: *Acute Goals and Plan of Care (Insert Text)  Description:   FUNCTIONAL STATUS PRIOR TO ADMISSION: Per pt, w/c and rollator use for mobility with occasional assist. Per son, pt sits on edge of garden tub for bathing. Pt reports mostly mod I for ADLs, including dressing and donning L LE brace. Per chart, pt with multiple falls. HOME SUPPORT: The patient lived with wife. 1.  Patient will perform lower body dressing with minimal assistance/contact guard assist using within 7 days. 2.  Patient will perform upper body dressing with minimal assistance/contact guard assist within 7 days. 3.  Patient will standing ADLs 5 mins at minimal assistance/contact guard assist within 7 days. 4.  Patient will don/doff neck brace at moderate assistance  within 7 days. 5.  Patient will verbalize/demonstrate 3/3 cervical precautions during ADL tasks without cues within  7 days. Outcome: Progressing Towards Goal     OCCUPATIONAL THERAPY TREATMENT  Patient: Ailyn Richardson (28 y.o. male)  Date: 1/25/2021  Diagnosis: Cervical myelopathy (HCC) [G95.9] <principal problem not specified>  Procedure(s) (LRB):  C2-4 LAMINECTOMY, C2-T2 INSTRUMENTED FUSION (N/A) 4 Days Post-Op  Precautions: Fall, Spinal(cervical; hard aspen collar; [place])  Chart, occupational therapy assessment, plan of care, and goals were reviewed. ASSESSMENT  Patient continues with skilled OT services and is progressing towards goals. Patient is received in supine agreeable to participate with therapy. He requires re-education on spinal precautions and total A to manage aspen collar (educated on ability to adjust collar during feeding tasks). Pt continues to be limited by weakness, impaired balance, confusion, and decreased activity tolerance. At EOB, pt is able to cross legs to assist with mgmt of L AFO and donning shoes with up to mod A.  He requires increased assistance to stand at EOB, however noted to perform SPT with min A x 2. Continue to recommend IPR at discharge. 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 Continue treatment per established plan of care. to address goals. Recommend with staff: 2 person assist for SPT to bed/chair; BSC for toileting; encourage participation in [facility] Jaison [facility] next OT session: BSC t/f; continue with ADLs as able    Recommendation for discharge: (in order for the patient to meet his/her long term goals)  Therapy 3 hours per day 5-7 days per week    This discharge recommendation:  Has been made in collaboration with the attending provider and/or case management    IF patient discharges home will need the following DME: TBD       SUBJECTIVE:   Patient stated It's my birthday today.     OBJECTIVE DATA SUMMARY:   Cognitive/Behavioral Status:  Neurologic State: Alert  Orientation Level: Oriented X4  Cognition: Follows commands;Decreased attention/concentration  Perception: Appears intact  Perseveration: No perseveration noted  Safety/Judgement: Fall prevention    Functional Mobility and Transfers for ADLs:  Bed Mobility:  Rolling: Moderate assistance;Assist x2  Supine to Sit: Minimum assistance; Moderate assistance;Assist x2  Sit to Supine: (pt seated up in chair at end of session)    Transfers:  Sit to Stand: Moderate assistance;Assist x2; Additional time  Bed to Chair: Minimum assistance;Assist x2; Additional time    Balance:  Sitting: Impaired  Sitting - Static: Good (unsupported); Fair (occasional)  Sitting - Dynamic: Good (unsupported); Fair (occasional)  Standing: Impaired; With support  Standing - Static: Fair  Standing - Dynamic : Fair    ADL Intervention:    Upper Body Dressing Assistance  Orthotics(Brace): Maximum assistance; Total assistance (dependent)(for aspen collar)  Cues: Physical assistance    Lower Body Dressing Assistance  Socks: Moderate assistance  Shoes with Velcro: Moderate assistance(including L AFO; able to cross legs)  Leg Crossed Method Used: Yes  Position Performed: Seated edge of bed  Cues: Don;Physical assistance; Tactile cues provided;Verbal cues provided;Visual cues provided    Cognitive Retraining  Safety/Judgement: Fall prevention    Patient recalled and demonstrated 3/3 cervical spine precautions with verbal cues. Patient instructed and indicated understanding the benefits of maintaining activity tolerance, functional mobility, and independence with self care tasks during acute stay  to ensure safe return home and to baseline. Encouraged patient to increase frequency and duration OOB, not sitting longer than 30 mins without marching/walking with staff, be out of bed for all meals, perform daily ADLs (as approved by RN/MD regarding bathing etc), and performing functional mobility to/from bathroom. Patient instruction and indicated understanding on body mechanics, ergonomics and gravitational force on the spine during different body positions to plan activities in prep for return home to complete basic ADLs, instrumental ADLs and back to work safely. Dressing brace: Patient instructed and demonstrated while in front of mirror to don/doff velcro on brace using dominant side, keeping non-dominant side intact. Instruction and indicated understanding in removal of fabric pieces, placement of clean pieces, don brace, then can hand wash and allow air dry. Dressing lower body: Patient instructed to don brace first and on the benefits to remain seated to don all clothing to increase independence with precautions and pain management. Patient instructed and demonstrated tailor sitting for lower body dressing with mod A. Pain:  Pt reporting minimal pain    Activity Tolerance:   Fair and SpO2 stable on RA      After treatment patient left in no apparent distress:   Sitting in chair, Call bell within reach and Bed / chair alarm activated    COMMUNICATION/COLLABORATION:   The patients plan of care was discussed with: Physical therapist and Registered nurse.      Christoph Quesada OT  Time Calculation: 27 mins

## 2021-01-25 NOTE — PROGRESS NOTES
[facility]  [address] [facility], [address] [place] [address]  [phone]      Medical Progress Note      NAME: Antionette Brooks   :  1945  MRM:  [id number]    Date/Time of service: 2021  12:11 PM       Subjective:     Chief Complaint:  Patient was personally seen and examined by me during this time period. Chart reviewed. C/o mild neck pain. No chest pain, SOB       Objective:       Vitals:       Last 24hrs VS reviewed since prior progress note.  Most recent are:    Visit Vitals  /62   Pulse 84   Temp 98.6 °F (37 °C)   Resp 18   Ht 6' (1.829 m)   Wt 73.5 kg (162 lb)   SpO2 97%   BMI 21.97 kg/m²     SpO2 Readings from Last 6 Encounters:   21 97%   21 96%   10/22/20 97%   10/08/19 96%   17 92%   17 97%    O2 Flow Rate (L/min): 2 l/min       Intake/Output Summary (Last 24 hours) at 2021 1211  Last data filed at 2021 0415  Gross per 24 hour   Intake 620 ml   Output 910 ml   Net -290 ml        Exam:     Physical Exam:    Gen:  Elderly, thin, frail, NAD  HEENT:  Pink conjunctivae, PERRL, hearing intact to voice, moist mucous membranes  Neck:  Supple, without masses, neck collar  Resp:  No accessory muscle use, clear breath sounds without wheezes rales or rhonchi  Card:  No murmurs, normal S1, S2 without thrills, bruits or peripheral edema  Abd:  Soft, non-tender, non-distended, normoactive bowel sounds are present, chronic ventral hernia  Musc:  No cyanosis or clubbing  Skin:  No rashes  Neuro:  Chronic L sided weakness  Psych:  poor insight, oriented to person, place     Medications Reviewed: (see below)    Lab Data Reviewed: (see below)    ______________________________________________________________________    Medications:     Current Facility-Administered Medications   Medication Dose Route Frequency    enoxaparin (LOVENOX) injection 40 mg  40 mg SubCUTAneous Q24H    dexamethasone (DECADRON) 4 mg/mL injection 4 mg  4 mg IntraVENous Q8H    diazePAM (VALIUM) tablet 5 mg  5 mg Oral Q6H PRN    ferrous gluconate 324 mg (38 mg iron) tablet 1 Tab  1 Tab Oral EVERY OTHER DAY    albuterol-ipratropium (DUO-NEB) 2.5 MG-0.5 MG/3 ML  3 mL Nebulization Q8H    azithromycin (ZITHROMAX) tablet 250 mg  250 mg Oral Q MON, WED & FRI    glucose chewable tablet 16 g  4 Tab Oral PRN    dextrose (D50W) injection syrg 12.5-25 g  25-50 mL IntraVENous PRN    glucagon (GLUCAGEN) injection 1 mg  1 mg IntraMUSCular PRN    insulin lispro (HUMALOG) injection   SubCUTAneous AC&HS    oxyCODONE IR (ROXICODONE) tablet 5 mg  5 mg Oral Q4H PRN    oxyCODONE IR (ROXICODONE) tablet 10 mg  10 mg Oral Q4H PRN    acetaminophen (TYLENOL) tablet 500 mg  500 mg Oral Q4H PRN    HYDROmorphone (DILAUDID) syringe 0.5 mg  0.5 mg IntraVENous Q4H PRN    HYDROmorphone (PF) (DILAUDID) injection 1 mg  1 mg IntraVENous Q4H PRN    atorvastatin (LIPITOR) tablet 10 mg  10 mg Oral DAILY    DULoxetine (CYMBALTA) capsule 60 mg  60 mg Oral BID    gabapentin (NEURONTIN) capsule 600 mg  600 mg Oral QID    guaiFENesin ER (MUCINEX) tablet 1,200 mg  1,200 mg Oral Q12H PRN    metFORMIN (GLUCOPHAGE) tablet 500 mg  500 mg Oral DAILY    budesonide (PULMICORT) 500 mcg/2 ml nebulizer suspension  500 mcg Nebulization BID RT    sodium chloride (NS) flush 5-40 mL  5-40 mL IntraVENous Q8H    sodium chloride (NS) flush 5-40 mL  5-40 mL IntraVENous PRN    naloxone (NARCAN) injection 0.4 mg  0.4 mg IntraVENous PRN    arformoteroL (BROVANA) neb solution 15 mcg  15 mcg Nebulization BID RT    hydrOXYzine HCL (ATARAX) tablet 10 mg  10 mg Oral Q8H PRN    polyethylene glycol (MIRALAX) packet 17 g  17 g Oral DAILY    phenol throat spray (CHLORASEPTIC) 1 Spray  1 Spray Oral PRN    cyclobenzaprine (FLEXERIL) tablet 10 mg  10 mg Oral TID PRN    senna (SENOKOT) tablet 17.2 mg  2 Tab Oral QHS    pseudoephedrine (SUDAFED) tablet 60 mg  60 mg Oral Q6H PRN          Lab Review:     Recent Labs     01/25/21  6961 01/23/21 0321   WBC 8.4 11.3*   HGB 11.7* 11.3*   HCT 36.3* 35.4*    205     Recent Labs     01/25/21  0633 01/23/21 0321    138   K 4.0 3.7    105   CO2 24 28   * 119*   BUN 18 13   CREA 0.71 0.69*   CA 9.2 8.6   MG 2.1 1.9   PHOS 3.6 2.8   ALB  --  3.0*   TBILI  --  0.5   ALT  --  16     Lab Results   Component Value Date/Time    Glucose (POC) 133 (H) 01/25/2021 11:38 AM    Glucose (POC) 179 (H) 01/24/2021 10:40 PM    Glucose (POC) 151 (H) 01/24/2021 03:42 PM    Glucose (POC) 139 (H) 01/24/2021 11:25 AM    Glucose (POC) 125 (H) 01/24/2021 06:28 AM          Assessment / Plan:     65 yo hx of DM, COPD, depression/anxiety, polio syndrome w/ L sided weakness, chronic pain, cervical spine stenosis s/p surgery 01/21. Medicine is following as a consultant    1) Cervical spine stenosis/chronic pain/myelopathy: s/p surgery by Neuro surg on 01/21. Cont IV dilaudid prn severe pain, decadron. Management per Neuro surg    2) COPD: stable. 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 Cont metformin, SSI    4) Polio syndrome/failure to thrive: has chronic L sided weakness. Cont PT/OT, rehab placement    5) Depression/anxiety: cont cymbalta    **Patient is medically stable. Will sign off.   Thank you for consult**    Total time spent with patient: 30 Minutes **I personally saw and examined the patient during this time period**                 Care Plan discussed with: Patient, nursing     Discussed:  Care Plan    Prophylaxis:  Lovenox    Disposition:  per neurosurgery            ___________________________________________________    Attending Physician: Waddell Spatz, MD

## 2021-01-25 NOTE — PROGRESS NOTES
1:15 PM   01/25/21      RUR 13%     Transition Care Plan   1. Patient has chosen [facility] and  will go there when discharged. Dr Mary Rucker has accepted patient. 2. Patient family supportive will transport   3. Patient will follow up with PCP and specialities   4.  CM will follow for discharge needs    [facility]

## 2021-01-25 NOTE — PROGRESS NOTES
Admission Medication Reconciliation:     Pharmacist visited the patient's room to complete interview. The patient was off the unit. Pharmacist will follow up tomorrow morning to complete the interview.    Thank you,      Gege Elizabeth, PharmD, BCPS

## 2021-01-25 NOTE — PROGRESS NOTES
[facility]: MED RECONCILIATION    Comments/Recommendations:   Patient states that he is adherent to all of his medications. Patient stated that he takes oxycodone-acetaminophen 7.5/325 mg four times daily for pain related to his post-polio syndrome. Medications added:     · N/A    Medications removed:    · Advair 100-50 mcg/dose     Medications adjusted:    · Prednisone 10 mg   · Oxycodone-acetaminophen 7.5-325 mg   · Azithromycin 500 mg   · Asmanex  mcg/actuation   · Bevespi aerosphere inhaler 9-4.8 mcg    Information obtained from: Patient     Allergies: Trazodone    Prior to Admission Medications:     Medication Documentation Review Audit       Reviewed by Erum Vasquez (Pharmacy Student) on 01/25/21 at 1358      Medication Sig Documenting Provider Last Dose Status Taking? albuterol (PROAIR HFA) 90 mcg/actuation inhaler Take 2 Puffs by inhalation every four (4) hours as needed. Other, MD Olinda  Active Yes   albuterol-ipratropium (DUO-NEB) 2.5 mg-0.5 mg/3 ml nebu 3 mL by Nebulization route every four (4) hours as needed (SOB). Provider, Historical 1/21/2021 0400 Active Yes   atorvastatin (LIPITOR) 10 mg tablet Take 10 mg by mouth daily. Provider, Historical  Active Yes   azithromycin (ZITHROMAX) 500 mg tab Take 500 mg by mouth every Monday, Wednesday, Friday. 3 times a week M-W-F Provider, Historical  Active Yes   DULoxetine (CYMBALTA) 60 mg capsule Take 60 mg by mouth two (2) times a day. Provider, Historical 1/21/2021 0400 Active Yes   gabapentin (NEURONTIN) 600 mg tablet Take 600 mg by mouth four (4) times daily. Provider, Historical 1/21/2021 0400 Active Yes   glycopyrrolate/formoterol fum (BEVESPI AEROSPHERE) 9-4.8 mcg HFA inhaler  Take 2 Puffs by inhalation two (2) times a day. Provider, Historical 1/21/2021 0400 Active Yes   guaifenesin/pseudoephedrne HCl (MUCINEX D PO) Take 1 Tab by mouth two (2) times daily as needed.  Provider, Historical  Active Yes   meloxicam (MOBIC) 15 mg tablet Take 15 mg by mouth daily. Other, MD Olinda  Active Yes   metFORMIN (GLUCOPHAGE) 500 mg tablet Take 500 mg by mouth daily. Provider, Historical 1/20/2021 Unknown time Active Yes           Med Note (Bernadine Mcclendon   Sat Aug 29, 2015  1:20 PM) . mometasone furoate  (ASMANEX HFA IN) 200 mcg/actuation HFA inhaler Take 2 Puffs by inhalation two (2) times a day. Provider, Historical 1/21/2021 0400 Active Yes   oxyCODONE-acetaminophen (PERCOCET 7.5) 7.5-325 mg per tablet Take 1 Tab by mouth every six (6) hours. Max daily dose 4 tablets. Provider, Historical 1/21/2021 0100 Active Yes   predniSONE (DELTASONE) 10 mg tablet Take 10 mg by mouth daily. Provider, Historical 1/21/2021 Active Yes                      Aylin Laws PharmD.  Candidate 2021

## 2021-01-26 VITALS
OXYGEN SATURATION: 98 % | SYSTOLIC BLOOD PRESSURE: 112 MMHG | RESPIRATION RATE: 14 BRPM | HEART RATE: 81 BPM | DIASTOLIC BLOOD PRESSURE: 68 MMHG | HEIGHT: 72 IN | BODY MASS INDEX: 21.94 KG/M2 | WEIGHT: 162 LBS | TEMPERATURE: 98.6 F

## 2021-01-26 LAB
GLUCOSE BLD STRIP.AUTO-MCNC: 130 MG/DL (ref 65–100)
GLUCOSE BLD STRIP.AUTO-MCNC: 137 MG/DL (ref 65–100)
SERVICE CMNT-IMP: ABNORMAL
SERVICE CMNT-IMP: ABNORMAL

## 2021-01-26 PROCEDURE — 74011250637 HC RX REV CODE- 250/637: Performed by: INTERNAL MEDICINE

## 2021-01-26 PROCEDURE — 94664 DEMO&/EVAL PT USE INHALER: CPT

## 2021-01-26 PROCEDURE — 74011250636 HC RX REV CODE- 250/636: Performed by: NEUROLOGICAL SURGERY

## 2021-01-26 PROCEDURE — 74011250637 HC RX REV CODE- 250/637: Performed by: NEUROLOGICAL SURGERY

## 2021-01-26 PROCEDURE — 82962 GLUCOSE BLOOD TEST: CPT

## 2021-01-26 PROCEDURE — 94640 AIRWAY INHALATION TREATMENT: CPT

## 2021-01-26 PROCEDURE — 74011000250 HC RX REV CODE- 250: Performed by: NEUROLOGICAL SURGERY

## 2021-01-26 PROCEDURE — 74011000250 HC RX REV CODE- 250: Performed by: INTERNAL MEDICINE

## 2021-01-26 RX ORDER — ASPIRIN 325 MG
50000 TABLET, DELAYED RELEASE (ENTERIC COATED) ORAL
Qty: 4 CAP | Refills: 0 | Status: SHIPPED
Start: 2021-01-26

## 2021-01-26 RX ORDER — SENNOSIDES 8.6 MG/1
2 TABLET ORAL
Qty: 1 TAB | Refills: 0 | Status: SHIPPED
Start: 2021-01-26

## 2021-01-26 RX ORDER — ENOXAPARIN SODIUM 100 MG/ML
40 INJECTION SUBCUTANEOUS EVERY 24 HOURS
Qty: 1 SYRINGE | Refills: 0 | Status: SHIPPED
Start: 2021-01-27

## 2021-01-26 RX ADMIN — ACETAMINOPHEN 500 MG: 500 TABLET ORAL at 08:16

## 2021-01-26 RX ADMIN — HYDROMORPHONE HYDROCHLORIDE 0.5 MG: 1 INJECTION, SOLUTION INTRAMUSCULAR; INTRAVENOUS; SUBCUTANEOUS at 13:36

## 2021-01-26 RX ADMIN — GABAPENTIN 600 MG: 300 CAPSULE ORAL at 13:36

## 2021-01-26 RX ADMIN — ENOXAPARIN SODIUM 40 MG: 40 INJECTION SUBCUTANEOUS at 08:16

## 2021-01-26 RX ADMIN — DEXAMETHASONE SODIUM PHOSPHATE 4 MG: 4 INJECTION, SOLUTION INTRAMUSCULAR; INTRAVENOUS at 06:34

## 2021-01-26 RX ADMIN — ARFORMOTEROL TARTRATE 15 MCG: 15 SOLUTION RESPIRATORY (INHALATION) at 07:17

## 2021-01-26 RX ADMIN — FERROUS GLUCONATE 1 TABLET: 324 TABLET ORAL at 08:16

## 2021-01-26 RX ADMIN — IPRATROPIUM BROMIDE AND ALBUTEROL SULFATE 3 ML: .5; 2.5 SOLUTION RESPIRATORY (INHALATION) at 07:10

## 2021-01-26 RX ADMIN — POLYETHYLENE GLYCOL 3350 17 G: 17 POWDER, FOR SOLUTION ORAL at 08:16

## 2021-01-26 RX ADMIN — DULOXETINE HYDROCHLORIDE 60 MG: 30 CAPSULE, DELAYED RELEASE ORAL at 08:16

## 2021-01-26 RX ADMIN — BUDESONIDE 500 MCG: 0.5 INHALANT RESPIRATORY (INHALATION) at 07:17

## 2021-01-26 RX ADMIN — OXYCODONE AND ACETAMINOPHEN 1 TABLET: 7.5; 325 TABLET ORAL at 11:49

## 2021-01-26 RX ADMIN — DEXAMETHASONE SODIUM PHOSPHATE 4 MG: 4 INJECTION, SOLUTION INTRAMUSCULAR; INTRAVENOUS at 13:36

## 2021-01-26 RX ADMIN — OXYCODONE AND ACETAMINOPHEN 1 TABLET: 7.5; 325 TABLET ORAL at 06:34

## 2021-01-26 RX ADMIN — GABAPENTIN 600 MG: 300 CAPSULE ORAL at 08:16

## 2021-01-26 RX ADMIN — Medication 10 ML: at 06:34

## 2021-01-26 RX ADMIN — METFORMIN HYDROCHLORIDE 500 MG: 500 TABLET ORAL at 08:16

## 2021-01-26 RX ADMIN — ATORVASTATIN CALCIUM 10 MG: 10 TABLET, FILM COATED ORAL at 08:16

## 2021-01-26 NOTE — PROGRESS NOTES
Neurosurgery Progress Note      Admit Date: 2021   LOS: 5 days        Daily Progress Note: 2021    POD: 5 Days Post-Op    S/P: Procedure(s):  C2-4 LAMINECTOMY, C2-T2 INSTRUMENTED FUSION    Subjective:     Pain controlled      Objective:     Vital signs  Temp (24hrs), Av.1 °F (36.7 °C), Min:97.4 °F (36.3 °C), Max:98.7 °F (37.1 °C)   701 - 1900  In: -   Out: 300 [Urine:300]  1901 -  07  In: 300 [P.O.:300]  Out: 1000 [Urine:1000]    Visit Vitals  BP (!) 101/52 (BP 1 Location: Right arm, BP Patient Position: At rest)   Pulse 71   Temp 97.6 °F (36.4 °C)   Resp 16   Ht 6' (1.829 m)   Wt 73.5 kg (162 lb)   SpO2 98%   BMI 21.97 kg/m²    O2 Flow Rate (L/min): 0 l/min O2 Device: Room air     Pain control  Pain Assessment  Pain Scale 1: Numeric (0 - 10)  Pain Intensity 1: 5  Pain Onset 1: acute  Pain Location 1: Neck  Pain Orientation 1: Anterior  Pain Description 1: Aching, Sore  Pain Intervention(s) 1: Medication (see MAR)    PT/OT  Gait     Gait  Base of Support: Center of gravity altered, Narrowed  Speed/Tiffanie: Slow, Pace decreased (<100 feet/min)  Step Length: Right shortened, Left shortened  Stance: Left increased, Right increased  Gait Abnormalities: Decreased step clearance, Step to gait  Ambulation - Level of Assistance: Minimal assistance, Assist x2  Distance (ft): 3 Feet (ft)  Assistive Device: Gait belt, Walker, rolling, Brace/Splint(L AFO)           Physical Exam:  Gen:NAD. Resting comfortably in bed. Wearing cervical collar in appropriate posotion  Neuro: Awake, alert.  MAEW (grossly), myelopathic BUE   : voiding without bakre  Skin: Incision with dressing C/D/I    24 hour results:    Recent Results (from the past 24 hour(s))   GLUCOSE, POC    Collection Time: 21 11:38 AM   Result Value Ref Range    Glucose (POC) 133 (H) 65 - 100 mg/dL    Performed by Angela Or    SARS-COV-2    Collection Time: 21  3:50 PM   Result Value Ref Range    SARS-CoV-2 Please find results under separate order     COVID-19 RAPID TEST    Collection Time: 01/25/21  3:50 PM   Result Value Ref Range    Specimen source Nasopharyngeal      COVID-19 rapid test Not detected NOTD     GLUCOSE, POC    Collection Time: 01/25/21  4:30 PM   Result Value Ref Range    Glucose (POC) 173 (H) 65 - 100 mg/dL    Performed by Chetan Slainas, POC    Collection Time: 01/25/21  4:52 PM   Result Value Ref Range    Glucose (POC) 156 (H) 65 - 100 mg/dL    Performed by Ramesh Hawkins, POC    Collection Time: 01/25/21 10:32 PM   Result Value Ref Range    Glucose (POC) 151 (H) 65 - 100 mg/dL    Performed by Belle Shah (PCT)    GLUCOSE, POC    Collection Time: 01/26/21  6:06 AM   Result Value Ref Range    Glucose (POC) 137 (H) 65 - 100 mg/dL    Performed by Belle Shah (PCT)           Assessment:     Active Problems:    Spinal stenosis of lumbar region (3/20/2014)      Osteoarthritis (8/29/2015)      Fibromyalgia (8/29/2015)      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) ()      Neuropathy ()      Post-polio syndrome ()      Hyperlipidemia ()      Leukocytosis (6/20/2016)      Anxiety and depression ()      Dementia (HCC) ()      Cervical myelopathy (HCC) (1/21/2021)      Anemia (1/22/2021)      Chronic pain ()      Type 2 diabetes mellitus (HCC) ()      Chronic obstructive pulmonary disease (HCC) ()        Plan:   POD5 C2-4 lami, C2-T2 fusion  Pain controlled  Myelopathic with HI atrophy and weakness  Hx polio with baseline L sided weakness and superimposed severe myelopathy  Pt/ot/oob  Collar, can dc to eat if needed, can dc to bathe  Lovenox through duration of rehab stay  Steroids x 48h (1/24-1/26)  Cont nutritional monitoring  Change posterior neck dressing prior to d/c. Hospitalist has signed off. Appreciate their assistance.     Discussed above with Abdiaziz Blackburn NP  Plan to d/c to rehab today    Yari Barrios NP

## 2021-01-26 NOTE — PROGRESS NOTES
1/26/2021   11:40 AM  Pt accepted by [facility] for admission today, Dr Meaghan Nash is accepting physician, pt will admit to [phone] after 2:00PM.  Nursing please call report to [phone]  Transport via Hospital to Home via C, will  at 2:00PM  ADDY Burns       9:28 AM  DC order noted, CM spoke w/ [place] liaison [phone], updated on result of Rapid COVID, their team will review this AM for admission today. Await response.   ADDY Burns

## 2021-01-26 NOTE — PROGRESS NOTES
1/26/2021  11:05 AM  Medicare pt has received, reviewed, and signed 2nd IM letter informing them of their right to appeal the discharge. Signed copied has been placed on pt bedside chart.   ADDY Orozco

## 2021-02-05 NOTE — DISCHARGE SUMMARY
[facility] SUMMARY    Name:  Jayne Ardon  MR#:  [id number]  :  1945  ACCOUNT #:  [de-identified]  ADMIT DATE:  2021  DISCHARGE DATE:  2021    REASON FOR ADMISSION:  Severe cervical stenosis, myelopathy. DISCHARGE DIAGNOSES:  Severe cervical stenosis, myelopathy. PROCEDURE:  Cervical laminectomy and cervicothoracic instrumented fusion. HOSPITAL COURSE:  The patient is a 76-year-old gentleman, who underwent the aforementioned procedure. At the time of admission, he had severe myelopathy adjacent to a previous fusion as well as foraminal narrowing inferiorly. He tolerated the procedure well. There were expected postoperative issues with pain control, which were adequately addressed. The patient was fairly limited in his mobilization and plan was made for discharge to inpatient rehabilitation. The patient was ultimately able to be discharged on 2021. Discharge medications, instructions, orders, followup and other information are available in both the electronic record and the attached paperwork to the patient's scanned chart.       Genesis Gunn MD PhD      [id number]  D:  2021 14:24  T:  2021 16:03  JOB #:  [id number]

## 2021-06-09 ENCOUNTER — TRANSCRIBE ORDER (OUTPATIENT)
Dept: SCHEDULING | Age: 76
End: 2021-06-09

## 2021-06-09 DIAGNOSIS — M12.811 ROTATOR CUFF ARTHROPATHY OF BOTH SHOULDERS: Primary | ICD-10-CM

## 2021-06-09 DIAGNOSIS — M12.812 ROTATOR CUFF ARTHROPATHY OF BOTH SHOULDERS: Primary | ICD-10-CM

## 2021-06-09 DIAGNOSIS — M19.011 PRIMARY OSTEOARTHRITIS OF BOTH SHOULDERS: ICD-10-CM

## 2021-06-09 DIAGNOSIS — M19.012 PRIMARY OSTEOARTHRITIS OF BOTH SHOULDERS: ICD-10-CM

## 2021-06-11 ENCOUNTER — HOSPITAL ENCOUNTER (OUTPATIENT)
Dept: CT IMAGING | Age: 76
Discharge: HOME | End: 2021-06-11
Attending: ORTHOPAEDIC SURGERY
Payer: MEDICARE

## 2021-06-11 DIAGNOSIS — M19.012 PRIMARY OSTEOARTHRITIS OF BOTH SHOULDERS: ICD-10-CM

## 2021-06-11 DIAGNOSIS — M12.811 ROTATOR CUFF ARTHROPATHY OF BOTH SHOULDERS: ICD-10-CM

## 2021-06-11 DIAGNOSIS — M12.812 ROTATOR CUFF ARTHROPATHY OF BOTH SHOULDERS: ICD-10-CM

## 2021-06-11 DIAGNOSIS — M19.011 PRIMARY OSTEOARTHRITIS OF BOTH SHOULDERS: ICD-10-CM

## 2021-06-11 PROCEDURE — 73200 CT UPPER EXTREMITY W/O DYE: CPT

## 2022-03-11 NOTE — DISCHARGE INSTRUCTIONS
"Chief Complaint  Urinary Tract Infection (Frequency/ back pain)    Subjective          Adelia Morris presents to [facility] PRIMARY CARE  History of Present Illness   68-year-old female patient of Dr. Khanna, new to me, presenting with complaints of urinary frequency and urgency, history of UTI, UA is negative, culture results pending, patient agrees to trying Azo and if symptoms not improved, will start Bactrim, recommended probiotic to help prevent frequent UTI.       dObjective   Vital Signs:   /80   Pulse 78   Temp 98 °F (36.7 °C)   Ht 170.2 cm (67\")   Wt 90.4 kg (199 lb 3.2 oz)   SpO2 98%   BMI 31.20 kg/m²     Physical Exam  Cardiovascular:      Rate and Rhythm: Normal rate and regular rhythm.      Pulses: Normal pulses.      Heart sounds: Normal heart sounds.   Pulmonary:      Effort: Pulmonary effort is normal.      Breath sounds: Normal breath sounds.   Abdominal:      General: Bowel sounds are normal.      Palpations: Abdomen is soft.   Neurological:      Mental Status: She is alert and oriented to person, place, and time.        Result Review :                 Assessment and Plan    Diagnoses and all orders for this visit:    1. Urinary frequency (Primary)  -     Urinalysis With Microscopic - Urine, Clean Catch; Future  -     Urine Culture - Urine, Urine, Clean Catch; Future  -     Urinalysis With Microscopic - Urine, Clean Catch  -     Urine Culture - Urine, Urine, Clean Catch  -     sulfamethoxazole-trimethoprim (BACTRIM DS,SEPTRA DS) 800-160 MG per tablet; Take 1 tablet by mouth 2 (Two) Times a Day.  Dispense: 14 tablet; Refill: 0    2. Urinary urgency  -     Urinalysis With Microscopic - Urine, Clean Catch; Future  -     Urine Culture - Urine, Urine, Clean Catch; Future  -     Urinalysis With Microscopic - Urine, Clean Catch  -     Urine Culture - Urine, Urine, Clean Catch  -     sulfamethoxazole-trimethoprim (BACTRIM DS,SEPTRA DS) 800-160 MG per tablet; Take 1 tablet " Discharge Instructions for  [facility]  [address] [place], [address] [facility]  Telephone: [phone]    NAME:  Carin KEENE Sr 23:  1945  MEDICAL RECORD NUMBER:  [id number]  DATE:  9/18/2019    Wound Cleansing:   Do not scrub or use excessive force. Cleanse wound prior to applying a clean dressing with:  [] Normal Saline [] Keep Wound Dry in Shower    [] Wound Cleanser   [] Cleanse wound with Mild Soap & Water  [] May Shower at Discharge   [] Other:   [x] cleanse with baby shampoo and rinse        [x] Other: gentian violet to wound bed and periwound    Dressings:           Wound Location ***   [] Apply Primary Dressing:        [] Cover and Secure with:     [x] Gauze [] Betina [] Kerlix   [] Ace Wrap [x] Cover Roll Tape [] ABD     [x] Other: betadine wet to dry  [] exudry   [] Change dressing: [] Daily    [x] Every Other Day [] Three times per week   [] Once a week [] Do Not Change Dressing   [] Other:        [] SpandaGrip []Right Leg  []Left Leg      []Low compression 5-10 mm/Hg      []Medium compression 10-20 mm/Hg     []High compression  20-30 mm/Hg   every morning immediately when getting up should be applied to affected leg(s) from mid foot to knee making sure to cover the heel. Remove every night before going to bed. [] Elevate leg(s) above the level of the heart when sitting. [] Avoid prolonged standing in one place. [] Elevate arm/hand above the level of the heart []RightArm []LeftArm     Compression:  Apply: [] Multilayer Compression Wrap Applied in Clinic  []RightLeg []Left Leg   [] Multi-layer compression. Do not get leg(s) with wrap wet. If wraps become too tight call the center or completely remove the wrap. [] unna's boot   []RightLeg []Left Leg      [] Elevate leg(s) above the level of the heart when sitting. [] Avoid prolonged standing in one place.     Off-Loading:   [] Off-loading when [] walking  [] in bed [] by mouth 2 (Two) Times a Day.  Dispense: 14 tablet; Refill: 0        Follow Up   Return if symptoms worsen or fail to improve.  Patient was given instructions and counseling regarding her condition or for health maintenance advice. Please see specific information pulled into the AVS if appropriate.       Answers for HPI/ROS submitted by the patient on 3/10/2022  Please describe your symptoms.: UTI....urgency, pressure, discomfort, little zingers, lower back pain....  Have you had these symptoms before?: Yes  How long have you been having these symptoms?: 1-4 days  Please list any medications you are currently taking for this condition.: Nothing currently.  Cipro works best for me, but last couple times the mg's have only been 500 mg.  When the mg is 875 it works much better and I don't have the UTI as often.  This med usually gives me a yeast infection, so adding a Diflucan keeps that from occurring.  Please describe any probable cause for these symptoms. : I do all the precautions for prevention which include, drinking ample water, wiping front to back, using the bathroom within a minute or two after intercourse. I rarely drink soda but I do drink black coffee in the mornings, with water as well.  I hadn't had any uti's (or sinus infections)  for years, up until last spring/fall and I was having a uti or sinus infection alternately and more often than before. I am seeing my gynecologist late March so she may be able to shed some light on why the UTI's are occurring more often.  What is the primary reason for your visit?: Other       sitting  [] Total non-weight bearing  [] Right Leg  [] Left Leg   [] Assistive Device [] Walker [] Cane  [] Wheelchair  [] Crutches   [] Surgical shoe    [] Podus Boot(s)   [] Foam Boot(s)  [] Roll About    [] Cast Boot [] CROW Boot  [] Other:    Dietary:  [x] Diet as tolerated: [] Calorie Diabetic Diet: [] No Added Salt:  [x] Increase Protein: [] Other:   Activity:  [x] Activity as tolerated:  [] Patient has no activity restrictions     [] Strict Bedrest: [] Remain off Work:     [] May return to full duty work:                                   [] Return to work with restrictions:             Return Appointment:  [] Wound and dressing supply provider:   [] Home Healthcare:  [] nurse visit at wound center in *** days   [x] Return Appointment: With Dr. Shane Friend in  [facility]  [x] call  to adjust splint    Electronically signed on 9/18/2019 at 8:43 AM     Jenny Shell 281: Should you experience any significant changes in your wound(s) or have questions about your wound care, please contact the [facility] Main at [address] [place] [address] 8:00 am - 4:30. If you need help with your wound outside these hours and cannot wait until we are again available, contact your PCP or go to the hospital emergency room. PLEASE NOTE: IF YOU ARE UNABLE TO OBTAIN WOUND SUPPLIES, CONTINUE TO USE THE SUPPLIES YOU HAVE AVAILABLE UNTIL YOU ARE ABLE TO REACH US. IT IS MOST IMPORTANT TO KEEP THE WOUND COVERED AT ALL TIMES.      Physician Signature:_______________________    Date: ___________ Time:  ____________

## 2022-03-18 PROBLEM — G95.9 CERVICAL MYELOPATHY (HCC): Status: ACTIVE | Noted: 2021-01-21

## 2022-03-19 PROBLEM — D64.9 ANEMIA: Status: ACTIVE | Noted: 2021-01-22

## 2023-05-11 RX ORDER — PREDNISONE 10 MG/1
10 TABLET ORAL DAILY
COMMUNITY

## 2023-05-11 RX ORDER — OXYCODONE AND ACETAMINOPHEN 7.5; 325 MG/1; MG/1
1 TABLET ORAL EVERY 6 HOURS
COMMUNITY

## 2023-05-11 RX ORDER — ATORVASTATIN CALCIUM 10 MG/1
10 TABLET, FILM COATED ORAL DAILY
COMMUNITY

## 2023-05-11 RX ORDER — AZITHROMYCIN 500 MG/1
TABLET, FILM COATED ORAL
COMMUNITY

## 2023-05-11 RX ORDER — GABAPENTIN 600 MG/1
TABLET ORAL 4 TIMES DAILY
COMMUNITY

## 2023-05-11 RX ORDER — ALBUTEROL SULFATE 90 UG/1
2 AEROSOL, METERED RESPIRATORY (INHALATION) EVERY 4 HOURS PRN
COMMUNITY

## 2023-05-11 RX ORDER — DULOXETIN HYDROCHLORIDE 60 MG/1
CAPSULE, DELAYED RELEASE ORAL 2 TIMES DAILY
COMMUNITY

## 2023-05-11 RX ORDER — ENOXAPARIN SODIUM 100 MG/ML
INJECTION SUBCUTANEOUS EVERY 24 HOURS
COMMUNITY
Start: 2021-01-27

## 2023-05-11 RX ORDER — MOMETASONE FUROATE 200 UG/1
2 AEROSOL RESPIRATORY (INHALATION) 2 TIMES DAILY
COMMUNITY

## 2023-05-11 RX ORDER — SENNA PLUS 8.6 MG/1
TABLET ORAL
COMMUNITY
Start: 2021-01-26

## 2023-05-11 RX ORDER — IPRATROPIUM BROMIDE AND ALBUTEROL SULFATE 2.5; .5 MG/3ML; MG/3ML
SOLUTION RESPIRATORY (INHALATION) EVERY 4 HOURS PRN
COMMUNITY

## 2024-06-21 ENCOUNTER — HOSPITAL ENCOUNTER (EMERGENCY)
Facility: HOSPITAL | Age: 79
Discharge: NURSING, REHAB OR LONG-TERM CARE | End: 2024-06-21
Attending: EMERGENCY MEDICINE
Payer: COMMERCIAL

## 2024-06-21 ENCOUNTER — APPOINTMENT (OUTPATIENT)
Facility: HOSPITAL | Age: 79
End: 2024-06-21
Payer: COMMERCIAL

## 2024-06-21 VITALS
BODY MASS INDEX: 18.28 KG/M2 | DIASTOLIC BLOOD PRESSURE: 67 MMHG | WEIGHT: 135 LBS | HEART RATE: 64 BPM | RESPIRATION RATE: 16 BRPM | OXYGEN SATURATION: 99 % | SYSTOLIC BLOOD PRESSURE: 138 MMHG | HEIGHT: 72 IN | TEMPERATURE: 98 F

## 2024-06-21 DIAGNOSIS — M25.519 CHRONIC SHOULDER PAIN, UNSPECIFIED LATERALITY: Primary | ICD-10-CM

## 2024-06-21 DIAGNOSIS — G89.29 CHRONIC SHOULDER PAIN, UNSPECIFIED LATERALITY: Primary | ICD-10-CM

## 2024-06-21 LAB
COMMENT:: NORMAL
SPECIMEN HOLD: NORMAL

## 2024-06-21 PROCEDURE — 73030 X-RAY EXAM OF SHOULDER: CPT

## 2024-06-21 PROCEDURE — 99284 EMERGENCY DEPT VISIT MOD MDM: CPT

## 2024-06-21 ASSESSMENT — PAIN DESCRIPTION - LOCATION
LOCATION: ARM

## 2024-06-21 ASSESSMENT — PAIN DESCRIPTION - ORIENTATION
ORIENTATION: LEFT

## 2024-06-21 ASSESSMENT — PAIN SCALES - GENERAL
PAINLEVEL_OUTOF10: 4
PAINLEVEL_OUTOF10: 4
PAINLEVEL_OUTOF10: 6
PAINLEVEL_OUTOF10: 4

## 2024-06-21 ASSESSMENT — LIFESTYLE VARIABLES
HOW OFTEN DO YOU HAVE A DRINK CONTAINING ALCOHOL: NEVER
HOW MANY STANDARD DRINKS CONTAINING ALCOHOL DO YOU HAVE ON A TYPICAL DAY: PATIENT DOES NOT DRINK

## 2024-06-21 ASSESSMENT — PAIN DESCRIPTION - DESCRIPTORS: DESCRIPTORS: ACHING

## 2024-06-21 ASSESSMENT — PAIN - FUNCTIONAL ASSESSMENT: PAIN_FUNCTIONAL_ASSESSMENT: 0-10

## 2024-06-21 NOTE — ED PROVIDER NOTES
[facility] EMERGENCY DEPT  EMERGENCY DEPARTMENT ENCOUNTER      Patient Name: Jane Campbell  MRN: [id number]  Birthdate 1945  Date of Evaluation: 6/21/2024  Physician: John Rivera MD    CHIEF COMPLAINT       Chief Complaint   Patient presents with    Extremity Weakness       HISTORY OF PRESENT ILLNESS   (Location/Symptom, Timing/Onset, Context/Setting, Quality, Duration, Modifying Factors, Severity)   Jane Campbell, 79 y.o., male     79-year-old male presents with left shoulder pain and weakness.  Patient reports history of polio and worsening left arm weakness for many years.  It is not clear if there is any new complaint today.          Nursing Notes were reviewed.    REVIEW OF SYSTEMS    (Not required)   Review of Systems    Except as noted above the remainder of the review of systems was reviewed and negative.     PAST MEDICAL HISTORY     Past Medical History:   Diagnosis Date    Anxiety and depression     BCC (basal cell carcinoma of skin)     Bronchitis, chronic (HCC)     Chronic obstructive pulmonary disease (HCC)     Chronic pain     Fibromyalgia     Generalized arthritis     Hyperlipidemia     Neuropathy     Post-polio syndrome     Type 2 diabetes mellitus (HCC)        SURGICAL HISTORY       Past Surgical History:   Procedure Laterality Date    APPENDECTOMY      CERVICAL DISCECTOMY  2000    FRACTURE SURGERY Right 2006    Heel    IR GENERIC PROCEDURE  02/2010    LUMBAR LAMINECTOMY  2014    ORTHOPEDIC SURGERY Left     Arm multiple surgeries    OTHER SURGICAL HISTORY      Penile surgery post trazodone use    IA ABDOMEN SURGERY PROC UNLISTED  02/2010    Feeding Tube- removed 2011    THYROIDECTOMY  05/13/2010    TOTAL HIP ARTHROPLASTY Left 2007    WRIST FRACTURE SURGERY Right 07/22/2014       CURRENT MEDICATIONS       Previous Medications    ALBUTEROL SULFATE HFA (PROVENTIL;VENTOLIN;PROAIR) 108 (90 BASE) MCG/ACT INHALER    Inhale 2 puffs into the lungs every 4 hours as needed    ATORVASTATIN (LIPITOR) 10 MG

## 2024-06-21 NOTE — ED TRIAGE NOTES
Pt to ED via EMS from [facility] c/o intermittent bilateral shoulder pain x \"years\". States he is here today for evaluation of worsening chronic weakness (hx of polio).

## 2024-06-22 NOTE — ED NOTES
Patient's family member requesting transport back to facility     [facility] ETA 0330, [facility] ETA 2230     Patient's family member has discharge paperwork and made aware of transport time     Patient going back to [facility] , Room 304

## 2024-06-26 ENCOUNTER — TELEPHONE (OUTPATIENT)
Facility: CLINIC | Age: 79
End: 2024-06-26

## 2024-06-26 NOTE — TELEPHONE ENCOUNTER
Pt's daughter, Pat Campbell called to advise of new pt appointment scheduled by someone in call center using incorrect date of birth for pt,  stating it took over an hour for the appointment to be scheduled, she had a really hard time understanding the , and when she checked [facility] for pt's appointment there was no record of it.    Ms. Campbell also expressed her frustration with the amount of time spent trying to get appointment for pt, stating he needs a hospital follow up and can't get in for appointment until 12/16/24.       Appointment had been scheduled using incorrect date of birth of [birth year], prompting creation of duplicate chart for pt.  Apology to Ms. Campbell for the error, acknowledging her frustration, rescheduled pt in correct chart, advised he will be placed on our cancellation list, but she may also get him in sooner using the [employer] self-scheduling Dyan which she agreed to try.    Due to pt's medical needs, Ms. Campbell was also given information for other providers accepting new pts as well as [facility], and contact information for specialists requested in our area as pt is relocating to our area.    Ms. Campbell expressed her appreciation for the time spent assisting her and information provided, but also asked that someone be informed of her experience with the new call center and excessive amount of time spent scheduling pt's appointment, difficulty understanding , and multiple transfers.     Yoselin

## 2024-07-24 ENCOUNTER — HOSPITAL ENCOUNTER (EMERGENCY)
Facility: HOSPITAL | Age: 79
Discharge: HOME | End: 2024-07-24
Attending: EMERGENCY MEDICINE
Payer: MEDICARE

## 2024-07-24 ENCOUNTER — APPOINTMENT (OUTPATIENT)
Facility: HOSPITAL | Age: 79
End: 2024-07-24
Payer: MEDICARE

## 2024-07-24 VITALS
TEMPERATURE: 98.1 F | DIASTOLIC BLOOD PRESSURE: 75 MMHG | HEART RATE: 62 BPM | OXYGEN SATURATION: 98 % | RESPIRATION RATE: 17 BRPM | SYSTOLIC BLOOD PRESSURE: 128 MMHG

## 2024-07-24 DIAGNOSIS — J18.9 COMMUNITY ACQUIRED PNEUMONIA, UNSPECIFIED LATERALITY: Primary | ICD-10-CM

## 2024-07-24 LAB
ALBUMIN SERPL-MCNC: 3.8 G/DL (ref 3.5–5)
ALBUMIN/GLOB SERPL: 1.2 (ref 1.1–2.2)
ALP SERPL-CCNC: 78 U/L (ref 45–117)
ALT SERPL-CCNC: 17 U/L (ref 12–78)
ANION GAP SERPL CALC-SCNC: 6 MMOL/L (ref 5–15)
AST SERPL-CCNC: 13 U/L (ref 15–37)
BASOPHILS # BLD: 0.1 K/UL (ref 0–0.1)
BASOPHILS NFR BLD: 1 % (ref 0–1)
BILIRUB SERPL-MCNC: 0.3 MG/DL (ref 0.2–1)
BUN SERPL-MCNC: 32 MG/DL (ref 6–20)
BUN/CREAT SERPL: 31 (ref 12–20)
CALCIUM SERPL-MCNC: 9 MG/DL (ref 8.5–10.1)
CHLORIDE SERPL-SCNC: 109 MMOL/L (ref 97–108)
CO2 SERPL-SCNC: 23 MMOL/L (ref 21–32)
CREAT SERPL-MCNC: 1.02 MG/DL (ref 0.7–1.3)
DIFFERENTIAL METHOD BLD: ABNORMAL
EOSINOPHIL # BLD: 0.9 K/UL (ref 0–0.4)
EOSINOPHIL NFR BLD: 11 % (ref 0–7)
ERYTHROCYTE [DISTWIDTH] IN BLOOD BY AUTOMATED COUNT: 13.6 % (ref 11.5–14.5)
FLUAV RNA SPEC QL NAA+PROBE: NOT DETECTED
FLUBV RNA SPEC QL NAA+PROBE: NOT DETECTED
GLOBULIN SER CALC-MCNC: 3.1 G/DL (ref 2–4)
GLUCOSE SERPL-MCNC: 92 MG/DL (ref 65–100)
HCT VFR BLD AUTO: 34.9 % (ref 36.6–50.3)
HGB BLD-MCNC: 11.4 G/DL (ref 12.1–17)
IMM GRANULOCYTES # BLD AUTO: 0 K/UL (ref 0–0.04)
IMM GRANULOCYTES NFR BLD AUTO: 0 % (ref 0–0.5)
LYMPHOCYTES # BLD: 2 K/UL (ref 0.8–3.5)
LYMPHOCYTES NFR BLD: 23 % (ref 12–49)
MCH RBC QN AUTO: 31.3 PG (ref 26–34)
MCHC RBC AUTO-ENTMCNC: 32.7 G/DL (ref 30–36.5)
MCV RBC AUTO: 95.9 FL (ref 80–99)
MONOCYTES # BLD: 0.6 K/UL (ref 0–1)
MONOCYTES NFR BLD: 7 % (ref 5–13)
NEUTS SEG # BLD: 5 K/UL (ref 1.8–8)
NEUTS SEG NFR BLD: 58 % (ref 32–75)
NRBC # BLD: 0 K/UL (ref 0–0.01)
NRBC BLD-RTO: 0 PER 100 WBC
NT PRO BNP: 2327 PG/ML
PLATELET # BLD AUTO: 226 K/UL (ref 150–400)
PMV BLD AUTO: 10.2 FL (ref 8.9–12.9)
POTASSIUM SERPL-SCNC: 4.1 MMOL/L (ref 3.5–5.1)
PROT SERPL-MCNC: 6.9 G/DL (ref 6.4–8.2)
RBC # BLD AUTO: 3.64 M/UL (ref 4.1–5.7)
SARS-COV-2 RNA RESP QL NAA+PROBE: NOT DETECTED
SODIUM SERPL-SCNC: 138 MMOL/L (ref 136–145)
TROPONIN I SERPL HS-MCNC: 25 NG/L (ref 0–76)
WBC # BLD AUTO: 8.6 K/UL (ref 4.1–11.1)

## 2024-07-24 PROCEDURE — 84484 ASSAY OF TROPONIN QUANT: CPT

## 2024-07-24 PROCEDURE — 36415 COLL VENOUS BLD VENIPUNCTURE: CPT

## 2024-07-24 PROCEDURE — 83880 ASSAY OF NATRIURETIC PEPTIDE: CPT

## 2024-07-24 PROCEDURE — 80053 COMPREHEN METABOLIC PANEL: CPT

## 2024-07-24 PROCEDURE — 71046 X-RAY EXAM CHEST 2 VIEWS: CPT

## 2024-07-24 PROCEDURE — 85025 COMPLETE CBC W/AUTO DIFF WBC: CPT

## 2024-07-24 PROCEDURE — 6370000000 HC RX 637 (ALT 250 FOR IP): Performed by: EMERGENCY MEDICINE

## 2024-07-24 PROCEDURE — 87636 SARSCOV2 & INF A&B AMP PRB: CPT

## 2024-07-24 PROCEDURE — 99284 EMERGENCY DEPT VISIT MOD MDM: CPT

## 2024-07-24 RX ORDER — BENZONATATE 100 MG/1
100 CAPSULE ORAL
Status: COMPLETED | OUTPATIENT
Start: 2024-07-24 | End: 2024-07-24

## 2024-07-24 RX ORDER — BENZONATATE 100 MG/1
100 CAPSULE ORAL 3 TIMES DAILY PRN
Qty: 30 CAPSULE | Refills: 0 | Status: SHIPPED | OUTPATIENT
Start: 2024-07-24 | End: 2024-08-03

## 2024-07-24 RX ORDER — DOXYCYCLINE HYCLATE 100 MG
100 TABLET ORAL 2 TIMES DAILY
Qty: 19 TABLET | Refills: 0 | Status: SHIPPED | OUTPATIENT
Start: 2024-07-24 | End: 2024-08-03

## 2024-07-24 RX ORDER — DOXYCYCLINE HYCLATE 100 MG
100 TABLET ORAL
Status: COMPLETED | OUTPATIENT
Start: 2024-07-24 | End: 2024-07-24

## 2024-07-24 RX ADMIN — DOXYCYCLINE HYCLATE 100 MG: 100 TABLET, COATED ORAL at 13:38

## 2024-07-24 RX ADMIN — BENZONATATE 100 MG: 100 CAPSULE ORAL at 12:42

## 2024-07-24 NOTE — ED PROVIDER NOTES
[facility] EMERGENCY DEPT  EMERGENCY DEPARTMENT ENCOUNTER      Pt Name: Jane Campbell  MRN: [id number]  Birthdate 1945  Date of evaluation: 7/24/2024  Provider: Geovani Dorantes DO      HISTORY OF PRESENT ILLNESS      HPI  79-year-old male with a history of COPD, polio, presents to the emergency department from his independent living facility by EMS with the complaints of few days of increasing cough and reported shortness of breath.  While in route to the ED the patient denies any shortness of breath but he states that he has history of prior pneumonia and states that it feels similar.  He notes a productive cough and rhinorrhea but no noted fever.  He was found to be satting 98% while in route with no distress noted.  Denied any known sick contacts recently.    Nursing Notes were reviewed.    REVIEW OF SYSTEMS         Review of Systems        PAST MEDICAL HISTORY     Past Medical History:   Diagnosis Date    Anxiety and depression     BCC (basal cell carcinoma of skin)     Bronchitis, chronic (HCC)     Chronic obstructive pulmonary disease (HCC)     Chronic pain     Fibromyalgia     Generalized arthritis     Hyperlipidemia     Neuropathy     Post-polio syndrome     Type 2 diabetes mellitus (HCC)          SURGICAL HISTORY       Past Surgical History:   Procedure Laterality Date    APPENDECTOMY      CERVICAL DISCECTOMY  2000    FRACTURE SURGERY Right 2006    Heel    IR GENERIC PROCEDURE  02/2010    LUMBAR LAMINECTOMY  2014    ORTHOPEDIC SURGERY Left     Arm multiple surgeries    OTHER SURGICAL HISTORY      Penile surgery post trazodone use    DE ABDOMEN SURGERY PROC UNLISTED  02/2010    Feeding Tube- removed 2011    THYROIDECTOMY  05/13/2010    TOTAL HIP ARTHROPLASTY Left 2007    WRIST FRACTURE SURGERY Right 07/22/2014         CURRENT MEDICATIONS       Previous Medications    ALBUTEROL SULFATE HFA (PROVENTIL;VENTOLIN;PROAIR) 108 (90 BASE) MCG/ACT INHALER    Inhale 2 puffs into the lungs every 4 hours as needed

## 2024-07-24 NOTE — ED NOTES
Ride home scheduled for 3PM att. Spoke to daughter of patient on phone to provide update with patient's permission.   DC papers reviewed with both patient and daughter.

## 2024-07-24 NOTE — ED NOTES
[facility] at bedside to take patient back to [facility] assisted living [facility]. Patient appears well upon discharge. All DC papers, prescriptions, and facesheet given to [facility] staff.

## 2024-08-03 ENCOUNTER — APPOINTMENT (OUTPATIENT)
Facility: HOSPITAL | Age: 79
DRG: 884 | End: 2024-08-03
Payer: MEDICARE

## 2024-08-03 ENCOUNTER — HOSPITAL ENCOUNTER (INPATIENT)
Facility: HOSPITAL | Age: 79
LOS: 7 days | Discharge: HOME HEALTH | DRG: 884 | End: 2024-08-10
Attending: EMERGENCY MEDICINE | Admitting: HOSPITALIST
Payer: MEDICARE

## 2024-08-03 DIAGNOSIS — N17.9 AKI (ACUTE KIDNEY INJURY) (HCC): ICD-10-CM

## 2024-08-03 DIAGNOSIS — R44.3 HALLUCINATIONS: ICD-10-CM

## 2024-08-03 DIAGNOSIS — E83.42 HYPOMAGNESEMIA: ICD-10-CM

## 2024-08-03 DIAGNOSIS — R41.82 ALTERED MENTAL STATUS, UNSPECIFIED ALTERED MENTAL STATUS TYPE: Primary | ICD-10-CM

## 2024-08-03 DIAGNOSIS — I42.9 CARDIOMYOPATHY, UNSPECIFIED TYPE (HCC): ICD-10-CM

## 2024-08-03 LAB
25(OH)D3 SERPL-MCNC: 14.1 NG/ML (ref 30–100)
ALBUMIN SERPL-MCNC: 4.4 G/DL (ref 3.5–5)
ALBUMIN/GLOB SERPL: 1.2 (ref 1.1–2.2)
ALP SERPL-CCNC: 99 U/L (ref 45–117)
ALT SERPL-CCNC: 18 U/L (ref 12–78)
AMPHET UR QL SCN: NEGATIVE
ANION GAP SERPL CALC-SCNC: 8 MMOL/L (ref 5–15)
APPEARANCE UR: CLEAR
AST SERPL-CCNC: 15 U/L (ref 15–37)
BACTERIA URNS QL MICRO: NEGATIVE /HPF
BARBITURATES UR QL SCN: NEGATIVE
BASOPHILS # BLD: 0.1 K/UL (ref 0–0.1)
BASOPHILS NFR BLD: 0 % (ref 0–1)
BENZODIAZ UR QL: NEGATIVE
BILIRUB SERPL-MCNC: 0.3 MG/DL (ref 0.2–1)
BILIRUB UR QL: NEGATIVE
BUN SERPL-MCNC: 43 MG/DL (ref 6–20)
BUN/CREAT SERPL: 28 (ref 12–20)
CALCIUM SERPL-MCNC: 9.9 MG/DL (ref 8.5–10.1)
CANNABINOIDS UR QL SCN: NEGATIVE
CHLORIDE SERPL-SCNC: 110 MMOL/L (ref 97–108)
CO2 SERPL-SCNC: 23 MMOL/L (ref 21–32)
COCAINE UR QL SCN: NEGATIVE
COLOR UR: NORMAL
COMMENT:: NORMAL
CREAT SERPL-MCNC: 1.51 MG/DL (ref 0.7–1.3)
DIFFERENTIAL METHOD BLD: ABNORMAL
EKG DIAGNOSIS: NORMAL
EKG Q-T INTERVAL: 364 MS
EKG QRS DURATION: 116 MS
EKG QTC CALCULATION (BAZETT): 459 MS
EKG R AXIS: -47 DEGREES
EKG T AXIS: 103 DEGREES
EKG VENTRICULAR RATE: 96 BPM
EOSINOPHIL # BLD: 0.2 K/UL (ref 0–0.4)
EOSINOPHIL NFR BLD: 2 % (ref 0–7)
EPITH CASTS URNS QL MICRO: NORMAL /LPF
ERYTHROCYTE [DISTWIDTH] IN BLOOD BY AUTOMATED COUNT: 13.6 % (ref 11.5–14.5)
FOLATE SERPL-MCNC: 19.8 NG/ML (ref 5–21)
GLOBULIN SER CALC-MCNC: 3.8 G/DL (ref 2–4)
GLUCOSE SERPL-MCNC: 99 MG/DL (ref 65–100)
GLUCOSE UR STRIP.AUTO-MCNC: NEGATIVE MG/DL
HCT VFR BLD AUTO: 43 % (ref 36.6–50.3)
HGB BLD-MCNC: 14.2 G/DL (ref 12.1–17)
HGB UR QL STRIP: NEGATIVE
HYALINE CASTS URNS QL MICRO: NORMAL /LPF (ref 0–5)
IMM GRANULOCYTES # BLD AUTO: 0 K/UL (ref 0–0.04)
IMM GRANULOCYTES NFR BLD AUTO: 0 % (ref 0–0.5)
KETONES UR QL STRIP.AUTO: NEGATIVE MG/DL
LEUKOCYTE ESTERASE UR QL STRIP.AUTO: NEGATIVE
LYMPHOCYTES # BLD: 2.2 K/UL (ref 0.8–3.5)
LYMPHOCYTES NFR BLD: 18 % (ref 12–49)
Lab: NORMAL
MAGNESIUM SERPL-MCNC: 1.5 MG/DL (ref 1.6–2.4)
MCH RBC QN AUTO: 31.5 PG (ref 26–34)
MCHC RBC AUTO-ENTMCNC: 33 G/DL (ref 30–36.5)
MCV RBC AUTO: 95.3 FL (ref 80–99)
METHADONE UR QL: NEGATIVE
MONOCYTES # BLD: 0.9 K/UL (ref 0–1)
MONOCYTES NFR BLD: 7 % (ref 5–13)
NEUTS SEG # BLD: 8.7 K/UL (ref 1.8–8)
NEUTS SEG NFR BLD: 73 % (ref 32–75)
NITRITE UR QL STRIP.AUTO: NEGATIVE
NRBC # BLD: 0 K/UL (ref 0–0.01)
NRBC BLD-RTO: 0 PER 100 WBC
OPIATES UR QL: NEGATIVE
PCP UR QL: NEGATIVE
PH UR STRIP: 5 (ref 5–8)
PHOSPHATE SERPL-MCNC: 3.9 MG/DL (ref 2.6–4.7)
PLATELET # BLD AUTO: 242 K/UL (ref 150–400)
PMV BLD AUTO: 10.6 FL (ref 8.9–12.9)
POTASSIUM SERPL-SCNC: 4.6 MMOL/L (ref 3.5–5.1)
PROT SERPL-MCNC: 8.2 G/DL (ref 6.4–8.2)
PROT UR STRIP-MCNC: NEGATIVE MG/DL
RBC # BLD AUTO: 4.51 M/UL (ref 4.1–5.7)
RBC #/AREA URNS HPF: NORMAL /HPF (ref 0–5)
SODIUM SERPL-SCNC: 141 MMOL/L (ref 136–145)
SP GR UR REFRACTOMETRY: 1.02 (ref 1–1.03)
SPECIMEN HOLD: NORMAL
TROPONIN I SERPL HS-MCNC: 31 NG/L (ref 0–76)
TSH SERPL DL<=0.05 MIU/L-ACNC: 8.11 UIU/ML (ref 0.36–3.74)
URINE CULTURE IF INDICATED: NORMAL
UROBILINOGEN UR QL STRIP.AUTO: 0.2 EU/DL (ref 0.2–1)
VIT B12 SERPL-MCNC: 444 PG/ML (ref 193–986)
WBC # BLD AUTO: 12 K/UL (ref 4.1–11.1)
WBC URNS QL MICRO: NORMAL /HPF (ref 0–4)

## 2024-08-03 PROCEDURE — 82746 ASSAY OF FOLIC ACID SERUM: CPT

## 2024-08-03 PROCEDURE — 80307 DRUG TEST PRSMV CHEM ANLYZR: CPT

## 2024-08-03 PROCEDURE — 2580000003 HC RX 258: Performed by: EMERGENCY MEDICINE

## 2024-08-03 PROCEDURE — 80053 COMPREHEN METABOLIC PANEL: CPT

## 2024-08-03 PROCEDURE — 36415 COLL VENOUS BLD VENIPUNCTURE: CPT

## 2024-08-03 PROCEDURE — 6360000002 HC RX W HCPCS: Performed by: EMERGENCY MEDICINE

## 2024-08-03 PROCEDURE — 1100000000 HC RM PRIVATE

## 2024-08-03 PROCEDURE — 70450 CT HEAD/BRAIN W/O DYE: CPT

## 2024-08-03 PROCEDURE — 85025 COMPLETE CBC W/AUTO DIFF WBC: CPT

## 2024-08-03 PROCEDURE — 84443 ASSAY THYROID STIM HORMONE: CPT

## 2024-08-03 PROCEDURE — 93005 ELECTROCARDIOGRAM TRACING: CPT | Performed by: PHYSICIAN ASSISTANT

## 2024-08-03 PROCEDURE — 84100 ASSAY OF PHOSPHORUS: CPT

## 2024-08-03 PROCEDURE — 2580000003 HC RX 258: Performed by: INTERNAL MEDICINE

## 2024-08-03 PROCEDURE — 96365 THER/PROPH/DIAG IV INF INIT: CPT

## 2024-08-03 PROCEDURE — 83735 ASSAY OF MAGNESIUM: CPT

## 2024-08-03 PROCEDURE — 99285 EMERGENCY DEPT VISIT HI MDM: CPT

## 2024-08-03 PROCEDURE — 82607 VITAMIN B-12: CPT

## 2024-08-03 PROCEDURE — 81001 URINALYSIS AUTO W/SCOPE: CPT

## 2024-08-03 PROCEDURE — 84484 ASSAY OF TROPONIN QUANT: CPT

## 2024-08-03 PROCEDURE — 82306 VITAMIN D 25 HYDROXY: CPT

## 2024-08-03 RX ORDER — 0.9 % SODIUM CHLORIDE 0.9 %
1000 INTRAVENOUS SOLUTION INTRAVENOUS ONCE
Status: COMPLETED | OUTPATIENT
Start: 2024-08-03 | End: 2024-08-03

## 2024-08-03 RX ORDER — GUAIFENESIN 600 MG/1
600 TABLET, EXTENDED RELEASE ORAL 2 TIMES DAILY
Status: ON HOLD | COMMUNITY
End: 2024-08-06 | Stop reason: ALTCHOICE

## 2024-08-03 RX ORDER — MAGNESIUM SULFATE 1 G/100ML
1000 INJECTION INTRAVENOUS
Status: COMPLETED | OUTPATIENT
Start: 2024-08-03 | End: 2024-08-03

## 2024-08-03 RX ORDER — SODIUM CHLORIDE 0.9 % (FLUSH) 0.9 %
5-40 SYRINGE (ML) INJECTION PRN
Status: DISCONTINUED | OUTPATIENT
Start: 2024-08-03 | End: 2024-08-10 | Stop reason: HOSPADM

## 2024-08-03 RX ORDER — ENOXAPARIN SODIUM 100 MG/ML
40 INJECTION SUBCUTANEOUS DAILY
Status: DISCONTINUED | OUTPATIENT
Start: 2024-08-04 | End: 2024-08-10 | Stop reason: HOSPADM

## 2024-08-03 RX ORDER — ACETAMINOPHEN 650 MG/1
650 SUPPOSITORY RECTAL EVERY 6 HOURS PRN
Status: DISCONTINUED | OUTPATIENT
Start: 2024-08-03 | End: 2024-08-10 | Stop reason: HOSPADM

## 2024-08-03 RX ORDER — SODIUM CHLORIDE 9 MG/ML
INJECTION, SOLUTION INTRAVENOUS PRN
Status: DISCONTINUED | OUTPATIENT
Start: 2024-08-03 | End: 2024-08-10 | Stop reason: HOSPADM

## 2024-08-03 RX ORDER — ACETAMINOPHEN 325 MG/1
650 TABLET ORAL EVERY 6 HOURS PRN
Status: DISCONTINUED | OUTPATIENT
Start: 2024-08-03 | End: 2024-08-10 | Stop reason: HOSPADM

## 2024-08-03 RX ORDER — SODIUM CHLORIDE 0.9 % (FLUSH) 0.9 %
5-40 SYRINGE (ML) INJECTION EVERY 12 HOURS SCHEDULED
Status: DISCONTINUED | OUTPATIENT
Start: 2024-08-03 | End: 2024-08-10 | Stop reason: HOSPADM

## 2024-08-03 RX ORDER — MELOXICAM 15 MG/1
15 TABLET ORAL DAILY
COMMUNITY
Start: 2021-03-24

## 2024-08-03 RX ORDER — DULOXETIN HYDROCHLORIDE 30 MG/1
60 CAPSULE, DELAYED RELEASE ORAL DAILY
Status: DISCONTINUED | OUTPATIENT
Start: 2024-08-04 | End: 2024-08-10 | Stop reason: HOSPADM

## 2024-08-03 RX ADMIN — SODIUM CHLORIDE 1000 ML: 9 INJECTION, SOLUTION INTRAVENOUS at 16:29

## 2024-08-03 RX ADMIN — MAGNESIUM SULFATE HEPTAHYDRATE 1000 MG: 1 INJECTION, SOLUTION INTRAVENOUS at 16:32

## 2024-08-03 RX ADMIN — SODIUM CHLORIDE, PRESERVATIVE FREE 10 ML: 5 INJECTION INTRAVENOUS at 21:01

## 2024-08-03 ASSESSMENT — PAIN - FUNCTIONAL ASSESSMENT: PAIN_FUNCTIONAL_ASSESSMENT: NONE - DENIES PAIN

## 2024-08-03 NOTE — ED NOTES
1:15 PM  I have evaluated the patient as the Provider in Rapid Medical Evaluation (RME). I have reviewed his vital signs and the triage nurse assessment. I have talked with the patient and any available family and advised that I am the provider in triage and have ordered the appropriate study to initiate their work up based on the clinical presentation during my assessment. I have advised that the patient will be accommodated in the Main ED as soon as possible. I have also requested to contact the triage nurse or myself immediately if the patient experiences any changes in their condition during this brief waiting period.    \"This isn't the first time, but this episode started yesterday.\"  Son notes hallucinations.  Reports that he has been talking about going to heaven.        ELIECER Pete Lindsay H, PA  08/03/24 [id number]

## 2024-08-03 NOTE — ED TRIAGE NOTES
Patient arrives to ER with daughter and son. Family reports patient is hallucinating and patient is stating he is going to heaven. Patient stays at an Assisted Living and care-giver reports increasing AMS. Patient actively trying to crawl out of wheel-chair in triage.

## 2024-08-03 NOTE — ED PROVIDER NOTES
[facility] EMERGENCY DEP  EMERGENCY DEPARTMENT ENCOUNTER      Pt Name: Jane Campbell  MRN: [id number]  Birthdate 1945  Date of evaluation: 8/3/2024  Provider: Geovani Dorantes DO      HISTORY OF PRESENT ILLNESS      HPI    79-year-old male presents to the emergency department with family noting increased confusion and hallucinations over the last couple of days.  He reportedly has a history of similar symptoms previously and was recommended psychiatry evaluation but they moved recently and he has not been able to get into see them.  No noted fever or cough or URI symptoms.  No reported urinary symptoms.  Denies any new focal numbness or weakness but does have chronic left leg weakness secondary to history of polio with a leg brace in place.  No known history of dementia.  He currently lives at an assisted living facility but caregiver reports significantly increased altered mental status for the last couple of days.  On arrival to the emergency department the patient and his confused and repeatedly stating that he is \"going to heaven.\"  He reportedly has called family things like \"they are dead and  in a car wreck.\"  Patient denies any pain or other acute medical concerns at this time.    Nursing Notes were reviewed.    REVIEW OF SYSTEMS         Review of Systems        PAST MEDICAL HISTORY     Past Medical History:   Diagnosis Date    Anxiety and depression     BCC (basal cell carcinoma of skin)     Bronchitis, chronic (HCC)     Chronic obstructive pulmonary disease (HCC)     Chronic pain     Fibromyalgia     Generalized arthritis     Hyperlipidemia     Neuropathy     Post-polio syndrome     Type 2 diabetes mellitus (HCC)          SURGICAL HISTORY       Past Surgical History:   Procedure Laterality Date    APPENDECTOMY      CERVICAL DISCECTOMY  2000    FRACTURE SURGERY Right 2006    Heel    IR GENERIC PROCEDURE  2010    LUMBAR LAMINECTOMY  2014    ORTHOPEDIC SURGERY Left     Arm multiple surgeries

## 2024-08-03 NOTE — ED NOTES
ED SIGN OUT NOTE  Care assumed at [facility] 3:00 PM EDT    Patient was signed out to me by Dr. Dorantes.     Patient is awaiting completion of workup and likely admission.    CT of the head is negative for acute intracranial process, there is left mastoid air cell ossification but otherwise no acute findings.  UA pending.    [facility] Consult for Admission  4:06 PM    ED Room Number: ER14/14  Patient Name and age:  Jane Campbell 79 y.o.  male  Working Diagnosis:   1. Altered mental status, unspecified altered mental status type    2. Hallucinations    3. MEÑO (acute kidney injury) (HCC)    4. Hypomagnesemia        COVID-19 Suspicion: No  Sepsis present:  No  Reassessment needed: No  Code Status:  Full Code  Readmission: No  Isolation Requirements: no  Recommended Level of Care: med/surg  Department: [facility] Adult ED - [phone]    Other: Patient presented for hallucinations.  WBC notable for 12 K.  BUN and creatinine elevated at 43 and 1.51, patient was hydrated with IV fluids.  UA pending, straight cath ordered suspect likely UTI. CT head negative       BP (!) 144/91   Pulse 94   Temp 97.8 °F (36.6 °C) (Oral)   Resp 16   Ht 1.829 m (6')   SpO2 99%   BMI 18.31 kg/m²     Labs Reviewed   CBC WITH AUTO DIFFERENTIAL - Abnormal; Notable for the following components:       Result Value    WBC 12.0 (*)     Neutrophils Absolute 8.7 (*)     All other components within normal limits   COMPREHENSIVE METABOLIC PANEL - Abnormal; Notable for the following components:    Chloride 110 (*)     BUN 43 (*)     Creatinine 1.51 (*)     BUN/Creatinine Ratio 28 (*)     Est, Glom Filt Rate 46 (*)     All other components within normal limits   MAGNESIUM - Abnormal; Notable for the following components:    Magnesium 1.5 (*)     All other components within normal limits   EXTRA TUBES HOLD   TROPONIN   URINALYSIS WITH REFLEX TO CULTURE   URINE DRUG SCREEN     CT HEAD WO CONTRAST   Final Result   No acute intracranial

## 2024-08-03 NOTE — H&P
available resources at the time of admission. Route is PO if not otherwise noted.     Family and social history were personally reviewed, all pertinent and relevant details are outlined as above.    Objective:   BP (!) 144/91   Pulse 94   Temp 97.8 °F (36.6 °C) (Oral)   Resp 16   Ht 1.829 m (6')   SpO2 99%   BMI 18.31 kg/m²         PHYSICAL EXAM:   General:          Sleepy, awakens to loud voice then back to sleep  HEENT: Muscle wasting, edentulous  Lungs:             Clear to auscultation bilaterally, symmetric expansion, no respiratory distress  Heart:              Regular rate and rhythm, no audible murmur  Abdomen:        Soft, non-tender, non-distended  Extremities:     LLE with chronic brace, no edema, muscle wasting noted  Skin:                Warm, dry  Neurologic:      Does not follow commands or answer questions appropriately, very [place]    Data Review:   I have independently reviewed and interpreted patient's lab and all other diagnostic data    Abnormal Labs Reviewed   CBC WITH AUTO DIFFERENTIAL - Abnormal; Notable for the following components:       Result Value    WBC 12.0 (*)     Neutrophils Absolute 8.7 (*)     All other components within normal limits   COMPREHENSIVE METABOLIC PANEL - Abnormal; Notable for the following components:    Chloride 110 (*)     BUN 43 (*)     Creatinine 1.51 (*)     BUN/Creatinine Ratio 28 (*)     Est, Glom Filt Rate 46 (*)     All other components within normal limits   MAGNESIUM - Abnormal; Notable for the following components:    Magnesium 1.5 (*)     All other components within normal limits     Recent Results (from the past 24 hour(s))   EKG 12 Lead    Collection Time: 08/03/24  1:24 PM   Result Value Ref Range    Ventricular Rate 96 BPM    QRS Duration 116 ms    Q-T Interval 364 ms    QTc Calculation (Bazett) 459 ms    R Axis -47 degrees    T Axis 103 degrees    Diagnosis       Atrial fibrillation  Left anterior fascicular block  Left ventricular hypertrophy

## 2024-08-04 ENCOUNTER — APPOINTMENT (OUTPATIENT)
Facility: HOSPITAL | Age: 79
DRG: 884 | End: 2024-08-04
Payer: MEDICARE

## 2024-08-04 LAB
AMMONIA PLAS-SCNC: 16 UMOL/L
ANION GAP SERPL CALC-SCNC: 6 MMOL/L (ref 5–15)
ARTERIAL PATENCY WRIST A: ABNORMAL
BASE DEFICIT BLDA-SCNC: 2 MMOL/L
BDY SITE: ABNORMAL
BUN SERPL-MCNC: 33 MG/DL (ref 6–20)
BUN/CREAT SERPL: 34 (ref 12–20)
CALCIUM SERPL-MCNC: 9.4 MG/DL (ref 8.5–10.1)
CHLORIDE SERPL-SCNC: 110 MMOL/L (ref 97–108)
CO2 SERPL-SCNC: 25 MMOL/L (ref 21–32)
CREAT SERPL-MCNC: 0.96 MG/DL (ref 0.7–1.3)
ERYTHROCYTE [DISTWIDTH] IN BLOOD BY AUTOMATED COUNT: 13.5 % (ref 11.5–14.5)
GAS FLOW.O2 O2 DELIVERY SYS: 1 L/MIN
GLUCOSE SERPL-MCNC: 93 MG/DL (ref 65–100)
HCO3 BLDA-SCNC: 22 MMOL/L (ref 22–26)
HCT VFR BLD AUTO: 37 % (ref 36.6–50.3)
HGB BLD-MCNC: 12 G/DL (ref 12.1–17)
LACTATE SERPL-SCNC: 1.1 MMOL/L (ref 0.4–2)
LACTATE SERPL-SCNC: 2.3 MMOL/L (ref 0.4–2)
MAGNESIUM SERPL-MCNC: 1.8 MG/DL (ref 1.6–2.4)
MCH RBC QN AUTO: 31.3 PG (ref 26–34)
MCHC RBC AUTO-ENTMCNC: 32.4 G/DL (ref 30–36.5)
MCV RBC AUTO: 96.6 FL (ref 80–99)
NRBC # BLD: 0 K/UL (ref 0–0.01)
NRBC BLD-RTO: 0 PER 100 WBC
NT PRO BNP: 2518 PG/ML
PCO2 BLDA: 36 MMHG (ref 35–45)
PH BLDA: 7.41 (ref 7.35–7.45)
PLATELET # BLD AUTO: 185 K/UL (ref 150–400)
PMV BLD AUTO: 10.9 FL (ref 8.9–12.9)
PO2 BLDA: 77 MMHG (ref 80–100)
POTASSIUM SERPL-SCNC: 4.2 MMOL/L (ref 3.5–5.1)
RBC # BLD AUTO: 3.83 M/UL (ref 4.1–5.7)
SAO2 % BLD: 96 % (ref 92–97)
SAO2% DEVICE SAO2% SENSOR NAME: ABNORMAL
SODIUM SERPL-SCNC: 141 MMOL/L (ref 136–145)
SPECIMEN SITE: ABNORMAL
T3FREE SERPL-MCNC: 1.9 PG/ML (ref 2.2–4)
T4 FREE SERPL-MCNC: 0.8 NG/DL (ref 0.8–1.5)
WBC # BLD AUTO: 11.6 K/UL (ref 4.1–11.1)

## 2024-08-04 PROCEDURE — 92610 EVALUATE SWALLOWING FUNCTION: CPT

## 2024-08-04 PROCEDURE — 85027 COMPLETE CBC AUTOMATED: CPT

## 2024-08-04 PROCEDURE — 84439 ASSAY OF FREE THYROXINE: CPT

## 2024-08-04 PROCEDURE — 83880 ASSAY OF NATRIURETIC PEPTIDE: CPT

## 2024-08-04 PROCEDURE — 2580000003 HC RX 258: Performed by: NURSE PRACTITIONER

## 2024-08-04 PROCEDURE — 2700000000 HC OXYGEN THERAPY PER DAY

## 2024-08-04 PROCEDURE — 94760 N-INVAS EAR/PLS OXIMETRY 1: CPT

## 2024-08-04 PROCEDURE — 94640 AIRWAY INHALATION TREATMENT: CPT

## 2024-08-04 PROCEDURE — 6370000000 HC RX 637 (ALT 250 FOR IP): Performed by: NURSE PRACTITIONER

## 2024-08-04 PROCEDURE — 80048 BASIC METABOLIC PNL TOTAL CA: CPT

## 2024-08-04 PROCEDURE — 84481 FREE ASSAY (FT-3): CPT

## 2024-08-04 PROCEDURE — 6370000000 HC RX 637 (ALT 250 FOR IP): Performed by: INTERNAL MEDICINE

## 2024-08-04 PROCEDURE — 82803 BLOOD GASES ANY COMBINATION: CPT

## 2024-08-04 PROCEDURE — 83735 ASSAY OF MAGNESIUM: CPT

## 2024-08-04 PROCEDURE — 36600 WITHDRAWAL OF ARTERIAL BLOOD: CPT

## 2024-08-04 PROCEDURE — 82140 ASSAY OF AMMONIA: CPT

## 2024-08-04 PROCEDURE — 87205 SMEAR GRAM STAIN: CPT

## 2024-08-04 PROCEDURE — 36415 COLL VENOUS BLD VENIPUNCTURE: CPT

## 2024-08-04 PROCEDURE — 83605 ASSAY OF LACTIC ACID: CPT

## 2024-08-04 PROCEDURE — 71045 X-RAY EXAM CHEST 1 VIEW: CPT

## 2024-08-04 PROCEDURE — 1100000000 HC RM PRIVATE

## 2024-08-04 PROCEDURE — 87070 CULTURE OTHR SPECIMN AEROBIC: CPT

## 2024-08-04 PROCEDURE — 6360000002 HC RX W HCPCS: Performed by: INTERNAL MEDICINE

## 2024-08-04 PROCEDURE — 2580000003 HC RX 258: Performed by: INTERNAL MEDICINE

## 2024-08-04 RX ORDER — SODIUM CHLORIDE 9 MG/ML
INJECTION, SOLUTION INTRAVENOUS ONCE
Status: COMPLETED | OUTPATIENT
Start: 2024-08-04 | End: 2024-08-04

## 2024-08-04 RX ORDER — IPRATROPIUM BROMIDE AND ALBUTEROL SULFATE 2.5; .5 MG/3ML; MG/3ML
1 SOLUTION RESPIRATORY (INHALATION) EVERY 4 HOURS PRN
Status: DISCONTINUED | OUTPATIENT
Start: 2024-08-04 | End: 2024-08-10 | Stop reason: HOSPADM

## 2024-08-04 RX ORDER — LEVOTHYROXINE SODIUM 0.05 MG/1
50 TABLET ORAL
Status: DISCONTINUED | OUTPATIENT
Start: 2024-08-05 | End: 2024-08-10 | Stop reason: HOSPADM

## 2024-08-04 RX ADMIN — DULOXETINE HYDROCHLORIDE 60 MG: 30 CAPSULE, DELAYED RELEASE ORAL at 08:35

## 2024-08-04 RX ADMIN — IPRATROPIUM BROMIDE AND ALBUTEROL SULFATE 1 DOSE: 2.5; .5 SOLUTION RESPIRATORY (INHALATION) at 13:40

## 2024-08-04 RX ADMIN — DULOXETINE HYDROCHLORIDE 90 MG: 60 CAPSULE, DELAYED RELEASE ORAL at 22:13

## 2024-08-04 RX ADMIN — IPRATROPIUM BROMIDE AND ALBUTEROL SULFATE 1 DOSE: 2.5; .5 SOLUTION RESPIRATORY (INHALATION) at 03:27

## 2024-08-04 RX ADMIN — ENOXAPARIN SODIUM 40 MG: 100 INJECTION SUBCUTANEOUS at 08:35

## 2024-08-04 RX ADMIN — SODIUM CHLORIDE: 9 INJECTION, SOLUTION INTRAVENOUS at 13:26

## 2024-08-04 RX ADMIN — SODIUM CHLORIDE, PRESERVATIVE FREE 10 ML: 5 INJECTION INTRAVENOUS at 08:35

## 2024-08-04 RX ADMIN — SODIUM CHLORIDE, PRESERVATIVE FREE 10 ML: 5 INJECTION INTRAVENOUS at 22:13

## 2024-08-04 NOTE — BSMART NOTE
Initial BSMART Liaison Assessment Form     Section I - Integrated Summary    Chief Complaint is recurrent hallucinations, SI, h/o suicide attempts.    LOS:  1 day     Presenting problem/Summary:  Liaison met f/f with pt in his room on the medical floor of [facility]. 1:1 constant observer at pt's bedside. Pt presents as alert, calm, cooperative, pleasant, and very hard of hearing. Liaison had to speak loudly and directly into pt's ear for him to be engaged in the interview.. Pt says that his family is bringing his hearing aides this evening. Pt says that he realizes that his family wanted to give him a \"brain examination.\" Pt says that he allows it to \"appease them.\" Pt oriented x 4 when asked direct orientation questions. He knows he is at the hospital and that the hospital is [facility]. Pt says that he lives at an [facility] and he gives the location. He is able to provide the correct date. Pt denies SI at present time. He refers back to his 2 previous suicide attempts, saying that they \"didn't work.\". He does indicate 2 previous suicide attempts, with his last suicide attempt occurring w/in the last 5 years. Pt denies HI/AH/VH at this time. He says that his sleep and appetite are \"pretty good.\" He says that he stopped drinking years ago after 60 years of alcohol abuse b/c he is a [employer] person.     Liaison did consult with pt's primary nurse who reports family concerned b/c pt saying that he and the daughter-in-law were \"going to heaven.\" Pt reportedly has a hx of confusion. Per ER PA note on 8/3/2024: \"Son notes hallucinations. Reports that he has been talking about going to heaven.\" Per ER physician's note on 8/3/2024: \"79-year-old male presents to the emergency department with family noting increased confusion and hallucinations over the last couple of days.\" Also reports: \"On arrival to the emergency department the patient [is] confused and repeatedly stating that he is \"going to heaven.\"  He reportedly has called

## 2024-08-05 ENCOUNTER — APPOINTMENT (OUTPATIENT)
Facility: HOSPITAL | Age: 79
DRG: 884 | End: 2024-08-05
Payer: MEDICARE

## 2024-08-05 LAB
ANION GAP SERPL CALC-SCNC: 7 MMOL/L (ref 5–15)
B PERT DNA SPEC QL NAA+PROBE: NOT DETECTED
BORDETELLA PARAPERTUSSIS BY PCR: NOT DETECTED
BUN SERPL-MCNC: 21 MG/DL (ref 6–20)
BUN/CREAT SERPL: 32 (ref 12–20)
C PNEUM DNA SPEC QL NAA+PROBE: NOT DETECTED
CALCIUM SERPL-MCNC: 9.5 MG/DL (ref 8.5–10.1)
CHLORIDE SERPL-SCNC: 107 MMOL/L (ref 97–108)
CO2 SERPL-SCNC: 25 MMOL/L (ref 21–32)
CREAT SERPL-MCNC: 0.66 MG/DL (ref 0.7–1.3)
ECHO AV MEAN GRADIENT: 3 MMHG
ECHO AV MEAN VELOCITY: 0.8 M/S
ECHO AV PEAK GRADIENT: 4 MMHG
ECHO AV PEAK VELOCITY: 1 M/S
ECHO AV VTI: 12.3 CM
ECHO BSA: 1.76 M2
ECHO LA DIAMETER INDEX: 1.94 CM/M2
ECHO LA DIAMETER: 3.5 CM
ECHO LV FRACTIONAL SHORTENING: 19 % (ref 28–44)
ECHO LV INTERNAL DIMENSION DIASTOLE INDEX: 2.39 CM/M2
ECHO LV INTERNAL DIMENSION DIASTOLIC: 4.3 CM (ref 4.2–5.9)
ECHO LV INTERNAL DIMENSION SYSTOLIC INDEX: 1.94 CM/M2
ECHO LV INTERNAL DIMENSION SYSTOLIC: 3.5 CM
ECHO LV IVSD: 1 CM (ref 0.6–1)
ECHO LV MASS 2D: 132.7 G (ref 88–224)
ECHO LV MASS INDEX 2D: 73.7 G/M2 (ref 49–115)
ECHO LV POSTERIOR WALL DIASTOLIC: 0.9 CM (ref 0.6–1)
ECHO LV RELATIVE WALL THICKNESS RATIO: 0.42
ECHO LVOT AREA: 5.7 CM2
ECHO LVOT DIAM: 2.7 CM
ECHO MV A VELOCITY: 0.48 M/S
ECHO MV AREA PHT: 3.6 CM2
ECHO MV E DECELERATION TIME (DT): 210.2 MS
ECHO MV E VELOCITY: 0.71 M/S
ECHO MV E/A RATIO: 1.48
ECHO MV PRESSURE HALF TIME (PHT): 61 MS
FLUAV SUBTYP SPEC NAA+PROBE: NOT DETECTED
FLUBV RNA SPEC QL NAA+PROBE: NOT DETECTED
GLUCOSE SERPL-MCNC: 99 MG/DL (ref 65–100)
HADV DNA SPEC QL NAA+PROBE: NOT DETECTED
HCOV 229E RNA SPEC QL NAA+PROBE: NOT DETECTED
HCOV NL63 RNA SPEC QL NAA+PROBE: NOT DETECTED
HCOV OC43 RNA SPEC QL NAA+PROBE: NOT DETECTED
HMPV RNA SPEC QL NAA+PROBE: NOT DETECTED
HPIV1 RNA SPEC QL NAA+PROBE: NOT DETECTED
HPIV2 RNA SPEC QL NAA+PROBE: NOT DETECTED
HPIV3 RNA SPEC QL NAA+PROBE: NOT DETECTED
HPIV4 RNA SPEC QL NAA+PROBE: NOT DETECTED
M PNEUMO DNA SPEC QL NAA+PROBE: NOT DETECTED
POTASSIUM SERPL-SCNC: 4.1 MMOL/L (ref 3.5–5.1)
RSV RNA SPEC QL NAA+PROBE: NOT DETECTED
RV+EV RNA SPEC QL NAA+PROBE: NOT DETECTED
SARS-COV-2 RNA RESP QL NAA+PROBE: NOT DETECTED
SODIUM SERPL-SCNC: 139 MMOL/L (ref 136–145)

## 2024-08-05 PROCEDURE — 80048 BASIC METABOLIC PNL TOTAL CA: CPT

## 2024-08-05 PROCEDURE — 97530 THERAPEUTIC ACTIVITIES: CPT

## 2024-08-05 PROCEDURE — 92612 ENDOSCOPY SWALLOW (FEES) VID: CPT

## 2024-08-05 PROCEDURE — 2580000003 HC RX 258: Performed by: INTERNAL MEDICINE

## 2024-08-05 PROCEDURE — 73030 X-RAY EXAM OF SHOULDER: CPT

## 2024-08-05 PROCEDURE — 6370000000 HC RX 637 (ALT 250 FOR IP): Performed by: NURSE PRACTITIONER

## 2024-08-05 PROCEDURE — 71250 CT THORAX DX C-: CPT

## 2024-08-05 PROCEDURE — 97165 OT EVAL LOW COMPLEX 30 MIN: CPT

## 2024-08-05 PROCEDURE — 2580000003 HC RX 258: Performed by: NURSE PRACTITIONER

## 2024-08-05 PROCEDURE — 97535 SELF CARE MNGMENT TRAINING: CPT

## 2024-08-05 PROCEDURE — 1100000000 HC RM PRIVATE

## 2024-08-05 PROCEDURE — 6360000002 HC RX W HCPCS: Performed by: INTERNAL MEDICINE

## 2024-08-05 PROCEDURE — 6370000000 HC RX 637 (ALT 250 FOR IP): Performed by: INTERNAL MEDICINE

## 2024-08-05 PROCEDURE — 0202U NFCT DS 22 TRGT SARS-COV-2: CPT

## 2024-08-05 PROCEDURE — 93306 TTE W/DOPPLER COMPLETE: CPT

## 2024-08-05 PROCEDURE — 6360000002 HC RX W HCPCS: Performed by: NURSE PRACTITIONER

## 2024-08-05 PROCEDURE — 87040 BLOOD CULTURE FOR BACTERIA: CPT

## 2024-08-05 PROCEDURE — 97161 PT EVAL LOW COMPLEX 20 MIN: CPT

## 2024-08-05 RX ORDER — HALOPERIDOL 5 MG/ML
5 INJECTION INTRAMUSCULAR EVERY 6 HOURS PRN
Status: DISCONTINUED | OUTPATIENT
Start: 2024-08-05 | End: 2024-08-05

## 2024-08-05 RX ORDER — MIRTAZAPINE 15 MG/1
15 TABLET, ORALLY DISINTEGRATING ORAL NIGHTLY
Status: DISCONTINUED | OUTPATIENT
Start: 2024-08-05 | End: 2024-08-10 | Stop reason: HOSPADM

## 2024-08-05 RX ORDER — OLANZAPINE 5 MG/1
2.5 TABLET ORAL NIGHTLY
Status: DISCONTINUED | OUTPATIENT
Start: 2024-08-05 | End: 2024-08-10 | Stop reason: HOSPADM

## 2024-08-05 RX ADMIN — DULOXETINE HYDROCHLORIDE 60 MG: 30 CAPSULE, DELAYED RELEASE ORAL at 08:43

## 2024-08-05 RX ADMIN — LEVOTHYROXINE SODIUM 50 MCG: 0.05 TABLET ORAL at 05:07

## 2024-08-05 RX ADMIN — DULOXETINE HYDROCHLORIDE 90 MG: 60 CAPSULE, DELAYED RELEASE ORAL at 21:00

## 2024-08-05 RX ADMIN — SODIUM CHLORIDE, PRESERVATIVE FREE 10 ML: 5 INJECTION INTRAVENOUS at 21:00

## 2024-08-05 RX ADMIN — WATER 5 MG: 1 INJECTION INTRAMUSCULAR; INTRAVENOUS; SUBCUTANEOUS at 17:12

## 2024-08-05 RX ADMIN — VANCOMYCIN HYDROCHLORIDE 1000 MG: 1 INJECTION, POWDER, LYOPHILIZED, FOR SOLUTION INTRAVENOUS at 20:59

## 2024-08-05 RX ADMIN — MIRTAZAPINE 15 MG: 15 TABLET, ORALLY DISINTEGRATING ORAL at 23:59

## 2024-08-05 RX ADMIN — SODIUM CHLORIDE: 9 INJECTION, SOLUTION INTRAVENOUS at 01:31

## 2024-08-05 RX ADMIN — OLANZAPINE 2.5 MG: 5 TABLET, FILM COATED ORAL at 21:38

## 2024-08-05 RX ADMIN — ENOXAPARIN SODIUM 40 MG: 100 INJECTION SUBCUTANEOUS at 08:43

## 2024-08-05 RX ADMIN — SODIUM CHLORIDE 1500 MG: 9 INJECTION, SOLUTION INTRAVENOUS at 01:33

## 2024-08-05 RX ADMIN — SODIUM CHLORIDE, PRESERVATIVE FREE 10 ML: 5 INJECTION INTRAVENOUS at 08:43

## 2024-08-05 NOTE — BSMART NOTE
BSMART Liaison Team Note     LOS:  2     Patient goals for today: \"talk to my pulmonologist and go home\", take medications as prescribed, make needs known in an appropriate manner.  BSMART Liaison team focus/goals: assess MH needs, provide therapeutic support, brief therapy, and education, as needed.  Assist medical care management team with recommendations for coordination of care.    Progress note: per triage, Patient arrives to ER with daughter and son. Family reports patient is hallucinating and patient is stating he is going to [facility]. Patient stays at an Assisted Living and care-giver reports increasing AMS. Patient actively trying to crawl out of wheel-chair in triage.    Liaison met with pt, FTCECE, on the medical floor.  He is received resting in bed, alert, oriented, logical and goal-directed.  His affect is bright, he is extremely hard of hearing, which makes communication challenging.  Pt denies SI/HI/AVH.  He reports he is feeling \"OK\" and states he is waiting to speak with pulmonology.  Pt reports he does not see a psychiatrist but his PCP prescribes him something for anxiety and cannot remember what it is called.  Pt reports living at [facility] and states he hopes to go home soon.  PT is here to work with pt - Liaison team will continue to monitor and support, as needed.     Barriers to Discharge: medical clearance, russ BHU bed  Guns in the home:  no    Outpatient provider(s):  none reported  Insurance info/prescription coverage:  medicare, [employer]    Diagnosis: delirium, appears to be resolved.  Dementia dx was added to pt's chart on 1/23/21 by Dr. Quentin Bagley.    Past Medical History:   Diagnosis Date    Anxiety and depression     BCC (basal cell carcinoma of skin)     Bronchitis, chronic (HCC)     Chronic obstructive pulmonary disease (HCC)     Chronic pain     Fibromyalgia     Generalized arthritis     Hyperlipidemia     Neuropathy     Post-polio syndrome     Type 2 diabetes mellitus

## 2024-08-06 ENCOUNTER — APPOINTMENT (OUTPATIENT)
Facility: HOSPITAL | Age: 79
DRG: 884 | End: 2024-08-06
Payer: MEDICARE

## 2024-08-06 PROBLEM — I48.0 PAROXYSMAL ATRIAL FIBRILLATION (HCC): Status: ACTIVE | Noted: 2024-08-06

## 2024-08-06 PROBLEM — R41.82 ALTERED MENTAL STATUS: Status: ACTIVE | Noted: 2024-08-06

## 2024-08-06 PROBLEM — I42.9 CARDIOMYOPATHY (HCC): Status: ACTIVE | Noted: 2024-08-06

## 2024-08-06 LAB
ANION GAP SERPL CALC-SCNC: 5 MMOL/L (ref 5–15)
APPEARANCE FLD: ABNORMAL
BACTERIA SPEC CULT: NORMAL
BASOPHILS # BLD: 0.1 K/UL (ref 0–0.1)
BASOPHILS NFR BLD: 1 % (ref 0–1)
BODY FLD TYPE: NORMAL
BUN SERPL-MCNC: 18 MG/DL (ref 6–20)
BUN/CREAT SERPL: 26 (ref 12–20)
CALCIUM SERPL-MCNC: 9.6 MG/DL (ref 8.5–10.1)
CHLORIDE SERPL-SCNC: 110 MMOL/L (ref 97–108)
CHOLEST SERPL-MCNC: 124 MG/DL
CO2 SERPL-SCNC: 25 MMOL/L (ref 21–32)
COLOR FLD: ABNORMAL
COMMENT:: NORMAL
CREAT SERPL-MCNC: 0.68 MG/DL (ref 0.7–1.3)
CRYSTALS FLD MICRO: NORMAL
DIFFERENTIAL METHOD BLD: ABNORMAL
EOSINOPHIL # BLD: 0.8 K/UL (ref 0–0.4)
EOSINOPHIL NFR BLD: 11 % (ref 0–7)
ERYTHROCYTE [DISTWIDTH] IN BLOOD BY AUTOMATED COUNT: 13.6 % (ref 11.5–14.5)
GLUCOSE BLD STRIP.AUTO-MCNC: 83 MG/DL (ref 65–117)
GLUCOSE BLD STRIP.AUTO-MCNC: 97 MG/DL (ref 65–117)
GLUCOSE SERPL-MCNC: 94 MG/DL (ref 65–100)
GRAM STN SPEC: NORMAL
HCT VFR BLD AUTO: 37.4 % (ref 36.6–50.3)
HDLC SERPL-MCNC: 31 MG/DL
HDLC SERPL: 4 (ref 0–5)
HGB BLD-MCNC: 12.4 G/DL (ref 12.1–17)
IMM GRANULOCYTES # BLD AUTO: 0 K/UL (ref 0–0.04)
IMM GRANULOCYTES NFR BLD AUTO: 0 % (ref 0–0.5)
LDLC SERPL CALC-MCNC: 71.4 MG/DL (ref 0–100)
LYMPHOCYTES # BLD: 2.4 K/UL (ref 0.8–3.5)
LYMPHOCYTES NFR BLD: 30 % (ref 12–49)
LYMPHOCYTES NFR FLD: 18 %
MCH RBC QN AUTO: 31.5 PG (ref 26–34)
MCHC RBC AUTO-ENTMCNC: 33.2 G/DL (ref 30–36.5)
MCV RBC AUTO: 94.9 FL (ref 80–99)
MONOCYTES # BLD: 0.8 K/UL (ref 0–1)
MONOCYTES NFR BLD: 10 % (ref 5–13)
MONOS+MACROS NFR FLD: 65 %
NEUTROPHILS NFR FLD: 17 %
NEUTS SEG # BLD: 3.8 K/UL (ref 1.8–8)
NEUTS SEG NFR BLD: 48 % (ref 32–75)
NRBC # BLD: 0 K/UL (ref 0–0.01)
NRBC BLD-RTO: 0 PER 100 WBC
NUC CELL # FLD: 431 /CU MM
PLATELET # BLD AUTO: 170 K/UL (ref 150–400)
PMV BLD AUTO: 10.8 FL (ref 8.9–12.9)
POTASSIUM SERPL-SCNC: 4 MMOL/L (ref 3.5–5.1)
PROCALCITONIN SERPL-MCNC: 0.05 NG/ML
RBC # BLD AUTO: 3.94 M/UL (ref 4.1–5.7)
RBC # FLD: >100 /CU MM
SERVICE CMNT-IMP: NORMAL
SODIUM SERPL-SCNC: 140 MMOL/L (ref 136–145)
SPECIMEN HOLD: NORMAL
SPECIMEN SOURCE FLD: ABNORMAL
TRIGL SERPL-MCNC: 108 MG/DL
VANCOMYCIN SERPL-MCNC: 12.4 UG/ML
VLDLC SERPL CALC-MCNC: 21.6 MG/DL
WBC # BLD AUTO: 7.8 K/UL (ref 4.1–11.1)

## 2024-08-06 PROCEDURE — 6360000002 HC RX W HCPCS

## 2024-08-06 PROCEDURE — 74220 X-RAY XM ESOPHAGUS 1CNTRST: CPT

## 2024-08-06 PROCEDURE — 6370000000 HC RX 637 (ALT 250 FOR IP): Performed by: INTERNAL MEDICINE

## 2024-08-06 PROCEDURE — 6370000000 HC RX 637 (ALT 250 FOR IP)

## 2024-08-06 PROCEDURE — 0R9J3ZZ DRAINAGE OF RIGHT SHOULDER JOINT, PERCUTANEOUS APPROACH: ICD-10-PCS

## 2024-08-06 PROCEDURE — 1100000000 HC RM PRIVATE

## 2024-08-06 PROCEDURE — 82962 GLUCOSE BLOOD TEST: CPT

## 2024-08-06 PROCEDURE — 6370000000 HC RX 637 (ALT 250 FOR IP): Performed by: NURSE PRACTITIONER

## 2024-08-06 PROCEDURE — 2700000000 HC OXYGEN THERAPY PER DAY

## 2024-08-06 PROCEDURE — 2500000003 HC RX 250 WO HCPCS

## 2024-08-06 PROCEDURE — 89050 BODY FLUID CELL COUNT: CPT

## 2024-08-06 PROCEDURE — 87015 SPECIMEN INFECT AGNT CONCNTJ: CPT

## 2024-08-06 PROCEDURE — 6360000002 HC RX W HCPCS: Performed by: NURSE PRACTITIONER

## 2024-08-06 PROCEDURE — 80061 LIPID PANEL: CPT

## 2024-08-06 PROCEDURE — 89060 EXAM SYNOVIAL FLUID CRYSTALS: CPT

## 2024-08-06 PROCEDURE — 94640 AIRWAY INHALATION TREATMENT: CPT

## 2024-08-06 PROCEDURE — 87205 SMEAR GRAM STAIN: CPT

## 2024-08-06 PROCEDURE — 36415 COLL VENOUS BLD VENIPUNCTURE: CPT

## 2024-08-06 PROCEDURE — 2580000003 HC RX 258: Performed by: NURSE PRACTITIONER

## 2024-08-06 PROCEDURE — 80048 BASIC METABOLIC PNL TOTAL CA: CPT

## 2024-08-06 PROCEDURE — 20610 DRAIN/INJ JOINT/BURSA W/O US: CPT

## 2024-08-06 PROCEDURE — 85025 COMPLETE CBC W/AUTO DIFF WBC: CPT

## 2024-08-06 PROCEDURE — 80202 ASSAY OF VANCOMYCIN: CPT

## 2024-08-06 PROCEDURE — APPNB45 APP NON BILLABLE 31-45 MINUTES: Performed by: PHYSICIAN ASSISTANT

## 2024-08-06 PROCEDURE — 2580000003 HC RX 258: Performed by: INTERNAL MEDICINE

## 2024-08-06 PROCEDURE — 84145 PROCALCITONIN (PCT): CPT

## 2024-08-06 PROCEDURE — 87070 CULTURE OTHR SPECIMN AEROBIC: CPT

## 2024-08-06 RX ORDER — BUDESONIDE 0.5 MG/2ML
0.5 INHALANT ORAL
Status: DISCONTINUED | OUTPATIENT
Start: 2024-08-06 | End: 2024-08-10 | Stop reason: HOSPADM

## 2024-08-06 RX ORDER — ARFORMOTEROL TARTRATE 15 UG/2ML
15 SOLUTION RESPIRATORY (INHALATION)
Status: DISCONTINUED | OUTPATIENT
Start: 2024-08-06 | End: 2024-08-07

## 2024-08-06 RX ORDER — METOPROLOL SUCCINATE 25 MG/1
25 TABLET, EXTENDED RELEASE ORAL DAILY
Status: DISCONTINUED | OUTPATIENT
Start: 2024-08-06 | End: 2024-08-08

## 2024-08-06 RX ORDER — ATORVASTATIN CALCIUM 10 MG/1
10 TABLET, FILM COATED ORAL DAILY
Status: DISCONTINUED | OUTPATIENT
Start: 2024-08-06 | End: 2024-08-10 | Stop reason: HOSPADM

## 2024-08-06 RX ORDER — SODIUM CHLORIDE 9 MG/ML
10 INJECTION, SOLUTION INTRAMUSCULAR; INTRAVENOUS; SUBCUTANEOUS ONCE
Status: DISCONTINUED | OUTPATIENT
Start: 2024-08-06 | End: 2024-08-10 | Stop reason: HOSPADM

## 2024-08-06 RX ORDER — LIDOCAINE HYDROCHLORIDE 10 MG/ML
10 INJECTION, SOLUTION EPIDURAL; INFILTRATION; INTRACAUDAL; PERINEURAL ONCE
Status: COMPLETED | OUTPATIENT
Start: 2024-08-06 | End: 2024-08-06

## 2024-08-06 RX ADMIN — ARFORMOTEROL TARTRATE 15 MCG: 15 SOLUTION RESPIRATORY (INHALATION) at 22:42

## 2024-08-06 RX ADMIN — LIDOCAINE HYDROCHLORIDE 5 ML: 10 INJECTION, SOLUTION EPIDURAL; INFILTRATION; INTRACAUDAL; PERINEURAL at 14:41

## 2024-08-06 RX ADMIN — DULOXETINE HYDROCHLORIDE 90 MG: 60 CAPSULE, DELAYED RELEASE ORAL at 22:08

## 2024-08-06 RX ADMIN — WATER 5 MG: 1 INJECTION INTRAMUSCULAR; INTRAVENOUS; SUBCUTANEOUS at 16:00

## 2024-08-06 RX ADMIN — OLANZAPINE 2.5 MG: 5 TABLET, FILM COATED ORAL at 22:08

## 2024-08-06 RX ADMIN — IPRATROPIUM BROMIDE 0.5 MG: 0.5 SOLUTION RESPIRATORY (INHALATION) at 12:00

## 2024-08-06 RX ADMIN — ARFORMOTEROL TARTRATE 15 MCG: 15 SOLUTION RESPIRATORY (INHALATION) at 12:01

## 2024-08-06 RX ADMIN — SACUBITRIL AND VALSARTAN 1 TABLET: 24; 26 TABLET, FILM COATED ORAL at 15:58

## 2024-08-06 RX ADMIN — IPRATROPIUM BROMIDE 0.5 MG: 0.5 SOLUTION RESPIRATORY (INHALATION) at 22:41

## 2024-08-06 RX ADMIN — MIRTAZAPINE 15 MG: 15 TABLET, ORALLY DISINTEGRATING ORAL at 22:09

## 2024-08-06 RX ADMIN — WATER 5 MG: 1 INJECTION INTRAMUSCULAR; INTRAVENOUS; SUBCUTANEOUS at 04:55

## 2024-08-06 RX ADMIN — SODIUM CHLORIDE, PRESERVATIVE FREE 10 ML: 5 INJECTION INTRAVENOUS at 22:09

## 2024-08-06 RX ADMIN — BUDESONIDE 500 MCG: 0.5 INHALANT RESPIRATORY (INHALATION) at 22:42

## 2024-08-06 RX ADMIN — BUDESONIDE 500 MCG: 0.5 INHALANT RESPIRATORY (INHALATION) at 12:01

## 2024-08-06 RX ADMIN — ATORVASTATIN CALCIUM 10 MG: 10 TABLET, FILM COATED ORAL at 11:11

## 2024-08-06 RX ADMIN — LEVOTHYROXINE SODIUM 50 MCG: 0.05 TABLET ORAL at 05:00

## 2024-08-06 NOTE — BSMART NOTE
Liaison attempted to meet f/f with pt in his room on the medical floor of [facility]. Pt not in his room. Per medical staff, pt has been transferred off the floor for a procedure. Liaison will continue to monitor and to support.

## 2024-08-06 NOTE — CARE COORDINATION
Care Management Initial Assessment       RUR: 15%  Readmission? No  1st IM letter given? Yes - 8/6  1st  letter given: No    CM met with patient and daughter at bedside to introduce self and explain role. Patient did not converse during our encounter. The daughter has requested CM fax a medical record request to [facility] to send records to [facility], this has been requested. Patient lives at [facility] which the patient/ family pays for OOP with 24/7 private caregivers. Patient is WC bound at base line. Daughter is requesting a LTC Medicaid screening, CM sent request to [employer]. Family requesting SNF at discharge, although per PSYCH notes this patient may qualify for IP Geriatric Psych stay once medically stable.     Patients daughter is thinking this patient may eventually need [facility]RIAN or LTC.     CM will continue to follow for needs.        08/06/24 5103   Service Assessment   Patient Orientation Unable to Assess   Cognition Other (see comment)   History Provided By Child/Family   Primary Caregiver Private caregiver   Accompanied By/Relationship daughter   Support Systems Family Members   Patient's Healthcare Decision Maker is: Legal Next of Kin   PCP Verified by CM Yes   Prior Functional Level Assistance with the following:;Bathing;Dressing;Toileting;Feeding;Housework;Mobility   Can patient return to prior living arrangement No   Ability to make needs known: Poor   Family able to assist with home care needs: No   Financial Resources Medicare   Social/Functional History   Lives With Other (comment)   Type of Home Senior housing apartment   Home Layout One level   Home Access Level entry   Discharge Planning   Type of Residence Skilled Nursing Facility   Living Arrangements Other (Comment)  ([facility] with Caregivers)   DME Ordered? No     Marge Helm RN/CRM

## 2024-08-07 LAB
ANION GAP SERPL CALC-SCNC: 7 MMOL/L (ref 5–15)
ARTERIAL PATENCY WRIST A: POSITIVE
BASE EXCESS BLD CALC-SCNC: 0.4 MMOL/L
BASOPHILS # BLD: 0 K/UL (ref 0–0.1)
BASOPHILS NFR BLD: 0 % (ref 0–1)
BDY SITE: ABNORMAL
BUN SERPL-MCNC: 22 MG/DL (ref 6–20)
BUN/CREAT SERPL: 33 (ref 12–20)
CA-I BLD-SCNC: 1.28 MMOL/L (ref 1.12–1.32)
CALCIUM SERPL-MCNC: 9.6 MG/DL (ref 8.5–10.1)
CHLORIDE SERPL-SCNC: 110 MMOL/L (ref 97–108)
CO2 SERPL-SCNC: 26 MMOL/L (ref 21–32)
CREAT SERPL-MCNC: 0.67 MG/DL (ref 0.7–1.3)
DIFFERENTIAL METHOD BLD: ABNORMAL
EKG DIAGNOSIS: NORMAL
EKG Q-T INTERVAL: 440 MS
EKG QRS DURATION: 120 MS
EKG QTC CALCULATION (BAZETT): 495 MS
EKG R AXIS: -62 DEGREES
EKG T AXIS: 96 DEGREES
EKG VENTRICULAR RATE: 76 BPM
EOSINOPHIL # BLD: 0.9 K/UL (ref 0–0.4)
EOSINOPHIL NFR BLD: 9 % (ref 0–7)
ERYTHROCYTE [DISTWIDTH] IN BLOOD BY AUTOMATED COUNT: 13.6 % (ref 11.5–14.5)
GAS FLOW.O2 O2 DELIVERY SYS: ABNORMAL
GLUCOSE BLD STRIP.AUTO-MCNC: 93 MG/DL (ref 65–117)
GLUCOSE SERPL-MCNC: 98 MG/DL (ref 65–100)
HCO3 BLD-SCNC: 23.5 MMOL/L (ref 22–26)
HCT VFR BLD AUTO: 41.3 % (ref 36.6–50.3)
HGB BLD-MCNC: 13.3 G/DL (ref 12.1–17)
IMM GRANULOCYTES # BLD AUTO: 0 K/UL (ref 0–0.04)
IMM GRANULOCYTES NFR BLD AUTO: 0 % (ref 0–0.5)
LYMPHOCYTES # BLD: 2 K/UL (ref 0.8–3.5)
LYMPHOCYTES NFR BLD: 22 % (ref 12–49)
MCH RBC QN AUTO: 30.7 PG (ref 26–34)
MCHC RBC AUTO-ENTMCNC: 32.2 G/DL (ref 30–36.5)
MCV RBC AUTO: 95.4 FL (ref 80–99)
MONOCYTES # BLD: 0.7 K/UL (ref 0–1)
NEUTS SEG # BLD: 5.6 K/UL (ref 1.8–8)
NEUTS SEG NFR BLD: 61 % (ref 32–75)
NRBC # BLD: 0 K/UL (ref 0–0.01)
NRBC BLD-RTO: 0 PER 100 WBC
PCO2 BLD: 33 MMHG (ref 35–45)
PH BLD: 7.46 (ref 7.35–7.45)
PLATELET # BLD AUTO: 189 K/UL (ref 150–400)
PMV BLD AUTO: 11.4 FL (ref 8.9–12.9)
PO2 BLD: 74 MMHG (ref 80–100)
POTASSIUM SERPL-SCNC: 4.2 MMOL/L (ref 3.5–5.1)
RBC # BLD AUTO: 4.33 M/UL (ref 4.1–5.7)
SAO2 % BLD: 95.6 % (ref 92–97)
SERVICE CMNT-IMP: NORMAL
SODIUM SERPL-SCNC: 143 MMOL/L (ref 136–145)
SPECIMEN TYPE: ABNORMAL
WBC # BLD AUTO: 9.2 K/UL (ref 4.1–11.1)

## 2024-08-07 PROCEDURE — 85025 COMPLETE CBC W/AUTO DIFF WBC: CPT

## 2024-08-07 PROCEDURE — 82803 BLOOD GASES ANY COMBINATION: CPT

## 2024-08-07 PROCEDURE — 99232 SBSQ HOSP IP/OBS MODERATE 35: CPT | Performed by: PHYSICIAN ASSISTANT

## 2024-08-07 PROCEDURE — 97535 SELF CARE MNGMENT TRAINING: CPT

## 2024-08-07 PROCEDURE — 2580000003 HC RX 258: Performed by: INTERNAL MEDICINE

## 2024-08-07 PROCEDURE — 97530 THERAPEUTIC ACTIVITIES: CPT

## 2024-08-07 PROCEDURE — 6370000000 HC RX 637 (ALT 250 FOR IP): Performed by: NURSE PRACTITIONER

## 2024-08-07 PROCEDURE — 94640 AIRWAY INHALATION TREATMENT: CPT

## 2024-08-07 PROCEDURE — 93005 ELECTROCARDIOGRAM TRACING: CPT | Performed by: NURSE PRACTITIONER

## 2024-08-07 PROCEDURE — 82962 GLUCOSE BLOOD TEST: CPT

## 2024-08-07 PROCEDURE — 6360000002 HC RX W HCPCS: Performed by: INTERNAL MEDICINE

## 2024-08-07 PROCEDURE — 80048 BASIC METABOLIC PNL TOTAL CA: CPT

## 2024-08-07 PROCEDURE — 6370000000 HC RX 637 (ALT 250 FOR IP): Performed by: INTERNAL MEDICINE

## 2024-08-07 PROCEDURE — 36415 COLL VENOUS BLD VENIPUNCTURE: CPT

## 2024-08-07 PROCEDURE — 36600 WITHDRAWAL OF ARTERIAL BLOOD: CPT

## 2024-08-07 PROCEDURE — 6360000002 HC RX W HCPCS

## 2024-08-07 PROCEDURE — 1100000000 HC RM PRIVATE

## 2024-08-07 PROCEDURE — 6370000000 HC RX 637 (ALT 250 FOR IP)

## 2024-08-07 RX ORDER — ARFORMOTEROL TARTRATE 15 UG/2ML
15 SOLUTION RESPIRATORY (INHALATION)
Status: DISCONTINUED | OUTPATIENT
Start: 2024-08-07 | End: 2024-08-10 | Stop reason: HOSPADM

## 2024-08-07 RX ORDER — HYDROXYZINE 50 MG/1
25 TABLET, FILM COATED ORAL 3 TIMES DAILY PRN
Status: DISCONTINUED | OUTPATIENT
Start: 2024-08-07 | End: 2024-08-09

## 2024-08-07 RX ADMIN — DULOXETINE HYDROCHLORIDE 60 MG: 30 CAPSULE, DELAYED RELEASE ORAL at 08:49

## 2024-08-07 RX ADMIN — ENOXAPARIN SODIUM 40 MG: 100 INJECTION SUBCUTANEOUS at 08:49

## 2024-08-07 RX ADMIN — SACUBITRIL AND VALSARTAN 1 TABLET: 24; 26 TABLET, FILM COATED ORAL at 08:49

## 2024-08-07 RX ADMIN — SODIUM CHLORIDE, PRESERVATIVE FREE 10 ML: 5 INJECTION INTRAVENOUS at 08:49

## 2024-08-07 RX ADMIN — DULOXETINE HYDROCHLORIDE 90 MG: 60 CAPSULE, DELAYED RELEASE ORAL at 21:01

## 2024-08-07 RX ADMIN — METOPROLOL SUCCINATE 25 MG: 25 TABLET, FILM COATED, EXTENDED RELEASE ORAL at 08:49

## 2024-08-07 RX ADMIN — SODIUM CHLORIDE, PRESERVATIVE FREE 10 ML: 5 INJECTION INTRAVENOUS at 21:05

## 2024-08-07 RX ADMIN — ATORVASTATIN CALCIUM 10 MG: 10 TABLET, FILM COATED ORAL at 08:49

## 2024-08-07 RX ADMIN — BUDESONIDE 500 MCG: 0.5 INHALANT RESPIRATORY (INHALATION) at 07:30

## 2024-08-07 RX ADMIN — SACUBITRIL AND VALSARTAN 0.5 TABLET: 24; 26 TABLET, FILM COATED ORAL at 21:01

## 2024-08-07 RX ADMIN — LEVOTHYROXINE SODIUM 50 MCG: 0.05 TABLET ORAL at 06:52

## 2024-08-07 RX ADMIN — MIRTAZAPINE 15 MG: 15 TABLET, ORALLY DISINTEGRATING ORAL at 21:02

## 2024-08-07 RX ADMIN — ARFORMOTEROL TARTRATE 15 MCG: 15 SOLUTION RESPIRATORY (INHALATION) at 07:30

## 2024-08-07 RX ADMIN — OLANZAPINE 2.5 MG: 5 TABLET, FILM COATED ORAL at 21:01

## 2024-08-07 RX ADMIN — IPRATROPIUM BROMIDE 0.5 MG: 0.5 SOLUTION RESPIRATORY (INHALATION) at 07:30

## 2024-08-07 NOTE — BSMART NOTE
BSMART Liaison Team Note     LOS:  4 days     Patient goal(s) for today: Patient goal(s) for today: take medications as prescribed, make needs known in an appropriate manner  BSMART Liaison team focus/goals: assess needs, provide support and education, coordinate care     Progress note: Liapushpa met f/f with pt in his room on the medical floor of [facility]. Pt presents resting comfortably in his recliner. He awakens to appear alert, calm, cooperative, pleasant, smiling appropriately. Pt has trouble hearing as previously. Pt says that he feels \"alright.\" He appears oriented x 4. He knows the month and the year. He denies any SI/HI/AH/VH at this time. He reports no sleep or appetite disturbance. Pt doesn't appear to be responding to any internal stimuli at this time. Pt thanked liapushpa for the visit. Sandra did consult with primary nurse who reports that pt had been trying to get out of bed earlier in the day, saying that he had to go. For this reason, he was put into his recliner. Liaison will continue to monitor and to support.     Barriers to Discharge: medical clearance, russ BHU bed   Guns in the home:  no    Outpatient provider(s):  none reported  Insurance info/prescription coverage: medicare, [employer] federal     Diagnosis: delirium, appears to be resolved.     Plan: [facility] BHU recommended upon medical clearance. Please defer to most recent psychiatric consult note for updated recommendations and/or disposition.  .   Follow up Psych Consult placed? Yes.   Psychiatrist updated?  No    Participating treatment team members: Jane Campbell, Aurelio Motta LCSW

## 2024-08-08 LAB
ANION GAP SERPL CALC-SCNC: 8 MMOL/L (ref 5–15)
BASOPHILS # BLD: 0 K/UL (ref 0–0.1)
BASOPHILS NFR BLD: 0 % (ref 0–1)
BUN SERPL-MCNC: 29 MG/DL (ref 6–20)
BUN/CREAT SERPL: 35 (ref 12–20)
CALCIUM SERPL-MCNC: 9.4 MG/DL (ref 8.5–10.1)
CHLORIDE SERPL-SCNC: 108 MMOL/L (ref 97–108)
CREAT SERPL-MCNC: 0.82 MG/DL (ref 0.7–1.3)
EKG ATRIAL RATE: 66 BPM
EKG DIAGNOSIS: NORMAL
EKG Q-T INTERVAL: 418 MS
EKG QRS DURATION: 122 MS
EKG QTC CALCULATION (BAZETT): 479 MS
EKG R AXIS: -59 DEGREES
EKG T AXIS: 97 DEGREES
EKG VENTRICULAR RATE: 79 BPM
EOSINOPHIL # BLD: 0.8 K/UL (ref 0–0.4)
EOSINOPHIL NFR BLD: 9 % (ref 0–7)
ERYTHROCYTE [DISTWIDTH] IN BLOOD BY AUTOMATED COUNT: 13.6 % (ref 11.5–14.5)
GLUCOSE SERPL-MCNC: 98 MG/DL (ref 65–100)
HCT VFR BLD AUTO: 42.5 % (ref 36.6–50.3)
HGB BLD-MCNC: 13.5 G/DL (ref 12.1–17)
IMM GRANULOCYTES # BLD AUTO: 0 K/UL (ref 0–0.04)
IMM GRANULOCYTES NFR BLD AUTO: 0 % (ref 0–0.5)
LYMPHOCYTES # BLD: 2.4 K/UL (ref 0.8–3.5)
LYMPHOCYTES NFR BLD: 25 % (ref 12–49)
MCH RBC QN AUTO: 30.8 PG (ref 26–34)
MCHC RBC AUTO-ENTMCNC: 31.8 G/DL (ref 30–36.5)
MCV RBC AUTO: 97 FL (ref 80–99)
MONOCYTES # BLD: 0.8 K/UL (ref 0–1)
MONOCYTES NFR BLD: 8 % (ref 5–13)
NEUTS SEG # BLD: 5.4 K/UL (ref 1.8–8)
NEUTS SEG NFR BLD: 58 % (ref 32–75)
NRBC # BLD: 0 K/UL (ref 0–0.01)
NRBC BLD-RTO: 0 PER 100 WBC
PLATELET # BLD AUTO: 181 K/UL (ref 150–400)
PMV BLD AUTO: 11.4 FL (ref 8.9–12.9)
POTASSIUM SERPL-SCNC: 4.2 MMOL/L (ref 3.5–5.1)
RBC # BLD AUTO: 4.38 M/UL (ref 4.1–5.7)
SODIUM SERPL-SCNC: 141 MMOL/L (ref 136–145)
WBC # BLD AUTO: 9.5 K/UL (ref 4.1–11.1)

## 2024-08-08 PROCEDURE — 97530 THERAPEUTIC ACTIVITIES: CPT

## 2024-08-08 PROCEDURE — 2580000003 HC RX 258: Performed by: INTERNAL MEDICINE

## 2024-08-08 PROCEDURE — 6370000000 HC RX 637 (ALT 250 FOR IP): Performed by: NURSE PRACTITIONER

## 2024-08-08 PROCEDURE — 6370000000 HC RX 637 (ALT 250 FOR IP): Performed by: INTERNAL MEDICINE

## 2024-08-08 PROCEDURE — 6360000002 HC RX W HCPCS

## 2024-08-08 PROCEDURE — 97535 SELF CARE MNGMENT TRAINING: CPT

## 2024-08-08 PROCEDURE — 93005 ELECTROCARDIOGRAM TRACING: CPT | Performed by: INTERNAL MEDICINE

## 2024-08-08 PROCEDURE — 85025 COMPLETE CBC W/AUTO DIFF WBC: CPT

## 2024-08-08 PROCEDURE — 6360000002 HC RX W HCPCS: Performed by: INTERNAL MEDICINE

## 2024-08-08 PROCEDURE — 36415 COLL VENOUS BLD VENIPUNCTURE: CPT

## 2024-08-08 PROCEDURE — 80048 BASIC METABOLIC PNL TOTAL CA: CPT

## 2024-08-08 PROCEDURE — 6370000000 HC RX 637 (ALT 250 FOR IP)

## 2024-08-08 PROCEDURE — 1100000000 HC RM PRIVATE

## 2024-08-08 PROCEDURE — 94640 AIRWAY INHALATION TREATMENT: CPT

## 2024-08-08 RX ORDER — METOPROLOL SUCCINATE 25 MG/1
25 TABLET, EXTENDED RELEASE ORAL 2 TIMES DAILY
Status: DISCONTINUED | OUTPATIENT
Start: 2024-08-08 | End: 2024-08-08

## 2024-08-08 RX ADMIN — OLANZAPINE 2.5 MG: 5 TABLET, FILM COATED ORAL at 21:48

## 2024-08-08 RX ADMIN — SACUBITRIL AND VALSARTAN 0.5 TABLET: 24; 26 TABLET, FILM COATED ORAL at 09:20

## 2024-08-08 RX ADMIN — ARFORMOTEROL TARTRATE 15 MCG: 15 SOLUTION RESPIRATORY (INHALATION) at 19:09

## 2024-08-08 RX ADMIN — ARFORMOTEROL TARTRATE 15 MCG: 15 SOLUTION RESPIRATORY (INHALATION) at 08:19

## 2024-08-08 RX ADMIN — SACUBITRIL AND VALSARTAN 0.5 TABLET: 24; 26 TABLET, FILM COATED ORAL at 21:48

## 2024-08-08 RX ADMIN — BUDESONIDE 500 MCG: 0.5 INHALANT RESPIRATORY (INHALATION) at 19:09

## 2024-08-08 RX ADMIN — BUDESONIDE 500 MCG: 0.5 INHALANT RESPIRATORY (INHALATION) at 08:19

## 2024-08-08 RX ADMIN — DULOXETINE HYDROCHLORIDE 60 MG: 30 CAPSULE, DELAYED RELEASE ORAL at 09:20

## 2024-08-08 RX ADMIN — IPRATROPIUM BROMIDE 0.5 MG: 0.5 SOLUTION RESPIRATORY (INHALATION) at 08:19

## 2024-08-08 RX ADMIN — ENOXAPARIN SODIUM 40 MG: 100 INJECTION SUBCUTANEOUS at 09:20

## 2024-08-08 RX ADMIN — IPRATROPIUM BROMIDE 0.5 MG: 0.5 SOLUTION RESPIRATORY (INHALATION) at 19:09

## 2024-08-08 RX ADMIN — MIRTAZAPINE 15 MG: 15 TABLET, ORALLY DISINTEGRATING ORAL at 21:48

## 2024-08-08 RX ADMIN — DULOXETINE HYDROCHLORIDE 90 MG: 60 CAPSULE, DELAYED RELEASE ORAL at 21:48

## 2024-08-08 RX ADMIN — SODIUM CHLORIDE, PRESERVATIVE FREE 10 ML: 5 INJECTION INTRAVENOUS at 09:21

## 2024-08-08 RX ADMIN — ATORVASTATIN CALCIUM 10 MG: 10 TABLET, FILM COATED ORAL at 09:20

## 2024-08-08 RX ADMIN — METOPROLOL SUCCINATE 25 MG: 25 TABLET, FILM COATED, EXTENDED RELEASE ORAL at 09:20

## 2024-08-08 RX ADMIN — LEVOTHYROXINE SODIUM 50 MCG: 0.05 TABLET ORAL at 05:51

## 2024-08-08 NOTE — BSMART NOTE
BSMART Liaison Team Note     LOS:  5     Patient goals for today:  take medications as prescribed, make needs known in an appropriate manner.  BSMART Liaison team focus/goals: assess MH needs, provide therapeutic support, brief therapy, and education, as needed.  Assist medical care management team with recommendations for coordination of care.    Progress note: per triage, Patient arrives to ER with daughter and son. Family reports patient is hallucinating and patient is stating he is going [place]. Patient stays at an Assisted Living and care-giver reports increasing AMS. Patient actively trying to crawl out of wheel-chair in triage.     Liaison met with pt, RIVAS, on the medical floor with psychiatric provider, Clair Escobar.  Pt's RN, Maverick and no are presently with the pt.  Maverick indicates pt is agitated today, which is a change from his calm demeanor, yesterday.  Maverick reports pt was complaining about the money it is costing for him to be in the hospital and stated \"I'll be glad when I'm dead and gone\".  Pt is received, sitting in a recliner, alert, oriented x2, with irritable affect and mood.  Pt is minimally engaged, reluctantly answering questions.  When asked about SI, pt states \"I want out of here that's what I want.  I pay x amount of money to be here\".  Pt denies HI.  In response to AVH, pt states \"Why are you asking me that?  I'm not some kind of lunatic\".  When informed we were there to help him, pt stated \"You're not doing anything but upsetting me\".  We ended the session early, to minimize pt's agitation.  Liaison team will continue to monitor and support, as needed.      Barriers to Discharge: medical clearance, russ BHU bed  Guns in the home:  no    Outpatient provider(s):  none reported  Insurance info/prescription coverage:  medicare    Diagnosis: delirium, appears to be resolved     Plan:  BHU continues to be recommended due to pt stockpiling medication to OD and aggression, at

## 2024-08-09 PROCEDURE — 6370000000 HC RX 637 (ALT 250 FOR IP)

## 2024-08-09 PROCEDURE — 2580000003 HC RX 258: Performed by: INTERNAL MEDICINE

## 2024-08-09 PROCEDURE — 6370000000 HC RX 637 (ALT 250 FOR IP): Performed by: NURSE PRACTITIONER

## 2024-08-09 PROCEDURE — 6370000000 HC RX 637 (ALT 250 FOR IP): Performed by: INTERNAL MEDICINE

## 2024-08-09 PROCEDURE — 6360000002 HC RX W HCPCS: Performed by: INTERNAL MEDICINE

## 2024-08-09 PROCEDURE — 94640 AIRWAY INHALATION TREATMENT: CPT

## 2024-08-09 PROCEDURE — 97530 THERAPEUTIC ACTIVITIES: CPT

## 2024-08-09 PROCEDURE — 6360000002 HC RX W HCPCS

## 2024-08-09 PROCEDURE — 1100000000 HC RM PRIVATE

## 2024-08-09 RX ADMIN — IPRATROPIUM BROMIDE AND ALBUTEROL SULFATE 1 DOSE: 2.5; .5 SOLUTION RESPIRATORY (INHALATION) at 00:46

## 2024-08-09 RX ADMIN — SACUBITRIL AND VALSARTAN 0.5 TABLET: 24; 26 TABLET, FILM COATED ORAL at 08:44

## 2024-08-09 RX ADMIN — SODIUM CHLORIDE, PRESERVATIVE FREE 10 ML: 5 INJECTION INTRAVENOUS at 22:00

## 2024-08-09 RX ADMIN — BUDESONIDE 500 MCG: 0.5 INHALANT RESPIRATORY (INHALATION) at 19:38

## 2024-08-09 RX ADMIN — BUDESONIDE 500 MCG: 0.5 INHALANT RESPIRATORY (INHALATION) at 09:26

## 2024-08-09 RX ADMIN — OLANZAPINE 2.5 MG: 5 TABLET, FILM COATED ORAL at 22:00

## 2024-08-09 RX ADMIN — MIRTAZAPINE 15 MG: 15 TABLET, ORALLY DISINTEGRATING ORAL at 22:00

## 2024-08-09 RX ADMIN — IPRATROPIUM BROMIDE 0.5 MG: 0.5 SOLUTION RESPIRATORY (INHALATION) at 19:39

## 2024-08-09 RX ADMIN — METOPROLOL TARTRATE 25 MG: 25 TABLET, FILM COATED ORAL at 22:00

## 2024-08-09 RX ADMIN — DULOXETINE HYDROCHLORIDE 60 MG: 30 CAPSULE, DELAYED RELEASE ORAL at 08:44

## 2024-08-09 RX ADMIN — ARFORMOTEROL TARTRATE 15 MCG: 15 SOLUTION RESPIRATORY (INHALATION) at 19:38

## 2024-08-09 RX ADMIN — LEVOTHYROXINE SODIUM 50 MCG: 0.05 TABLET ORAL at 07:08

## 2024-08-09 RX ADMIN — METOPROLOL TARTRATE 25 MG: 25 TABLET, FILM COATED ORAL at 08:45

## 2024-08-09 RX ADMIN — DULOXETINE HYDROCHLORIDE 90 MG: 60 CAPSULE, DELAYED RELEASE ORAL at 22:00

## 2024-08-09 RX ADMIN — ARFORMOTEROL TARTRATE 15 MCG: 15 SOLUTION RESPIRATORY (INHALATION) at 09:26

## 2024-08-09 RX ADMIN — IPRATROPIUM BROMIDE AND ALBUTEROL SULFATE 1 DOSE: 2.5; .5 SOLUTION RESPIRATORY (INHALATION) at 14:34

## 2024-08-09 RX ADMIN — IPRATROPIUM BROMIDE 0.5 MG: 0.5 SOLUTION RESPIRATORY (INHALATION) at 09:26

## 2024-08-09 RX ADMIN — SODIUM CHLORIDE, PRESERVATIVE FREE 10 ML: 5 INJECTION INTRAVENOUS at 08:45

## 2024-08-09 RX ADMIN — ENOXAPARIN SODIUM 40 MG: 100 INJECTION SUBCUTANEOUS at 08:44

## 2024-08-09 RX ADMIN — ATORVASTATIN CALCIUM 10 MG: 10 TABLET, FILM COATED ORAL at 08:45

## 2024-08-09 RX ADMIN — SACUBITRIL AND VALSARTAN 0.5 TABLET: 24; 26 TABLET, FILM COATED ORAL at 22:00

## 2024-08-09 NOTE — CARE COORDINATION
Transition of Care Plan:    RUR: 15%  Prior Level of Functioning: needs assistance   Disposition: TBD IP PSYCH vs SNF  If SNF or IPR: Date FOC offered:   Date FOC received:   Accepting facility:   Date authorization started with reference number:   Date authorization received and expires:   Follow up appointments:   DME needed:   Transportation at discharge: TBD  IM/IMM Medicare/ letter given: 1st 8/6, 2nd at discharge   Caregiver Contact: Erika [phone]  Barriers to discharge: psych admission     Patient did not converse during our encounter. The daughter has requested CM fax a medical record request to [facility] to send records to [facility], this has been requested. Patient lives at [facility] which the patient/ family pays for OOP with 24/7 private caregivers. Patient is WC bound at base line. Daughter is requesting a LTC Medicaid screening, CM sent request to [employer]. Family requesting SNF at discharge, although per PSYCH notes this patient may qualify for IP Geriatric Psych stay once medically stable.     Pending Psych decision.    Marge Helm RN/CRM

## 2024-08-09 NOTE — BSMART NOTE
BSMART Liaison Team Note     LOS:  6 days     Patient goal(s) for today: take medications as prescribed, make needs known in an appropriate manner, engage in supportive counseling as needed  BSMART Liaison team focus/goals: assess needs, provide support and education, coordinate care, provide supportive counseling as needed     Progress note: Liaison met f/f with pt in his room on the medical floor at [facility]. Pt presents as sitting in his recliner performing his nebulizer treatment. Pt presents as alert, calm, generally cooperative,with some frustration with having to remain at the hospital. Pt continues to be very hard of hearing. Pt complains that he sits in his room \"24/7,\" mostly alone, and he says that he is tired of it. He feels like he'll \"never get out of here.\" Says that he has \"spent almost all year in the hospital.\" Pt tells liaison that his wife lives nearby, and emphasizes that \"I have friends out there.\" Pt adamantly denies SI. He says seemingly frustrated, \"Why would I do that? I'm 80. As far as I concerned, I'm already dead.\"  He says that if he had ever wanted to commit suicide, he would be \"long gone,\" d/t fighting illness previously. Pt says that he would be glad to die, but says that he would never do it b/c he is a Gnosticism. Pt denies HI/AH/VH at this time. He denies sleep or appetite disturbance. Pt easily redirected when venting his frustrations. Liaison provided supportive counseling and validation of his feelings. Liaison will continue to monitor and to support.      Barriers to Discharge: medical clearance, russ BHU bed   Guns in the home: no     Outpatient provider(s):  none reported  Insurance info/prescription coverage:  medicare    Diagnosis: delirium, appears to be resolved     Plan: Inpatient russ psychiatry recommended. Please defer to most recent psychiatric consult note for update recommendations..   Follow up Psych Consult placed? yes.   Psychiatrist updated?  no    Participating

## 2024-08-09 NOTE — CONSULTS
[facility]  Cardiology Care Note                  [x]Initial visit     []Established visit     Patient Name: Jnae Campbell - :1945 - MRN:[id number]  Primary Cardiologist: None  Consulting Cardiologist: Dr Emmanuel Bray      2024 2:06 PM     Reason for initial visit: Dilated cardiomyopathy with ejection fraction of 25 to 30% newly discovered      Assessment/Plan/Discussion: Cardiology Attending:           ASSESSMENT    Newly discovered dilated cardiomyopathy with ejection fraction of 25 to 30%  Paroxysmal atrial fibrillation now in normal sinus rhythm  Hospitalization for hallucination with suicidal thoughts  Paroxysmal atrial fibrillation now in normal sinus rhythm  Hypertension  Diabetes mellitus  COPD  Suspected sleep apnea  Hearing loss  Postpolio paralysis  Hypothyroidism    PLAN     Patient presented to the hospital with suicidal thoughts and hallucination management as per psychiatric team  We were consulted for newly discovered dilated cardiomyopathy with ejection fraction of 25% currently asymptomatic --near euvolemic plan to initiated guideline mediated heart failure therapy during this hospitalization  Will start low-dose Entresto tier 1 p.o. twice daily along with Toprol-XL 25 mg once daily  I had a prolonged discussion with the daughter who is power of  about further management of newly discovered dilated cardiomyopathy and need to rule out ischemic heart disease  At present I would continue conservative medical management due to ongoing hallucination and suicidal thoughts will consider left heart catheterization on follow-up visit  Management of diabetes as per primary team would recommend adding Jardiance 10 mg once daily for diabetes and GDMT--plan to add in a.m.  Will further uptitrate GDMT  Patient also has paroxysmal atrial fibrillation now in normal sinus rhythm will repeat the EKG 
Comprehensive Nutrition Assessment    Type and Reason for Visit: Initial, Consult    Nutrition Recommendations/Plan:   Continue diet as tolerated with ONS TID.       Malnutrition Assessment:  Malnutrition Status:  Severe malnutrition (08/07/24 1131)    Context:  Acute Illness     Findings of the 6 clinical characteristics of malnutrition:  Energy Intake:  Unable to assess  Weight Loss:  7.5% over 3 months     Body Fat Loss:  Moderate body fat loss Buccal region, Orbital   Muscle Mass Loss:  Moderate muscle mass loss Temples (temporalis), Clavicles (pectoralis & deltoids), Hand (interosseous)  Fluid Accumulation:  No significant fluid accumulation     Strength:  Not Performed       Nutrition Assessment:    Past Medical History:   Diagnosis Date    Anxiety and depression     BCC (basal cell carcinoma of skin)     Bronchitis, chronic (HCC)     Chronic obstructive pulmonary disease (HCC)     Chronic pain     Fibromyalgia     Generalized arthritis     Hyperlipidemia     Neuropathy     Post-polio syndrome     Type 2 diabetes mellitus (HCC)    Pt admitted from [facility] for AMS.    Nutrition consult received for unintentional wt loss. Unable to provide much history from patient, but per chart review family notes about 25# wt loss over the last year. Wt listed as 135# in June of 2024 and next weight from January 2021 of 164#. Pt NPO for breakfast this morning for esophagram, additionally note hx of Zenker's diverticulum. Spoke with RN, unsure of intakes in hospital; there are 2 meal intakes recorded >50%. Has Ensure ONS ordered for all meal trays. Advised RN to continue to encourage oral intakes and ONS if/when appetite poor.       Nutritionally Significant Medications:  Synthroid, Remeron      Estimated Daily Nutrient Needs:  Energy Requirements Based On: Kcal/kg  Weight Used for Energy Requirements: Current  Energy (kcal/day): 3415-1211 (28-32 kcal/kg)    Weight Used for Protein Requirements: Current  Protein (g/day): 75 
PSYCHIATRY CONSULT NOTE    REASON FOR CONSULT: altered mental status      HISTORY OF PRESENTING COMPLAINT:  Jane Campbell is a 79 y.o. White (non-) male who is currently admitted to the medical floor at [facility]. Per chart, [facility] He denies any suicidal thoughts. He lives in an [facility] and says it is a great place to live out the rest of his life. He does not make any statement about doing anything to harm himself. He talks about his wife who lives about 4 miles down the road from him. He denies any hallucinations. He is hard of hearing which does limit the efficacy of assessment. Per chart, he was brought to the ED due to stating that he any his daughter in law were going to [facility] that day. He has a reported history of hallucinations but was never given a formal diagnosis. He denies any formal psychiatric diagnosis. He was reportedly trying to crawl out of his wheelchair in triage. On assessment this afternoon, he is alert and oriented.     Spoke with patient's son and daughter in law on the phone. Per their report, he started being intermittently aggressive and confused back in April of this year. He has been having episodes of confusion and states that he has been talking with God. He also reportedly stockpiled oxycodone to use to overdose. Per son and daughter-in-law, both he and his wife have spoken about completing suicide should they feel they have nothing to live for. They report he was diagnosed with dementia during a brief hospital stay recently.       PAST PSYCHIATRIC HISTORY and SUBSTANCE ABUSE HISTORY:  Anxiety and depression per chart      PAST MEDICAL HISTORY:    Please see H&P for details.     Past Medical History:   Diagnosis Date    Anxiety and depression     BCC (basal cell carcinoma of skin)     Bronchitis, chronic (HCC)     Chronic obstructive pulmonary disease (HCC)     Chronic pain     Fibromyalgia     Generalized arthritis     
PSYCHIATRY CONSULT NOTE    REASON FOR CONSULT: altered mental status    Interval update:    8/6/24:  Nursing reports Mr. Campbell did well after new pm meds started last night including 2.5 mg Zyprexa and Remeron 15 mg.  Starting last night.  He slept well and no behavioral disturbance or any bothersome side effects. Today she reports he also has continued to do well. He took medications from her without any problem and has been pleasant throughout the day.   Initially yesterday he had behavioral disturbance around 5 PM which is when he was started on as needed Zyprexa 5 mg IV.  He tolerated this well and made significant difference with agitation without making him overly groggy. Hydroxyzine is available for use prior to the needing Zyprexa for anxiety as a first line into anxiolytic.     Mr. Ocampo was being transported off the unit shortly after I arrived for testing but was pleasant with me and reported no new concerns today.  I was not able to converse with him for more than a minute but he appeared pleasantly confused?    8/5/24  Patient seen by me on 8/5/2024 for follow up and was pleasant throughout the entire course of our discussion and my questions. His short and long term memory appears to be quite good. He does acknowledge yes to having some bizarre and scary thoughts \"sometimes\" that do scare him and is unsure where they comes from. \"My mind is always going, it is always going, even in my sleep\". When asking then how he sleeps, or if feeling refreshed in the mornings, he reports, \"no\", to more mornings than otherwise. He also reports not liking to take \"a lot of medications\". He very much enjoys the facility where he lives and would like to return there. I did inform him, they were worried about these thoughts and agreed with him being with us for short stay in efforts to help him. He seemed more open to medication if needed due to this.   Very pleasant gentleman and speaks fondly of his wife and 
PSYCHIATRY CONSULT NOTE    REASON FOR CONSULT: altered mental status    Interval update:    8/8/24:  Mr. Campbell is irritable today. He states that he just wants to die and is upset that he is in the hospital. He is only minimally engaged with assessment today. Nursing reports he is much more irritable today than he was yesterday. He has been compliant with his medications. He requires intermittent redirection.    8/7/24:  Patient is sitting in his chair today staring straight ahead and partially watching TV.  He does change the channel when I am in the room.  He is less responsive today and presents with a more \"zoned out\" appearance.  Per nursing he has been doing well and not having behavioral disturbances thus far today.  He has been regularly having a difficult time in the early evening and late afternoon timeframe and reportedly has responded well to Zyprexa. He is currently taking Remeron 15 mg at bedtime ans zyprexa 2.5 mg disintegrating tablet at bedtime as well.  Due to his more zoned out appearance I will modify some medications in efforts to help him with mood still while keeping him engaged and alert.     8/6/24:  Nursing reports Mr. Campbell did well after new pm meds started last night including 2.5 mg Zyprexa and Remeron 15 mg.  Starting last night.  He slept well and no behavioral disturbance or any bothersome side effects. Today she reports he also has continued to do well. He took medications from her without any problem and has been pleasant throughout the day.   Initially yesterday he had behavioral disturbance around 5 PM which is when he was started on as needed Zyprexa 5 mg IV.  He tolerated this well and made significant difference with agitation without making him overly groggy. Hydroxyzine is available for use prior to the needing Zyprexa for anxiety as a first line into anxiolytic.     Mr. Ocampo was being transported off the unit shortly after I arrived for testing but was pleasant with 
PSYCHIATRY CONSULT NOTE    REASON FOR CONSULT: altered mental status    Interval update:  8/9/24:  Mr. Campbell is sitting in his recliner again today watching television during our visit.  He leans forward often so he can hear me due to his hard of hearing and not wearing hearing aids presently.  He tells me that he has no bothersome side effects from any of the medication provided and does feel like it is helpful.  I have prescribed him Zyprexa 2.5 mg disintegrating tablet nightly and Remeron 15 mg at bedtime.    His symptoms on admission have  progressively  improved over the last 4 days. Today is his best day so far and his daughter even reported he told her \"I was really out of it before\" .   He reports sleeping well for the first time in \"a long time\", reports his mind is not racing with thoughts all the time, he is in better spirits with his mood and no thoughts of suicide or death or depression.  He is looking forward to going home to his facility and is glad to overall feel better.    I spent about 45 minutes talking with his daughter and son-in-law to organize a discharge plan and this went well and will need some follow-up support from the unit  regarding some appointments for a new primary care provider and a pulmonologist within the next several weeks due to this hospitalization.    Presently denies any thoughts of suicide, homicide, or any audio or visual hallucinations today.  No complaints or elicited paranoia or delusional thinking.  No new complaints or bothersome concerns that are a hindrance for him to discharge back to his snf facility tomorrow.    8/7/24:  Patient is sitting in his chair today staring straight ahead and partially watching TV.  He does change the channel when I am in the room.  He is less responsive today and presents with a more \"zoned out\" appearance.  Per nursing he has been doing well and not having behavioral disturbances thus far today.  He has been regularly 
arthritis     Hyperlipidemia     Neuropathy     Post-polio syndrome     Type 2 diabetes mellitus (HCC)      Prior to Admission medications    Medication Sig Start Date End Date Taking? Authorizing Provider   meloxicam (MOBIC) 15 MG tablet Take 1 tablet by mouth daily 3/24/21  Yes Provider, Tyrell, MD   diclofenac sodium (VOLTAREN) 1 % GEL Apply 2 g topically 4 times daily as needed for Pain    Provider, Tyrell, MD   albuterol sulfate HFA (PROVENTIL;VENTOLIN;PROAIR) 108 (90 Base) MCG/ACT inhaler Inhale 2 puffs into the lungs every 4 hours as needed for Shortness of Breath    Automatic Reconciliation, Ar   atorvastatin (LIPITOR) 10 MG tablet Take 1 tablet by mouth daily    Automatic Reconciliation, Ar   azithromycin (ZITHROMAX) 500 MG tablet Take 1 tablet by mouth three times a week    Automatic Reconciliation, Ar   DULoxetine (CYMBALTA) 60 MG extended release capsule Take by mouth 2 times daily 60mg in AM and 90mg in PM    Automatic Reconciliation, Ar   gabapentin (NEURONTIN) 600 MG tablet Take 1 tablet by mouth 3 times daily.    Automatic Reconciliation, Ar   glycopyrrolate-formoterol (BEVESPI) 9-4.8 MCG/ACT AERO Inhale 2 puffs into the lungs 2 times daily    Automatic Reconciliation, Ar   ipratropium-albuterol (DUONEB) 0.5-2.5 (3) MG/3ML SOLN nebulizer solution Inhale 3 mLs into the lungs every 4 hours as needed for Wheezing    Automatic Reconciliation, Ar   metFORMIN (GLUCOPHAGE) 500 MG tablet Take 1 tablet by mouth daily    Automatic Reconciliation, Ar   mometasone (ASMANEX) 100 MCG/ACT AERO inhaler Inhale 2 puffs into the lungs 2 times daily    Automatic Reconciliation, Ar     [unfilled]  Lab Results   Component Value Date    WBC 9.2 08/07/2024    HGB 13.3 08/07/2024    HCT 41.3 08/07/2024    MCV 95.4 08/07/2024     08/07/2024     Lab Results   Component Value Date     08/07/2024    K 4.2 08/07/2024     (H) 08/07/2024    CO2 26 08/07/2024    BUN 22 (H) 08/07/2024    CREATININE 0.67 (L) 
mmol/L    Glucose 99 65 - 100 mg/dL    BUN 21 (H) 6 - 20 MG/DL    Creatinine 0.66 (L) 0.70 - 1.30 MG/DL    BUN/Creatinine Ratio 32 (H) 12 - 20      Est, Glom Filt Rate >90 >60 ml/min/1.73m2    Calcium 9.5 8.5 - 10.1 MG/DL   Culture, Blood 2    Collection Time: 08/05/24  6:55 AM    Specimen: Blood   Result Value Ref Range    Special Requests RIGHT  Antecubital        Culture NO GROWTH <24 HRS     Culture, Blood 1    Collection Time: 08/05/24  6:57 AM    Specimen: Blood   Result Value Ref Range    Special Requests LEFT  FOREARM        Culture NO GROWTH <24 HRS     Echo (TTE) complete (PRN contrast/bubble/strain/3D)    Collection Time: 08/05/24  3:39 PM   Result Value Ref Range    IVSd 1.0 0.6 - 1.0 cm    LVIDd 4.3 4.2 - 5.9 cm    LVIDs 3.5 cm    LVOT Diameter 2.7 cm    LVPWd 0.9 0.6 - 1.0 cm    LA Diameter 3.5 cm    AV Peak Gradient 4 mmHg    AV Mean Gradient 3 mmHg    AV Peak Velocity 1.0 m/s    AV Mean Velocity 0.8 m/s    AV VTI 12.3 cm    MV A Velocity 0.48 m/s    MV E Wave Deceleration Time 210.2 ms    MV E Velocity 0.71 m/s    MV PHT 61.0 ms    MV Area by PHT 3.6 cm2    Body Surface Area 1.76 m2    Fractional Shortening 2D 19 28 - 44 %    LVIDd Index 2.39 cm/m2    LVIDs Index 1.94 cm/m2    LV RWT Ratio 0.42     LV Mass 2D 132.7 88 - 224 g    LV Mass 2D Index 73.7 49 - 115 g/m2    MV E/A 1.48     LVOT Area 5.7 cm2    LA Size Index 1.94 cm/m2       Impression:     Subacute or Chronic Right Shoulder dislocation; Right shoulder freely moves in and out of joint without forced effort. Chart review reveals he has RTC tears of the R shoulder, and imaging reviewed reveals further degradation of the AC and GH joints. I do not believe the effusion seen on CT imaging represents an infectious origin after examination. If this fluid is still deemed necessary for evaluation consultation with IR should be requested for accuracy due to his abnormal anatomy.     Plan:   - No surgical plans at this time. Dislocation is chronic and 
Logical, goal directed  Perception: Denies AH or VH.   Thought Content: denies SI or Plan  Insight: Partial  Judgement: Fair  Cognition: Intact grossly.     ASSESSMENT AND PLAN:    Update 8/5/2024  Delirium/Altered mental Status returned today ~ 5 pm with aggegation and impulsivity     Zyprexa 5 mg IV now (5 pm)    Add hydroxyzine 25 mg tid as needed for anxiety/mild agitation if will take po    Zyprexa 5 mg TID as needed for more severe agitation/impulsivity/unwilling able to take po/at risk of harming self or staff/ others    Remeron 15 mg at bedtime for insomnia    Continue Cymbalta dosage he has been tolerating well prior to admit-  60 mg daily and 90 mg q night      Will follow daily and provider recommendations for psychiatric care and medication management       Jane Campbell meets criteria for a diagnosis of delirium, appears to be resolved.    At this time, Mr. Campbell is oriented x3 and does not verbalize any desire to harm himself or anyone else. He also denies any hallucinations. AMS appears to wax and wane.  Given patient's history of stockpiling medications to overdose and aggression at home, would recommend geriatric psychiatric admission if medically cleared.      Thank your your consult. Please feel free to consult us again as needed.

## 2024-08-10 VITALS
WEIGHT: 136.2 LBS | OXYGEN SATURATION: 96 % | SYSTOLIC BLOOD PRESSURE: 105 MMHG | DIASTOLIC BLOOD PRESSURE: 93 MMHG | HEIGHT: 71 IN | BODY MASS INDEX: 19.07 KG/M2 | RESPIRATION RATE: 15 BRPM | HEART RATE: 86 BPM | TEMPERATURE: 94.6 F

## 2024-08-10 LAB
BACTERIA SPEC CULT: NORMAL
GRAM STN SPEC: NORMAL
SERVICE CMNT-IMP: NORMAL

## 2024-08-10 PROCEDURE — 6370000000 HC RX 637 (ALT 250 FOR IP)

## 2024-08-10 PROCEDURE — 94640 AIRWAY INHALATION TREATMENT: CPT

## 2024-08-10 PROCEDURE — 6360000002 HC RX W HCPCS

## 2024-08-10 PROCEDURE — 6370000000 HC RX 637 (ALT 250 FOR IP): Performed by: NURSE PRACTITIONER

## 2024-08-10 PROCEDURE — 6360000002 HC RX W HCPCS: Performed by: INTERNAL MEDICINE

## 2024-08-10 PROCEDURE — 6370000000 HC RX 637 (ALT 250 FOR IP): Performed by: INTERNAL MEDICINE

## 2024-08-10 RX ORDER — MIRTAZAPINE 15 MG/1
15 TABLET, ORALLY DISINTEGRATING ORAL NIGHTLY
Qty: 21 TABLET | Refills: 0 | Status: SHIPPED | OUTPATIENT
Start: 2024-08-10 | End: 2024-08-31

## 2024-08-10 RX ORDER — LEVOTHYROXINE SODIUM 0.05 MG/1
50 TABLET ORAL
Qty: 30 TABLET | Refills: 0 | Status: SHIPPED | OUTPATIENT
Start: 2024-08-11 | End: 2024-09-10

## 2024-08-10 RX ORDER — OLANZAPINE 2.5 MG/1
2.5 TABLET, FILM COATED ORAL NIGHTLY
Qty: 21 TABLET | Refills: 0 | Status: SHIPPED | OUTPATIENT
Start: 2024-08-10 | End: 2024-08-31

## 2024-08-10 RX ADMIN — ENOXAPARIN SODIUM 40 MG: 100 INJECTION SUBCUTANEOUS at 08:08

## 2024-08-10 RX ADMIN — DULOXETINE HYDROCHLORIDE 60 MG: 30 CAPSULE, DELAYED RELEASE ORAL at 08:08

## 2024-08-10 RX ADMIN — ATORVASTATIN CALCIUM 10 MG: 10 TABLET, FILM COATED ORAL at 08:09

## 2024-08-10 RX ADMIN — IPRATROPIUM BROMIDE 0.5 MG: 0.5 SOLUTION RESPIRATORY (INHALATION) at 07:24

## 2024-08-10 RX ADMIN — BUDESONIDE 500 MCG: 0.5 INHALANT RESPIRATORY (INHALATION) at 07:24

## 2024-08-10 RX ADMIN — SACUBITRIL AND VALSARTAN 0.5 TABLET: 24; 26 TABLET, FILM COATED ORAL at 08:09

## 2024-08-10 RX ADMIN — ARFORMOTEROL TARTRATE 15 MCG: 15 SOLUTION RESPIRATORY (INHALATION) at 07:24

## 2024-08-10 RX ADMIN — LEVOTHYROXINE SODIUM 50 MCG: 0.05 TABLET ORAL at 06:47

## 2024-08-10 NOTE — DISCHARGE SUMMARY
Discharge Summary       PATIENT ID: Jane Campbell  MRN: [id number]   YOB: 1945    DATE OF ADMISSION: 8/3/2024  1:59 PM    DATE OF DISCHARGE: 08/10/2024   PRIMARY CARE PROVIDER: Gabriel Luevano MD     ATTENDING PHYSICIAN: Dr. Brianna Gee  DISCHARGING PROVIDER: Alisha Galloway PA-C    To contact this individual call [phone] and ask the  to page.  If unavailable ask to be transferred the Adult Hospitalist Department.    CONSULTATIONS: IP CONSULT TO HOSPITALIST  IP CONSULT TO PSYCHIATRY  IP CONSULT TO PSYCHIATRY  IP CONSULT TO ORTHOPEDIC SURGERY  IP CONSULT TO CARDIOLOGY  IP CONSULT TO GI  IP CONSULT TO DIETITIAN  IP CONSULT TO CASE MANAGEMENT    PROCEDURES/SURGERIES: * No surgery found *     ADMITTING DIAGNOSES & HOSPITAL COURSE:   Jane Campbell is a 79 y.o. male with a history of anxiety/depression, suicide attempts, hallucinations, DM2, neuropathy, alcoholism quit at age 63, and Post-polio syndrome brought by family to the ED on 8/3/2024 for hallucinations and saying he and his DIL were going to [facility] today. Pt otherwise has been well no F/C/S, abd pain, N/V or urinary symptoms. Normal PO intake. Lives at [facility]. No recent changes to environment or meds. Was seen in ED last week for pna/cough, sent home with abx rx and pt completed tx and sxs resolved. Has h/o hallucinations and has been seen by a psych nurse in the past but never a psychiatrist and was never given any formal diagnosis or cause.       These hallucinations and changes in mental status started in April of this year per his family. He recently moved into the [facility], in June of this year. 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 He was treated with oral abx for the week however symptoms persist and patient now with hallucinations again. Previously, his hallucinations an suicidal ideations/attempts have not been associated with known infection.     Patient

## 2024-08-10 NOTE — DISCHARGE INSTRUCTIONS
[phone]  Fax: [phone]     Hours: 8:00am - 4:30pm     Omi Gómez MD Otolaryngology Follow up   [address]  [address]  [place] [address]  [phone]        Milind Mendoza PA Physician Assistant Follow up   [address] [facility] [address]  [phone]        [facility] DIABETES [facility]OCRINOLOGY-[facility] OFFICE   Follow up in 4 week(s) Follow up in 4 weeks for evaluation of hypothyroidism [address]  [place] [id number] [phone]     Emmanuel Bray MD Cardiology Schedule an appointment as soon as possible for a visit in 2 week(s)   [address]  [place] [address]  [phone]        Veena Rajput APRN - CNP Certified Nurse Practitioner Schedule an appointment as soon as possible for a visit in 1 week(s)   [address]  [place] [facility] [place] [address]  [phone]        Riky Bray MD Psychiatry Schedule an appointment as soon as possible for a visit   [address]  [place] [facility] [address]  [phone]                ADDITIONAL CARE RECOMMENDATIONS:  Follow up with your primary care provider within 1-2 weeks of discharge for continued management of chronic conditions.  Follow up and make an appointment with a pulmonologist within 1-2 weeks of discharge for continued management of chronic conditions.  Follow up with ENT (Dr. Omi Gómez) for possible intervention for Zenkers diverticulum.  Follow up with orthopedics as needed upon discharge.  Follow up with endocrinology outpatient within 4 weeks of discharge for newly diagnosed hypothyroidism.  Follow up with christine Morocho, within 1-2 weeks of discharge for continued management of chronic conditions  Follow up with psychiatry within 1-2 weeks of discharge (Veena Rajput) for continued management of chronic conditions.  Follow up outpatient with neuro-psych (Dr. Riky Bray) for testing outpatient.  Return precautions discussed with the patient and

## 2024-08-10 NOTE — BH NOTE
[facility]  PSYCHIATRY CONSULT NOTE:    Name: Jane Campbell  MR#: [id number]  : 1945  ACCOUNT#: [id number]  ADMIT DATE: 8/3/2024    Follow up note finalizing discharge plans:    Patient is cleared to be discharged back to facility as of today 8/10/24- Daughter is aware and panning for this. He has been medically cleared for two days. Contacted unit to make sure shared with covering medical provider and sent a message as well.     Review of psychiatric discharge instructions in my note from yesterday and summary below:     Spoke to daughter and son-n law for over 45 minutes on 24 and made plans for safe discharge back to facility he came from. He is safe to discharge back with support of family/caregiver.     He does not need or qualify any longer for neuro psych bed as he has demonstrated significant improvement with started zyprexa 2.5 mg nightly and Remeron 15 mg every night. He has reported having some \"of the best sleep Ellen ever had\". Otherwise to be continued on home med's admitted with    NEW MEDS AT DISCHARGE   zyprexa 2.5 mg nightly   Remeron 15 mg nightly     HOME MED TO CONTINUE  Cymbalta 60 mg daily   Cymbalta 90 mg Night      ADDITIONAL RECOMMENDATIONS:    HAVE DISCUSSED ALL WITH FAMILY, CALLED UNIT ON 8/10/24 8 am to make sure unit RN can notify MD covering today which she thinks is Gerard whom I have also sent a message in perfect serve that he may be discharged today per psych)    PLEASE HAVE CM SET UP APT FOR NEW PCP AND PULKAITLYN MD AS SOON AS POSSIBLE (w/I 2-3 weeks at most post d/c) (his facility is near [facility]  To be organized with daughter   [phone]    Follow up with in 1-2 weeks with psychiatry  I have provided contact information and office is also going to reach out to the daughter for arranging.    [phone]    Neuro psych work up outpatient     Thank you for the opportunity to participate in the care of your patient. Please re-consult psychiatry as needed.

## 2024-08-10 NOTE — CARE COORDINATION
Transition of Care:   Patient is being discharged home-patient is independent living at [facility] with caregivers. [facility] referral sent via [employer] and they accepted.   Transportation- Daughter -Pat [phone]    Daughter had questions around arranging a new PCP-patient does not have a PCP in the [facility]. Cm sent email to care management specialist -Aury Hyatt to arrange PCP on Monday and to contact the daughter.     The daughter would also like a referral sent to [facility] in case in the next 30 days she decides patient needs additional assistance. Cm sent referral via [employer] but informed them plan was discharge home.     Daughter is requesting a LTC Medicaid screening, CM sent request to [employer]. Daughter informed cm the patient has a long term care policy and was going to review what is included in his policy.     Cm also provided patients daughter [employer] information.

## 2024-08-10 NOTE — PLAN OF CARE
Problem: Discharge Planning  Goal: Discharge to home or other facility with appropriate resources  8/10/2024 0957 by Elo Hammer RN  Outcome: Progressing     Problem: Safety - Adult  Goal: Free from fall injury  8/10/2024 0957 by Elo Hammer RN  Outcome: Progressing     Problem: Skin/Tissue Integrity  Goal: Absence of new skin breakdown  Description: 1.  Monitor for areas of redness and/or skin breakdown  2.  Assess vascular access sites hourly  3.  Every 4-6 hours minimum:  Change oxygen saturation probe site  4.  Every 4-6 hours:  If on nasal continuous positive airway pressure, respiratory therapy assess nares and determine need for appliance change or resting period.  8/10/2024 0957 by Elo Hammer RN  Outcome: Progressing     Problem: Chronic Conditions and Co-morbidities  Goal: Patient's chronic conditions and co-morbidity symptoms are monitored and maintained or improved  8/10/2024 0957 by Elo Hammer, RN  Outcome: Progressing     Problem: Nutrition Deficit:  Goal: Optimize nutritional status  8/10/2024 0957 by Elo Hammer, RN  Outcome: Progressing     
  Problem: Discharge Planning  Goal: Discharge to home or other facility with appropriate resources  Outcome: Progressing     Problem: Safety - Adult  Goal: Free from fall injury  Outcome: Progressing     Problem: Skin/Tissue Integrity  Goal: Absence of new skin breakdown  Description: 1.  Monitor for areas of redness and/or skin breakdown  2.  Assess vascular access sites hourly  3.  Every 4-6 hours minimum:  Change oxygen saturation probe site  4.  Every 4-6 hours:  If on nasal continuous positive airway pressure, respiratory therapy assess nares and determine need for appliance change or resting period.  Outcome: Progressing     Problem: Physical Therapy - Adult  Goal: By Discharge: Performs mobility at highest level of function for planned discharge setting.  See evaluation for individualized goals.  Description: FUNCTIONAL STATUS PRIOR TO ADMISSION: Patient oriented x 4 but not totally reliable as a historian.  Reports living at the [facility] ([facility] and being mod indep at the w/c level, including transfers to/from the w/c.  Denies ambulation for \"a long long time\".  Has a long leg brace for LLE since having polio, states he must wear it when getting up to w/c but denies standing for transfer.  Patient also has a caregiver that stays \"all day long\", 7 days a week.  Patient reports the CG assists only with household chores and not ADLs but \"will do anything I ask them to\".     Physical Therapy Goals  Initiated 8/5/2024  1.  Patient will move from supine to sit and sit to supine, scoot up and down, and roll side to side in bed with modified independence within 7 day(s).    2.  Patient will perform sit to stand with modified independence within 7 day(s).  3.  Patient will transfer from bed to chair and chair to bed with modified independence using the least restrictive device within 7 day(s).  5.  Patient will sit up in chair 2 hours at a time to improve OOB tolerance within 7 day(s).      8/9/2024 1354 by John 
  Problem: Discharge Planning  Goal: Discharge to home or other facility with appropriate resources  Outcome: Progressing  Flowsheets  Taken 8/6/2024 0836 by Frances Herrera RN  Discharge to home or other facility with appropriate resources: Identify barriers to discharge with patient and caregiver  Taken 8/5/2024 2106 by Debbi Boykin RN  Discharge to home or other facility with appropriate resources: Identify barriers to discharge with patient and caregiver     Problem: Safety - Adult  Goal: Free from fall injury  8/6/2024 0914 by Frances Herrera RN  Outcome: Progressing  8/5/2024 2344 by Debbi Boykin RN  Outcome: Progressing     Problem: Skin/Tissue Integrity  Goal: Absence of new skin breakdown  Description: 1.  Monitor for areas of redness and/or skin breakdown  2.  Assess vascular access sites hourly  3.  Every 4-6 hours minimum:  Change oxygen saturation probe site  4.  Every 4-6 hours:  If on nasal continuous positive airway pressure, respiratory therapy assess nares and determine need for appliance change or resting period.  8/6/2024 0914 by Frances Herrera RN  Outcome: Progressing  8/5/2024 2344 by Debbi Boykin RN  Outcome: Progressing     Problem: Chronic Conditions and Co-morbidities  Goal: Patient's chronic conditions and co-morbidity symptoms are monitored and maintained or improved  8/6/2024 0914 by Frances Herrera RN  Outcome: Progressing  8/5/2024 2344 by Debbi Boykin RN  Outcome: Progressing  8/5/2024 1924 by Kateryna Silva RN  Outcome: Progressing     
  Problem: Occupational Therapy - Adult  Goal: By Discharge: Performs self-care activities at highest level of function for planned discharge setting.  See evaluation for individualized goals.  Description: FUNCTIONAL STATUS PRIOR TO ADMISSION:  Patient is questionable historian.  He reports living at [facility]).  He reports he was mod indep for self care transfers bed <> wc<> toilet with LLE leg brace, mod indep for dressing and toileting.  He reports multiple falls.  He reports supplemental O2 at night(3L?).    , ADL Assistance: Needs assistance,  ,  ,  ,  ,  ,  , Ambulation Assistance: Non-ambulatory, Transfer Assistance: Needs assistance,       HOME SUPPORT: Unclear if he has home health aide and unclear their role or hours.      Occupational Therapy Goals:  Initiated 8/5/2024  1.  Patient will perform upper body dressing with Set-up and Supervision within 7 day(s).  2.  Patient will perform lower body dressing with Moderate Assist within 7 day(s).  3.  Patient will perform toilet transfers to drop arm BS with minimal assistance  within 7 day(s).  4.  Patient will perform all aspects of toileting with Moderate Assist within 7 day(s).  5.  Patient will participate in upper extremity therapeutic exercise/activities with Supervision for 5 minutes within 7 day(s).    6.  Patient will utilize energy conservation techniques during functional activities with verbal cues within 7 day(s).  Outcome: Progressing     OCCUPATIONAL THERAPY TREATMENT  Patient: Jane Campbell (79 y.o. male)  Date: 8/7/2024  Primary Diagnosis: Hallucinations [R44.3]  Hypomagnesemia [E83.42]  MEÑO (acute kidney injury) (HCC) [N17.9]  Altered mental status, unspecified altered mental status type [R41.82]       Precautions:                  Chart, occupational therapy assessment, plan of care, and goals were reviewed.    ASSESSMENT  Patient continues to benefit from skilled OT services and is progressing towards goals. Jane 
  Problem: Occupational Therapy - Adult  Goal: By Discharge: Performs self-care activities at highest level of function for planned discharge setting.  See evaluation for individualized goals.  Description: FUNCTIONAL STATUS PRIOR TO ADMISSION:  Patient is questionable historian.  He reports living at [facility]).  He reports he was mod indep for self care transfers bed <> wc<> toilet with LLE leg brace, mod indep for dressing and toileting.  He reports multiple falls.  He reports supplemental O2 at night(3L?).    , ADL Assistance: Needs assistance,  ,  ,  ,  ,  ,  , Ambulation Assistance: Non-ambulatory, Transfer Assistance: Needs assistance,       HOME SUPPORT: Unclear if he has home health aide and unclear their role or hours.      Occupational Therapy Goals:  Initiated 8/5/2024  1.  Patient will perform upper body dressing with Set-up and Supervision within 7 day(s).  2.  Patient will perform lower body dressing with Moderate Assist within 7 day(s).  3.  Patient will perform toilet transfers to drop arm BS with minimal assistance  within 7 day(s).  4.  Patient will perform all aspects of toileting with Moderate Assist within 7 day(s).  5.  Patient will participate in upper extremity therapeutic exercise/activities with Supervision for 5 minutes within 7 day(s).    6.  Patient will utilize energy conservation techniques during functional activities with verbal cues within 7 day(s).  Outcome: Progressing   OCCUPATIONAL THERAPY EVALUATION    Patient: Jane Campbell (79 y.o. male)  Date: 8/5/2024  Primary Diagnosis: Hallucinations [R44.3]  Hypomagnesemia [E83.42]  MEÑO (acute kidney injury) ([facility]) [N17.9]  Altered mental status, unspecified altered mental status type [R41.82]         Precautions:                Fall, LLE brace OOB, droplet plus    ASSESSMENT :  The patient is limited by decreased functional mobility, independence in ADLs, high-level IADLs, ROM, strength, activity tolerance, 
  Problem: Occupational Therapy - Adult  Goal: By Discharge: Performs self-care activities at highest level of function for planned discharge setting.  See evaluation for individualized goals.  Description: FUNCTIONAL STATUS PRIOR TO ADMISSION:  Patient is questionable historian.  He reports living at [facility]).  He reports he was mod indep for self care transfers bed <> wc<> toilet with LLE leg brace, mod indep for dressing and toileting.  He reports multiple falls.  He reports supplemental O2 at night(3L?).    , ADL Assistance: Needs assistance,  ,  ,  ,  ,  ,  , Ambulation Assistance: Non-ambulatory, Transfer Assistance: Needs assistance,       HOME SUPPORT: Unclear if he has home health aide and unclear their role or hours.      Occupational Therapy Goals:  Initiated 8/5/2024  1.  Patient will perform upper body dressing with Set-up and Supervision within 7 day(s).  2.  Patient will perform lower body dressing with Moderate Assist within 7 day(s).  3.  Patient will perform toilet transfers to drop arm BS with minimal assistance  within 7 day(s).  4.  Patient will perform all aspects of toileting with Moderate Assist within 7 day(s).  5.  Patient will participate in upper extremity therapeutic exercise/activities with Supervision for 5 minutes within 7 day(s).    6.  Patient will utilize energy conservation techniques during functional activities with verbal cues within 7 day(s).  Outcome: Progressing   OCCUPATIONAL THERAPY TREATMENT  Patient: Jane Campbell (79 y.o. male)  Date: 8/8/2024  Primary Diagnosis: Hallucinations [R44.3]  Hypomagnesemia [E83.42]  MEÑO (acute kidney injury) (HCC) [N17.9]  Altered mental status, unspecified altered mental status type [R41.82]       Precautions:                  Chart, occupational therapy assessment, plan of care, and goals were reviewed.    ASSESSMENT  Patient continues to benefit from skilled OT services and is slowly progressing towards goals. 
  Problem: Physical Therapy - Adult  Goal: By Discharge: Performs mobility at highest level of function for planned discharge setting.  See evaluation for individualized goals.  Description: FUNCTIONAL STATUS PRIOR TO ADMISSION: Patient oriented x 4 but not totally reliable as a historian.  Reports living at the [facility] ([facility]?) and being mod indep at the w/c level, including transfers to/from the w/c.  Denies ambulation for \"a long long time\".  Has a long leg brace for LLE since having polio, states he must wear it when getting up to w/c but denies standing for transfer.  Patient also has a caregiver that stays \"all day long\", 7 days a week.  Patient reports the CG assists only with household chores and not ADLs but \"will do anything I ask them to\".     Physical Therapy Goals  Initiated 8/5/2024  1.  Patient will move from supine to sit and sit to supine, scoot up and down, and roll side to side in bed with modified independence within 7 day(s).    2.  Patient will perform sit to stand with modified independence within 7 day(s).  3.  Patient will transfer from bed to chair and chair to bed with modified independence using the least restrictive device within 7 day(s).  5.  Patient will sit up in chair 2 hours at a time to improve OOB tolerance within 7 day(s).      Outcome: Progressing       PHYSICAL THERAPY TREATMENT    Patient: Jane Campbell (79 y.o. male)  Date: 8/9/2024  Diagnosis: Hallucinations [R44.3]  Hypomagnesemia [E83.42]  MEÑO (acute kidney injury) (HCC) [N17.9]  Altered mental status, unspecified altered mental status type [R41.82] Hallucinations      Precautions:                        ASSESSMENT:  Patient continues to benefit from skilled PT services and is slowly progressing towards goals. Patient continues to require max to total assist for bed to chair transfers - patient has difficulty with use of lower ext. Patient still with decreased safety awareness; found sitting edge of bed with rail up. 
  Problem: Physical Therapy - Adult  Goal: By Discharge: Performs mobility at highest level of function for planned discharge setting.  See evaluation for individualized goals.  Description: FUNCTIONAL STATUS PRIOR TO ADMISSION: Patient oriented x 4 but not totally reliable as a historian.  Reports living at the [facility] ([facility] and being mod indep at the w/c level, including transfers to/from the w/c.  Denies ambulation for \"a long long time\".  Has a long leg brace for LLE since having polio, states he must wear it when getting up to w/c but denies standing for transfer.  Patient also has a caregiver that stays \"all day long\", 7 days a week.  Patient reports the CG assists only with household chores and not ADLs but \"will do anything I ask them to\".     Physical Therapy Goals  Initiated 8/5/2024  1.  Patient will move from supine to sit and sit to supine, scoot up and down, and roll side to side in bed with modified independence within 7 day(s).    2.  Patient will perform sit to stand with modified independence within 7 day(s).  3.  Patient will transfer from bed to chair and chair to bed with modified independence using the least restrictive device within 7 day(s).  5.  Patient will sit up in chair 2 hours at a time to improve OOB tolerance within 7 day(s).      Outcome: Progressing   PHYSICAL THERAPY EVALUATION    Patient: Jane Campbell (79 y.o. male)  Date: 8/5/2024  Primary Diagnosis: Hallucinations [R44.3]  Hypomagnesemia [E83.42]  MEÑO (acute kidney injury) (HCC) [N17.9]  Altered mental status, unspecified altered mental status type [R41.82]       Precautions:                        ASSESSMENT :   DEFICITS/IMPAIRMENTS:   The patient is limited by decreased functional mobility, ROM, strength, endurance, safety awareness, cognition, attention/concentration, balance, and psychological concerns s/p admission for hallucinations and AMS.  Patient oriented x 4 this afternoon but very [place], provided history as mod 
  Problem: Safety - Adult  Goal: Free from fall injury  Outcome: Progressing     Problem: Discharge Planning  Goal: Discharge to home or other facility with appropriate resources  Recent Flowsheet Documentation  Taken 8/4/2024 2218 by Debbi Boykin RN  Discharge to home or other facility with appropriate resources: Identify barriers to discharge with patient and caregiver     
  Problem: Safety - Adult  Goal: Free from fall injury  Outcome: Progressing     Problem: Skin/Tissue Integrity  Goal: Absence of new skin breakdown  Description: 1.  Monitor for areas of redness and/or skin breakdown  2.  Assess vascular access sites hourly  3.  Every 4-6 hours minimum:  Change oxygen saturation probe site  4.  Every 4-6 hours:  If on nasal continuous positive airway pressure, respiratory therapy assess nares and determine need for appliance change or resting period.  Outcome: Progressing     Problem: Discharge Planning  Goal: Discharge to home or other facility with appropriate resources  Recent Flowsheet Documentation  Taken 8/5/2024 2106 by Debbi Boykin, RN  Discharge to home or other facility with appropriate resources: Identify barriers to discharge with patient and caregiver     
  Problem: Safety - Adult  Goal: Free from fall injury  Outcome: Progressing     Problem: Skin/Tissue Integrity  Goal: Absence of new skin breakdown  Description: 1.  Monitor for areas of redness and/or skin breakdown  2.  Assess vascular access sites hourly  3.  Every 4-6 hours minimum:  Change oxygen saturation probe site  4.  Every 4-6 hours:  If on nasal continuous positive airway pressure, respiratory therapy assess nares and determine need for appliance change or resting period.  Outcome: Progressing     Problem: Nutrition Deficit:  Goal: Optimize nutritional status  Outcome: Progressing     
  Problem: Skin/Tissue Integrity  Goal: Absence of new skin breakdown  Description: 1.  Monitor for areas of redness and/or skin breakdown  2.  Assess vascular access sites hourly  3.  Every 4-6 hours minimum:  Change oxygen saturation probe site  4.  Every 4-6 hours:  If on nasal continuous positive airway pressure, respiratory therapy assess nares and determine need for appliance change or resting period.  Outcome: Progressing     Problem: Safety - Adult  Goal: Free from fall injury  Outcome: Progressing     Problem: Discharge Planning  Goal: Discharge to home or other facility with appropriate resources  Recent Flowsheet Documentation  Taken 8/7/2024 2106 by Debbi Boykin, RN  Discharge to home or other facility with appropriate resources: Identify barriers to discharge with patient and caregiver     
Cardiology consult placed for EF.   
without max assist. Patient did not mention brace today.  Patient assisted to chair with max to total assist for modified stand pivot transfer. Patient became calmer once in chair and upright.         PLAN:  Patient continues to benefit from skilled intervention to address the above impairments.  Continue treatment per established plan of care.    Recommend with staff: therapy recommendations for staff: Recommend activity out of bed only with use of a mechanical lift.      Recommendation for discharge: (in order for the patient to meet his/her long term goals): Continue to assess pending progress    Other factors to consider for discharge: impaired cognition, high risk for falls, not safe to be alone, and concern for safely navigating or managing the home environment    IF patient discharges home will need the following DME: continuing to assess with progress       SUBJECTIVE:   Patient stated, \"I need to get out of here.\"    OBJECTIVE DATA SUMMARY:   Critical Behavior:      Patient received sitting edge of bed with bed rail up    Functional Mobility Training:  Bed Mobility:  Bed Mobility Training  Supine to Sit: Minimum assistance  Sit to Supine: Moderate assistance  Scooting: Total assistance  Transfers:  Transfer Training  Transfer Training: Yes  Sit to Stand: Maximum assistance  Stand to Sit: Maximum assistance  Stand Pivot Transfers: Total assistance  Bed to Chair: Total assistance  Balance:  Balance  Sitting: Impaired  Sitting - Static: Fair (occasional)  Sitting - Dynamic: Fair (occasional)  Standing: Impaired  Standing - Static: Poor;Constant support                Pain Rating:  No complaints of pain   Pain Intervention(s):       Activity Tolerance:   Fair     After treatment:   Patient left in no apparent distress sitting up in chair, Call bell within reach, and Bed/ chair alarm activated      COMMUNICATION/EDUCATION:   The patient's plan of care was discussed with: registered nurse    Patient

## 2024-08-10 NOTE — PROGRESS NOTES
Hospitalist Progress Note  STEPHANIE Alfonso NP  Answering service: [phone] OR [phone] from in house phone        Date of Service:  2024  NAME:  Jane Campbell  :  1945  MRN:  [id number]      Admission Summary:   As per H&P: \"Jane Campbell is a 79 y.o. male with a history of anxiety/depression, suicide attempts, hallucinations, DM2, neuropathy, alcoholism quit at age 63, and Post-polio syndrome brought by family for hallucinations and saying he and his DIL were going to [facility] today. Pt otherwise has been well no F/C/S, abd pain, N/V or urinary symptoms. Normal PO intake. [facility] No recent changes to environment or meds. Was seen in ED last week for pna/cough, sent home with abx rx and pt completed tx and sxs resolved. Has h/o hallucinations and has been seen by a psych nurse in the past but never a psychiatrist and was never given any formal diagnosis or cause. \"   These hallucinations and changes in mental status started in April of this year per his family. He recently moved into the [facility], in  of this year. 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 He was treated with oral abx for the week however symptoms persist and patient now with hallucinations again. Previously, his hallucinations an suicidal ideations/attempts have not been associated with known infection.     Interval history / Subjective:   Patient seen and examined, lying in bed. Oriented X 4, but very hard of hearing. No complaints. Daughter in law at bedside provides history.     Esophagram showing small Zenker's diverticulum and mild esophageal dysmotility. Consult GI for recommendations.     Underwent right shoulder joint aspiration this afternoon. Fluid analysis/culture pending.     Procal 0.05. Respiratory status stable. Sputum culture negative. Discontinue 
                                                                                                Hospitalist Progress Note  STEPHANIE Hernandez NP  Answering service: [phone] OR [phone] from in house phone        Date of Service:  2024  NAME:  Jane Campbell  :  1945  MRN:  [id number]      Admission Summary:     As per H&P: \"Jane Campbell is a 79 y.o. male with a history of anxiety/depression, suicide attempts, hallucinations, DM2, neuropathy, alcoholism quit at age 63, and Post-polio syndrome brought by family for hallucinations and saying he and his DIL were going to [facility] today. Pt otherwise has been well no F/C/S, abd pain, N/V or urinary symptoms. Normal PO intake. Lives at [facility]. No recent changes to environment or meds. Was seen in ED last week for pna/cough, sent home with abx rx and pt completed tx and sxs resolved. Has h/o hallucinations and has been seen by a psych nurse in the past but never a psychiatrist and was never given any formal diagnosis or cause. \"   These hallucinations and changes in mental status started in April of this year per his family. He recently moved into the [facility], in  of this year. 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 He was treated with oral abx for the week however symptoms persist and patient now with hallucinations again. Previously, his hallucinations an suicidal ideations/attempts have not been associated with known infection.     Interval history / Subjective:     Spoke with the patients daughter and son at the bedside. All questions answered. They give a very helpful extensive history. 1:1 sitter precautions discontinued after psych eval. Psych is recommending inpatient geropsych admission once patient is medically cleared. Patient does have ~25 lb weight loss over the last mirella per family.     Diagnosed with hypothyroidism. Patient also with productive congested cough and sputum 
                                                                                 [facility]  Cardiology Care Note                  []Initial visit     [x]Established visit     Patient Name: Jane Campbell - :1945 - MRN:[id number]  Primary Cardiologist: None  Consulting Cardiologist: Dr Emmanuel Bray      2024 1:16 PM     Reason for initial visit: Dilated cardiomyopathy with ejection fraction of 25 to 30% newly discovered      Assessment/Plan/Discussion: Cardiology Attending:           ASSESSMENT    Newly discovered dilated cardiomyopathy with ejection fraction of 25 to 30%  Paroxysmal atrial fibrillation now in normal sinus rhythm  Hospitalization for hallucination with suicidal thoughts  Paroxysmal atrial fibrillation now in normal sinus rhythm  Hypertension  Diabetes mellitus  COPD  Suspected sleep apnea  Hearing loss  Postpolio paralysis  Hypothyroidism  Zenker's diverticulum    PLAN     Patient presented to the hospital with suicidal thoughts and hallucination management as per psychiatric team  We were consulted for newly discovered dilated cardiomyopathy with ejection fraction of 25% currently asymptomatic --near euvolemic    Will continue Entresto tier 1--- half a tablet twice a day along with Toprol-XL 25 mg once daily  May consider up titration of GDMT as outpatient if blood pressure allows    Plan to do outpatient left heart catheterization as a part of evaluation for newly discovered dilated cardiomyopathy need to rule out ischemic cardiomyopathy  Discussed the plan with daughter who is the power of     At present I would continue conservative medical management due to ongoing hallucination and suicidal thoughts will consider left heart catheterization on follow-up visit  Management of diabetes as per primary team would recommend adding Jardiance 10 mg once daily for diabetes and GDMT--.    Patient also has paroxysmal atrial fibrillation now in normal sinus rhythm 
        [facility]                                                                                          Hospitalist Progress Note  Alisha Galloway PA-C  Answering service: [phone] OR [phone] from in house phone        Date of Service:  2024  NAME:  Jane Campbell  :  1945  MRN:  [id number]       Admission Summary:   Jane Campbell is a 79 y.o. male with a history of anxiety/depression, suicide attempts, hallucinations, DM2, neuropathy, alcoholism quit at age 63, and Post-polio syndrome brought by family to the ED on 8/3/2024 for hallucinations and saying he and his DIL were going to [facility] today. Pt otherwise has been well no F/C/S, abd pain, N/V or urinary symptoms. Normal PO intake. Lives at [facility]. No recent changes to environment or meds. Was seen in ED last week for pna/cough, sent home with abx rx and pt completed tx and sxs resolved. Has h/o hallucinations and has been seen by a psych nurse in the past but never a psychiatrist and was never given any formal diagnosis or cause.      These hallucinations and changes in mental status started in April of this year per his family. He recently moved into the [facility], in  of this year. 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 He was treated with oral abx for the week however symptoms persist and patient now with hallucinations again. Previously, his hallucinations an suicidal ideations/attempts have not been associated with known infection.   Interval history / Subjective:   Upon seeing the patient on rounds this morning, patient was laying back in bed asleep. Patient easily aroused. Patient seemed to have flat affect. I would ask the patient his name and he would stare at me. I would ask again and he would tell me. Upon asking the patient other questions, patient would just stare and look at me. Only intermittently would be answer my questions. 
        [facility]ist [facility]                                                                                          Hospitalist Progress Note  Alisha Galloway PA-C  Answering service: [phone] OR [phone] from in house phone        Date of Service:  2024  NAME:  Jane Campbell  :  1945  MRN:  [id number]       Admission Summary:   Jane Campbell is a 79 y.o. male with a history of anxiety/depression, suicide attempts, hallucinations, DM2, neuropathy, alcoholism quit at age 63, and Post-polio syndrome brought by family to the ED on 8/3/2024 for hallucinations and saying he and his DIL were going to [facility] today. Pt otherwise has been well no F/C/S, abd pain, N/V or urinary symptoms. Normal PO intake. Lives at [facility]. No recent changes to environment or meds. Was seen in ED last week for pna/cough, sent home with abx rx and pt completed tx and sxs resolved. Has h/o hallucinations and has been seen by a psych nurse in the past but never a psychiatrist and was never given any formal diagnosis or cause.       These hallucinations and changes in mental status started in April of this year per his family. He recently moved into the [facility], in  of this year. 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 He was treated with oral abx for the week however symptoms persist and patient now with hallucinations again. Previously, his hallucinations an suicidal ideations/attempts have not been associated with known infection.   Interval history / Subjective:   Upon seeing the patient on rounds this morning, patient was wide awake and wanting to get out of bed. Patient was able to tell me his name and his birthday but could not tell me where he was or who the president was. Patient denies being in any pain. Patient has no other complaints at this time.     Assessment & Plan:      Chronic COPD   Oxygen Dependent   -Utilizes PRN oxygen 
      Speech LAnguage Pathology EVALUATION    Patient: Jane Campbell (79 y.o. male)  Date: 8/4/2024  Primary Diagnosis: Hallucinations [R44.3]  Hypomagnesemia [E83.42]  MEÑO (acute kidney injury) (HCC) [N17.9]  Altered mental status, unspecified altered mental status type [R41.82]       Precautions:                     ASSESSMENT :  Patient seen for bedside swallow evaluation. Patient previously seen for Modified Barium Swallow Study in 2019 that showed no aspiration/penetration but did show a small zenker's diverticulum (which has been known since 2010) s/p revision per patient report. Patient had coughing throughout the study not related to aspiration. Pt states that he now has had recurrent pneumonias and sees a pulmonologist for this. Patient noted at bedside to have an intermittent delayed cough with PO. While this may be related to the zenkers diverticulum, given that pt has had recurrent pneumonias and delayed cough, as well as modified barium swallow studies not having continuous visualization of the swallow, will plan to complete a Flexible Endoscopic Evaluation of Swallow (FEES) at bedside to assess pt swallow function. Will continue to follow.     Patient will benefit from skilled intervention to address the above impairments.     PLAN :  Recommendations and Planned Interventions:  Diet: Puree and thin liquids  FEES   Good oral care  Meds in puree           Acute SLP Services: SLP Plan of Care: 4 times/week. Patient's rehabilitation potential is considered to be Fair.  Discharge Recommendations: Yes, recommend SLP treatment at next level of care     SUBJECTIVE:   Patient stated, “That sounds good. Thank you.\" Pt extremely kind and cooperative. Very hard of hearing.    OBJECTIVE:     Past Medical History:   Diagnosis Date    Anxiety and depression     BCC (basal cell carcinoma of skin)     Bronchitis, chronic (HCC)     Chronic obstructive pulmonary disease (HCC)     Chronic pain     Fibromyalgia     
  DISCHARGE SUMMARY from Nurse    PATIENT INSTRUCTIONS:    After general anesthesia or intravenous sedation, for 24 hours or while taking prescription Narcotics:  Limit your activities  Do not drive and operate hazardous machinery  Do not make important personal or business decisions  Do  not drink alcoholic beverages  If you have not urinated within 8 hours after discharge, please contact your surgeon on call.    Report the following to your surgeon:  Excessive pain, swelling, redness or odor of or around the surgical area  Temperature over 100.5  Nausea and vomiting lasting longer than 4 hours or if unable to take medications  Any signs of decreased circulation or nerve impairment to extremity: change in color, persistent  numbness, tingling, coldness or increase pain  Any questions    What to do at Home:  Recommended activity: per discharge/AVS paperwork    If you experience any of the following symptoms; per discharge/AVS paperwork    *  Please give a list of your current medications to your Primary Care Provider.    *  Please update this list whenever your medications are discontinued, doses are      changed, or new medications (including over-the-counter products) are added.    *  Please carry medication information at all times in case of emergency situations.    These are general instructions for a healthy lifestyle:    No smoking/ No tobacco products/ Avoid exposure to second hand smoke  Surgeon General's Warning:  Quitting smoking now greatly reduces serious risk to your health.    Obesity, smoking, and sedentary lifestyle greatly increases your risk for illness    A healthy diet, regular physical exercise & weight monitoring are important for maintaining a healthy lifestyle    You may be retaining fluid if you have a history of heart failure or if you experience any of the following symptoms:  Weight gain of 3 pounds or more overnight or 5 pounds in a week, increased swelling in our hands or feet or 
  Physician Progress Note      PATIENT:               YADIRA TUCKER  CSN #:                  [id number]  :                       1945  ADMIT DATE:       8/3/2024 1:59 PM  DISCH DATE:  RESPONDING  PROVIDER #:        Leslye Rondon MD          QUERY TEXT:    Pt admitted with hallucination and has malnutrition documented in  pn.   Please further specify type of malnutrition with documentation in the medical   record.    The medical record reflects the following:  Risk Factors: fibromyalgia, copd, post-polio syndrome  Clinical Indicators:  pn-Malnutrition Assessment:  Malnutrition Status:  Severe malnutrition (24 1131)  Context:  Acute Illness  Findings of the 6 clinical characteristics of malnutrition:  Energy Intake:  Unable to assess  Weight Loss:  7.5% over 3 months  Body Fat Loss:  Moderate body fat loss Buccal region, Orbital  Muscle Mass Loss:  Moderate muscle mass loss Temples (temporalis), Clavicles   (pectoralis & deltoids), Hand (interosseous)  Fluid Accumulation:  No significant fluid accumulation  Treatment: RD cx, Ekaterina Martin RN/CDI [id number]    ASPEN Criteria:    https://aspenjournals.onlinelibrary.bradford.com/doi/full/10.1177/314248343932006  5  Options provided:  -- Severe Malnutrition  -- Severe Protein calorie malnutrition  -- Other - I will add my own diagnosis  -- Disagree - Not applicable / Not valid  -- Disagree - Clinically unable to determine / Unknown  -- Refer to Clinical Documentation Reviewer    PROVIDER RESPONSE TEXT:    This patient has severe malnutrition.    Query created by: Ekaterina Motta on 2024 4:16 PM      Electronically signed by:  Leslye Rnodon MD 2024 11:31 AM          
Brief Ortho Update    Patient seen and examined with daughter at bedside who assists with history. States patient with longstanding bilateral shoulder dysfunction with known cuff tears and need for shoulder replacements but deemed a poor surgical candidate due to comorbid conditions. States patient stays on chronic pain medication through pain provider but also uses unknown doses of Arthritis strength ibuprofen - daughter suspects too high dosing.    Past history of shoulder effusions with aspirations and workup but no infections noted. Daughter states patient is wheelchair bound at baseline due to polio related dysability but also only functions in arms at elbows/wrists/hands.  Daughter confirms that over the past days/weeks patient has not been complaining of shoulder pain as he has in the past.    Patient with palpable effusion on the right with bilateral humeral head malpositioning which are freely mobile within the joint capsule but do not maintain any reduction attempt. Patient unable to actively mobilize through shoulder past about 30deg and has pain with passive motion through shoulder as well. Significant left hand degenerative changes noted.    Right shoulder effusion in setting of likely chronic dislocation and known bilateral severe rotator cuff compromise.    Considering confusion and AMS issues related to hallucinations rule-out of infectious process is reasonable.  Does not appear to have joint infection but can have aspiration of right shoulder but needs to be done by Radiology considering compromised anatomy  Aspirate to be sent for cell count, culture and gram stain and crystal analysis  Medical management per primary team    Dom Cabrera PA-C  Orthopedic Trauma Service  [facility]    
Bsmart liaison requested to consult psych since no noted in chart.   
Daughter updated on plan of care  
ORTHO PROGRESS NOTE    2024  Admit Date:   8/3/2024    Post Op day: * No surgery found *    Subjective:    Jane Campbell is resting comfortably this morning but denies shoulder pain. Had aspiration of right shoulder effusion yesterday showing  430 nuc cells with 17% polys, no crystals and no orgs noted. Patient with long history of severe bilateral shoulder degen and instability and no new injuries.    PT/OT:   Gait:                    Vital Signs:    Patient Vitals for the past 8 hrs:   BP Temp Temp src Pulse Resp SpO2   24 (!) 143/60 97.7 °F (36.5 °C) Oral -- 18 97 %   24 0552 -- -- -- 80 -- --   249 126/63 97.5 °F (36.4 °C) Oral 66 18 97 %   24 0350 -- -- -- 83 -- --   24 0152 -- -- -- 78 -- --     Temp (24hrs), Av.8 °F (36.6 °C), Min:97.5 °F (36.4 °C), Max:98.6 °F (37 °C)      Pain Control:        Meds:    Current Facility-Administered Medications   Medication Dose Route Frequency    arformoterol tartrate (BROVANA) nebulizer solution 15 mcg  15 mcg Nebulization BID RT    And    ipratropium (ATROVENT) 0.02 % nebulizer solution 0.5 mg  0.5 mg Nebulization BID RT    budesonide (PULMICORT) nebulizer suspension 500 mcg  0.5 mg Nebulization BID RT    atorvastatin (LIPITOR) tablet 10 mg  10 mg Oral Daily    sodium chloride (PF) 0.9 % injection 10 mL  10 mL Other Once    iohexol (OMNIPAQUE) injection 10 mL  10 mL Intra-artICUlar ONCE PRN    sacubitril-valsartan (ENTRESTO) 24-26 MG per tablet 1 tablet  1 tablet Oral BID    metoprolol succinate (TOPROL XL) extended release tablet 25 mg  25 mg Oral Daily    OLANZapine (ZYPREXA) tablet 2.5 mg  2.5 mg Oral Nightly    mirtazapine (REMERON SOL-TAB) disintegrating tablet 15 mg  15 mg Oral Nightly    OLANZapine (ZyPREXA) 5 mg in sterile water 1 mL injection  5 mg IntraVENous TID PRN    ipratropium 0.5 mg-albuterol 2.5 mg (DUONEB) nebulizer solution 1 Dose  1 Dose Inhalation Q4H PRN    levothyroxine (SYNTHROID) tablet 50 mcg 
Occupational Therapy  08/06/24    Chart reviewed and discussed with RN. RN requesting therapy to defer at this time as Pt is awaiting intervention for R shoulder. Will defer and follow up as able and appropriate.    Thank you!  Ro Taylor OT    
Patient being seen by another service at attempt. Waited for some time and pt was still being seen attempting to get some blood work drawn. Will follow up later today v tomorrow as available.     Thank you  Any Quinones MS OTR/L   
Per Radiologist message, patient will receive barium swallow tomorrow 8/6  
Pharmacist Note - Vancomycin Dosing    Consult provided for this 79 y.o. male for indication of  nosocomial pneumonia  Antibiotic regimen(s):    Patient on vancomycin PTA? NO     Recent Labs     24  1333 24  0641   WBC 12.0* 11.6*   CREATININE 1.51* 0.96   BUN 43* 33*     Frequency of BMP: daily x 3  Height: 180.9 cm  Weight: 61.8 kg  Est CrCl: 53 ml/min  Temp (24hrs), Av.1 °F (36.7 °C), Min:97.7 °F (36.5 °C), Max:98.4 °F (36.9 °C)    Cultures:     gram positive cocci in clusters per respiratory sputum sample thus far..       MRSA Swab ordered (if applicable)? YES    The plan below is expected to result in a target range of AUC/LEANNA 400-600    Therapy will be initiated with a loading dose of 1500 mg IV x 1 to be followed by a maintenance dose of 1000 mg (~16mg/kg/dose) IV every 18 hours.  Pharmacy to follow patient daily and order levels / make dose adjustments as appropriate.      If dosing by trough levels and not AUC, delete this line and all text below    *Vancomycin has been dosed used Bayesian kinetics software to target an AUC/LEANNA of 400-600, which provides adequate exposure for an assumed infection due to MRSA with an LEANNA of 1 or less while reducing the risk of nephrotoxicity as seen with traditional trough based dosing goals.     
Physical Therapy    Chart reviewed and discussed with RN and OT.  RN requesting therapy to defer at this time as Pt is awaiting intervention for R shoulder. Will defer and follow up as able and appropriate.  GAUTAM LOUIS, PT       
Physical therapy:    Orders received and chart reviewed. Evaluation attempted. Pt politely declined therapy at this time. Offered to assist pt to EOB and pt continued to decline. Will defer and continue to follow. Thank you.    Caryl Aguirre, PT, DPT    
RN told pt was getting a barium swallow test today. Test was performed yesterday.   
Received critical result from lab, lactic acid 2.3 drawn @ 1115. Per protocol ordered repeat lab in 2 hours, at time to draw ARMANDO Marinelli @ bedside and ordered NS bolus wants lactic acid drawn after bolus is completed     1530 - bolus has finished, lactic acid sent to lab   
Report called to REBECCA Peterson.   
SLP Contact Note    Discussed this patient with Dr. Shea, Radiologist. Appreciate his expertise. Given that there is concern for recurrent/chronic aspiration in the absence of any significant deficits appreciated on both the Modified Barium Swallow Study and Flexible Endoscopic Evaluation of Swallow (FEES) at bedside (with possible concern for fistula), Dr. Shea stated that an esophagram would likely be the next step to assess and the he will approve that it gets done. Given that he cannot stand, Dr. Shea reports that you can do it in the lateral position and follow it down. Will place order per conversation with Dr. Seha and Caryl Marinelli, ARMANDO.     Toma Gray M.Ed, PhD(c), CCC-SLP (pronouns: she/her/hers)  Speech-Language Pathologist    
Spiritual Health Assessment/Progress Note  [facility]    Initial Encounter,  ,  ,      Name: Jane Campbell MRN: [id number]    Age: 79 y.o.     Sex: male   Language: English   Baptist: Pentecostalism   Hallucinations     Date: 8/7/2024            Total Time Calculated: 15 min              Spiritual Assessment began in [facility] 5S1 ORTHO JOINT        Referral/Consult From: Rounding   Encounter Overview/Reason: Initial Encounter  Service Provided For: Patient    Eveline, Belief, Meaning:   Patient unable to communicate at this time  Family/Friends No family/friends present      Importance and Influence:  Patient unable to communicate at this time  Family/Friends no family/friends present    Community:  Patient Other: Unable to assess  Family/Friends Other: NA    Assessment and Plan of Care:     Patient Interventions include: Other: Unable to assess  Family/Friends Interventions include: Other: NA    Patient Plan of Care: Spiritual Care available upon further referral and Other:    Family/Friends Plan of Care: Other: NA    Electronically signed by JULIANA West on 8/7/2024 at 2:47 PM  For additional spiritual care, please contact the  on-call at [phone]).      Polly Barragan MDiv, MS, BCC  Staff   Spiritual Health Services  
No  Nostril used: right  Scope Used: Ambu disposable scope  Feeding Tube Present in Nare: No  Adverse Reaction: Yes  Respiratory status: room air        Part I:  Anatomy:       General Comments: Tight pharyngeal space. Otherwise functional above the glottis. Concern for tracheal narrowing.            Part II:  Laryngeal Function:    Adduction:   Full   Abduction:   Full   Arytenoid movement:   Symmetric Vocal quality:   WFL         Part III:  Secretions:    [place] Secretion Rating (0-7): 0     Location:  0 - Nil significant pooled secretions in pyriform fossae or laryngeal vestibule Amount:   0 - Nil significant pooled secretions in pyriform fossae (0-20%) Response*:  0 - Secretions in pyriform fossae or laryngeal vestibule effectively cleared   Comments (e.g., quality/texture/color):      *Normal airway responses in the pharynx or laryngeal vestibule may include spontaneous coughing, throat clearing, and/or swallowing        Part IV:  Swallow Trials:    Subjective comments regarding oral phase of swallow: Unable to manipulate solid consistency. No other difficulties.    Comments regarding esophageal phase of swallow: None appreciated in this modality.                             Dysphagia Severity Rating:   Oral phase: Mild  Pharyngeal phase: WFL        COMMUNICATION/EDUCATION:     The patient's plan of care including recommendations, planned interventions, and recommended diet changes were discussed with: Registered nurse    Patient/family have participated as able in goal setting and plan of care    Thank you,  Toma Gray SLP          Problem: SLP Adult - Impaired Swallowing  Goal: By Discharge: Advance to least restrictive diet without signs or symptoms of aspiration for planned discharge setting.  See evaluation for individualized goals.  Description: Speech Therapy Goals  Initiated 8/4/2024    1. Patient will tolerate least restrictive diet without adverse effects within 7 days.  2. Patient will 
1000   BP:    Pulse: 59   Resp:    Temp:    SpO2:        No intake or output data in the 24 hours ending 08/09/24 1043     Physical Examination:     I had a face to face encounter with this patient and independently examined them on 8/9/2024 as outlined below:    General : awake, no acute distress, without dentures, VERY [place]  HEENT: PEERL, EOMI, moist mucus membrane, TM clear  Neck: supple, no JVD  Chest: Clear to auscultation bilaterally   CVS: S1 S2 heard  Abd: soft/ non tender, non distended  Ext: no clubbing, no cyanosis, no edema  Neuro/Psych: pleasant mood and affect, sensory grossly within normal limit  Skin: warm     Data Review:    Review and/or order of clinical lab test  Review and/or order of tests in the radiology section of CPT  Review and/or order of tests in the medicine section of CPT      I have personally and independently reviewed all pertinent labs, diagnostic studies, imaging, and have provided independent interpretation of the same.     Labs:     Recent Labs     08/07/24  0611 08/08/24  0614   WBC 9.2 9.5   HGB 13.3 13.5   HCT 41.3 42.5    181     Recent Labs     08/07/24  0611 08/08/24  0614    141   K 4.2 4.2   * 108   CO2 26 25   BUN 22* 29*     Lab Results   Component Value Date/Time    CHOL 124 08/06/2024 12:16 PM    HDL 31 08/06/2024 12:16 PM    LDL 71.4 08/06/2024 12:16 PM     Notes reviewed from all clinical/nonclinical/nursing services involved in patient's clinical care. Care coordination discussions were held with appropriate clinical/nonclinical/ nursing providers based on care coordination needs.     Patients current active Medications were reviewed, considered, added and adjusted based on the clinical condition today.      Home Medications were reconciled to the best of my ability given all available resources at the time of admission. Route is PO if not otherwise noted.    Medications Reviewed:     Current Facility-Administered Medications   Medication Dose 
Labs     08/03/24  1333 08/04/24  0641 08/05/24  0655    141 139   K 4.6 4.2 4.1   * 110* 107   CO2 23 25 25   BUN 43* 33* 21*   MG 1.5* 1.8  --    PHOS 3.9  --   --        Recent Labs     08/03/24  1333   ALT 18   GLOB 3.8       No results for input(s): \"INR\", \"APTT\" in the last 72 hours.    Invalid input(s): \"PTP\"   No results for input(s): \"TIBC\" in the last 72 hours.    Invalid input(s): \"FE\", \"PSAT\", \"FERR\"   No results found for: \"RBCF\"   No results for input(s): \"PH\", \"PCO2\", \"PO2\" in the last 72 hours.  No results for input(s): \"CPK\" in the last 72 hours.    Invalid input(s): \"CPKMB\", \"CKNDX\", \"TROIQ\"  No results found for: \"CHOL\", \"CHLST\", \"CHOLV\", \"HDL\", \"HDLC\", \"LDL\"  No results found for: \"GLUCPOC\"        Medications Reviewed:     Current Facility-Administered Medications   Medication Dose Route Frequency    vancomycin (VANCOCIN) 1,000 mg in sodium chloride 0.9 % 250 mL IVPB ([id number])  1,000 mg IntraVENous Q18H    [START ON 8/6/2024] vancomycin level 8/6 @ 0600  1 each Other Once    ipratropium 0.5 mg-albuterol 2.5 mg (DUONEB) nebulizer solution 1 Dose  1 Dose Inhalation Q4H PRN    levothyroxine (SYNTHROID) tablet 50 mcg  50 mcg Oral QAM AC    vancomycin (VANCOCIN) intermittent dosing (placeholder)   Other RX Placeholder    sodium chloride flush 0.9 % injection 5-40 mL  5-40 mL IntraVENous 2 times per day    sodium chloride flush 0.9 % injection 5-40 mL  5-40 mL IntraVENous PRN    0.9 % sodium chloride infusion   IntraVENous PRN    acetaminophen (TYLENOL) tablet 650 mg  650 mg Oral Q6H PRN    Or    acetaminophen (TYLENOL) suppository 650 mg  650 mg Rectal Q6H PRN    enoxaparin (LOVENOX) injection 40 mg  40 mg SubCUTAneous Daily    DULoxetine (CYMBALTA) extended release capsule 90 mg  90 mg Oral Nightly    DULoxetine (CYMBALTA) extended release capsule 60 mg  60 mg Oral Daily     ______________________________________________________________________  EXPECTED LENGTH OF STAY: Unable to 
tablet 25 mg, 25 mg, Oral, Daily, Emili Bray MD, 25 mg at 08/07/24 0849    OLANZapine (ZYPREXA) tablet 2.5 mg, 2.5 mg, Oral, Nightly, Veena Rajput APRN - CNP, 2.5 mg at 08/06/24 2208    mirtazapine (REMERON SOL-TAB) disintegrating tablet 15 mg, 15 mg, Oral, Nightly, Veena Rajput APRN - CNP, 15 mg at 08/06/24 2209    OLANZapine (ZyPREXA) 5 mg in sterile water 1 mL injection, 5 mg, IntraVENous, TID PRN, Veena Rajput APRN - CNP, 5 mg at 08/06/24 1600    ipratropium 0.5 mg-albuterol 2.5 mg (DUONEB) nebulizer solution 1 Dose, 1 Dose, Inhalation, Q4H PRN, Kaylene Zhang APRN - NP, 1 Dose at 08/04/24 1340    levothyroxine (SYNTHROID) tablet 50 mcg, 50 mcg, Oral, QAM AC, Michell Marinelli APRN - NP, 50 mcg at 08/07/24 0652    sodium chloride flush 0.9 % injection 5-40 mL, 5-40 mL, IntraVENous, 2 times per day, Tiffany Conde MD, 10 mL at 08/07/24 0849    sodium chloride flush 0.9 % injection 5-40 mL, 5-40 mL, IntraVENous, PRN, Tiffany Conde MD    0.9 % sodium chloride infusion, , IntraVENous, PRN, Tiffany Conde MD, Last Rate: 10 mL/hr at 08/05/24 0131, New Bag at 08/05/24 0131    acetaminophen (TYLENOL) tablet 650 mg, 650 mg, Oral, Q6H PRN **OR** acetaminophen (TYLENOL) suppository 650 mg, 650 mg, Rectal, Q6H PRN, Tiffany Conde MD    enoxaparin (LOVENOX) injection 40 mg, 40 mg, SubCUTAneous, Daily, Tiffany Conde MD, 40 mg at 08/07/24 0849    DULoxetine (CYMBALTA) extended release capsule 90 mg, 90 mg, Oral, Nightly, Tiffany Conde MD, 90 mg at 08/06/24 2208    DULoxetine (CYMBALTA) extended release capsule 60 mg, 60 mg, Oral, Daily, Tiffany Conde MD, 60 mg at 08/07/24 0858    EMILI BRAY MD    [facility]  Call center: (P) [phone]  (F) [phone]

## 2024-09-10 NOTE — PROGRESS NOTES
Problem: Self Care Deficits Care Plan (Adult)  Goal: *Acute Goals and Plan of Care (Insert Text)  Occupational Therapy Goals  Initiated 4/10/2017  1. Patient will perform grooming with supervision/set-up standing at sink >5 minutes within 7 day(s). 2. Patient will perform upper body dressing and bathing with supervision/set-up within 7 day(s). 3. Patient will perform lower body dressing and bathing with minimal assistance within 7 day(s). 4. Patient will perform toilet transfers with minimal assistance with best technique within 7 day(s). 5. Patient will perform all aspects of toileting with minimal assistance within 7 day(s). 6. Patient will participate in upper extremity therapeutic exercise/activities with supervision/set-up for 10 minutes within 7 day(s). 7. Patient will utilize energy conservation techniques during functional activities with minimum verbal cues within 7 day(s). OCCUPATIONAL THERAPY TREATMENT  Patient: Phuong Mota (87 y.o. male)  Date: 4/11/2017  Diagnosis: COPD exacerbation (HCC) COPD exacerbation (HCC)       Precautions:  fall      ASSESSMENT:  Pt  Making steady progress toward goals. Sh was maurciio to manage his shoes with L AFO an set-up with increased time. Pt educated on energy conservation technique to increase his safety and independence with all functional tasks. He verbalized good understanding. Recommend IP pulmonary rehab at discharge. Progression toward goals:  [X]       Improving appropriately and progressing toward goals  [ ]       Improving slowly and progressing toward goals  [ ]       Not making progress toward goals and plan of care will be adjusted       PLAN:  Patient continues to benefit from skilled intervention to address the above impairments. Continue treatment per established plan of care.   Discharge Recommendations:  Inpatient Pulmonary Rehab  Further Equipment Recommendations for Discharge:  TBD       SUBJECTIVE:   Patient stated I am feeling better today.      OBJECTIVE DATA SUMMARY:   Cognitive/Behavioral Status:  Neurologic State: Alert; Appropriate for age  Orientation Level: Oriented X4  Cognition: Appropriate for age attention/concentration; Follows commands  Perception: Appears intact  Perseveration: No perseveration noted  Safety/Judgement: Awareness of environment; Fall prevention; Insight into deficits        ADL Intervention:  Lower Body Dressing Assistance  Socks: Supervision/set-up  Shoes with Velcro: Supervision/set-up (with L AFO)  Leg Crossed Method Used: Yes  Position Performed: Seated edge of bed;Bending forward method  Cues: Don;Verbal cues provided;Visual cues provided     Cognitive Retraining  Safety/Judgement: Awareness of environment; Fall prevention; Insight into deficits      Patient instructed and indicated understanding energy conservation techniques to increase independence and safety during all ADLs for end goal of returning back home. Provided instruction body is like a jar of marbles, marbles represent energy, at end of day need as many marbles as possible to obtain a good night sleep, REM sleep is when the body repairs itself. This ensures a full jar of marbles, full of energy when wake up to start a new day. Use energy conservation techniques during ADLs so can increase participation in life activities patient prefers, to ensure more frequent good days. If having a bad day, evaluate tasks completed day before and re-plan how to save energy to complete same tasks, for example if going grocery shopping do not complete full bathing/dressing/grooming. Visual handout provided. Patient indicated understanding by stating tasks already completing to save energy ie sitting to don all clothing.       Pain:  Pain Scale 1: Numeric (0 - 10)  Pain Intensity 1: 4  Pain Location 1: Generalized     Pain Description 1: Aching;Constant  Pain Intervention(s) 1: Medication (see MAR)     Activity Tolerance:   Fair  Please refer to the flowsheet for vital signs taken during this treatment.   After treatment:   [ ] Patient left in no apparent distress sitting up in chair  [X] Patient left in no apparent distress in bed  [X] Call bell left within reach  [X] Nursing notified  [ ] Caregiver present  [ ] Bed alarm activated      COMMUNICATION/COLLABORATION:   The patients plan of care was discussed with: Physical Therapy Assistant and Registered Nurse     Edy Vasques OT  Time Calculation: 25 mins supervision

## 2024-09-17 ENCOUNTER — APPOINTMENT (OUTPATIENT)
Facility: HOSPITAL | Age: 79
DRG: 640 | End: 2024-09-17
Payer: MEDICARE

## 2024-09-17 ENCOUNTER — HOSPITAL ENCOUNTER (INPATIENT)
Facility: HOSPITAL | Age: 79
LOS: 6 days | Discharge: NURSING, REHAB OR LONG-TERM CARE | DRG: 640 | End: 2024-09-23
Attending: EMERGENCY MEDICINE | Admitting: INTERNAL MEDICINE
Payer: MEDICARE

## 2024-09-17 DIAGNOSIS — I42.9 CARDIOMYOPATHY, UNSPECIFIED TYPE (HCC): ICD-10-CM

## 2024-09-17 DIAGNOSIS — R40.1 STUPOR: ICD-10-CM

## 2024-09-17 DIAGNOSIS — I95.89 OTHER SPECIFIED HYPOTENSION: Primary | ICD-10-CM

## 2024-09-17 PROBLEM — R41.82 ALTERED MENTAL STATE: Status: ACTIVE | Noted: 2024-09-17

## 2024-09-17 LAB
ALBUMIN SERPL-MCNC: 3 G/DL (ref 3.5–5)
ALBUMIN/GLOB SERPL: 0.7 (ref 1.1–2.2)
ALP SERPL-CCNC: 78 U/L (ref 45–117)
ALT SERPL-CCNC: 31 U/L (ref 12–78)
ANION GAP SERPL CALC-SCNC: 4 MMOL/L (ref 2–12)
APPEARANCE UR: CLEAR
ARTERIAL PATENCY WRIST A: ABNORMAL
AST SERPL-CCNC: 31 U/L (ref 15–37)
BACTERIA URNS QL MICRO: NEGATIVE /HPF
BASE DEFICIT BLD-SCNC: 2 MMOL/L
BASOPHILS # BLD: 0 K/UL (ref 0–0.1)
BASOPHILS NFR BLD: 0 % (ref 0–1)
BDY SITE: ABNORMAL
BILIRUB SERPL-MCNC: 0.5 MG/DL (ref 0.2–1)
BILIRUB UR QL: NEGATIVE
BUN SERPL-MCNC: 31 MG/DL (ref 6–20)
BUN/CREAT SERPL: 26 (ref 12–20)
CALCIUM SERPL-MCNC: 8.8 MG/DL (ref 8.5–10.1)
CHLORIDE SERPL-SCNC: 105 MMOL/L (ref 97–108)
CO2 SERPL-SCNC: 28 MMOL/L (ref 21–32)
COLOR UR: ABNORMAL
COMMENT:: NORMAL
CREAT SERPL-MCNC: 1.21 MG/DL (ref 0.7–1.3)
DIFFERENTIAL METHOD BLD: ABNORMAL
EKG ATRIAL RATE: 91 BPM
EKG DIAGNOSIS: NORMAL
EKG P AXIS: 67 DEGREES
EKG P-R INTERVAL: 152 MS
EKG Q-T INTERVAL: 378 MS
EKG QRS DURATION: 114 MS
EKG QTC CALCULATION (BAZETT): 464 MS
EKG R AXIS: -62 DEGREES
EKG T AXIS: 96 DEGREES
EKG VENTRICULAR RATE: 91 BPM
EOSINOPHIL # BLD: 0 K/UL (ref 0–0.4)
EOSINOPHIL NFR BLD: 0 % (ref 0–7)
EPITH CASTS URNS QL MICRO: ABNORMAL /LPF
ERYTHROCYTE [DISTWIDTH] IN BLOOD BY AUTOMATED COUNT: 13.5 % (ref 11.5–14.5)
EST. AVERAGE GLUCOSE BLD GHB EST-MCNC: 140 MG/DL
FLUAV RNA SPEC QL NAA+PROBE: NOT DETECTED
FLUBV RNA SPEC QL NAA+PROBE: NOT DETECTED
GAS FLOW.O2 O2 DELIVERY SYS: ABNORMAL
GLOBULIN SER CALC-MCNC: 4.2 G/DL (ref 2–4)
GLUCOSE BLD STRIP.AUTO-MCNC: 140 MG/DL (ref 65–117)
GLUCOSE BLD STRIP.AUTO-MCNC: 162 MG/DL (ref 65–117)
GLUCOSE SERPL-MCNC: 119 MG/DL (ref 65–100)
GLUCOSE UR STRIP.AUTO-MCNC: NEGATIVE MG/DL
HBA1C MFR BLD: 6.5 % (ref 4–5.6)
HCO3 BLD-SCNC: 22.3 MMOL/L (ref 21–28)
HCT VFR BLD AUTO: 37.2 % (ref 36.6–50.3)
HGB BLD-MCNC: 11.8 G/DL (ref 12.1–17)
HGB UR QL STRIP: NEGATIVE
HYALINE CASTS URNS QL MICRO: ABNORMAL /LPF (ref 0–5)
IMM GRANULOCYTES # BLD AUTO: 0.1 K/UL (ref 0–0.04)
IMM GRANULOCYTES NFR BLD AUTO: 1 % (ref 0–0.5)
KETONES UR QL STRIP.AUTO: 15 MG/DL
LACTATE SERPL-SCNC: 0.9 MMOL/L (ref 0.4–2)
LEUKOCYTE ESTERASE UR QL STRIP.AUTO: NEGATIVE
LYMPHOCYTES # BLD: 0.6 K/UL (ref 0.8–3.5)
LYMPHOCYTES NFR BLD: 5 % (ref 12–49)
MCH RBC QN AUTO: 29.9 PG (ref 26–34)
MCHC RBC AUTO-ENTMCNC: 31.7 G/DL (ref 30–36.5)
MCV RBC AUTO: 94.4 FL (ref 80–99)
MONOCYTES # BLD: 0.6 K/UL (ref 0–1)
MONOCYTES NFR BLD: 5 % (ref 5–13)
NEUTS SEG # BLD: 11.6 K/UL (ref 1.8–8)
NEUTS SEG NFR BLD: 89 % (ref 32–75)
NITRITE UR QL STRIP.AUTO: NEGATIVE
NRBC # BLD: 0 K/UL (ref 0–0.01)
NRBC BLD-RTO: 0 PER 100 WBC
NT PRO BNP: 3143 PG/ML
PCO2 BLD: 35.7 MMHG (ref 35–48)
PH BLD: 7.4 (ref 7.35–7.45)
PH UR STRIP: 5.5 (ref 5–8)
PLATELET # BLD AUTO: 250 K/UL (ref 150–400)
PMV BLD AUTO: 9.8 FL (ref 8.9–12.9)
PO2 BLD: 77 MMHG (ref 83–108)
POTASSIUM SERPL-SCNC: 4.3 MMOL/L (ref 3.5–5.1)
PROCALCITONIN SERPL-MCNC: 0.75 NG/ML
PROT SERPL-MCNC: 7.2 G/DL (ref 6.4–8.2)
PROT UR STRIP-MCNC: 30 MG/DL
RBC # BLD AUTO: 3.94 M/UL (ref 4.1–5.7)
RBC #/AREA URNS HPF: ABNORMAL /HPF (ref 0–5)
RBC MORPH BLD: ABNORMAL
SAO2 % BLD: 95.4 % (ref 92–97)
SARS-COV-2 RNA RESP QL NAA+PROBE: NOT DETECTED
SERVICE CMNT-IMP: ABNORMAL
SERVICE CMNT-IMP: ABNORMAL
SODIUM SERPL-SCNC: 137 MMOL/L (ref 136–145)
SOURCE: NORMAL
SP GR UR REFRACTOMETRY: 1.02 (ref 1–1.03)
SPECIMEN HOLD: NORMAL
SPECIMEN HOLD: NORMAL
SPECIMEN TYPE: ABNORMAL
TROPONIN I SERPL HS-MCNC: 312 NG/L (ref 0–76)
TROPONIN I SERPL HS-MCNC: 417 NG/L (ref 0–76)
TROPONIN I SERPL HS-MCNC: 522 NG/L (ref 0–76)
UROBILINOGEN UR QL STRIP.AUTO: 0.2 EU/DL (ref 0.2–1)
WBC # BLD AUTO: 12.9 K/UL (ref 4.1–11.1)
WBC URNS QL MICRO: ABNORMAL /HPF (ref 0–4)

## 2024-09-17 PROCEDURE — 83880 ASSAY OF NATRIURETIC PEPTIDE: CPT

## 2024-09-17 PROCEDURE — 2580000003 HC RX 258: Performed by: EMERGENCY MEDICINE

## 2024-09-17 PROCEDURE — 93005 ELECTROCARDIOGRAM TRACING: CPT | Performed by: EMERGENCY MEDICINE

## 2024-09-17 PROCEDURE — 84145 PROCALCITONIN (PCT): CPT

## 2024-09-17 PROCEDURE — 6360000002 HC RX W HCPCS: Performed by: HOSPITALIST

## 2024-09-17 PROCEDURE — 82803 BLOOD GASES ANY COMBINATION: CPT

## 2024-09-17 PROCEDURE — 36600 WITHDRAWAL OF ARTERIAL BLOOD: CPT

## 2024-09-17 PROCEDURE — 87040 BLOOD CULTURE FOR BACTERIA: CPT

## 2024-09-17 PROCEDURE — 83036 HEMOGLOBIN GLYCOSYLATED A1C: CPT

## 2024-09-17 PROCEDURE — 80053 COMPREHEN METABOLIC PANEL: CPT

## 2024-09-17 PROCEDURE — 2060000000 HC ICU INTERMEDIATE R&B

## 2024-09-17 PROCEDURE — 36415 COLL VENOUS BLD VENIPUNCTURE: CPT

## 2024-09-17 PROCEDURE — 2580000003 HC RX 258: Performed by: HOSPITALIST

## 2024-09-17 PROCEDURE — 83605 ASSAY OF LACTIC ACID: CPT

## 2024-09-17 PROCEDURE — 81001 URINALYSIS AUTO W/SCOPE: CPT

## 2024-09-17 PROCEDURE — 84484 ASSAY OF TROPONIN QUANT: CPT

## 2024-09-17 PROCEDURE — 71045 X-RAY EXAM CHEST 1 VIEW: CPT

## 2024-09-17 PROCEDURE — 6370000000 HC RX 637 (ALT 250 FOR IP): Performed by: HOSPITALIST

## 2024-09-17 PROCEDURE — 94640 AIRWAY INHALATION TREATMENT: CPT

## 2024-09-17 PROCEDURE — 87636 SARSCOV2 & INF A&B AMP PRB: CPT

## 2024-09-17 PROCEDURE — 85025 COMPLETE CBC W/AUTO DIFF WBC: CPT

## 2024-09-17 PROCEDURE — 82962 GLUCOSE BLOOD TEST: CPT

## 2024-09-17 PROCEDURE — 70450 CT HEAD/BRAIN W/O DYE: CPT

## 2024-09-17 PROCEDURE — 99285 EMERGENCY DEPT VISIT HI MDM: CPT

## 2024-09-17 RX ORDER — ONDANSETRON 4 MG/1
4 TABLET, ORALLY DISINTEGRATING ORAL EVERY 8 HOURS PRN
Status: DISCONTINUED | OUTPATIENT
Start: 2024-09-17 | End: 2024-09-23 | Stop reason: HOSPADM

## 2024-09-17 RX ORDER — 0.9 % SODIUM CHLORIDE 0.9 %
500 INTRAVENOUS SOLUTION INTRAVENOUS ONCE
Status: COMPLETED | OUTPATIENT
Start: 2024-09-17 | End: 2024-09-17

## 2024-09-17 RX ORDER — SODIUM CHLORIDE, SODIUM LACTATE, POTASSIUM CHLORIDE, AND CALCIUM CHLORIDE .6; .31; .03; .02 G/100ML; G/100ML; G/100ML; G/100ML
500 INJECTION, SOLUTION INTRAVENOUS ONCE
Status: COMPLETED | OUTPATIENT
Start: 2024-09-17 | End: 2024-09-17

## 2024-09-17 RX ORDER — SODIUM CHLORIDE 0.9 % (FLUSH) 0.9 %
5-40 SYRINGE (ML) INJECTION EVERY 12 HOURS SCHEDULED
Status: DISCONTINUED | OUTPATIENT
Start: 2024-09-17 | End: 2024-09-23 | Stop reason: HOSPADM

## 2024-09-17 RX ORDER — POLYETHYLENE GLYCOL 3350 17 G/17G
17 POWDER, FOR SOLUTION ORAL DAILY PRN
Status: DISCONTINUED | OUTPATIENT
Start: 2024-09-17 | End: 2024-09-23 | Stop reason: HOSPADM

## 2024-09-17 RX ORDER — SODIUM CHLORIDE 9 MG/ML
INJECTION, SOLUTION INTRAVENOUS PRN
Status: DISCONTINUED | OUTPATIENT
Start: 2024-09-17 | End: 2024-09-23 | Stop reason: HOSPADM

## 2024-09-17 RX ORDER — INSULIN LISPRO 100 [IU]/ML
0-4 INJECTION, SOLUTION INTRAVENOUS; SUBCUTANEOUS NIGHTLY
Status: DISCONTINUED | OUTPATIENT
Start: 2024-09-17 | End: 2024-09-23 | Stop reason: HOSPADM

## 2024-09-17 RX ORDER — LEVOTHYROXINE SODIUM 50 UG/1
50 TABLET ORAL
Status: DISCONTINUED | OUTPATIENT
Start: 2024-09-18 | End: 2024-09-23 | Stop reason: HOSPADM

## 2024-09-17 RX ORDER — POTASSIUM CHLORIDE 750 MG/1
40 TABLET, EXTENDED RELEASE ORAL PRN
Status: DISCONTINUED | OUTPATIENT
Start: 2024-09-17 | End: 2024-09-23 | Stop reason: HOSPADM

## 2024-09-17 RX ORDER — DULOXETIN HYDROCHLORIDE 30 MG/1
60 CAPSULE, DELAYED RELEASE ORAL DAILY
Status: DISCONTINUED | OUTPATIENT
Start: 2024-09-18 | End: 2024-09-18

## 2024-09-17 RX ORDER — IPRATROPIUM BROMIDE AND ALBUTEROL SULFATE 2.5; .5 MG/3ML; MG/3ML
1 SOLUTION RESPIRATORY (INHALATION)
Status: DISCONTINUED | OUTPATIENT
Start: 2024-09-17 | End: 2024-09-18

## 2024-09-17 RX ORDER — DEXTROSE MONOHYDRATE AND SODIUM CHLORIDE 5; .45 G/100ML; G/100ML
INJECTION, SOLUTION INTRAVENOUS CONTINUOUS
Status: DISCONTINUED | OUTPATIENT
Start: 2024-09-17 | End: 2024-09-19

## 2024-09-17 RX ORDER — SODIUM CHLORIDE 0.9 % (FLUSH) 0.9 %
5-40 SYRINGE (ML) INJECTION PRN
Status: DISCONTINUED | OUTPATIENT
Start: 2024-09-17 | End: 2024-09-23 | Stop reason: HOSPADM

## 2024-09-17 RX ORDER — ONDANSETRON 2 MG/ML
4 INJECTION INTRAMUSCULAR; INTRAVENOUS EVERY 6 HOURS PRN
Status: DISCONTINUED | OUTPATIENT
Start: 2024-09-17 | End: 2024-09-23 | Stop reason: HOSPADM

## 2024-09-17 RX ORDER — ACETAMINOPHEN 650 MG/1
650 SUPPOSITORY RECTAL EVERY 6 HOURS PRN
Status: DISCONTINUED | OUTPATIENT
Start: 2024-09-17 | End: 2024-09-23 | Stop reason: HOSPADM

## 2024-09-17 RX ORDER — ASPIRIN 81 MG/1
81 TABLET, CHEWABLE ORAL DAILY
Status: DISCONTINUED | OUTPATIENT
Start: 2024-09-17 | End: 2024-09-23 | Stop reason: HOSPADM

## 2024-09-17 RX ORDER — INSULIN LISPRO 100 [IU]/ML
0-4 INJECTION, SOLUTION INTRAVENOUS; SUBCUTANEOUS
Status: DISCONTINUED | OUTPATIENT
Start: 2024-09-17 | End: 2024-09-23 | Stop reason: HOSPADM

## 2024-09-17 RX ORDER — MAGNESIUM SULFATE IN WATER 40 MG/ML
2000 INJECTION, SOLUTION INTRAVENOUS PRN
Status: DISCONTINUED | OUTPATIENT
Start: 2024-09-17 | End: 2024-09-23 | Stop reason: HOSPADM

## 2024-09-17 RX ORDER — ENOXAPARIN SODIUM 100 MG/ML
40 INJECTION SUBCUTANEOUS DAILY
Status: DISCONTINUED | OUTPATIENT
Start: 2024-09-18 | End: 2024-09-23 | Stop reason: HOSPADM

## 2024-09-17 RX ORDER — ATORVASTATIN CALCIUM 10 MG/1
10 TABLET, FILM COATED ORAL DAILY
Status: DISCONTINUED | OUTPATIENT
Start: 2024-09-17 | End: 2024-09-23 | Stop reason: HOSPADM

## 2024-09-17 RX ORDER — ACETAMINOPHEN 325 MG/1
650 TABLET ORAL EVERY 6 HOURS PRN
Status: DISCONTINUED | OUTPATIENT
Start: 2024-09-17 | End: 2024-09-23 | Stop reason: HOSPADM

## 2024-09-17 RX ORDER — NITROGLYCERIN 0.4 MG/1
0.4 TABLET SUBLINGUAL EVERY 5 MIN PRN
Status: DISCONTINUED | OUTPATIENT
Start: 2024-09-17 | End: 2024-09-23 | Stop reason: HOSPADM

## 2024-09-17 RX ORDER — POTASSIUM CHLORIDE 7.45 MG/ML
10 INJECTION INTRAVENOUS PRN
Status: DISCONTINUED | OUTPATIENT
Start: 2024-09-17 | End: 2024-09-23 | Stop reason: HOSPADM

## 2024-09-17 RX ORDER — DEXTROSE MONOHYDRATE 100 MG/ML
INJECTION, SOLUTION INTRAVENOUS CONTINUOUS PRN
Status: DISCONTINUED | OUTPATIENT
Start: 2024-09-17 | End: 2024-09-23 | Stop reason: HOSPADM

## 2024-09-17 RX ADMIN — Medication 10 ML: at 20:09

## 2024-09-17 RX ADMIN — IPRATROPIUM BROMIDE AND ALBUTEROL SULFATE 1 DOSE: .5; 3 SOLUTION RESPIRATORY (INHALATION) at 19:54

## 2024-09-17 RX ADMIN — ARFORMOTEROL TARTRATE: 15 SOLUTION RESPIRATORY (INHALATION) at 19:54

## 2024-09-17 RX ADMIN — SODIUM CHLORIDE, POTASSIUM CHLORIDE, SODIUM LACTATE AND CALCIUM CHLORIDE 500 ML: 600; 310; 30; 20 INJECTION, SOLUTION INTRAVENOUS at 18:41

## 2024-09-17 RX ADMIN — SODIUM CHLORIDE 500 ML: 9 INJECTION, SOLUTION INTRAVENOUS at 15:24

## 2024-09-17 RX ADMIN — DEXTROSE AND SODIUM CHLORIDE: 5; 450 INJECTION, SOLUTION INTRAVENOUS at 17:45

## 2024-09-17 ASSESSMENT — PAIN - FUNCTIONAL ASSESSMENT: PAIN_FUNCTIONAL_ASSESSMENT: NONE - DENIES PAIN

## 2024-09-18 PROBLEM — I50.22 CHRONIC SYSTOLIC CHF (CONGESTIVE HEART FAILURE) (HCC): Status: ACTIVE | Noted: 2024-09-18

## 2024-09-18 LAB
ANION GAP SERPL CALC-SCNC: 5 MMOL/L (ref 2–12)
BASOPHILS # BLD: 0 K/UL (ref 0–0.1)
BASOPHILS NFR BLD: 0 % (ref 0–1)
BUN SERPL-MCNC: 20 MG/DL (ref 6–20)
BUN/CREAT SERPL: 29 (ref 12–20)
CALCIUM SERPL-MCNC: 8 MG/DL (ref 8.5–10.1)
CHLORIDE SERPL-SCNC: 109 MMOL/L (ref 97–108)
CO2 SERPL-SCNC: 24 MMOL/L (ref 21–32)
CREAT SERPL-MCNC: 0.69 MG/DL (ref 0.7–1.3)
DIFFERENTIAL METHOD BLD: ABNORMAL
EOSINOPHIL # BLD: 0 K/UL (ref 0–0.4)
EOSINOPHIL NFR BLD: 0 % (ref 0–7)
ERYTHROCYTE [DISTWIDTH] IN BLOOD BY AUTOMATED COUNT: 13.7 % (ref 11.5–14.5)
GLUCOSE BLD STRIP.AUTO-MCNC: 103 MG/DL (ref 65–117)
GLUCOSE BLD STRIP.AUTO-MCNC: 132 MG/DL (ref 65–117)
GLUCOSE BLD STRIP.AUTO-MCNC: 135 MG/DL (ref 65–117)
GLUCOSE SERPL-MCNC: 133 MG/DL (ref 65–100)
HCT VFR BLD AUTO: 31.2 % (ref 36.6–50.3)
HGB BLD-MCNC: 9.9 G/DL (ref 12.1–17)
IMM GRANULOCYTES # BLD AUTO: 0.1 K/UL (ref 0–0.04)
IMM GRANULOCYTES NFR BLD AUTO: 1 % (ref 0–0.5)
LYMPHOCYTES # BLD: 1.3 K/UL (ref 0.8–3.5)
LYMPHOCYTES NFR BLD: 16 % (ref 12–49)
MCH RBC QN AUTO: 30.2 PG (ref 26–34)
MCHC RBC AUTO-ENTMCNC: 31.7 G/DL (ref 30–36.5)
MCV RBC AUTO: 95.1 FL (ref 80–99)
MONOCYTES # BLD: 0.5 K/UL (ref 0–1)
MONOCYTES NFR BLD: 6 % (ref 5–13)
NEUTS SEG # BLD: 6.2 K/UL (ref 1.8–8)
NEUTS SEG NFR BLD: 77 % (ref 32–75)
NRBC # BLD: 0 K/UL (ref 0–0.01)
NRBC BLD-RTO: 0 PER 100 WBC
PLATELET # BLD AUTO: 236 K/UL (ref 150–400)
PMV BLD AUTO: 10.1 FL (ref 8.9–12.9)
POTASSIUM SERPL-SCNC: 4.4 MMOL/L (ref 3.5–5.1)
RBC # BLD AUTO: 3.28 M/UL (ref 4.1–5.7)
SERVICE CMNT-IMP: ABNORMAL
SERVICE CMNT-IMP: ABNORMAL
SERVICE CMNT-IMP: NORMAL
SODIUM SERPL-SCNC: 138 MMOL/L (ref 136–145)
WBC # BLD AUTO: 8.1 K/UL (ref 4.1–11.1)

## 2024-09-18 PROCEDURE — 6370000000 HC RX 637 (ALT 250 FOR IP): Performed by: HOSPITALIST

## 2024-09-18 PROCEDURE — 97530 THERAPEUTIC ACTIVITIES: CPT

## 2024-09-18 PROCEDURE — 82962 GLUCOSE BLOOD TEST: CPT

## 2024-09-18 PROCEDURE — 85025 COMPLETE CBC W/AUTO DIFF WBC: CPT

## 2024-09-18 PROCEDURE — 94640 AIRWAY INHALATION TREATMENT: CPT

## 2024-09-18 PROCEDURE — 80048 BASIC METABOLIC PNL TOTAL CA: CPT

## 2024-09-18 PROCEDURE — 36415 COLL VENOUS BLD VENIPUNCTURE: CPT

## 2024-09-18 PROCEDURE — 2060000000 HC ICU INTERMEDIATE R&B

## 2024-09-18 PROCEDURE — 6360000002 HC RX W HCPCS: Performed by: HOSPITALIST

## 2024-09-18 PROCEDURE — 97161 PT EVAL LOW COMPLEX 20 MIN: CPT

## 2024-09-18 PROCEDURE — 97165 OT EVAL LOW COMPLEX 30 MIN: CPT

## 2024-09-18 RX ORDER — DULOXETIN HYDROCHLORIDE 30 MG/1
60 CAPSULE, DELAYED RELEASE ORAL
Status: DISCONTINUED | OUTPATIENT
Start: 2024-09-18 | End: 2024-09-20

## 2024-09-18 RX ORDER — ALBUTEROL SULFATE 0.83 MG/ML
SOLUTION RESPIRATORY (INHALATION)
Status: DISCONTINUED
Start: 2024-09-18 | End: 2024-09-18 | Stop reason: WASHOUT

## 2024-09-18 RX ORDER — IPRATROPIUM BROMIDE AND ALBUTEROL SULFATE 2.5; .5 MG/3ML; MG/3ML
1 SOLUTION RESPIRATORY (INHALATION)
Status: DISCONTINUED | OUTPATIENT
Start: 2024-09-18 | End: 2024-09-20

## 2024-09-18 RX ORDER — DULOXETIN HYDROCHLORIDE 30 MG/1
30 CAPSULE, DELAYED RELEASE ORAL DAILY
Status: DISCONTINUED | OUTPATIENT
Start: 2024-09-18 | End: 2024-09-20

## 2024-09-18 RX ORDER — DULOXETIN HYDROCHLORIDE 60 MG/1
60 CAPSULE, DELAYED RELEASE ORAL DAILY
Status: ON HOLD | COMMUNITY
End: 2024-09-23

## 2024-09-18 RX ORDER — IPRATROPIUM BROMIDE AND ALBUTEROL SULFATE 2.5; .5 MG/3ML; MG/3ML
1 SOLUTION RESPIRATORY (INHALATION) EVERY 4 HOURS PRN
Status: DISCONTINUED | OUTPATIENT
Start: 2024-09-18 | End: 2024-09-23 | Stop reason: HOSPADM

## 2024-09-18 RX ORDER — CASTOR OIL AND BALSAM, PERU 788; 87 MG/G; MG/G
OINTMENT TOPICAL 2 TIMES DAILY
Status: DISCONTINUED | OUTPATIENT
Start: 2024-09-18 | End: 2024-09-23 | Stop reason: HOSPADM

## 2024-09-18 RX ADMIN — IPRATROPIUM BROMIDE AND ALBUTEROL SULFATE 1 DOSE: .5; 3 SOLUTION RESPIRATORY (INHALATION) at 07:47

## 2024-09-18 RX ADMIN — DULOXETINE HYDROCHLORIDE 60 MG: 30 CAPSULE, DELAYED RELEASE ORAL at 20:43

## 2024-09-18 RX ADMIN — IPRATROPIUM BROMIDE AND ALBUTEROL SULFATE 1 DOSE: .5; 3 SOLUTION RESPIRATORY (INHALATION) at 00:56

## 2024-09-18 RX ADMIN — ARFORMOTEROL TARTRATE: 15 SOLUTION RESPIRATORY (INHALATION) at 07:47

## 2024-09-18 RX ADMIN — Medication: at 20:44

## 2024-09-18 RX ADMIN — IPRATROPIUM BROMIDE AND ALBUTEROL SULFATE 1 DOSE: .5; 3 SOLUTION RESPIRATORY (INHALATION) at 19:13

## 2024-09-18 RX ADMIN — DULOXETINE HYDROCHLORIDE 30 MG: 30 CAPSULE, DELAYED RELEASE ORAL at 09:20

## 2024-09-18 RX ADMIN — LEVOTHYROXINE SODIUM 50 MCG: 0.05 TABLET ORAL at 07:08

## 2024-09-18 RX ADMIN — Medication: at 15:58

## 2024-09-18 RX ADMIN — ATORVASTATIN CALCIUM 10 MG: 10 TABLET, FILM COATED ORAL at 09:19

## 2024-09-18 RX ADMIN — ENOXAPARIN SODIUM 40 MG: 100 INJECTION SUBCUTANEOUS at 09:20

## 2024-09-18 RX ADMIN — ASPIRIN 81 MG CHEWABLE TABLET 81 MG: 81 TABLET CHEWABLE at 09:20

## 2024-09-18 RX ADMIN — ARFORMOTEROL TARTRATE: 15 SOLUTION RESPIRATORY (INHALATION) at 19:13

## 2024-09-19 ENCOUNTER — APPOINTMENT (OUTPATIENT)
Facility: HOSPITAL | Age: 79
DRG: 640 | End: 2024-09-19
Payer: MEDICARE

## 2024-09-19 PROBLEM — R42 DIZZINESS: Status: ACTIVE | Noted: 2024-09-19

## 2024-09-19 PROBLEM — R41.82 AMS (ALTERED MENTAL STATUS): Status: ACTIVE | Noted: 2024-09-19

## 2024-09-19 LAB
ANION GAP SERPL CALC-SCNC: 6 MMOL/L (ref 2–12)
BASOPHILS # BLD: 0 K/UL (ref 0–0.1)
BASOPHILS NFR BLD: 0 % (ref 0–1)
BUN SERPL-MCNC: 12 MG/DL (ref 6–20)
BUN/CREAT SERPL: 19 (ref 12–20)
CALCIUM SERPL-MCNC: 8.4 MG/DL (ref 8.5–10.1)
CHLORIDE SERPL-SCNC: 104 MMOL/L (ref 97–108)
CO2 SERPL-SCNC: 24 MMOL/L (ref 21–32)
CREAT SERPL-MCNC: 0.62 MG/DL (ref 0.7–1.3)
DIFFERENTIAL METHOD BLD: ABNORMAL
ECHO AV PEAK GRADIENT: 11 MMHG
ECHO AV PEAK VELOCITY: 1.7 M/S
ECHO AV VELOCITY RATIO: 0.59
ECHO BSA: 1.77 M2
ECHO EST RA PRESSURE: 3 MMHG
ECHO LV EF PHYSICIAN: 25 %
ECHO LV EJECTION FRACTION A2C: 23 %
ECHO LV EJECTION FRACTION A4C: 29 %
ECHO LVOT PEAK GRADIENT: 4 MMHG
ECHO LVOT PEAK VELOCITY: 1 M/S
ECHO MV A VELOCITY: 1.29 M/S
ECHO MV E VELOCITY: 0.47 M/S
ECHO MV E/A RATIO: 0.36
ECHO MV REGURGITANT PEAK GRADIENT: 85 MMHG
ECHO MV REGURGITANT PEAK VELOCITY: 4.6 M/S
ECHO RIGHT VENTRICULAR SYSTOLIC PRESSURE (RVSP): 29 MMHG
ECHO RV TAPSE: 2.2 CM (ref 1.7–?)
ECHO TV REGURGITANT MAX VELOCITY: 2.56 M/S
ECHO TV REGURGITANT PEAK GRADIENT: 26 MMHG
EOSINOPHIL # BLD: 0 K/UL (ref 0–0.4)
EOSINOPHIL NFR BLD: 1 % (ref 0–7)
ERYTHROCYTE [DISTWIDTH] IN BLOOD BY AUTOMATED COUNT: 13.6 % (ref 11.5–14.5)
GLUCOSE BLD STRIP.AUTO-MCNC: 109 MG/DL (ref 65–117)
GLUCOSE BLD STRIP.AUTO-MCNC: 126 MG/DL (ref 65–117)
GLUCOSE BLD STRIP.AUTO-MCNC: 83 MG/DL (ref 65–117)
GLUCOSE BLD STRIP.AUTO-MCNC: 97 MG/DL (ref 65–117)
GLUCOSE SERPL-MCNC: 152 MG/DL (ref 65–100)
HCT VFR BLD AUTO: 33.6 % (ref 36.6–50.3)
HGB BLD-MCNC: 10.6 G/DL (ref 12.1–17)
IMM GRANULOCYTES # BLD AUTO: 0.1 K/UL (ref 0–0.04)
IMM GRANULOCYTES NFR BLD AUTO: 1 % (ref 0–0.5)
LYMPHOCYTES # BLD: 1 K/UL (ref 0.8–3.5)
LYMPHOCYTES NFR BLD: 11 % (ref 12–49)
MCH RBC QN AUTO: 30 PG (ref 26–34)
MCHC RBC AUTO-ENTMCNC: 31.5 G/DL (ref 30–36.5)
MCV RBC AUTO: 95.2 FL (ref 80–99)
MONOCYTES # BLD: 0.6 K/UL (ref 0–1)
MONOCYTES NFR BLD: 7 % (ref 5–13)
NEUTS SEG # BLD: 7 K/UL (ref 1.8–8)
NEUTS SEG NFR BLD: 80 % (ref 32–75)
NRBC # BLD: 0 K/UL (ref 0–0.01)
NRBC BLD-RTO: 0 PER 100 WBC
PLATELET # BLD AUTO: 278 K/UL (ref 150–400)
PMV BLD AUTO: 10.2 FL (ref 8.9–12.9)
POTASSIUM SERPL-SCNC: 3.9 MMOL/L (ref 3.5–5.1)
RBC # BLD AUTO: 3.53 M/UL (ref 4.1–5.7)
SERVICE CMNT-IMP: ABNORMAL
SERVICE CMNT-IMP: NORMAL
SODIUM SERPL-SCNC: 134 MMOL/L (ref 136–145)
WBC # BLD AUTO: 8.7 K/UL (ref 4.1–11.1)

## 2024-09-19 PROCEDURE — 6370000000 HC RX 637 (ALT 250 FOR IP): Performed by: HOSPITALIST

## 2024-09-19 PROCEDURE — 80048 BASIC METABOLIC PNL TOTAL CA: CPT

## 2024-09-19 PROCEDURE — 2060000000 HC ICU INTERMEDIATE R&B

## 2024-09-19 PROCEDURE — 94640 AIRWAY INHALATION TREATMENT: CPT

## 2024-09-19 PROCEDURE — 93325 DOPPLER ECHO COLOR FLOW MAPG: CPT

## 2024-09-19 PROCEDURE — 6370000000 HC RX 637 (ALT 250 FOR IP): Performed by: NURSE PRACTITIONER

## 2024-09-19 PROCEDURE — 85025 COMPLETE CBC W/AUTO DIFF WBC: CPT

## 2024-09-19 PROCEDURE — 2580000003 HC RX 258: Performed by: HOSPITALIST

## 2024-09-19 PROCEDURE — 6360000002 HC RX W HCPCS: Performed by: HOSPITALIST

## 2024-09-19 PROCEDURE — 6370000000 HC RX 637 (ALT 250 FOR IP): Performed by: INTERNAL MEDICINE

## 2024-09-19 PROCEDURE — 82962 GLUCOSE BLOOD TEST: CPT

## 2024-09-19 RX ORDER — METOPROLOL TARTRATE 25 MG/1
25 TABLET, FILM COATED ORAL 2 TIMES DAILY
Status: DISCONTINUED | OUTPATIENT
Start: 2024-09-19 | End: 2024-09-20

## 2024-09-19 RX ORDER — MIRTAZAPINE 15 MG/1
15 TABLET, ORALLY DISINTEGRATING ORAL NIGHTLY
Status: DISCONTINUED | OUTPATIENT
Start: 2024-09-19 | End: 2024-09-20

## 2024-09-19 RX ADMIN — ARFORMOTEROL TARTRATE: 15 SOLUTION RESPIRATORY (INHALATION) at 20:43

## 2024-09-19 RX ADMIN — Medication 10 ML: at 21:00

## 2024-09-19 RX ADMIN — METOPROLOL TARTRATE 25 MG: 25 TABLET, FILM COATED ORAL at 12:34

## 2024-09-19 RX ADMIN — LEVOTHYROXINE SODIUM 50 MCG: 0.05 TABLET ORAL at 06:19

## 2024-09-19 RX ADMIN — ATORVASTATIN CALCIUM 10 MG: 10 TABLET, FILM COATED ORAL at 09:19

## 2024-09-19 RX ADMIN — ASPIRIN 81 MG CHEWABLE TABLET 81 MG: 81 TABLET CHEWABLE at 09:19

## 2024-09-19 RX ADMIN — Medication 10 ML: at 09:20

## 2024-09-19 RX ADMIN — IPRATROPIUM BROMIDE AND ALBUTEROL SULFATE 1 DOSE: .5; 3 SOLUTION RESPIRATORY (INHALATION) at 20:42

## 2024-09-19 RX ADMIN — DULOXETINE HYDROCHLORIDE 30 MG: 30 CAPSULE, DELAYED RELEASE ORAL at 09:19

## 2024-09-19 RX ADMIN — Medication: at 09:20

## 2024-09-19 RX ADMIN — IPRATROPIUM BROMIDE AND ALBUTEROL SULFATE 1 DOSE: .5; 3 SOLUTION RESPIRATORY (INHALATION) at 08:57

## 2024-09-19 RX ADMIN — MIRTAZAPINE 15 MG: 15 TABLET, ORALLY DISINTEGRATING ORAL at 23:08

## 2024-09-19 RX ADMIN — ENOXAPARIN SODIUM 40 MG: 100 INJECTION SUBCUTANEOUS at 09:19

## 2024-09-19 RX ADMIN — ARFORMOTEROL TARTRATE: 15 SOLUTION RESPIRATORY (INHALATION) at 08:57

## 2024-09-19 RX ADMIN — METOPROLOL TARTRATE 25 MG: 25 TABLET, FILM COATED ORAL at 23:09

## 2024-09-19 RX ADMIN — DULOXETINE HYDROCHLORIDE 60 MG: 30 CAPSULE, DELAYED RELEASE ORAL at 23:08

## 2024-09-20 ENCOUNTER — APPOINTMENT (OUTPATIENT)
Facility: HOSPITAL | Age: 79
DRG: 640 | End: 2024-09-20
Payer: MEDICARE

## 2024-09-20 PROBLEM — R41.0 CONFUSION: Status: ACTIVE | Noted: 2024-09-20

## 2024-09-20 PROBLEM — R53.1 GENERALIZED WEAKNESS: Status: ACTIVE | Noted: 2024-09-20

## 2024-09-20 PROBLEM — Z51.5 PALLIATIVE CARE ENCOUNTER: Status: ACTIVE | Noted: 2024-09-20

## 2024-09-20 PROBLEM — E43 SEVERE PROTEIN-CALORIE MALNUTRITION (HCC): Status: ACTIVE | Noted: 2024-09-20

## 2024-09-20 LAB
ANION GAP SERPL CALC-SCNC: 9 MMOL/L (ref 2–12)
BUN SERPL-MCNC: 12 MG/DL (ref 6–20)
BUN/CREAT SERPL: 20 (ref 12–20)
CALCIUM SERPL-MCNC: 8.8 MG/DL (ref 8.5–10.1)
CHLORIDE SERPL-SCNC: 106 MMOL/L (ref 97–108)
CO2 SERPL-SCNC: 22 MMOL/L (ref 21–32)
CREAT SERPL-MCNC: 0.59 MG/DL (ref 0.7–1.3)
ECHO BSA: 1.77 M2
EKG DIAGNOSIS: NORMAL
GLUCOSE BLD STRIP.AUTO-MCNC: 107 MG/DL (ref 65–117)
GLUCOSE BLD STRIP.AUTO-MCNC: 110 MG/DL (ref 65–117)
GLUCOSE BLD STRIP.AUTO-MCNC: 113 MG/DL (ref 65–117)
GLUCOSE BLD STRIP.AUTO-MCNC: 131 MG/DL (ref 65–117)
GLUCOSE BLD STRIP.AUTO-MCNC: 158 MG/DL (ref 65–117)
GLUCOSE SERPL-MCNC: 102 MG/DL (ref 65–100)
POTASSIUM SERPL-SCNC: 4.2 MMOL/L (ref 3.5–5.1)
SERVICE CMNT-IMP: ABNORMAL
SERVICE CMNT-IMP: ABNORMAL
SERVICE CMNT-IMP: NORMAL
SODIUM SERPL-SCNC: 137 MMOL/L (ref 136–145)
STRESS BASELINE DIAS BP: 71 MMHG
STRESS BASELINE HR: 56 BPM
STRESS BASELINE ST DEPRESSION: 0 MM
STRESS BASELINE SYS BP: 136 MMHG
STRESS ESTIMATED WORKLOAD: 1 METS
STRESS PEAK DIAS BP: 71 MMHG
STRESS PEAK SYS BP: 141 MMHG
STRESS PERCENT HR ACHIEVED: 58 %
STRESS POST PEAK HR: 81 BPM
STRESS RATE PRESSURE PRODUCT: NORMAL BPM*MMHG
STRESS TARGET HR: 139 BPM

## 2024-09-20 PROCEDURE — 99223 1ST HOSP IP/OBS HIGH 75: CPT | Performed by: PHYSICAL MEDICINE & REHABILITATION

## 2024-09-20 PROCEDURE — 6360000002 HC RX W HCPCS: Performed by: HOSPITALIST

## 2024-09-20 PROCEDURE — 6370000000 HC RX 637 (ALT 250 FOR IP): Performed by: INTERNAL MEDICINE

## 2024-09-20 PROCEDURE — 80048 BASIC METABOLIC PNL TOTAL CA: CPT

## 2024-09-20 PROCEDURE — 2580000003 HC RX 258: Performed by: HOSPITALIST

## 2024-09-20 PROCEDURE — 78452 HT MUSCLE IMAGE SPECT MULT: CPT

## 2024-09-20 PROCEDURE — 93017 CV STRESS TEST TRACING ONLY: CPT

## 2024-09-20 PROCEDURE — 92610 EVALUATE SWALLOWING FUNCTION: CPT

## 2024-09-20 PROCEDURE — 93016 CV STRESS TEST SUPVJ ONLY: CPT | Performed by: SPECIALIST

## 2024-09-20 PROCEDURE — 6360000002 HC RX W HCPCS: Performed by: SPECIALIST

## 2024-09-20 PROCEDURE — 93018 CV STRESS TEST I&R ONLY: CPT | Performed by: SPECIALIST

## 2024-09-20 PROCEDURE — A9500 TC99M SESTAMIBI: HCPCS | Performed by: NURSE PRACTITIONER

## 2024-09-20 PROCEDURE — 82962 GLUCOSE BLOOD TEST: CPT

## 2024-09-20 PROCEDURE — 6370000000 HC RX 637 (ALT 250 FOR IP): Performed by: NURSE PRACTITIONER

## 2024-09-20 PROCEDURE — 94640 AIRWAY INHALATION TREATMENT: CPT

## 2024-09-20 PROCEDURE — 2060000000 HC ICU INTERMEDIATE R&B

## 2024-09-20 PROCEDURE — 3430000000 HC RX DIAGNOSTIC RADIOPHARMACEUTICAL: Performed by: NURSE PRACTITIONER

## 2024-09-20 PROCEDURE — 6370000000 HC RX 637 (ALT 250 FOR IP): Performed by: SPECIALIST

## 2024-09-20 PROCEDURE — 6370000000 HC RX 637 (ALT 250 FOR IP): Performed by: HOSPITALIST

## 2024-09-20 RX ORDER — TETRAKIS(2-METHOXYISOBUTYLISOCYANIDE)COPPER(I) TETRAFLUOROBORATE 1 MG/ML
10.9 INJECTION, POWDER, LYOPHILIZED, FOR SOLUTION INTRAVENOUS
Status: COMPLETED | OUTPATIENT
Start: 2024-09-20 | End: 2024-09-20

## 2024-09-20 RX ORDER — OLANZAPINE 2.5 MG/1
2.5 TABLET, FILM COATED ORAL EVERY EVENING
Status: DISCONTINUED | OUTPATIENT
Start: 2024-09-20 | End: 2024-09-23 | Stop reason: HOSPADM

## 2024-09-20 RX ORDER — REGADENOSON 0.08 MG/ML
0.4 INJECTION, SOLUTION INTRAVENOUS
Status: COMPLETED | OUTPATIENT
Start: 2024-09-20 | End: 2024-09-20

## 2024-09-20 RX ORDER — MIRTAZAPINE 15 MG/1
15 TABLET, ORALLY DISINTEGRATING ORAL EVERY EVENING
Status: DISCONTINUED | OUTPATIENT
Start: 2024-09-20 | End: 2024-09-23 | Stop reason: HOSPADM

## 2024-09-20 RX ORDER — METOPROLOL SUCCINATE 25 MG/1
25 TABLET, EXTENDED RELEASE ORAL DAILY
Status: DISCONTINUED | OUTPATIENT
Start: 2024-09-20 | End: 2024-09-23 | Stop reason: HOSPADM

## 2024-09-20 RX ORDER — OLANZAPINE 2.5 MG/1
2.5 TABLET, FILM COATED ORAL NIGHTLY
Status: DISCONTINUED | OUTPATIENT
Start: 2024-09-20 | End: 2024-09-20

## 2024-09-20 RX ORDER — TETRAKIS(2-METHOXYISOBUTYLISOCYANIDE)COPPER(I) TETRAFLUOROBORATE 1 MG/ML
30.6 INJECTION, POWDER, LYOPHILIZED, FOR SOLUTION INTRAVENOUS
Status: COMPLETED | OUTPATIENT
Start: 2024-09-20 | End: 2024-09-20

## 2024-09-20 RX ORDER — DULOXETIN HYDROCHLORIDE 30 MG/1
60 CAPSULE, DELAYED RELEASE ORAL DAILY
Status: DISCONTINUED | OUTPATIENT
Start: 2024-09-21 | End: 2024-09-23 | Stop reason: HOSPADM

## 2024-09-20 RX ORDER — DULOXETIN HYDROCHLORIDE 30 MG/1
90 CAPSULE, DELAYED RELEASE ORAL EVERY EVENING
Status: DISCONTINUED | OUTPATIENT
Start: 2024-09-20 | End: 2024-09-23 | Stop reason: HOSPADM

## 2024-09-20 RX ADMIN — DULOXETINE HYDROCHLORIDE 30 MG: 30 CAPSULE, DELAYED RELEASE ORAL at 08:39

## 2024-09-20 RX ADMIN — LEVOTHYROXINE SODIUM 50 MCG: 0.05 TABLET ORAL at 07:08

## 2024-09-20 RX ADMIN — ATORVASTATIN CALCIUM 10 MG: 10 TABLET, FILM COATED ORAL at 08:39

## 2024-09-20 RX ADMIN — MIRTAZAPINE 15 MG: 15 TABLET, ORALLY DISINTEGRATING ORAL at 20:32

## 2024-09-20 RX ADMIN — ARFORMOTEROL TARTRATE: 15 SOLUTION RESPIRATORY (INHALATION) at 20:32

## 2024-09-20 RX ADMIN — ASPIRIN 81 MG CHEWABLE TABLET 81 MG: 81 TABLET CHEWABLE at 08:39

## 2024-09-20 RX ADMIN — TECHNETIUM TC-99M SESTAMIBI 10.9 MILLICURIE: 1 INJECTION INTRAVENOUS at 08:55

## 2024-09-20 RX ADMIN — REGADENOSON 0.4 MG: 0.08 INJECTION, SOLUTION INTRAVENOUS at 10:34

## 2024-09-20 RX ADMIN — Medication 10 ML: at 20:33

## 2024-09-20 RX ADMIN — TECHNETIUM TC-99M SESTAMIBI 30.6 MILLICURIE: 1 INJECTION INTRAVENOUS at 10:30

## 2024-09-20 RX ADMIN — DULOXETINE HYDROCHLORIDE 90 MG: 30 CAPSULE, DELAYED RELEASE ORAL at 20:32

## 2024-09-20 RX ADMIN — ARFORMOTEROL TARTRATE: 15 SOLUTION RESPIRATORY (INHALATION) at 07:55

## 2024-09-20 RX ADMIN — METOPROLOL SUCCINATE 25 MG: 25 TABLET, EXTENDED RELEASE ORAL at 19:00

## 2024-09-20 RX ADMIN — SACUBITRIL AND VALSARTAN 1 TABLET: 24; 26 TABLET, FILM COATED ORAL at 12:54

## 2024-09-20 RX ADMIN — METOPROLOL TARTRATE 25 MG: 25 TABLET, FILM COATED ORAL at 08:39

## 2024-09-20 RX ADMIN — Medication: at 08:44

## 2024-09-20 RX ADMIN — Medication 10 ML: at 08:43

## 2024-09-20 RX ADMIN — ENOXAPARIN SODIUM 40 MG: 100 INJECTION SUBCUTANEOUS at 08:40

## 2024-09-21 LAB
GLUCOSE BLD STRIP.AUTO-MCNC: 104 MG/DL (ref 65–117)
GLUCOSE BLD STRIP.AUTO-MCNC: 192 MG/DL (ref 65–117)
GLUCOSE BLD STRIP.AUTO-MCNC: 86 MG/DL (ref 65–117)
GLUCOSE BLD STRIP.AUTO-MCNC: 97 MG/DL (ref 65–117)
GLUCOSE BLD STRIP.AUTO-MCNC: 99 MG/DL (ref 65–117)
SERVICE CMNT-IMP: ABNORMAL
SERVICE CMNT-IMP: NORMAL

## 2024-09-21 PROCEDURE — 2060000000 HC ICU INTERMEDIATE R&B

## 2024-09-21 PROCEDURE — 6370000000 HC RX 637 (ALT 250 FOR IP): Performed by: SPECIALIST

## 2024-09-21 PROCEDURE — 2580000003 HC RX 258: Performed by: HOSPITALIST

## 2024-09-21 PROCEDURE — 6360000002 HC RX W HCPCS: Performed by: HOSPITALIST

## 2024-09-21 PROCEDURE — 94640 AIRWAY INHALATION TREATMENT: CPT

## 2024-09-21 PROCEDURE — 6370000000 HC RX 637 (ALT 250 FOR IP): Performed by: HOSPITALIST

## 2024-09-21 PROCEDURE — 82962 GLUCOSE BLOOD TEST: CPT

## 2024-09-21 PROCEDURE — 6370000000 HC RX 637 (ALT 250 FOR IP): Performed by: INTERNAL MEDICINE

## 2024-09-21 RX ADMIN — Medication 10 ML: at 21:02

## 2024-09-21 RX ADMIN — METOPROLOL SUCCINATE 25 MG: 25 TABLET, EXTENDED RELEASE ORAL at 11:24

## 2024-09-21 RX ADMIN — DULOXETINE HYDROCHLORIDE 60 MG: 30 CAPSULE, DELAYED RELEASE ORAL at 11:29

## 2024-09-21 RX ADMIN — ENOXAPARIN SODIUM 40 MG: 100 INJECTION SUBCUTANEOUS at 11:28

## 2024-09-21 RX ADMIN — SACUBITRIL AND VALSARTAN 1 TABLET: 24; 26 TABLET, FILM COATED ORAL at 11:22

## 2024-09-21 RX ADMIN — DULOXETINE HYDROCHLORIDE 90 MG: 30 CAPSULE, DELAYED RELEASE ORAL at 18:50

## 2024-09-21 RX ADMIN — OLANZAPINE 2.5 MG: 2.5 TABLET, FILM COATED ORAL at 18:52

## 2024-09-21 RX ADMIN — ARFORMOTEROL TARTRATE: 15 SOLUTION RESPIRATORY (INHALATION) at 21:06

## 2024-09-21 RX ADMIN — Medication: at 21:02

## 2024-09-21 RX ADMIN — Medication: at 11:40

## 2024-09-21 RX ADMIN — ASPIRIN 81 MG CHEWABLE TABLET 81 MG: 81 TABLET CHEWABLE at 11:23

## 2024-09-21 RX ADMIN — LEVOTHYROXINE SODIUM 50 MCG: 0.05 TABLET ORAL at 07:15

## 2024-09-21 RX ADMIN — ARFORMOTEROL TARTRATE: 15 SOLUTION RESPIRATORY (INHALATION) at 07:39

## 2024-09-21 RX ADMIN — MIRTAZAPINE 15 MG: 15 TABLET, ORALLY DISINTEGRATING ORAL at 18:52

## 2024-09-21 RX ADMIN — Medication 10 ML: at 11:36

## 2024-09-21 RX ADMIN — ATORVASTATIN CALCIUM 10 MG: 10 TABLET, FILM COATED ORAL at 11:23

## 2024-09-21 RX ADMIN — SACUBITRIL AND VALSARTAN 1 TABLET: 24; 26 TABLET, FILM COATED ORAL at 21:02

## 2024-09-22 LAB
ANION GAP SERPL CALC-SCNC: 7 MMOL/L (ref 2–12)
BACTERIA SPEC CULT: NORMAL
BACTERIA SPEC CULT: NORMAL
BUN SERPL-MCNC: 14 MG/DL (ref 6–20)
BUN/CREAT SERPL: 17 (ref 12–20)
CALCIUM SERPL-MCNC: 8.4 MG/DL (ref 8.5–10.1)
CHLORIDE SERPL-SCNC: 107 MMOL/L (ref 97–108)
CO2 SERPL-SCNC: 25 MMOL/L (ref 21–32)
CREAT SERPL-MCNC: 0.83 MG/DL (ref 0.7–1.3)
GLUCOSE BLD STRIP.AUTO-MCNC: 116 MG/DL (ref 65–117)
GLUCOSE BLD STRIP.AUTO-MCNC: 151 MG/DL (ref 65–117)
GLUCOSE BLD STRIP.AUTO-MCNC: 85 MG/DL (ref 65–117)
GLUCOSE BLD STRIP.AUTO-MCNC: 88 MG/DL (ref 65–117)
GLUCOSE BLD STRIP.AUTO-MCNC: 94 MG/DL (ref 65–117)
GLUCOSE BLD STRIP.AUTO-MCNC: 95 MG/DL (ref 65–117)
GLUCOSE SERPL-MCNC: 131 MG/DL (ref 65–100)
POTASSIUM SERPL-SCNC: 4.1 MMOL/L (ref 3.5–5.1)
SERVICE CMNT-IMP: ABNORMAL
SERVICE CMNT-IMP: NORMAL
SODIUM SERPL-SCNC: 139 MMOL/L (ref 136–145)

## 2024-09-22 PROCEDURE — 6370000000 HC RX 637 (ALT 250 FOR IP): Performed by: INTERNAL MEDICINE

## 2024-09-22 PROCEDURE — 80048 BASIC METABOLIC PNL TOTAL CA: CPT

## 2024-09-22 PROCEDURE — 94640 AIRWAY INHALATION TREATMENT: CPT

## 2024-09-22 PROCEDURE — 6360000002 HC RX W HCPCS: Performed by: HOSPITALIST

## 2024-09-22 PROCEDURE — 6370000000 HC RX 637 (ALT 250 FOR IP): Performed by: HOSPITALIST

## 2024-09-22 PROCEDURE — 2580000003 HC RX 258: Performed by: HOSPITALIST

## 2024-09-22 PROCEDURE — 82962 GLUCOSE BLOOD TEST: CPT

## 2024-09-22 PROCEDURE — 36415 COLL VENOUS BLD VENIPUNCTURE: CPT

## 2024-09-22 PROCEDURE — 2060000000 HC ICU INTERMEDIATE R&B

## 2024-09-22 PROCEDURE — 2580000003 HC RX 258: Performed by: INTERNAL MEDICINE

## 2024-09-22 RX ORDER — 0.9 % SODIUM CHLORIDE 0.9 %
500 INTRAVENOUS SOLUTION INTRAVENOUS ONCE
Status: COMPLETED | OUTPATIENT
Start: 2024-09-22 | End: 2024-09-22

## 2024-09-22 RX ADMIN — MIRTAZAPINE 15 MG: 15 TABLET, ORALLY DISINTEGRATING ORAL at 18:32

## 2024-09-22 RX ADMIN — Medication 10 ML: at 09:27

## 2024-09-22 RX ADMIN — DULOXETINE HYDROCHLORIDE 60 MG: 30 CAPSULE, DELAYED RELEASE ORAL at 09:26

## 2024-09-22 RX ADMIN — Medication 10 ML: at 21:23

## 2024-09-22 RX ADMIN — Medication: at 21:22

## 2024-09-22 RX ADMIN — ARFORMOTEROL TARTRATE: 15 SOLUTION RESPIRATORY (INHALATION) at 20:03

## 2024-09-22 RX ADMIN — SODIUM CHLORIDE 500 ML: 9 INJECTION, SOLUTION INTRAVENOUS at 09:39

## 2024-09-22 RX ADMIN — ATORVASTATIN CALCIUM 10 MG: 10 TABLET, FILM COATED ORAL at 09:26

## 2024-09-22 RX ADMIN — Medication: at 09:27

## 2024-09-22 RX ADMIN — ENOXAPARIN SODIUM 40 MG: 100 INJECTION SUBCUTANEOUS at 09:27

## 2024-09-22 RX ADMIN — ASPIRIN 81 MG CHEWABLE TABLET 81 MG: 81 TABLET CHEWABLE at 09:26

## 2024-09-22 RX ADMIN — DULOXETINE HYDROCHLORIDE 90 MG: 30 CAPSULE, DELAYED RELEASE ORAL at 18:33

## 2024-09-22 RX ADMIN — OLANZAPINE 2.5 MG: 2.5 TABLET, FILM COATED ORAL at 18:31

## 2024-09-22 RX ADMIN — LEVOTHYROXINE SODIUM 50 MCG: 0.05 TABLET ORAL at 07:04

## 2024-09-22 RX ADMIN — ARFORMOTEROL TARTRATE: 15 SOLUTION RESPIRATORY (INHALATION) at 08:49

## 2024-09-23 VITALS
SYSTOLIC BLOOD PRESSURE: 122 MMHG | WEIGHT: 132.3 LBS | HEIGHT: 71 IN | TEMPERATURE: 97.8 F | RESPIRATION RATE: 14 BRPM | OXYGEN SATURATION: 93 % | HEART RATE: 69 BPM | DIASTOLIC BLOOD PRESSURE: 64 MMHG | BODY MASS INDEX: 18.52 KG/M2

## 2024-09-23 LAB
GLUCOSE BLD STRIP.AUTO-MCNC: 111 MG/DL (ref 65–117)
GLUCOSE BLD STRIP.AUTO-MCNC: 153 MG/DL (ref 65–117)
SERVICE CMNT-IMP: ABNORMAL
SERVICE CMNT-IMP: NORMAL

## 2024-09-23 PROCEDURE — 6370000000 HC RX 637 (ALT 250 FOR IP): Performed by: INTERNAL MEDICINE

## 2024-09-23 PROCEDURE — 6370000000 HC RX 637 (ALT 250 FOR IP): Performed by: SPECIALIST

## 2024-09-23 PROCEDURE — 6360000002 HC RX W HCPCS: Performed by: HOSPITALIST

## 2024-09-23 PROCEDURE — 2580000003 HC RX 258: Performed by: HOSPITALIST

## 2024-09-23 PROCEDURE — 94640 AIRWAY INHALATION TREATMENT: CPT

## 2024-09-23 PROCEDURE — 6370000000 HC RX 637 (ALT 250 FOR IP): Performed by: HOSPITALIST

## 2024-09-23 PROCEDURE — 82962 GLUCOSE BLOOD TEST: CPT

## 2024-09-23 RX ORDER — METOPROLOL TARTRATE 25 MG/1
25 TABLET, FILM COATED ORAL 2 TIMES DAILY
Qty: 60 TABLET | Refills: 0 | Status: SHIPPED
Start: 2024-09-23 | End: 2024-10-23

## 2024-09-23 RX ORDER — DULOXETIN HYDROCHLORIDE 30 MG/1
90 CAPSULE, DELAYED RELEASE ORAL DAILY
Qty: 90 CAPSULE | Refills: 0 | Status: SHIPPED
Start: 2024-09-23

## 2024-09-23 RX ORDER — DULOXETIN HYDROCHLORIDE 30 MG/1
60 CAPSULE, DELAYED RELEASE ORAL DAILY
Qty: 30 CAPSULE | Refills: 3 | Status: SHIPPED
Start: 2024-09-23

## 2024-09-23 RX ADMIN — ENOXAPARIN SODIUM 40 MG: 100 INJECTION SUBCUTANEOUS at 09:26

## 2024-09-23 RX ADMIN — ASPIRIN 81 MG CHEWABLE TABLET 81 MG: 81 TABLET CHEWABLE at 09:27

## 2024-09-23 RX ADMIN — Medication: at 09:27

## 2024-09-23 RX ADMIN — Medication 10 ML: at 09:30

## 2024-09-23 RX ADMIN — DULOXETINE HYDROCHLORIDE 60 MG: 30 CAPSULE, DELAYED RELEASE ORAL at 09:27

## 2024-09-23 RX ADMIN — METOPROLOL SUCCINATE 25 MG: 25 TABLET, EXTENDED RELEASE ORAL at 09:26

## 2024-09-23 RX ADMIN — ATORVASTATIN CALCIUM 10 MG: 10 TABLET, FILM COATED ORAL at 09:27

## 2024-09-23 RX ADMIN — ARFORMOTEROL TARTRATE: 15 SOLUTION RESPIRATORY (INHALATION) at 08:02

## 2024-09-23 RX ADMIN — LEVOTHYROXINE SODIUM 50 MCG: 0.05 TABLET ORAL at 06:34

## 2024-11-18 ENCOUNTER — TELEPHONE (OUTPATIENT)
Facility: CLINIC | Age: 79
End: 2024-11-18

## 2024-12-09 ENCOUNTER — HOSPITAL ENCOUNTER (EMERGENCY)
Facility: HOSPITAL | Age: 79
Discharge: HOME | End: 2024-12-09
Attending: EMERGENCY MEDICINE
Payer: COMMERCIAL

## 2024-12-09 ENCOUNTER — APPOINTMENT (OUTPATIENT)
Dept: VASCULAR SURGERY | Facility: HOSPITAL | Age: 79
End: 2024-12-09
Attending: EMERGENCY MEDICINE
Payer: COMMERCIAL

## 2024-12-09 VITALS
RESPIRATION RATE: 16 BRPM | DIASTOLIC BLOOD PRESSURE: 65 MMHG | WEIGHT: 132 LBS | HEIGHT: 71 IN | BODY MASS INDEX: 18.48 KG/M2 | HEART RATE: 49 BPM | SYSTOLIC BLOOD PRESSURE: 139 MMHG | OXYGEN SATURATION: 98 % | TEMPERATURE: 97.8 F

## 2024-12-09 DIAGNOSIS — L03.116 CELLULITIS OF LEFT LOWER EXTREMITY: ICD-10-CM

## 2024-12-09 DIAGNOSIS — I82.452 ACUTE DEEP VEIN THROMBOSIS (DVT) OF LEFT PERONEAL VEIN (HCC): ICD-10-CM

## 2024-12-09 DIAGNOSIS — M79.89 LEG SWELLING: Primary | ICD-10-CM

## 2024-12-09 LAB
ALBUMIN SERPL-MCNC: 3.1 G/DL (ref 3.5–5)
ALBUMIN/GLOB SERPL: 0.8 (ref 1.1–2.2)
ALP SERPL-CCNC: 132 U/L (ref 45–117)
ALT SERPL-CCNC: 19 U/L (ref 12–78)
ANION GAP SERPL CALC-SCNC: 5 MMOL/L (ref 2–12)
AST SERPL-CCNC: 15 U/L (ref 15–37)
BASOPHILS # BLD: 0 K/UL (ref 0–0.1)
BASOPHILS NFR BLD: 0 % (ref 0–1)
BILIRUB SERPL-MCNC: 0.3 MG/DL (ref 0.2–1)
BUN SERPL-MCNC: 29 MG/DL (ref 6–20)
BUN/CREAT SERPL: 27 (ref 12–20)
CALCIUM SERPL-MCNC: 9 MG/DL (ref 8.5–10.1)
CHLORIDE SERPL-SCNC: 109 MMOL/L (ref 97–108)
CO2 SERPL-SCNC: 26 MMOL/L (ref 21–32)
CREAT SERPL-MCNC: 1.06 MG/DL (ref 0.7–1.3)
DIFFERENTIAL METHOD BLD: ABNORMAL
ECHO BSA: 1.73 M2
EOSINOPHIL # BLD: 0.5 K/UL (ref 0–0.4)
EOSINOPHIL NFR BLD: 4 % (ref 0–7)
ERYTHROCYTE [DISTWIDTH] IN BLOOD BY AUTOMATED COUNT: 13.9 % (ref 11.5–14.5)
GLOBULIN SER CALC-MCNC: 3.8 G/DL (ref 2–4)
GLUCOSE SERPL-MCNC: 124 MG/DL (ref 65–100)
HCT VFR BLD AUTO: 37.1 % (ref 36.6–50.3)
HGB BLD-MCNC: 11.7 G/DL (ref 12.1–17)
IMM GRANULOCYTES # BLD AUTO: 0.1 K/UL (ref 0–0.04)
IMM GRANULOCYTES NFR BLD AUTO: 1 % (ref 0–0.5)
LYMPHOCYTES # BLD: 1.6 K/UL (ref 0.8–3.5)
LYMPHOCYTES NFR BLD: 14 % (ref 12–49)
MCH RBC QN AUTO: 31.5 PG (ref 26–34)
MCHC RBC AUTO-ENTMCNC: 31.5 G/DL (ref 30–36.5)
MCV RBC AUTO: 100 FL (ref 80–99)
MONOCYTES # BLD: 1.1 K/UL (ref 0–1)
MONOCYTES NFR BLD: 9 % (ref 5–13)
NEUTS SEG # BLD: 8.6 K/UL (ref 1.8–8)
NEUTS SEG NFR BLD: 72 % (ref 32–75)
NRBC # BLD: 0 K/UL (ref 0–0.01)
NRBC BLD-RTO: 0 PER 100 WBC
PLATELET # BLD AUTO: 251 K/UL (ref 150–400)
PMV BLD AUTO: 10 FL (ref 8.9–12.9)
POTASSIUM SERPL-SCNC: 4.4 MMOL/L (ref 3.5–5.1)
PROT SERPL-MCNC: 6.9 G/DL (ref 6.4–8.2)
RBC # BLD AUTO: 3.71 M/UL (ref 4.1–5.7)
SODIUM SERPL-SCNC: 140 MMOL/L (ref 136–145)
WBC # BLD AUTO: 11.8 K/UL (ref 4.1–11.1)

## 2024-12-09 PROCEDURE — 93971 EXTREMITY STUDY: CPT

## 2024-12-09 PROCEDURE — 99284 EMERGENCY DEPT VISIT MOD MDM: CPT

## 2024-12-09 PROCEDURE — 80053 COMPREHEN METABOLIC PANEL: CPT

## 2024-12-09 PROCEDURE — 36415 COLL VENOUS BLD VENIPUNCTURE: CPT

## 2024-12-09 PROCEDURE — 85025 COMPLETE CBC W/AUTO DIFF WBC: CPT

## 2024-12-09 RX ORDER — CLINDAMYCIN HYDROCHLORIDE 150 MG/1
450 CAPSULE ORAL 3 TIMES DAILY
Qty: 63 CAPSULE | Refills: 0 | Status: SHIPPED | OUTPATIENT
Start: 2024-12-09 | End: 2024-12-16

## 2024-12-09 RX ORDER — CLINDAMYCIN HYDROCHLORIDE 150 MG/1
450 CAPSULE ORAL 3 TIMES DAILY
Qty: 63 CAPSULE | Refills: 0 | Status: SHIPPED | OUTPATIENT
Start: 2024-12-09 | End: 2024-12-09

## 2024-12-09 RX ORDER — RIVAROXABAN 10 MG/1
10 TABLET, FILM COATED ORAL
Qty: 30 TABLET | Refills: 0 | Status: SHIPPED | OUTPATIENT
Start: 2024-12-09

## 2024-12-09 RX ORDER — RIVAROXABAN 10 MG/1
10 TABLET, FILM COATED ORAL
Qty: 30 TABLET | Refills: 0 | Status: SHIPPED | OUTPATIENT
Start: 2024-12-09 | End: 2024-12-09

## 2024-12-09 ASSESSMENT — PAIN - FUNCTIONAL ASSESSMENT
PAIN_FUNCTIONAL_ASSESSMENT: NONE - DENIES PAIN
PAIN_FUNCTIONAL_ASSESSMENT: NONE - DENIES PAIN

## 2024-12-09 NOTE — ED PROVIDER NOTES
Take 1 tablet by mouth daily    DICLOFENAC SODIUM (VOLTAREN) 1 % GEL    Apply 2 g topically 4 times daily as needed for Pain    DULOXETINE (CYMBALTA) 30 MG EXTENDED RELEASE CAPSULE    Take 2 capsules by mouth daily (60 mg in AM and 90 mg in PM)    DULOXETINE (CYMBALTA) 30 MG EXTENDED RELEASE CAPSULE    Take 3 capsules by mouth daily (30 mg in AM and 60 mg in PM)    GLYCOPYRROLATE-FORMOTEROL (BEVESPI) 9-4.8 MCG/ACT AERO    Inhale 2 puffs into the lungs 2 times daily    IPRATROPIUM-ALBUTEROL (DUONEB) 0.5-2.5 (3) MG/3ML SOLN NEBULIZER SOLUTION    Inhale 3 mLs into the lungs every 4 hours as needed for Wheezing    LEVOTHYROXINE (SYNTHROID) 50 MCG TABLET    Take 1 tablet by mouth every morning (before breakfast)    MELOXICAM (MOBIC) 15 MG TABLET    Take 1 tablet by mouth daily    METFORMIN (GLUCOPHAGE) 500 MG TABLET    Take 1 tablet by mouth daily    METOPROLOL TARTRATE (LOPRESSOR) 25 MG TABLET    Take 1 tablet by mouth 2 times daily Hold if BP <100    MIRTAZAPINE (REMERON SOL-TAB) 15 MG DISINTEGRATING TABLET    Take 1 tablet by mouth nightly for 21 days    MOMETASONE (ASMANEX) 100 MCG/ACT AERO INHALER    Inhale 2 puffs into the lungs 2 times daily    OLANZAPINE (ZYPREXA) 2.5 MG TABLET    Take 1 tablet by mouth nightly for 21 days       ALLERGIES     Trazodone    FAMILY HISTORY       Family History   Problem Relation Age of Onset    Lung Disease Father     Cancer Father     Osteoarthritis Mother           SOCIAL HISTORY       Social History     Socioeconomic History    Marital status:    Tobacco Use    Smoking status: Former     Current packs/day: 0.00     Types: Cigarettes     Quit date: 1970     Years since quittin.6    Smokeless tobacco: Never   Substance and Sexual Activity    Alcohol use: Not Currently    Drug use: No     Social Determinants of Health      Received from [facility], [facility]    Social Network   Intimate Partner Violence: Not At Risk (2024)    Received from [facility] [employer],

## 2024-12-09 NOTE — ED TRIAGE NOTES
Pt biba from the [facility] for left leg swelling and left foot wound.  Pt is seen by home health once a week. Reported symptoms today. Duration of symptoms unknown. Denies chest pain/sob, abdominal pain, nvd, fever. Pt wound dressed prior to hospital  Arrival.

## 2024-12-10 NOTE — ED NOTES
[facility] at bedside in process of transferring patient to [facility]. Per patient's daughter, home health workers will be at site upon arrival. V/S stable prior to departure. Patient alert and oriented to baseline.

## 2025-01-14 NOTE — TELEPHONE ENCOUNTER
[facility] ([facility])   Phone:  [phone]      Fax:  [phone] [address], [address]  [place] [address]    Name:  Jane Campbell  YOB: 1945    Received [facility] at Home referral for Jane Campbell from Dr. Tiffany Lees who saw patient for inpatient Palliative Medicine during his recent hospitalization.     This nurse called patient's son Aurelio Campbell, no answer, left message requesting call back.      Kerrie Cisse RN, Gerontological Nursing-BC, CHPN

## 2025-03-30 ENCOUNTER — HOSPITAL ENCOUNTER (INPATIENT)
Facility: HOSPITAL | Age: 80
LOS: 3 days | Discharge: HOME | DRG: 689 | End: 2025-04-02
Attending: EMERGENCY MEDICINE | Admitting: STUDENT IN AN ORGANIZED HEALTH CARE EDUCATION/TRAINING PROGRAM
Payer: MEDICARE

## 2025-03-30 ENCOUNTER — APPOINTMENT (OUTPATIENT)
Facility: HOSPITAL | Age: 80
DRG: 689 | End: 2025-03-30
Payer: MEDICARE

## 2025-03-30 DIAGNOSIS — N39.0 URINARY TRACT INFECTION WITHOUT HEMATURIA, SITE UNSPECIFIED: Primary | ICD-10-CM

## 2025-03-30 DIAGNOSIS — R41.82 ALTERED MENTAL STATUS, UNSPECIFIED ALTERED MENTAL STATUS TYPE: ICD-10-CM

## 2025-03-30 PROBLEM — G93.41 ACUTE METABOLIC ENCEPHALOPATHY: Status: ACTIVE | Noted: 2025-03-30

## 2025-03-30 LAB
ALBUMIN SERPL-MCNC: 3.4 G/DL (ref 3.5–5)
ALBUMIN/GLOB SERPL: 1 (ref 1.1–2.2)
ALP SERPL-CCNC: 105 U/L (ref 45–117)
ALT SERPL-CCNC: 20 U/L (ref 12–78)
ANION GAP SERPL CALC-SCNC: 4 MMOL/L (ref 2–12)
APPEARANCE UR: CLEAR
AST SERPL-CCNC: 14 U/L (ref 15–37)
BACTERIA URNS QL MICRO: ABNORMAL /HPF
BASOPHILS # BLD: 0.05 K/UL (ref 0–0.1)
BASOPHILS NFR BLD: 0.4 % (ref 0–1)
BILIRUB SERPL-MCNC: 0.4 MG/DL (ref 0.2–1)
BILIRUB UR QL: NEGATIVE
BUN SERPL-MCNC: 18 MG/DL (ref 6–20)
BUN/CREAT SERPL: 19 (ref 12–20)
CALCIUM SERPL-MCNC: 9.2 MG/DL (ref 8.5–10.1)
CHLORIDE SERPL-SCNC: 109 MMOL/L (ref 97–108)
CO2 SERPL-SCNC: 26 MMOL/L (ref 21–32)
COLOR UR: ABNORMAL
COMMENT:: NORMAL
CREAT SERPL-MCNC: 0.96 MG/DL (ref 0.7–1.3)
DIFFERENTIAL METHOD BLD: ABNORMAL
EOSINOPHIL # BLD: 0.35 K/UL (ref 0–0.4)
EOSINOPHIL NFR BLD: 2.9 % (ref 0–7)
EPITH CASTS URNS QL MICRO: ABNORMAL /LPF
ERYTHROCYTE [DISTWIDTH] IN BLOOD BY AUTOMATED COUNT: 13.7 % (ref 11.5–14.5)
GLOBULIN SER CALC-MCNC: 3.5 G/DL (ref 2–4)
GLUCOSE BLD STRIP.AUTO-MCNC: 126 MG/DL (ref 65–117)
GLUCOSE BLD STRIP.AUTO-MCNC: 142 MG/DL (ref 65–117)
GLUCOSE BLD STRIP.AUTO-MCNC: 95 MG/DL (ref 65–117)
GLUCOSE SERPL-MCNC: 125 MG/DL (ref 65–100)
GLUCOSE UR STRIP.AUTO-MCNC: NEGATIVE MG/DL
HCT VFR BLD AUTO: 38.4 % (ref 36.6–50.3)
HGB BLD-MCNC: 12.2 G/DL (ref 12.1–17)
HGB UR QL STRIP: NEGATIVE
HYALINE CASTS URNS QL MICRO: ABNORMAL /LPF (ref 0–2)
IMM GRANULOCYTES # BLD AUTO: 0.07 K/UL (ref 0–0.04)
IMM GRANULOCYTES NFR BLD AUTO: 0.6 % (ref 0–0.5)
KETONES UR QL STRIP.AUTO: NEGATIVE MG/DL
LACTATE SERPL-SCNC: 1.5 MMOL/L (ref 0.4–2)
LEUKOCYTE ESTERASE UR QL STRIP.AUTO: ABNORMAL
LYMPHOCYTES # BLD: 1.66 K/UL (ref 0.8–3.5)
LYMPHOCYTES NFR BLD: 13.9 % (ref 12–49)
MCH RBC QN AUTO: 30.8 PG (ref 26–34)
MCHC RBC AUTO-ENTMCNC: 31.8 G/DL (ref 30–36.5)
MCV RBC AUTO: 97 FL (ref 80–99)
MONOCYTES # BLD: 0.89 K/UL (ref 0–1)
MONOCYTES NFR BLD: 7.5 % (ref 5–13)
NEUTS SEG # BLD: 8.88 K/UL (ref 1.8–8)
NEUTS SEG NFR BLD: 74.7 % (ref 32–75)
NITRITE UR QL STRIP.AUTO: POSITIVE
NRBC # BLD: 0 K/UL (ref 0–0.01)
NRBC BLD-RTO: 0 PER 100 WBC
PH UR STRIP: 6.5 (ref 5–8)
PLATELET # BLD AUTO: 248 K/UL (ref 150–400)
PMV BLD AUTO: 10.6 FL (ref 8.9–12.9)
POTASSIUM SERPL-SCNC: 3.8 MMOL/L (ref 3.5–5.1)
PROT SERPL-MCNC: 6.9 G/DL (ref 6.4–8.2)
PROT UR STRIP-MCNC: NEGATIVE MG/DL
RBC # BLD AUTO: 3.96 M/UL (ref 4.1–5.7)
RBC #/AREA URNS HPF: ABNORMAL /HPF (ref 0–5)
SERVICE CMNT-IMP: ABNORMAL
SERVICE CMNT-IMP: ABNORMAL
SERVICE CMNT-IMP: NORMAL
SODIUM SERPL-SCNC: 139 MMOL/L (ref 136–145)
SP GR UR REFRACTOMETRY: 1.02 (ref 1–1.03)
SPECIMEN HOLD: NORMAL
SPECIMEN HOLD: NORMAL
TROPONIN I SERPL HS-MCNC: 33 NG/L (ref 0–76)
TSH SERPL DL<=0.05 MIU/L-ACNC: 2.35 UIU/ML (ref 0.36–3.74)
UROBILINOGEN UR QL STRIP.AUTO: 1 EU/DL (ref 0.2–1)
WBC # BLD AUTO: 11.9 K/UL (ref 4.1–11.1)
WBC URNS QL MICRO: ABNORMAL /HPF (ref 0–4)

## 2025-03-30 PROCEDURE — 85025 COMPLETE CBC W/AUTO DIFF WBC: CPT

## 2025-03-30 PROCEDURE — 87040 BLOOD CULTURE FOR BACTERIA: CPT

## 2025-03-30 PROCEDURE — 87186 SC STD MICRODIL/AGAR DIL: CPT

## 2025-03-30 PROCEDURE — 2500000003 HC RX 250 WO HCPCS: Performed by: NURSE PRACTITIONER

## 2025-03-30 PROCEDURE — 94761 N-INVAS EAR/PLS OXIMETRY MLT: CPT

## 2025-03-30 PROCEDURE — 87088 URINE BACTERIA CULTURE: CPT

## 2025-03-30 PROCEDURE — 2060000000 HC ICU INTERMEDIATE R&B

## 2025-03-30 PROCEDURE — 83605 ASSAY OF LACTIC ACID: CPT

## 2025-03-30 PROCEDURE — 81001 URINALYSIS AUTO W/SCOPE: CPT

## 2025-03-30 PROCEDURE — 84484 ASSAY OF TROPONIN QUANT: CPT

## 2025-03-30 PROCEDURE — 71045 X-RAY EXAM CHEST 1 VIEW: CPT

## 2025-03-30 PROCEDURE — 2500000003 HC RX 250 WO HCPCS: Performed by: STUDENT IN AN ORGANIZED HEALTH CARE EDUCATION/TRAINING PROGRAM

## 2025-03-30 PROCEDURE — 99285 EMERGENCY DEPT VISIT HI MDM: CPT

## 2025-03-30 PROCEDURE — 6370000000 HC RX 637 (ALT 250 FOR IP): Performed by: STUDENT IN AN ORGANIZED HEALTH CARE EDUCATION/TRAINING PROGRAM

## 2025-03-30 PROCEDURE — 70450 CT HEAD/BRAIN W/O DYE: CPT

## 2025-03-30 PROCEDURE — 96374 THER/PROPH/DIAG INJ IV PUSH: CPT

## 2025-03-30 PROCEDURE — 6360000002 HC RX W HCPCS: Performed by: NURSE PRACTITIONER

## 2025-03-30 PROCEDURE — 87086 URINE CULTURE/COLONY COUNT: CPT

## 2025-03-30 PROCEDURE — 36415 COLL VENOUS BLD VENIPUNCTURE: CPT

## 2025-03-30 PROCEDURE — 80053 COMPREHEN METABOLIC PANEL: CPT

## 2025-03-30 PROCEDURE — 82962 GLUCOSE BLOOD TEST: CPT

## 2025-03-30 PROCEDURE — 84443 ASSAY THYROID STIM HORMONE: CPT

## 2025-03-30 RX ORDER — MAGNESIUM SULFATE IN WATER 40 MG/ML
2000 INJECTION, SOLUTION INTRAVENOUS PRN
Status: DISCONTINUED | OUTPATIENT
Start: 2025-03-30 | End: 2025-04-02 | Stop reason: HOSPADM

## 2025-03-30 RX ORDER — DULOXETIN HYDROCHLORIDE 30 MG/1
30 CAPSULE, DELAYED RELEASE ORAL DAILY
Status: DISCONTINUED | OUTPATIENT
Start: 2025-03-31 | End: 2025-04-02 | Stop reason: HOSPADM

## 2025-03-30 RX ORDER — SODIUM CHLORIDE 0.9 % (FLUSH) 0.9 %
5-40 SYRINGE (ML) INJECTION PRN
Status: DISCONTINUED | OUTPATIENT
Start: 2025-03-30 | End: 2025-04-02 | Stop reason: HOSPADM

## 2025-03-30 RX ORDER — ONDANSETRON 4 MG/1
4 TABLET, ORALLY DISINTEGRATING ORAL EVERY 8 HOURS PRN
Status: DISCONTINUED | OUTPATIENT
Start: 2025-03-30 | End: 2025-04-02 | Stop reason: HOSPADM

## 2025-03-30 RX ORDER — SODIUM CHLORIDE 0.9 % (FLUSH) 0.9 %
5-40 SYRINGE (ML) INJECTION EVERY 12 HOURS SCHEDULED
Status: DISCONTINUED | OUTPATIENT
Start: 2025-03-30 | End: 2025-04-02 | Stop reason: HOSPADM

## 2025-03-30 RX ORDER — INSULIN LISPRO 100 [IU]/ML
0-4 INJECTION, SOLUTION INTRAVENOUS; SUBCUTANEOUS
Status: DISCONTINUED | OUTPATIENT
Start: 2025-03-30 | End: 2025-04-02 | Stop reason: HOSPADM

## 2025-03-30 RX ORDER — ACETAMINOPHEN 325 MG/1
650 TABLET ORAL EVERY 6 HOURS PRN
Status: DISCONTINUED | OUTPATIENT
Start: 2025-03-30 | End: 2025-04-02 | Stop reason: HOSPADM

## 2025-03-30 RX ORDER — LEVOTHYROXINE SODIUM 50 UG/1
50 TABLET ORAL
Status: DISCONTINUED | OUTPATIENT
Start: 2025-03-31 | End: 2025-04-02 | Stop reason: HOSPADM

## 2025-03-30 RX ORDER — ONDANSETRON 2 MG/ML
4 INJECTION INTRAMUSCULAR; INTRAVENOUS EVERY 6 HOURS PRN
Status: DISCONTINUED | OUTPATIENT
Start: 2025-03-30 | End: 2025-04-02 | Stop reason: HOSPADM

## 2025-03-30 RX ORDER — ENOXAPARIN SODIUM 100 MG/ML
40 INJECTION SUBCUTANEOUS DAILY
Status: DISCONTINUED | OUTPATIENT
Start: 2025-03-30 | End: 2025-03-30

## 2025-03-30 RX ORDER — POTASSIUM CHLORIDE 750 MG/1
40 TABLET, EXTENDED RELEASE ORAL PRN
Status: DISCONTINUED | OUTPATIENT
Start: 2025-03-30 | End: 2025-04-02 | Stop reason: HOSPADM

## 2025-03-30 RX ORDER — ATORVASTATIN CALCIUM 10 MG/1
10 TABLET, FILM COATED ORAL DAILY
Status: DISCONTINUED | OUTPATIENT
Start: 2025-03-31 | End: 2025-04-02 | Stop reason: HOSPADM

## 2025-03-30 RX ORDER — OLANZAPINE 5 MG/1
2.5 TABLET ORAL NIGHTLY
Status: DISCONTINUED | OUTPATIENT
Start: 2025-03-30 | End: 2025-04-02 | Stop reason: HOSPADM

## 2025-03-30 RX ORDER — MIRTAZAPINE 15 MG/1
15 TABLET, FILM COATED ORAL NIGHTLY
Status: DISCONTINUED | OUTPATIENT
Start: 2025-03-30 | End: 2025-04-02 | Stop reason: HOSPADM

## 2025-03-30 RX ORDER — ACETAMINOPHEN 650 MG/1
650 SUPPOSITORY RECTAL EVERY 6 HOURS PRN
Status: DISCONTINUED | OUTPATIENT
Start: 2025-03-30 | End: 2025-04-02 | Stop reason: HOSPADM

## 2025-03-30 RX ORDER — METOPROLOL TARTRATE 25 MG/1
25 TABLET, FILM COATED ORAL 2 TIMES DAILY
Status: DISCONTINUED | OUTPATIENT
Start: 2025-03-30 | End: 2025-04-02 | Stop reason: HOSPADM

## 2025-03-30 RX ORDER — POLYETHYLENE GLYCOL 3350 17 G/17G
17 POWDER, FOR SOLUTION ORAL DAILY PRN
Status: DISCONTINUED | OUTPATIENT
Start: 2025-03-30 | End: 2025-04-02 | Stop reason: HOSPADM

## 2025-03-30 RX ORDER — SODIUM CHLORIDE 9 MG/ML
INJECTION, SOLUTION INTRAVENOUS PRN
Status: DISCONTINUED | OUTPATIENT
Start: 2025-03-30 | End: 2025-04-02 | Stop reason: HOSPADM

## 2025-03-30 RX ORDER — SULFAMETHOXAZOLE AND TRIMETHOPRIM 800; 160 MG/1; MG/1
1 TABLET ORAL 2 TIMES DAILY
Qty: 14 TABLET | Refills: 0 | Status: SHIPPED | OUTPATIENT
Start: 2025-03-30 | End: 2025-03-30

## 2025-03-30 RX ORDER — SULFAMETHOXAZOLE AND TRIMETHOPRIM 800; 160 MG/1; MG/1
1 TABLET ORAL 2 TIMES DAILY
Qty: 14 TABLET | Refills: 0 | Status: SHIPPED | OUTPATIENT
Start: 2025-03-30 | End: 2025-04-02 | Stop reason: HOSPADM

## 2025-03-30 RX ORDER — DEXTROSE MONOHYDRATE 100 MG/ML
INJECTION, SOLUTION INTRAVENOUS CONTINUOUS PRN
Status: DISCONTINUED | OUTPATIENT
Start: 2025-03-30 | End: 2025-04-02 | Stop reason: HOSPADM

## 2025-03-30 RX ORDER — POTASSIUM CHLORIDE 7.45 MG/ML
10 INJECTION INTRAVENOUS PRN
Status: DISCONTINUED | OUTPATIENT
Start: 2025-03-30 | End: 2025-04-02 | Stop reason: HOSPADM

## 2025-03-30 RX ADMIN — WATER 1000 MG: 1 INJECTION INTRAMUSCULAR; INTRAVENOUS; SUBCUTANEOUS at 11:47

## 2025-03-30 RX ADMIN — METOPROLOL TARTRATE 25 MG: 25 TABLET, FILM COATED ORAL at 21:55

## 2025-03-30 RX ADMIN — ACETAMINOPHEN 650 MG: 325 TABLET ORAL at 22:38

## 2025-03-30 RX ADMIN — MIRTAZAPINE 15 MG: 15 TABLET, FILM COATED ORAL at 21:55

## 2025-03-30 RX ADMIN — SODIUM CHLORIDE, PRESERVATIVE FREE 10 ML: 5 INJECTION INTRAVENOUS at 22:02

## 2025-03-30 ASSESSMENT — PAIN - FUNCTIONAL ASSESSMENT: PAIN_FUNCTIONAL_ASSESSMENT: NONE - DENIES PAIN

## 2025-03-30 NOTE — H&P
9/23/24 10/23/24  Madala, Sushma, MD   levothyroxine (SYNTHROID) 50 MCG tablet Take 1 tablet by mouth every morning (before breakfast) 8/11/24 9/10/24  Alisha Galloway PA-C   mirtazapine (REMERON SOL-TAB) 15 MG disintegrating tablet Take 1 tablet by mouth nightly for 21 days 8/10/24 8/31/24  Alisha Galloway PA-C   OLANZapine (ZYPREXA) 2.5 MG tablet Take 1 tablet by mouth nightly for 21 days 8/10/24 8/31/24  Alisha Galloway PA-C   diclofenac sodium (VOLTAREN) 1 % GEL Apply 2 g topically 4 times daily as needed for Pain    ProviderTyrell MD   meloxicam (MOBIC) 15 MG tablet Take 1 tablet by mouth daily 3/24/21   Provider, MD Tyrell   albuterol sulfate HFA (PROVENTIL;VENTOLIN;PROAIR) 108 (90 Base) MCG/ACT inhaler Inhale 2 puffs into the lungs every 4 hours as needed for Shortness of Breath    Automatic Reconciliation, Ar   atorvastatin (LIPITOR) 10 MG tablet Take 1 tablet by mouth daily    Automatic Reconciliation, Ar   glycopyrrolate-formoterol (BEVESPI) 9-4.8 MCG/ACT AERO Inhale 2 puffs into the lungs 2 times daily    Automatic Reconciliation, Ar   ipratropium-albuterol (DUONEB) 0.5-2.5 (3) MG/3ML SOLN nebulizer solution Inhale 3 mLs into the lungs every 4 hours as needed for Wheezing    Automatic Reconciliation, Ar   metFORMIN (GLUCOPHAGE) 500 MG tablet Take 1 tablet by mouth daily    Automatic Reconciliation, Ar   mometasone (ASMANEX) 100 MCG/ACT AERO inhaler Inhale 2 puffs into the lungs 2 times daily    Automatic Reconciliation, Ar       REVIEW OF SYSTEMS:  See HPI for details           Objective:   VITALS:    Vitals:    03/30/25 1345   BP:    Pulse: 75   Resp: 11   Temp:    SpO2: 96%     PHYSICAL EXAM:    Physical Exam:     Physical Exam  Constitutional:       General: He is not in acute distress.     Appearance: He is ill-appearing (chronically). He is not toxic-appearing.      Comments: Elderly, frail   HENT:      Right Ear: External ear normal.      Left Ear: External ear normal.      Ears:

## 2025-03-30 NOTE — ED PROVIDER NOTES
[facility] EMERGENCY DEPARTMENT  EMERGENCY DEPARTMENT ENCOUNTER      Pt Name: Jane Campbell  MRN: [id number]  Birthdate 1945  Date of evaluation: 3/30/2025  Provider: STEPHANIE Daigle NP      HISTORY OF PRESENT ILLNESS      CC: Altered mental status     Past Medical History:  No date: Anxiety and depression  No date: BCC (basal cell carcinoma of skin)  No date: Bronchitis, chronic (HCC)  No date: Chronic obstructive pulmonary disease (HCC)  No date: Chronic pain  No date: Fibromyalgia  No date: Generalized arthritis  No date: Hyperlipidemia  No date: Neuropathy  No date: Post-polio syndrome (HCC)  No date: Type 2 diabetes mellitus (HCC)  Past Surgical History:  No date: APPENDECTOMY  2000: CERVICAL DISCECTOMY  2006: FRACTURE SURGERY; Right      Comment:  Heel  02/2010: IR BRONCHOSCOPY  2014: LUMBAR LAMINECTOMY  No date: ORTHOPEDIC SURGERY; Left      Comment:  Arm multiple surgeries  No date: OTHER SURGICAL HISTORY      Comment:  Penile surgery post trazodone use  02/2010: [place] UNLISTED PROCEDURE ABDOMEN PERITONEUM & OMENTUM      Comment:  Feeding Tube- removed 2011 05/13/2010: THYROIDECTOMY  2007: TOTAL HIP ARTHROPLASTY; Left  07/22/2014: WRIST FRACTURE SURGERY; Right      The history is provided by the patient.           Nursing Notes were reviewed.    REVIEW OF SYSTEMS         Review of Systems   Constitutional:  Negative for activity change, chills, diaphoresis, fatigue and fever.   HENT:  Negative for congestion, sinus pressure, sinus pain and trouble swallowing.    Eyes:  Negative for pain, redness and visual disturbance.   Respiratory:  Negative for cough and shortness of breath.    Cardiovascular:  Negative for chest pain and palpitations.   Gastrointestinal:  Negative for abdominal distention, abdominal pain, nausea and vomiting.   Genitourinary:  Negative for difficulty urinating, frequency and urgency.   Musculoskeletal:  Negative for arthralgias, gait problem, neck pain

## 2025-03-30 NOTE — ED NOTES
Pt found with SPO2 of 47%.  RN started bagging pt.  Pt then woke up and non rebreather placed on patient.      MD Marshall at bedside.

## 2025-03-30 NOTE — ED PROVIDER NOTES
Called to room for altered mental status.  Patient was unresponsive to painful stimuli.  Pulse oximetry was in the 40s.  Opened airway, noted patient is DO NOT RESUSCITATE.  After several seconds did have spontaneous respirations.  Blood sugar was within normal limits.  Pulse oximetry went up to the 100s.  Patient awoke and stated he was fine and he just wanted to go home.  Advised midlevel provider who is taking care of the patient     America Marshall,   03/30/25 9748

## 2025-03-30 NOTE — DISCHARGE INSTRUCTIONS
HOSPITALIST DISCHARGE INSTRUCTIONS  NAME:  Jane Campbell   :  1945   MRN:  [id number]     Date/Time:  2025 11:03 AM    ADMIT DATE: 3/30/2025     DISCHARGE DATE: 2025     DISCHARGE DIAGNOSIS:  Confusion due to urinary tract infection    DISCHARGE INSTRUCTIONS:  Thank you for allowing us to participate in your care. Your discharging Hospitalist is Morales Mo MD. You were admitted for evaluation and treatment of the above.       MEDICATIONS:    It is important that you take the medication exactly as they are prescribed.   Keep your medication in the bottles provided by the pharmacist and keep a list of the medication names, dosages, and times to be taken in your wallet.   Do not take other medications without consulting your doctor.             If you experience any of the following symptoms then please call your primary care physician or return to the emergency room if you cannot get hold of your doctor:  Fever, chills, nausea, vomiting, diarrhea, change in mentation, falling, bleeding, shortness of breath    Follow Up:  Please call the below provider to arrange hospital follow up appointment      Primary Care Team  [facility] [address] [place] [id number] [phone]  Follow up  Patient receives PT and OT through [facility] as well as MD Luevano, MD Gabriel  [address] [facility] [address] [phone]    Follow up  Recommend follow up with Primary Care Physician within 7-10 days after discharge from the hospital for a post-hospitalization checkup.      For questions regarding your Hospitalization or to contact the Hospital Medicine team, please call [phone].      Information obtained by :  I understand that if any problems occur once I am at home I am to contact my physician.    I understand and acknowledge receipt of the instructions indicated above.

## 2025-03-30 NOTE — ED TRIAGE NOTES
Patient to ER via EMS for complaints of AMS starting at 1500 yesterday 03/29.     EMS reports nursing staff stated \"This is how he acts when he has a UTI\".     Baseline patient is A/O x 3 per EMS.     Patient denies any complaints in triage.     Hx of afib, COPD, polio.     Takes xarelto.     Opal NP in triage to assess patient.

## 2025-03-31 ENCOUNTER — APPOINTMENT (OUTPATIENT)
Facility: HOSPITAL | Age: 80
DRG: 689 | End: 2025-03-31
Payer: MEDICARE

## 2025-03-31 LAB
ANION GAP SERPL CALC-SCNC: 6 MMOL/L (ref 2–12)
BASOPHILS # BLD: 0.04 K/UL (ref 0–0.1)
BASOPHILS NFR BLD: 0.4 % (ref 0–1)
BUN SERPL-MCNC: 20 MG/DL (ref 6–20)
BUN/CREAT SERPL: 22 (ref 12–20)
CALCIUM SERPL-MCNC: 8.6 MG/DL (ref 8.5–10.1)
CHLORIDE SERPL-SCNC: 110 MMOL/L (ref 97–108)
CO2 SERPL-SCNC: 24 MMOL/L (ref 21–32)
CREAT SERPL-MCNC: 0.9 MG/DL (ref 0.7–1.3)
DIFFERENTIAL METHOD BLD: ABNORMAL
EOSINOPHIL # BLD: 0.55 K/UL (ref 0–0.4)
EOSINOPHIL NFR BLD: 5.3 % (ref 0–7)
ERYTHROCYTE [DISTWIDTH] IN BLOOD BY AUTOMATED COUNT: 13.7 % (ref 11.5–14.5)
EST. AVERAGE GLUCOSE BLD GHB EST-MCNC: 123 MG/DL
GLUCOSE BLD STRIP.AUTO-MCNC: 114 MG/DL (ref 65–117)
GLUCOSE BLD STRIP.AUTO-MCNC: 126 MG/DL (ref 65–117)
GLUCOSE BLD STRIP.AUTO-MCNC: 141 MG/DL (ref 65–117)
GLUCOSE BLD STRIP.AUTO-MCNC: 152 MG/DL (ref 65–117)
GLUCOSE SERPL-MCNC: 141 MG/DL (ref 65–100)
HBA1C MFR BLD: 5.9 % (ref 4–5.6)
HCT VFR BLD AUTO: 39.1 % (ref 36.6–50.3)
HGB BLD-MCNC: 12.1 G/DL (ref 12.1–17)
IMM GRANULOCYTES # BLD AUTO: 0.05 K/UL (ref 0–0.04)
IMM GRANULOCYTES NFR BLD AUTO: 0.5 % (ref 0–0.5)
LYMPHOCYTES # BLD: 1.89 K/UL (ref 0.8–3.5)
LYMPHOCYTES NFR BLD: 18.1 % (ref 12–49)
MCH RBC QN AUTO: 30.4 PG (ref 26–34)
MCHC RBC AUTO-ENTMCNC: 30.9 G/DL (ref 30–36.5)
MCV RBC AUTO: 98.2 FL (ref 80–99)
MONOCYTES # BLD: 0.86 K/UL (ref 0–1)
MONOCYTES NFR BLD: 8.2 % (ref 5–13)
NEUTS SEG # BLD: 7.08 K/UL (ref 1.8–8)
NEUTS SEG NFR BLD: 67.5 % (ref 32–75)
NRBC # BLD: 0 K/UL (ref 0–0.01)
NRBC BLD-RTO: 0 PER 100 WBC
PLATELET # BLD AUTO: 237 K/UL (ref 150–400)
PMV BLD AUTO: 10.4 FL (ref 8.9–12.9)
POTASSIUM SERPL-SCNC: 3.8 MMOL/L (ref 3.5–5.1)
RBC # BLD AUTO: 3.98 M/UL (ref 4.1–5.7)
SERVICE CMNT-IMP: ABNORMAL
SERVICE CMNT-IMP: NORMAL
SODIUM SERPL-SCNC: 140 MMOL/L (ref 136–145)
WBC # BLD AUTO: 10.5 K/UL (ref 4.1–11.1)

## 2025-03-31 PROCEDURE — 2060000000 HC ICU INTERMEDIATE R&B

## 2025-03-31 PROCEDURE — 80048 BASIC METABOLIC PNL TOTAL CA: CPT

## 2025-03-31 PROCEDURE — 95819 EEG AWAKE AND ASLEEP: CPT

## 2025-03-31 PROCEDURE — 82962 GLUCOSE BLOOD TEST: CPT

## 2025-03-31 PROCEDURE — 97161 PT EVAL LOW COMPLEX 20 MIN: CPT

## 2025-03-31 PROCEDURE — 6370000000 HC RX 637 (ALT 250 FOR IP): Performed by: STUDENT IN AN ORGANIZED HEALTH CARE EDUCATION/TRAINING PROGRAM

## 2025-03-31 PROCEDURE — 97165 OT EVAL LOW COMPLEX 30 MIN: CPT

## 2025-03-31 PROCEDURE — 92610 EVALUATE SWALLOWING FUNCTION: CPT

## 2025-03-31 PROCEDURE — 83036 HEMOGLOBIN GLYCOSYLATED A1C: CPT

## 2025-03-31 PROCEDURE — 95816 EEG AWAKE AND DROWSY: CPT | Performed by: PSYCHIATRY & NEUROLOGY

## 2025-03-31 PROCEDURE — 6360000002 HC RX W HCPCS: Performed by: STUDENT IN AN ORGANIZED HEALTH CARE EDUCATION/TRAINING PROGRAM

## 2025-03-31 PROCEDURE — 94761 N-INVAS EAR/PLS OXIMETRY MLT: CPT

## 2025-03-31 PROCEDURE — 99222 1ST HOSP IP/OBS MODERATE 55: CPT | Performed by: PSYCHIATRY & NEUROLOGY

## 2025-03-31 PROCEDURE — 97530 THERAPEUTIC ACTIVITIES: CPT

## 2025-03-31 PROCEDURE — 2500000003 HC RX 250 WO HCPCS: Performed by: STUDENT IN AN ORGANIZED HEALTH CARE EDUCATION/TRAINING PROGRAM

## 2025-03-31 PROCEDURE — 70551 MRI BRAIN STEM W/O DYE: CPT

## 2025-03-31 PROCEDURE — 95957 EEG DIGITAL ANALYSIS: CPT

## 2025-03-31 PROCEDURE — 85025 COMPLETE CBC W/AUTO DIFF WBC: CPT

## 2025-03-31 RX ADMIN — LEVOTHYROXINE SODIUM 50 MCG: 0.05 TABLET ORAL at 06:02

## 2025-03-31 RX ADMIN — SODIUM CHLORIDE, PRESERVATIVE FREE 10 ML: 5 INJECTION INTRAVENOUS at 21:47

## 2025-03-31 RX ADMIN — ATORVASTATIN CALCIUM 10 MG: 10 TABLET, FILM COATED ORAL at 08:53

## 2025-03-31 RX ADMIN — WATER 1000 MG: 1 INJECTION INTRAMUSCULAR; INTRAVENOUS; SUBCUTANEOUS at 08:53

## 2025-03-31 RX ADMIN — SODIUM CHLORIDE, PRESERVATIVE FREE 10 ML: 5 INJECTION INTRAVENOUS at 08:55

## 2025-03-31 RX ADMIN — METOPROLOL TARTRATE 25 MG: 25 TABLET, FILM COATED ORAL at 08:53

## 2025-03-31 RX ADMIN — DULOXETINE 30 MG: 30 CAPSULE, DELAYED RELEASE ORAL at 08:53

## 2025-03-31 RX ADMIN — RIVAROXABAN 10 MG: 10 TABLET, FILM COATED ORAL at 08:53

## 2025-03-31 RX ADMIN — MIRTAZAPINE 15 MG: 15 TABLET, FILM COATED ORAL at 21:43

## 2025-03-31 RX ADMIN — METOPROLOL TARTRATE 25 MG: 25 TABLET, FILM COATED ORAL at 21:43

## 2025-03-31 NOTE — CONSULTS
CONSULT - Neurology      Name:  Jane Campbell       MRN: [id number]  Location: B316/01    Date: 3/31/2025  Time:  7:48 AM        Chief Complaint:   Chief Complaint   Patient presents with    Altered Mental Status       HPI:  It is a great pleasure to see Jane Capmbell, a 80 y.o. male today in the hospital . Briefly these are the events happened as per the chart H&P and taken from the chart. The patient was doing fine and the symptoms started with confusion. He was not recognizing people. He has had this in the past when he had a UTI . The symptoms fluctuated in the beginning. The patient was brought to the emergency room for further evaluation.       PAST MEDICAL HISTORY:  Past Medical History:   Diagnosis Date    Anxiety and depression     BCC (basal cell carcinoma of skin)     Bronchitis, chronic (HCC)     Chronic obstructive pulmonary disease (HCC)     Chronic pain     Fibromyalgia     Generalized arthritis     Hyperlipidemia     Neuropathy     Post-polio syndrome (HCC)     Type 2 diabetes mellitus (HCC)      PAST SURGICAL HISTORY:    Past Surgical History:   Procedure Laterality Date    APPENDECTOMY      CERVICAL DISCECTOMY  2000    FRACTURE SURGERY Right     Heel    IR BRONCHOSCOPY  2010    LUMBAR LAMINECTOMY      ORTHOPEDIC SURGERY Left     Arm multiple surgeries    OTHER SURGICAL HISTORY      Penile surgery post trazodone use    [place] UNLISTED PROCEDURE ABDOMEN PERITONEUM & OMENTUM  2010    Feeding Tube- removed     THYROIDECTOMY  2010    TOTAL HIP ARTHROPLASTY Left     WRIST FRACTURE SURGERY Right 2014     FAMILY HISTORY:    Family History   Problem Relation Age of Onset    Lung Disease Father     Cancer Father     Osteoarthritis Mother      SOCIAL HISTORY:   Social History     Tobacco Use    Smoking status: Former     Current packs/day: 0.00     Types: Cigarettes     Quit date: 1970     Years since quittin.9    Smokeless tobacco: Never   Substance Use Topics

## 2025-03-31 NOTE — PROCEDURES
PROCEDURE NOTE  Date: 3/31/2025   Name: Jane Campbell  YOB: 1945    Procedures      [facility]     Electroencephalogram Report    Procedure ID: SFA  Procedure Date: 03/31/2025   Patient Name: Jane Campbell YOB: 1945   Procedure Type: Routine Medical Record No: [id number]     INDICATION: Altered mental status    STATE: Awake and drowsy .    DESCRIPTION OF PROCEDURE: Electrodes were applied in accordance with the international 10-20 system of electrode placement.  EEG was reviewed in both bipolar and referential montages.    Description of Activity:  During wakefulness, patient is able to mount 8 Hz symmetric posterior alpha rhythm but there is intermittent theta background slowing that occurs.     During drowsiness, there is attenuation of the alpha rhythm and low voltage theta activity occurs bilaterally.     Movements are seen with no seizure correlate.    No sharp or spike discharges, seizures or epileptiform discharges seen. No focal asymmetry.    Clinical Interpretation:  This EEG, performed during wakefulness and drowsiness is mildly abnormal. There is mild generalized slowing as seen in encephalopathies. There is no focal asymmetry, seizures or epileptiform discharges seen.       Medications:  Current Facility-Administered Medications   Medication Dose Route Frequency    cefTRIAXone (ROCEPHIN) 1,000 mg in sterile water 10 mL IV syringe  1,000 mg IntraVENous Q24H    sodium chloride flush 0.9 % injection 5-40 mL  5-40 mL IntraVENous 2 times per day    sodium chloride flush 0.9 % injection 5-40 mL  5-40 mL IntraVENous PRN    0.9 % sodium chloride infusion   IntraVENous PRN    potassium chloride (KLOR-CON) extended release tablet 40 mEq  40 mEq Oral PRN    Or    potassium bicarb-citric acid (EFFER-K) effervescent tablet 40 mEq  40 mEq Oral PRN    Or    potassium chloride 10 mEq/100 mL IVPB (Peripheral Line)  10 mEq IntraVENous PRN    magnesium

## 2025-03-31 NOTE — ED NOTES
TRANSFER - OUT REPORT:    Verbal report given to Elsy Campbell  being transferred to [facility] for routine progression of patient care       Report consisted of patient's Situation, Background, Assessment and   Recommendations(SBAR).     Information from the following report(s) Nurse Handoff Report, Index, ED Encounter Summary, ED SBAR, Intake/Output, MAR, Recent Results, Cardiac Rhythm sinus rhythm , Quality Measures, Neuro Assessment, and Event Log was reviewed with the receiving nurse.    [place] Fall Assessment:    Presents to emergency department  because of falls (Syncope, seizure, or loss of consciousness): Yes  Age > 70: Yes  Altered Mental Status, Intoxication with alcohol or substance confusion (Disorientation, impaired judgment, poor safety awaremess, or inability to follow instructions): Yes  Impaired Mobility: Ambulates or transfers with assistive devices or assistance; Unable to ambulate or transer.: Yes  Nursing Judgement: Yes          Lines:   Peripheral IV 03/30/25 Right Antecubital (Active)   Site Assessment Clean, dry & intact 03/30/25 0950   Line Status Blood return noted 03/30/25 0950   Line Care Cap changed 03/30/25 0950   Phlebitis Assessment No symptoms 03/30/25 0950   Infiltration Assessment 0 03/30/25 0950   Dressing Status New dressing applied;Clean, dry & intact 03/30/25 0950   Dressing Type Transparent 03/30/25 0950   Dressing Intervention New 03/30/25 0950       Peripheral IV 03/30/25 Distal;Right Forearm (Active)        Opportunity for questions and clarification was provided.      Patient transported with:  Registered Nurse

## 2025-03-31 NOTE — CARE COORDINATION
Care Management Initial Assessment  3/31/2025 10:04 AM  If patient is discharged prior to next notation, then this note serves as note for discharge by case management.    Reason for Admission:   Urinary tract infection without hematuria, site unspecified [N39.0]  Altered mental status, unspecified altered mental status type [R41.82]  Acute metabolic encephalopathy [G93.41]         Patient Admission Status: Inpatient  Date Admitted to INP: 3/30  RUR: Readmission Risk Score: 14.3    Hospitalization in the last 30 days (Readmission):  No        Advance Care Planning:  Code Status: DNR  Primary Healthcare Decision Maker: (P) Named in Scanned ACP Document  Primary Decision Maker: Aurelio Campbell - Carlos - [phone]   Advance Directive: DNI/DNR     __________________________________________________________________________  Assessment:      03/31/25 1002   Service Assessment   Patient Orientation Alert and Oriented   Cognition Alert   History Provided By Patient   Primary Caregiver Other (Comment)  (lives in [facility], per patient has assistance)   Support Systems Home Care Staff   Patient's Healthcare Decision Maker is: Named in Scanned ACP Document   Prior Functional Level Assistance with the following:;Bathing;Dressing;Cooking;Housework;Shopping;Mobility   Current Functional Level Assistance with the following:;Bathing;Dressing;Cooking;Housework;Shopping;Mobility   Social/Functional History   Lives With Home care staff   Type of Home Senior housing apartment   Ambulation Assistance Needs assistance  (per patient WC at baseline)   Discharge Planning   Patient expects to be discharged to: Independent living facility   Services At/After Discharge   Mode of Transport at Discharge Other (see comment)  (TBD)   Confirm Follow Up Transport Family       Comments: Per patient he resides at [facility], is assisted with ADL/IADLs, WC at baseline for mobility. CM called DIL and LM to assess PLOF, waiting on CB. Patient with low

## 2025-04-01 ENCOUNTER — APPOINTMENT (OUTPATIENT)
Facility: HOSPITAL | Age: 80
DRG: 689 | End: 2025-04-01
Payer: MEDICARE

## 2025-04-01 LAB
ANION GAP SERPL CALC-SCNC: 6 MMOL/L (ref 2–12)
BASOPHILS # BLD: 0.04 K/UL (ref 0–0.1)
BASOPHILS NFR BLD: 0.4 % (ref 0–1)
BUN SERPL-MCNC: 23 MG/DL (ref 6–20)
BUN/CREAT SERPL: 23 (ref 12–20)
CALCIUM SERPL-MCNC: 8.6 MG/DL (ref 8.5–10.1)
CHLORIDE SERPL-SCNC: 110 MMOL/L (ref 97–108)
CO2 SERPL-SCNC: 23 MMOL/L (ref 21–32)
CREAT SERPL-MCNC: 0.99 MG/DL (ref 0.7–1.3)
DIFFERENTIAL METHOD BLD: ABNORMAL
EOSINOPHIL # BLD: 0.65 K/UL (ref 0–0.4)
EOSINOPHIL NFR BLD: 6.6 % (ref 0–7)
ERYTHROCYTE [DISTWIDTH] IN BLOOD BY AUTOMATED COUNT: 13.8 % (ref 11.5–14.5)
GLUCOSE BLD STRIP.AUTO-MCNC: 120 MG/DL (ref 65–117)
GLUCOSE BLD STRIP.AUTO-MCNC: 139 MG/DL (ref 65–117)
GLUCOSE BLD STRIP.AUTO-MCNC: 140 MG/DL (ref 65–117)
GLUCOSE BLD STRIP.AUTO-MCNC: 147 MG/DL (ref 65–117)
GLUCOSE SERPL-MCNC: 119 MG/DL (ref 65–100)
HCT VFR BLD AUTO: 37.1 % (ref 36.6–50.3)
HGB BLD-MCNC: 11.7 G/DL (ref 12.1–17)
IMM GRANULOCYTES # BLD AUTO: 0.05 K/UL (ref 0–0.04)
IMM GRANULOCYTES NFR BLD AUTO: 0.5 % (ref 0–0.5)
LYMPHOCYTES # BLD: 2.02 K/UL (ref 0.8–3.5)
LYMPHOCYTES NFR BLD: 20.5 % (ref 12–49)
MCH RBC QN AUTO: 30.5 PG (ref 26–34)
MCHC RBC AUTO-ENTMCNC: 31.5 G/DL (ref 30–36.5)
MCV RBC AUTO: 96.9 FL (ref 80–99)
MONOCYTES # BLD: 0.79 K/UL (ref 0–1)
MONOCYTES NFR BLD: 8 % (ref 5–13)
NEUTS SEG # BLD: 6.31 K/UL (ref 1.8–8)
NEUTS SEG NFR BLD: 64 % (ref 32–75)
NRBC # BLD: 0 K/UL (ref 0–0.01)
NRBC BLD-RTO: 0 PER 100 WBC
PLATELET # BLD AUTO: 246 K/UL (ref 150–400)
PMV BLD AUTO: 10 FL (ref 8.9–12.9)
POTASSIUM SERPL-SCNC: 4 MMOL/L (ref 3.5–5.1)
RBC # BLD AUTO: 3.83 M/UL (ref 4.1–5.7)
SERVICE CMNT-IMP: ABNORMAL
SODIUM SERPL-SCNC: 139 MMOL/L (ref 136–145)
WBC # BLD AUTO: 9.9 K/UL (ref 4.1–11.1)

## 2025-04-01 PROCEDURE — 74018 RADEX ABDOMEN 1 VIEW: CPT

## 2025-04-01 PROCEDURE — 85025 COMPLETE CBC W/AUTO DIFF WBC: CPT

## 2025-04-01 PROCEDURE — 6370000000 HC RX 637 (ALT 250 FOR IP): Performed by: STUDENT IN AN ORGANIZED HEALTH CARE EDUCATION/TRAINING PROGRAM

## 2025-04-01 PROCEDURE — 80048 BASIC METABOLIC PNL TOTAL CA: CPT

## 2025-04-01 PROCEDURE — 6360000002 HC RX W HCPCS: Performed by: FAMILY MEDICINE

## 2025-04-01 PROCEDURE — 94761 N-INVAS EAR/PLS OXIMETRY MLT: CPT

## 2025-04-01 PROCEDURE — 2500000003 HC RX 250 WO HCPCS: Performed by: STUDENT IN AN ORGANIZED HEALTH CARE EDUCATION/TRAINING PROGRAM

## 2025-04-01 PROCEDURE — 2500000003 HC RX 250 WO HCPCS

## 2025-04-01 PROCEDURE — 6360000002 HC RX W HCPCS: Performed by: STUDENT IN AN ORGANIZED HEALTH CARE EDUCATION/TRAINING PROGRAM

## 2025-04-01 PROCEDURE — 82962 GLUCOSE BLOOD TEST: CPT

## 2025-04-01 PROCEDURE — 1100000000 HC RM PRIVATE

## 2025-04-01 PROCEDURE — 6370000000 HC RX 637 (ALT 250 FOR IP): Performed by: FAMILY MEDICINE

## 2025-04-01 RX ORDER — ZIPRASIDONE MESYLATE 20 MG/ML
10 INJECTION, POWDER, LYOPHILIZED, FOR SOLUTION INTRAMUSCULAR ONCE
Status: COMPLETED | OUTPATIENT
Start: 2025-04-01 | End: 2025-04-01

## 2025-04-01 RX ORDER — WATER 10 ML/10ML
INJECTION INTRAMUSCULAR; INTRAVENOUS; SUBCUTANEOUS
Status: COMPLETED
Start: 2025-04-01 | End: 2025-04-01

## 2025-04-01 RX ADMIN — MIRTAZAPINE 15 MG: 15 TABLET, FILM COATED ORAL at 21:21

## 2025-04-01 RX ADMIN — ATORVASTATIN CALCIUM 10 MG: 10 TABLET, FILM COATED ORAL at 10:08

## 2025-04-01 RX ADMIN — SODIUM CHLORIDE, PRESERVATIVE FREE 10 ML: 5 INJECTION INTRAVENOUS at 21:25

## 2025-04-01 RX ADMIN — DULOXETINE 30 MG: 30 CAPSULE, DELAYED RELEASE ORAL at 10:08

## 2025-04-01 RX ADMIN — OLANZAPINE 2.5 MG: 2.5 TABLET, FILM COATED ORAL at 21:20

## 2025-04-01 RX ADMIN — ZIPRASIDONE MESYLATE 10 MG: 20 INJECTION, POWDER, LYOPHILIZED, FOR SOLUTION INTRAMUSCULAR at 07:31

## 2025-04-01 RX ADMIN — WATER 10 ML: 1 INJECTION INTRAMUSCULAR; INTRAVENOUS; SUBCUTANEOUS at 07:34

## 2025-04-01 RX ADMIN — SODIUM CHLORIDE, PRESERVATIVE FREE 10 ML: 5 INJECTION INTRAVENOUS at 10:18

## 2025-04-01 RX ADMIN — METOPROLOL TARTRATE 25 MG: 25 TABLET, FILM COATED ORAL at 10:08

## 2025-04-01 RX ADMIN — LEVOTHYROXINE SODIUM 50 MCG: 0.05 TABLET ORAL at 05:57

## 2025-04-01 RX ADMIN — WATER 1000 MG: 1 INJECTION INTRAMUSCULAR; INTRAVENOUS; SUBCUTANEOUS at 10:09

## 2025-04-01 RX ADMIN — RIVAROXABAN 10 MG: 10 TABLET, FILM COATED ORAL at 09:29

## 2025-04-01 RX ADMIN — POLYETHYLENE GLYCOL 3350 17 G: 17 POWDER, FOR SOLUTION ORAL at 10:08

## 2025-04-01 ASSESSMENT — PAIN SCALES - GENERAL: PAINLEVEL_OUTOF10: 0

## 2025-04-01 NOTE — CASE COMMUNICATION
COMMUNICATION NOTE - Neurology Service    Name:  Jane Campbell     MRN: [id number]         Communication Note:   MRI brain - No acute intracranial abnormality.   EEG did not show active seizure activity  I explained in detail about the current condition.   Rest of the management per primary team.  Neurology will sign off.   Please do not hesitate to call with questions or concerns.  Please let us know if we can provide any additional information.

## 2025-04-01 NOTE — CARE COORDINATION
Care Management Progress Note    Reason for Admission:   Urinary tract infection without hematuria, site unspecified [N39.0]  Altered mental status, unspecified altered mental status type [R41.82]  Acute metabolic encephalopathy [G93.41]         Patient Admission Status: Inpatient  RUR:   Hospitalization in the last 30 days (Readmission):  No        Transition of care plan:  Medical - on going medical management, code [place] today, neuro following  DC plan - Recs for HH. CM  for DIL,  lives in ILF with PCG potential for services in house, waiting on CB  Discharge plan communicated with patient and/or discharge caregiver: Yes    Date 1st [facility] letter given:   Outpatient follow-up.  Transport at discharge:  stretcher

## 2025-04-02 VITALS
HEART RATE: 62 BPM | TEMPERATURE: 97.8 F | WEIGHT: 151.8 LBS | BODY MASS INDEX: 21.25 KG/M2 | DIASTOLIC BLOOD PRESSURE: 67 MMHG | HEIGHT: 71 IN | RESPIRATION RATE: 16 BRPM | SYSTOLIC BLOOD PRESSURE: 128 MMHG | OXYGEN SATURATION: 95 %

## 2025-04-02 LAB
ANION GAP SERPL CALC-SCNC: 8 MMOL/L (ref 2–12)
BACTERIA SPEC CULT: ABNORMAL
BASOPHILS # BLD: 0.03 K/UL (ref 0–0.1)
BASOPHILS NFR BLD: 0.3 % (ref 0–1)
BUN SERPL-MCNC: 21 MG/DL (ref 6–20)
BUN/CREAT SERPL: 25 (ref 12–20)
CALCIUM SERPL-MCNC: 8.8 MG/DL (ref 8.5–10.1)
CC UR VC: ABNORMAL
CHLORIDE SERPL-SCNC: 110 MMOL/L (ref 97–108)
CO2 SERPL-SCNC: 23 MMOL/L (ref 21–32)
CREAT SERPL-MCNC: 0.83 MG/DL (ref 0.7–1.3)
DIFFERENTIAL METHOD BLD: ABNORMAL
EOSINOPHIL # BLD: 0.56 K/UL (ref 0–0.4)
EOSINOPHIL NFR BLD: 6.3 % (ref 0–7)
ERYTHROCYTE [DISTWIDTH] IN BLOOD BY AUTOMATED COUNT: 13.6 % (ref 11.5–14.5)
GLUCOSE BLD STRIP.AUTO-MCNC: 101 MG/DL (ref 65–117)
GLUCOSE BLD STRIP.AUTO-MCNC: 124 MG/DL (ref 65–117)
GLUCOSE SERPL-MCNC: 87 MG/DL (ref 65–100)
HCT VFR BLD AUTO: 38.7 % (ref 36.6–50.3)
HGB BLD-MCNC: 12.2 G/DL (ref 12.1–17)
IMM GRANULOCYTES # BLD AUTO: 0.04 K/UL (ref 0–0.04)
IMM GRANULOCYTES NFR BLD AUTO: 0.4 % (ref 0–0.5)
LYMPHOCYTES # BLD: 2.02 K/UL (ref 0.8–3.5)
LYMPHOCYTES NFR BLD: 22.7 % (ref 12–49)
MCH RBC QN AUTO: 30.8 PG (ref 26–34)
MCHC RBC AUTO-ENTMCNC: 31.5 G/DL (ref 30–36.5)
MCV RBC AUTO: 97.7 FL (ref 80–99)
MONOCYTES # BLD: 0.78 K/UL (ref 0–1)
MONOCYTES NFR BLD: 8.8 % (ref 5–13)
NEUTS SEG # BLD: 5.47 K/UL (ref 1.8–8)
NEUTS SEG NFR BLD: 61.5 % (ref 32–75)
NRBC # BLD: 0 K/UL (ref 0–0.01)
NRBC BLD-RTO: 0 PER 100 WBC
PLATELET # BLD AUTO: 237 K/UL (ref 150–400)
PMV BLD AUTO: 10.1 FL (ref 8.9–12.9)
POTASSIUM SERPL-SCNC: 4.1 MMOL/L (ref 3.5–5.1)
RBC # BLD AUTO: 3.96 M/UL (ref 4.1–5.7)
SERVICE CMNT-IMP: ABNORMAL
SERVICE CMNT-IMP: ABNORMAL
SERVICE CMNT-IMP: NORMAL
SODIUM SERPL-SCNC: 141 MMOL/L (ref 136–145)
WBC # BLD AUTO: 8.9 K/UL (ref 4.1–11.1)

## 2025-04-02 PROCEDURE — 6370000000 HC RX 637 (ALT 250 FOR IP): Performed by: STUDENT IN AN ORGANIZED HEALTH CARE EDUCATION/TRAINING PROGRAM

## 2025-04-02 PROCEDURE — 80048 BASIC METABOLIC PNL TOTAL CA: CPT

## 2025-04-02 PROCEDURE — 82962 GLUCOSE BLOOD TEST: CPT

## 2025-04-02 PROCEDURE — 6360000002 HC RX W HCPCS: Performed by: STUDENT IN AN ORGANIZED HEALTH CARE EDUCATION/TRAINING PROGRAM

## 2025-04-02 PROCEDURE — 85025 COMPLETE CBC W/AUTO DIFF WBC: CPT

## 2025-04-02 PROCEDURE — 94761 N-INVAS EAR/PLS OXIMETRY MLT: CPT

## 2025-04-02 PROCEDURE — 2500000003 HC RX 250 WO HCPCS: Performed by: STUDENT IN AN ORGANIZED HEALTH CARE EDUCATION/TRAINING PROGRAM

## 2025-04-02 RX ORDER — DULOXETIN HYDROCHLORIDE 30 MG/1
30 CAPSULE, DELAYED RELEASE ORAL DAILY
Qty: 30 CAPSULE | Refills: 0 | Status: SHIPPED
Start: 2025-04-02

## 2025-04-02 RX ORDER — MIRTAZAPINE 15 MG/1
15 TABLET, ORALLY DISINTEGRATING ORAL NIGHTLY
Qty: 21 TABLET | Refills: 0 | Status: SHIPPED
Start: 2025-04-02 | End: 2025-04-23

## 2025-04-02 RX ADMIN — ATORVASTATIN CALCIUM 10 MG: 10 TABLET, FILM COATED ORAL at 11:10

## 2025-04-02 RX ADMIN — RIVAROXABAN 10 MG: 10 TABLET, FILM COATED ORAL at 11:30

## 2025-04-02 RX ADMIN — WATER 1000 MG: 1 INJECTION INTRAMUSCULAR; INTRAVENOUS; SUBCUTANEOUS at 11:11

## 2025-04-02 RX ADMIN — DULOXETINE 30 MG: 30 CAPSULE, DELAYED RELEASE ORAL at 11:10

## 2025-04-02 RX ADMIN — SODIUM CHLORIDE, PRESERVATIVE FREE 10 ML: 5 INJECTION INTRAVENOUS at 11:12

## 2025-04-02 RX ADMIN — METOPROLOL TARTRATE 25 MG: 25 TABLET, FILM COATED ORAL at 11:11

## 2025-04-02 ASSESSMENT — PAIN SCALES - GENERAL
PAINLEVEL_OUTOF10: 0

## 2025-04-02 NOTE — PLAN OF CARE
Problem: Chronic Conditions and Co-morbidities  Goal: Patient's chronic conditions and co-morbidity symptoms are monitored and maintained or improved  Outcome: Progressing  Flowsheets (Taken 4/1/2025 1926 by Lashay Conde, RN)  Care Plan - Patient's Chronic Conditions and Co-Morbidity Symptoms are Monitored and Maintained or Improved: Monitor and assess patient's chronic conditions and comorbid symptoms for stability, deterioration, or improvement     Problem: Discharge Planning  Goal: Discharge to home or other facility with appropriate resources  Outcome: Progressing  Flowsheets (Taken 4/1/2025 1926 by Lashay Conde, RN)  Discharge to home or other facility with appropriate resources:   Identify barriers to discharge with patient and caregiver   Identify discharge learning needs (meds, wound care, etc)     Problem: Skin/Tissue Integrity  Goal: Skin integrity remains intact  Description: 1.  Monitor for areas of redness and/or skin breakdown  2.  Assess vascular access sites hourly  3.  Every 4-6 hours minimum:  Change oxygen saturation probe site  4.  Every 4-6 hours:  If on nasal continuous positive airway pressure, respiratory therapy assess nares and determine need for appliance change or resting period  Outcome: Progressing  Flowsheets (Taken 4/1/2025 1926 by Lashay Conde, RN)  Skin Integrity Remains Intact: Monitor for areas of redness and/or skin breakdown     Problem: Safety - Adult  Goal: Free from fall injury  Outcome: Progressing     
  Problem: Occupational Therapy - Adult  Goal: By Discharge: Performs self-care activities at highest level of function for planned discharge setting.  See evaluation for individualized goals.  Description: FUNCTIONAL STATUS PRIOR TO ADMISSION:  Pt is a fair historian. Per chart review he requires assistance for ADLs and all transfers, is wheelchair-bound at baseline. He has baseline L side impairments due to history of Polio, but is able to assist with some tasks.     HOME SUPPORT: Patient lived at [facility] and had private caregivers to assist with all ADLs and mobility.    Occupational Therapy Goals:  Initiated 3/31/2025  1.  Patient will perform self-feeding, in supported sitting, with Set-up within 7 day(s).  2.  Patient will perform grooming, in supported sitting, with Set-up and Supervision within 7 day(s).  3.  Patient will perform upper body dressing with Minimal Assist within 7 day(s).  4.  Patient will perform rolling in bed to assist with all aspects of toileting with Moderate Assist within 7 day(s).  5.  Patient will participate in upper extremity therapeutic exercise/activities with Minimal Assist for 10 minutes within 7 day(s).    Outcome: Progressing     OCCUPATIONAL THERAPY EVALUATION    Patient: Jane Campbell (80 y.o. male)  Date: 3/31/2025  Primary Diagnosis: Urinary tract infection without hematuria, site unspecified [N39.0]  Altered mental status, unspecified altered mental status type [R41.82]  Acute metabolic encephalopathy [G93.41]         Precautions:                    ASSESSMENT :  The patient is limited by decreased functional mobility, independence in ADLs, strength, activity tolerance, endurance, coordination, balance, posture on day one of hospital admission for AMS in setting of UTI. Pt with baseline L side deficits in UE/LE due to h/o Polio and per discussion with CM requires significant assistance for ADLs from private caregivers. He is severely [place].    Based on the 
Rehabil Res Clin Transl. 2022;4(3):524883. doi: 10.1016/j.arrct..277208. PMID: 94741865; PMCID: ZHE0090565.  4. Uvaldo CHAUHAN, Any S, Kamilla W, Krista PUENTE. AM-PAC Short Forms Manual 4.0. Revised 2020.                                                                                                                                                                                                                              Pain Ratin/10   Pain Intervention(s):   nursing notified and addressing    Activity Tolerance:   Good    After treatment:   Patient left in no apparent distress in bed, Call bell within reach, Bed/ chair alarm activated, Side rails x3, and Heels elevated for pressure relief    COMMUNICATION/EDUCATION:   The patient's plan of care was discussed with: occupational therapist, registered nurse, and     Patient Education  Education Given To: Patient  Education Provided: Role of Therapy;Mobility Training;Plan of Care;Energy Conservation;Fall Prevention Strategies;Home Exercise Program;IADL Safety;Precautions;Orientation;ADL Adaptive Strategies;Transfer Training;Equipment  Education Method: Verbal;Demonstration  Barriers to Learning: Hearing  Education Outcome: Verbalized understanding;Continued education needed    Thank you for this referral.  ANA GLOVER, PT,[facility].  Minutes: 31      Physical Therapy Evaluation Charge Determination   History Examination Presentation Decision-Making   MEDIUM  Complexity : 1-2 comorbidities / personal factors will impact the outcome/ POC  MEDIUM Complexity : 3 Standardized tests and measures addressin body structure, function, activity limitation and / or participation in recreation  MEDIUM Complexity : Evolving with changing characteristics  AM-PAC  MEDIUM   Based on the above components, the patient evaluation is determined to be of the following complexity level: Medium

## 2025-04-02 NOTE — PROGRESS NOTES
[facility]                                                                                          Hospitalist Progress Note  Morales Mo MD  Office Phone: [phone]        Date of Service:  3/31/2025  NAME:  Jane Campbell  :  1945  MRN:  [id number]       Interval history / Subjective:   No issues overnight, no acute concerns today.      Assessment & Plan:     Jane is a 80 y.o. male with PMHx T2DM, HLD, HFrEF (25%), anxiety/depression, polio, hypothyroidism who is presenting for AMS.      Acute metabolic encephalopathy   Unresponsive Episode with hypoxia   - Electrolytes wnl. No anemia. CT head negative. UA with evidence of infection. Episode of unresponsiveness and hypoxia (40%) in the ED with no arrhythmia - resolved on its own & currently on RA  - Suspect due to UTI.   - Episode of non-responsiveness - normal tele, associated hypoxia, no-postictal state.  - Trop and lactate normal   - EEG pending  - Brain MRI negative for acute findings  - Neurology following  - Blood cultures pending   - Neuro checks   - PT/OT/SLP  - SLP:  Easy to chew and thin liquids, cleared  - Aspiration, seizure, and fall precautions   - Telemetry monitoring      UTI   Leukocytosis   - UA with nitrites, LE, and bacteria concerning for infection  - IV rocephin   - Ur Cx: >329502 cfu coag negative staph     Chronic HFrEF   Atrial Fibrillation   - Last EF 25% in    - Continue xarelto & metoprolol   - Did not tolerate entresto/further GDMT in the past due to hypotension      Type 2 DM  - Last A1c on 24 of 6.5. Repeat 5.9% 3/30  - Hold home medications metformin   - BG goal < 140 fasting, < 180 postprandial  - Correctional insulin, glucose monitoring, Hypoglycemic protocol      HLD   - Continue statin      Anxiety and Depression   History of polio  - Continue cymbalta & remeron  - Hold zyprexa for now in setting of AMS, resume as indicated      Hypothyroidism   - TSH 
        [facility]                                                                                          Hospitalist Progress Note  Morales Mo MD  Office Phone: [phone]        Date of Service:  2025  NAME:  Jane Campbell  :  1945  MRN:  [id number]       Interval history / Subjective:   Patient agitated this morning, code atlas called. Received dose of geodon and has been calm since that time. Good PO intake and taking meds.      Assessment & Plan:     Jane is a 80 y.o. male with PMHx T2DM, HLD, HFrEF (25%), anxiety/depression, polio, hypothyroidism who is presenting for AMS.      Acute metabolic encephalopathy   Unresponsive Episode with hypoxia   - Electrolytes wnl. No anemia. CT head negative. UA with evidence of infection. Episode of unresponsiveness and hypoxia (40%) in the ED with no arrhythmia - resolved on its own & currently on RA  - Suspect due to UTI.   - Episode of non-responsiveness - normal tele, associated hypoxia, no-postictal state.  - Trop and lactate normal   - EEG negative for seizure activity   - Brain MRI negative for acute findings  - Neurology signed off   - Blood cultures NGTD  - Neuro checks   - PT/OT/SLP  - SLP:  Easy to chew and thin liquids, cleared  - Aspiration, seizure, and fall precautions   - Telemetry monitoring      UTI   Leukocytosis   - UA with nitrites, LE, and bacteria concerning for infection  - IV rocephin   - Ur Cx: >892876 cfu coag negative staph     Chronic HFrEF   Atrial Fibrillation   - Last EF 25% in    - Continue xarelto & metoprolol   - Did not tolerate entresto/further GDMT in the past due to hypotension      Type 2 DM  - Last A1c on 24 of 6.5. Repeat 5.9% 3/30  - Hold home medications metformin   - BG goal < 140 fasting, < 180 postprandial  - Correctional insulin, glucose monitoring, Hypoglycemic protocol      HLD   - Continue statin      Anxiety and Depression   History of polio  - 
  Physician Progress Note      PATIENT:               YADIRA TUCKER  CSN #:                  [id number]  :                       1945  ADMIT DATE:       3/30/2025 9:33 AM  DISCH DATE:  RESPONDING  PROVIDER #:        Morales Mo MD          QUERY TEXT:    The patient admitted for UTI. Urine Cultures are now available as of   2025/.  Based on your medical judgment, please clarify these findings and document if   any of the following are being evaluated and/or treated:    The patient?s clinical indicators include:  -80 yr old male AMS, thought to be due to UTI.  - UA +4 Bacteria + Nitrates  -/ Urine culture final result +Staph species, coagulase negative  -IV Rocephin  Options provided:  -- UTI is due to Gram negative Staph Bacteria.  -- Final urine culture on 2025- is not clinically significant  -- Other - I will add my own diagnosis  -- Disagree - Not applicable / Not valid  -- Disagree - Clinically unable to determine / Unknown  -- Refer to Clinical Documentation Reviewer    PROVIDER RESPONSE TEXT:    UTI due to coagulase negative staph bacteria    Query created by: Opal Rodrigues on 2025 11:09 AM      QUERY TEXT:    The patient has known atrial fibrillation and is on Xarelto, which has been   continued this admission.  Based on your medical judgment, please clarify these findings and document if   any of the following are being evaluated and/or treated:    The patient?s clinical indicators include:  -80 yr old male- CHF, DM, Known Atrial fibrillation  -Continue Xarelto this admission.  Options provided:  -- Secondary hypercoagulable state in a patient with atrial fibrillation  -- Other - I will add my own diagnosis  -- Disagree - Not applicable / Not valid  -- Disagree - Clinically unable to determine / Unknown  -- Refer to Clinical Documentation Reviewer    PROVIDER RESPONSE TEXT:    This patient has secondary hypercoagulable state in a patient with atrial   fibrillation.    Query 
2200: Patient transfer to 536. Report given to Shara RN (oncoming nurse) by Kristin/Lashay RN (offgoing nurse). Report included the following information Nurse Handoff Report, Adult Overview, Intake/Output, MAR, Recent Results, and Cardiac Rhythm SB-NSR .     
4/1/25 Sent message to Dr Mo the patient is severly agitated we need something for him started about 10 min ago we have called a cod atlas.  Dr Mo gave order for Geodon.  4/1/25 at 0730 advised that the patient is presenting as if he may be constipated asked if we could have a KUB.  KUB was ordered.    4/1/25 at 1149Just FYI the patient has awaken and we have had issues with him masturbating called security, but he was sleep when they came up, he states that he is better and is eating and taking medications. going from one extreme to another with him today  
Code atlas called at 0700. Patient became extremely agitated, screaming, yelling, and attempting to get out of bed,kicking legs. Inconsolable. RRT RN, Nursing Sup, and security at bedside. Notified MD, waiting orders.  
Physical/Occupational therapy:    Chart reviewed and spoke with RN in prep for therapy session. Pt with agitation early this morning with a code atlas called. Pt received sedation medication and not appropriate for skilled intervention. Will defer.    Caryl Aguirre, PT, DPT  
understanding as evidenced by Verbalizing understanding.     The patient's plan of care including recommendations, planned interventions, and recommended diet changes were discussed with: Registered nurse    Patient/family have participated as able in goal setting and plan of care and Patient/family agree to work toward stated goals and plan of care    Thank you,  Hilda Woodson, SLP    SLP Individual Minutes  Time In: 1441  Time Out: 1452  Minutes: 11

## 2025-04-02 NOTE — DISCHARGE SUMMARY
Hospitalist Discharge Summary     Patient ID:  Jane Campbell  [id number]  80 y.o.  1945    Admit date: 3/30/2025    Discharge date and time: 4/2/2025    Admission Diagnoses: Urinary tract infection without hematuria, site unspecified [N39.0]  Altered mental status, unspecified altered mental status type [R41.82]  Acute metabolic encephalopathy [G93.41]    Discharge Diagnoses:    Principal Problem:    Acute metabolic encephalopathy      Hospital Course:   Admit HPI:     Jane is a 80 y.o. male with PMHx T2DM, HLD, HFrEF (25%), anxiety/depression, hypothyroidism, polio who is presenting for AMS.      Patient lives in independent living at [facility]. Caregivers called the family this morning that he was having \"episodes\" of screaming & confusion. He was not recognizing people. He has had this in the past when he had a UTI or when he gets moved to a new location.      While in the ED, he had an episode where he started screaming.  He then would not arouse.  This happened again patient was screaming, the nurse walked by the room and saw that his oxygen saturation was 40 to 50%.  She tried to sternal rub him but he was unresponsive.  She placed him on nonrebreather and O2 came up to 80%.  She then began bagging him and O2 came back to normal.  Patient woke and is on room air.  No arrhythmia or abnormal telemetry findings during this time.     Patient with no complaints.     Electrolytes wnl. No anemia. CT head negative. UA with evidence of infection.    Hospital Course:     Jane is a 80 y.o. male with PMHx T2DM, HLD, HFrEF (25%), anxiety/depression, polio, hypothyroidism who is presenting for AMS.      Acute metabolic encephalopathy - likely secondary to UTI, now resolved  Unresponsive Episode with hypoxia   - Electrolytes wnl. No anemia. CT head negative. UA with evidence of infection. Episode of unresponsiveness and hypoxia (40%) in the ED with no arrhythmia - resolved on its own & currently on

## 2025-04-02 NOTE — CARE COORDINATION
Care Management Progress Note    Reason for Admission:   Urinary tract infection without hematuria, site unspecified [N39.0]  Altered mental status, unspecified altered mental status type [R41.82]  Acute metabolic encephalopathy [G93.41]         Patient Admission Status: Inpatient  RUR:  15%  Hospitalization in the last 30 days (Readmission):  No        Pt transferred from [facility] to the 5th floor.  EMR reviewed and handoff received from previous  (Mally).    Reportedly, pt is a resident at [facility].      Transition Plan of Care:  Neurology following for medical management   Plan is for pt to return home with private caregiver support.  Per DIL, the caregiver is aware of pt's hospital discharge today  PT/OT evals complete; pt was max assist and w/c dependent so home health was recommended.  Pt receives PT and OT thru [facility].  Order was written and will accompany pt   All new Rxs will need to be sent to [facility] pharmacy  Outpatient follow up  Ambulance has been arranged for 3 p.m. DIL is aware.    No further discharge needs indicated.    Marleny

## 2025-04-05 LAB
BACTERIA SPEC CULT: NORMAL
SERVICE CMNT-IMP: NORMAL

## 2025-04-15 ENCOUNTER — APPOINTMENT (OUTPATIENT)
Facility: HOSPITAL | Age: 80
DRG: 091 | End: 2025-04-15
Payer: MEDICARE

## 2025-04-15 ENCOUNTER — HOSPITAL ENCOUNTER (INPATIENT)
Facility: HOSPITAL | Age: 80
LOS: 9 days | Discharge: HOME | DRG: 091 | End: 2025-04-24
Attending: EMERGENCY MEDICINE | Admitting: FAMILY MEDICINE
Payer: MEDICARE

## 2025-04-15 DIAGNOSIS — M48.02 CERVICAL SPINAL STENOSIS: ICD-10-CM

## 2025-04-15 DIAGNOSIS — G95.9 CERVICAL MYELOPATHY (HCC): ICD-10-CM

## 2025-04-15 DIAGNOSIS — R74.8 ELEVATED CK: ICD-10-CM
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
## 2025-04-15 DIAGNOSIS — R62.7 FAILURE TO THRIVE IN ADULT: ICD-10-CM

## 2025-04-15 DIAGNOSIS — G93.41 ACUTE METABOLIC ENCEPHALOPATHY: ICD-10-CM

## 2025-04-15 DIAGNOSIS — R50.9 FEVER, UNSPECIFIED FEVER CAUSE: ICD-10-CM

## 2025-04-15 DIAGNOSIS — R29.6 FREQUENT FALLS: ICD-10-CM

## 2025-04-15 DIAGNOSIS — R55 SYNCOPE AND COLLAPSE: ICD-10-CM

## 2025-04-15 LAB
ALBUMIN SERPL-MCNC: 3.2 G/DL (ref 3.5–5)
ALBUMIN/GLOB SERPL: 0.8 (ref 1.1–2.2)
ALP SERPL-CCNC: 100 U/L (ref 45–117)
ALT SERPL-CCNC: 31 U/L (ref 12–78)
ANION GAP SERPL CALC-SCNC: 7 MMOL/L (ref 2–12)
APPEARANCE UR: CLEAR
AST SERPL-CCNC: 32 U/L (ref 15–37)
BACTERIA URNS QL MICRO: NEGATIVE /HPF
BASOPHILS # BLD: 0.02 K/UL (ref 0–0.1)
BASOPHILS NFR BLD: 0.2 % (ref 0–1)
BILIRUB SERPL-MCNC: 0.5 MG/DL (ref 0.2–1)
BILIRUB UR QL: NEGATIVE
BUN SERPL-MCNC: 22 MG/DL (ref 6–20)
BUN/CREAT SERPL: 22 (ref 12–20)
CALCIUM SERPL-MCNC: 9.2 MG/DL (ref 8.5–10.1)
CHLORIDE SERPL-SCNC: 107 MMOL/L (ref 97–108)
CK SERPL-CCNC: 457 U/L (ref 39–308)
CO2 SERPL-SCNC: 25 MMOL/L (ref 21–32)
COLOR UR: ABNORMAL
COMMENT:: NORMAL
CREAT SERPL-MCNC: 0.99 MG/DL (ref 0.7–1.3)
DIFFERENTIAL METHOD BLD: ABNORMAL
EKG ATRIAL RATE: 67 BPM
EKG DIAGNOSIS: NORMAL
EKG P AXIS: 45 DEGREES
EKG P-R INTERVAL: 166 MS
EKG Q-T INTERVAL: 428 MS
EKG QRS DURATION: 122 MS
EKG QTC CALCULATION (BAZETT): 452 MS
EKG R AXIS: -55 DEGREES
EKG T AXIS: 92 DEGREES
EKG VENTRICULAR RATE: 67 BPM
EOSINOPHIL # BLD: 0.29 K/UL (ref 0–0.4)
EOSINOPHIL NFR BLD: 3.5 % (ref 0–7)
EPITH CASTS URNS QL MICRO: ABNORMAL /LPF
ERYTHROCYTE [DISTWIDTH] IN BLOOD BY AUTOMATED COUNT: 13.4 % (ref 11.5–14.5)
GLOBULIN SER CALC-MCNC: 3.8 G/DL (ref 2–4)
GLUCOSE SERPL-MCNC: 104 MG/DL (ref 65–100)
GLUCOSE UR STRIP.AUTO-MCNC: NEGATIVE MG/DL
HCT VFR BLD AUTO: 37.9 % (ref 36.6–50.3)
HGB BLD-MCNC: 11.9 G/DL (ref 12.1–17)
HGB UR QL STRIP: NEGATIVE
HYALINE CASTS URNS QL MICRO: ABNORMAL /LPF (ref 0–2)
IMM GRANULOCYTES # BLD AUTO: 0.03 K/UL (ref 0–0.04)
IMM GRANULOCYTES NFR BLD AUTO: 0.4 % (ref 0–0.5)
KETONES UR QL STRIP.AUTO: ABNORMAL MG/DL
LACTATE BLD-SCNC: 1.42 MMOL/L (ref 0.4–2)
LEUKOCYTE ESTERASE UR QL STRIP.AUTO: NEGATIVE
LYMPHOCYTES # BLD: 1.67 K/UL (ref 0.8–3.5)
LYMPHOCYTES NFR BLD: 20 % (ref 12–49)
MAGNESIUM SERPL-MCNC: 1.8 MG/DL (ref 1.6–2.4)
MCH RBC QN AUTO: 30.3 PG (ref 26–34)
MCHC RBC AUTO-ENTMCNC: 31.4 G/DL (ref 30–36.5)
MCV RBC AUTO: 96.4 FL (ref 80–99)
MONOCYTES # BLD: 0.71 K/UL (ref 0–1)
MONOCYTES NFR BLD: 8.5 % (ref 5–13)
NEUTS SEG # BLD: 5.61 K/UL (ref 1.8–8)
NEUTS SEG NFR BLD: 67.4 % (ref 32–75)
NITRITE UR QL STRIP.AUTO: NEGATIVE
NRBC # BLD: 0 K/UL (ref 0–0.01)
NRBC BLD-RTO: 0 PER 100 WBC
PH UR STRIP: 5.5 (ref 5–8)
PLATELET # BLD AUTO: 246 K/UL (ref 150–400)
PMV BLD AUTO: 10.6 FL (ref 8.9–12.9)
POTASSIUM SERPL-SCNC: 4.1 MMOL/L (ref 3.5–5.1)
PROT SERPL-MCNC: 7 G/DL (ref 6.4–8.2)
PROT UR STRIP-MCNC: NEGATIVE MG/DL
RBC # BLD AUTO: 3.93 M/UL (ref 4.1–5.7)
RBC #/AREA URNS HPF: ABNORMAL /HPF (ref 0–5)
SODIUM SERPL-SCNC: 139 MMOL/L (ref 136–145)
SP GR UR REFRACTOMETRY: 1.02 (ref 1–1.03)
SPECIMEN HOLD: NORMAL
TROPONIN I SERPL HS-MCNC: 38 NG/L (ref 0–76)
URINE CULTURE IF INDICATED: ABNORMAL
UROBILINOGEN UR QL STRIP.AUTO: 0.2 EU/DL (ref 0.2–1)
WBC # BLD AUTO: 8.3 K/UL (ref 4.1–11.1)
WBC URNS QL MICRO: ABNORMAL /HPF (ref 0–4)

## 2025-04-15 PROCEDURE — 70450 CT HEAD/BRAIN W/O DYE: CPT

## 2025-04-15 PROCEDURE — 85025 COMPLETE CBC W/AUTO DIFF WBC: CPT

## 2025-04-15 PROCEDURE — 94640 AIRWAY INHALATION TREATMENT: CPT

## 2025-04-15 PROCEDURE — 80053 COMPREHEN METABOLIC PANEL: CPT

## 2025-04-15 PROCEDURE — 93010 ELECTROCARDIOGRAM REPORT: CPT | Performed by: SPECIALIST

## 2025-04-15 PROCEDURE — 1100000000 HC RM PRIVATE

## 2025-04-15 PROCEDURE — 2580000003 HC RX 258: Performed by: EMERGENCY MEDICINE

## 2025-04-15 PROCEDURE — 6360000002 HC RX W HCPCS: Performed by: NURSE PRACTITIONER

## 2025-04-15 PROCEDURE — 83605 ASSAY OF LACTIC ACID: CPT

## 2025-04-15 PROCEDURE — 81001 URINALYSIS AUTO W/SCOPE: CPT

## 2025-04-15 PROCEDURE — 2500000003 HC RX 250 WO HCPCS: Performed by: NURSE PRACTITIONER

## 2025-04-15 PROCEDURE — 36415 COLL VENOUS BLD VENIPUNCTURE: CPT

## 2025-04-15 PROCEDURE — 82550 ASSAY OF CK (CPK): CPT

## 2025-04-15 PROCEDURE — 2500000003 HC RX 250 WO HCPCS: Performed by: FAMILY MEDICINE

## 2025-04-15 PROCEDURE — 6360000002 HC RX W HCPCS: Performed by: FAMILY MEDICINE

## 2025-04-15 PROCEDURE — 6370000000 HC RX 637 (ALT 250 FOR IP): Performed by: FAMILY MEDICINE

## 2025-04-15 PROCEDURE — 99285 EMERGENCY DEPT VISIT HI MDM: CPT

## 2025-04-15 PROCEDURE — 2580000003 HC RX 258: Performed by: FAMILY MEDICINE

## 2025-04-15 PROCEDURE — 83735 ASSAY OF MAGNESIUM: CPT

## 2025-04-15 PROCEDURE — 93005 ELECTROCARDIOGRAM TRACING: CPT | Performed by: EMERGENCY MEDICINE

## 2025-04-15 PROCEDURE — 84484 ASSAY OF TROPONIN QUANT: CPT

## 2025-04-15 RX ORDER — SODIUM CHLORIDE 0.9 % (FLUSH) 0.9 %
5-40 SYRINGE (ML) INJECTION PRN
Status: DISCONTINUED | OUTPATIENT
Start: 2025-04-15 | End: 2025-04-24 | Stop reason: HOSPADM

## 2025-04-15 RX ORDER — SODIUM CHLORIDE 0.9 % (FLUSH) 0.9 %
5-40 SYRINGE (ML) INJECTION EVERY 12 HOURS SCHEDULED
Status: DISCONTINUED | OUTPATIENT
Start: 2025-04-15 | End: 2025-04-24 | Stop reason: HOSPADM

## 2025-04-15 RX ORDER — MAGNESIUM SULFATE IN WATER 40 MG/ML
2000 INJECTION, SOLUTION INTRAVENOUS PRN
Status: DISCONTINUED | OUTPATIENT
Start: 2025-04-15 | End: 2025-04-24 | Stop reason: HOSPADM

## 2025-04-15 RX ORDER — POTASSIUM CHLORIDE 7.45 MG/ML
10 INJECTION INTRAVENOUS PRN
Status: DISCONTINUED | OUTPATIENT
Start: 2025-04-15 | End: 2025-04-24 | Stop reason: HOSPADM

## 2025-04-15 RX ORDER — IPRATROPIUM BROMIDE AND ALBUTEROL SULFATE 2.5; .5 MG/3ML; MG/3ML
1 SOLUTION RESPIRATORY (INHALATION) EVERY 4 HOURS PRN
Status: DISCONTINUED | OUTPATIENT
Start: 2025-04-15 | End: 2025-04-24 | Stop reason: HOSPADM

## 2025-04-15 RX ORDER — ONDANSETRON 4 MG/1
4 TABLET, ORALLY DISINTEGRATING ORAL EVERY 8 HOURS PRN
Status: DISCONTINUED | OUTPATIENT
Start: 2025-04-15 | End: 2025-04-24 | Stop reason: HOSPADM

## 2025-04-15 RX ORDER — ONDANSETRON 2 MG/ML
4 INJECTION INTRAMUSCULAR; INTRAVENOUS EVERY 6 HOURS PRN
Status: DISCONTINUED | OUTPATIENT
Start: 2025-04-15 | End: 2025-04-24 | Stop reason: HOSPADM

## 2025-04-15 RX ORDER — LEVOTHYROXINE SODIUM 50 UG/1
50 TABLET ORAL
Status: DISCONTINUED | OUTPATIENT
Start: 2025-04-16 | End: 2025-04-24 | Stop reason: HOSPADM

## 2025-04-15 RX ORDER — MIRTAZAPINE 15 MG/1
15 TABLET, FILM COATED ORAL NIGHTLY
Status: DISCONTINUED | OUTPATIENT
Start: 2025-04-15 | End: 2025-04-24 | Stop reason: HOSPADM

## 2025-04-15 RX ORDER — BUDESONIDE 0.5 MG/2ML
0.25 INHALANT ORAL 2 TIMES DAILY
Status: DISCONTINUED | OUTPATIENT
Start: 2025-04-15 | End: 2025-04-17

## 2025-04-15 RX ORDER — POTASSIUM CHLORIDE 750 MG/1
40 TABLET, EXTENDED RELEASE ORAL PRN
Status: DISCONTINUED | OUTPATIENT
Start: 2025-04-15 | End: 2025-04-24 | Stop reason: HOSPADM

## 2025-04-15 RX ORDER — SODIUM CHLORIDE 9 MG/ML
INJECTION, SOLUTION INTRAVENOUS PRN
Status: DISCONTINUED | OUTPATIENT
Start: 2025-04-15 | End: 2025-04-24 | Stop reason: HOSPADM

## 2025-04-15 RX ORDER — ACETAMINOPHEN 650 MG/1
650 SUPPOSITORY RECTAL EVERY 6 HOURS PRN
Status: DISCONTINUED | OUTPATIENT
Start: 2025-04-15 | End: 2025-04-24 | Stop reason: HOSPADM

## 2025-04-15 RX ORDER — DULOXETIN HYDROCHLORIDE 30 MG/1
30 CAPSULE, DELAYED RELEASE ORAL DAILY
Status: DISCONTINUED | OUTPATIENT
Start: 2025-04-15 | End: 2025-04-24 | Stop reason: HOSPADM

## 2025-04-15 RX ORDER — ARFORMOTEROL TARTRATE 15 UG/2ML
15 SOLUTION RESPIRATORY (INHALATION)
Status: DISCONTINUED | OUTPATIENT
Start: 2025-04-15 | End: 2025-04-15

## 2025-04-15 RX ORDER — ARFORMOTEROL TARTRATE 15 UG/2ML
15 SOLUTION RESPIRATORY (INHALATION)
Status: DISCONTINUED | OUTPATIENT
Start: 2025-04-15 | End: 2025-04-17

## 2025-04-15 RX ORDER — 0.9 % SODIUM CHLORIDE 0.9 %
500 INTRAVENOUS SOLUTION INTRAVENOUS ONCE
Status: COMPLETED | OUTPATIENT
Start: 2025-04-15 | End: 2025-04-15

## 2025-04-15 RX ORDER — METOPROLOL TARTRATE 25 MG/1
25 TABLET, FILM COATED ORAL 2 TIMES DAILY
Status: DISCONTINUED | OUTPATIENT
Start: 2025-04-15 | End: 2025-04-16

## 2025-04-15 RX ORDER — POLYETHYLENE GLYCOL 3350 17 G/17G
17 POWDER, FOR SOLUTION ORAL DAILY PRN
Status: DISCONTINUED | OUTPATIENT
Start: 2025-04-15 | End: 2025-04-24 | Stop reason: HOSPADM

## 2025-04-15 RX ORDER — ACETAMINOPHEN 325 MG/1
650 TABLET ORAL EVERY 6 HOURS PRN
Status: DISCONTINUED | OUTPATIENT
Start: 2025-04-15 | End: 2025-04-24 | Stop reason: HOSPADM

## 2025-04-15 RX ORDER — SODIUM CHLORIDE 9 MG/ML
INJECTION, SOLUTION INTRAVENOUS CONTINUOUS
Status: DISCONTINUED | OUTPATIENT
Start: 2025-04-15 | End: 2025-04-16

## 2025-04-15 RX ADMIN — WATER 5 MG: 1 INJECTION INTRAMUSCULAR; INTRAVENOUS; SUBCUTANEOUS at 21:04

## 2025-04-15 RX ADMIN — BUDESONIDE 250 MCG: 0.5 INHALANT RESPIRATORY (INHALATION) at 12:35

## 2025-04-15 RX ADMIN — BUDESONIDE 250 MCG: 0.5 INHALANT RESPIRATORY (INHALATION) at 20:04

## 2025-04-15 RX ADMIN — MIRTAZAPINE 15 MG: 15 TABLET, FILM COATED ORAL at 20:19

## 2025-04-15 RX ADMIN — IPRATROPIUM BROMIDE 0.5 MG: 0.5 SOLUTION RESPIRATORY (INHALATION) at 19:59

## 2025-04-15 RX ADMIN — SODIUM CHLORIDE, PRESERVATIVE FREE 10 ML: 5 INJECTION INTRAVENOUS at 12:42

## 2025-04-15 RX ADMIN — SODIUM CHLORIDE: 0.9 INJECTION, SOLUTION INTRAVENOUS at 12:33

## 2025-04-15 RX ADMIN — SODIUM CHLORIDE 500 ML: 0.9 INJECTION, SOLUTION INTRAVENOUS at 11:10

## 2025-04-15 RX ADMIN — DULOXETINE 30 MG: 30 CAPSULE, DELAYED RELEASE ORAL at 12:30

## 2025-04-15 RX ADMIN — SODIUM CHLORIDE, PRESERVATIVE FREE 5 ML: 5 INJECTION INTRAVENOUS at 20:19

## 2025-04-15 RX ADMIN — METOPROLOL TARTRATE 25 MG: 25 TABLET, FILM COATED ORAL at 20:18

## 2025-04-15 RX ADMIN — ARFORMOTEROL TARTRATE 15 MCG: 15 SOLUTION RESPIRATORY (INHALATION) at 20:04

## 2025-04-15 RX ADMIN — IPRATROPIUM BROMIDE 0.5 MG: 0.5 SOLUTION RESPIRATORY (INHALATION) at 13:53

## 2025-04-15 ASSESSMENT — PAIN SCALES - GENERAL
PAINLEVEL_OUTOF10: 0
PAINLEVEL_OUTOF10: 0

## 2025-04-15 ASSESSMENT — PAIN - FUNCTIONAL ASSESSMENT: PAIN_FUNCTIONAL_ASSESSMENT: NONE - DENIES PAIN

## 2025-04-15 NOTE — ED NOTES
TRANSFER - OUT REPORT:    Verbal report given to Angelica FERNANDEZ on Jane Campbell  being transferred to 5th Floor for routine progression of patient care       Report consisted of patient's Situation, Background, Assessment and   Recommendations(SBAR).     Information from the following report(s) Nurse Handoff Report, ED Encounter Summary, ED SBAR, Adult Overview, MAR, and Recent Results was reviewed with the receiving nurse.    [place] Fall Assessment:    Presents to emergency department  because of falls (Syncope, seizure, or loss of consciousness): Yes  Age > 70: Yes  Altered Mental Status, Intoxication with alcohol or substance confusion (Disorientation, impaired judgment, poor safety awaremess, or inability to follow instructions): No  Impaired Mobility: Ambulates or transfers with assistive devices or assistance; Unable to ambulate or transer.: Yes  Nursing Judgement: Yes          Lines:   Peripheral IV 04/15/25 Right Antecubital (Active)   Site Assessment Clean, dry & intact 04/15/25 0935   Line Status Blood return noted 04/15/25 0935   Phlebitis Assessment No symptoms 04/15/25 0935   Infiltration Assessment 0 04/15/25 0935   Dressing Status Clean, dry & intact 04/15/25 0935   Dressing Type Transparent 04/15/25 0935        Opportunity for questions and clarification was provided.      Patient transported with:  Tech

## 2025-04-15 NOTE — ED TRIAGE NOTES
Pt arrives via EMS from [facility] with c/o GLF. Per EMS, facility staff found patient on the ground this morning and reports increase in number of falls recently. Pt states he \"just woke up and was on the floor\". Per EMS, pt was recently discharged x1 week ago for possible UTI.     Denies pain, dizziness, lightheadedness.     Per EMS, pt recently started on Xarelto

## 2025-04-15 NOTE — H&P
[facility]  [address] [address], [place] [id number] [phone]    Formerly [facility] Adult  Hospitalist Group    History & Physical    Date of service: 4/15/2025    Patient name: Jane Campbell  MRN: [id number]  YOB: 1945  Age: 80 y.o.     Primary care provider:  Gabriel Luevano MD     Source of Information: patient, medical records.                      Chief complain: Fall    History of present illness  Jane Campbell is a 80 y.o. male who presented with a fall that occurred at his [facility] independent living facility sometime last night.  He was found this morning and does not remember falling states \"I just woke up and was on the floor\".  He has been having an increased number of falls recently.  He denies feeling dizzy or lightheaded.    Past Medical History:   Diagnosis Date    Anxiety and depression     BCC (basal cell carcinoma of skin)     Bronchitis, chronic (HCC)     Chronic obstructive pulmonary disease (HCC)     Chronic pain     Fibromyalgia     Generalized arthritis     Hyperlipidemia     Neuropathy     Post-polio syndrome (HCC)     Type 2 diabetes mellitus (HCC)       Past Surgical History:   Procedure Laterality Date    APPENDECTOMY      CERVICAL DISCECTOMY  2000    FRACTURE SURGERY Right 2006    Heel    IR BRONCHOSCOPY  02/2010    LUMBAR LAMINECTOMY  2014    ORTHOPEDIC SURGERY Left     Arm multiple surgeries    OTHER SURGICAL HISTORY      Penile surgery post trazodone use    [place] UNLISTED PROCEDURE ABDOMEN PERITONEUM & OMENTUM  02/2010    Feeding Tube- removed 2011    THYROIDECTOMY  05/13/2010    TOTAL HIP ARTHROPLASTY Left 2007    WRIST FRACTURE SURGERY Right 07/22/2014     Prior to Admission medications    Medication Sig Start Date End Date Taking? Authorizing Provider   DULoxetine (CYMBALTA) 30 MG extended release capsule Take 1 capsule by mouth daily 4/2/25   Morales Mo MD   mirtazapine (REMERON SOL-TAB) 15 MG disintegrating

## 2025-04-15 NOTE — PLAN OF CARE
Problem: Respiratory - Adult  Goal: Achieves optimal ventilation and oxygenation  4/15/2025 1957 by Marcela Hicks RN  Outcome: Progressing  4/15/2025 1354 by Bernice Chavez RT  Outcome: Progressing     Problem: Chronic Conditions and Co-morbidities  Goal: Patient's chronic conditions and co-morbidity symptoms are monitored and maintained or improved  Outcome: Progressing     Problem: Safety - Adult  Goal: Free from fall injury  Outcome: Progressing     Problem: Skin/Tissue Integrity  Goal: Skin integrity remains intact  Description: 1.  Monitor for areas of redness and/or skin breakdown2.  Assess vascular access sites hourly3.  Every 4-6 hours minimum:  Change oxygen saturation probe site4.  Every 4-6 hours:  If on nasal continuous positive airway pressure, respiratory therapy assess nares and determine need for appliance change or resting period  Outcome: Progressing  Flowsheets (Taken 4/15/2025 1947)  Skin Integrity Remains Intact:   Monitor for areas of redness and/or skin breakdown   Every 4-6 hours minimum:  Change oxygen saturation probe site   Assess vascular access sites hourly

## 2025-04-15 NOTE — ED NOTES
Patient belongings in green bag: pajama pants, shirt, and socks. Patient has one hearing aid in left ear. No other hearing aid arrived with patient.

## 2025-04-15 NOTE — ED PROVIDER NOTES
[facility] MULTI-SPECIALTY ONCOLOGY 2  EMERGENCY DEPARTMENT ENCOUNTER      Pt Name: Jane Campbell  MRN: [id number]  Birthdate 1945  Date of evaluation: 4/15/2025  Provider: Geovani Dorantes DO      HISTORY OF PRESENT ILLNESS      HPI    81-year-old male with a recent admission for UTI and AMS presents to the emergency department by EMS from his independent living facility after he was found on the ground this morning.  He states that he had been on the floor for several hours unable to get up.  He states that he does not recall what caused him to fall stating that he just woke up on the floor.  He is on Xarelto.  He denies any injuries from the fall denies any headache, neck pain, back pain, chest pain, shortness of breath, abdominal pain or any extremity injuries.  He denies any focal numbness or weakness.    Nursing Notes were reviewed.    REVIEW OF SYSTEMS         Review of Systems        PAST MEDICAL HISTORY     Past Medical History:   Diagnosis Date    Anxiety and depression     BCC (basal cell carcinoma of skin)     Bronchitis, chronic (HCC)     Chronic obstructive pulmonary disease (HCC)     Chronic pain     Fibromyalgia     Generalized arthritis     Hyperlipidemia     Neuropathy     Post-polio syndrome (HCC)     Type 2 diabetes mellitus (HCC)          SURGICAL HISTORY       Past Surgical History:   Procedure Laterality Date    APPENDECTOMY      CERVICAL DISCECTOMY  2000    FRACTURE SURGERY Right 2006    Heel    IR BRONCHOSCOPY  02/2010    LUMBAR LAMINECTOMY  2014    ORTHOPEDIC SURGERY Left     Arm multiple surgeries    OTHER SURGICAL HISTORY      Penile surgery post trazodone use    [place] UNLISTED PROCEDURE ABDOMEN PERITONEUM & OMENTUM  02/2010    Feeding Tube- removed 2011    THYROIDECTOMY  05/13/2010    TOTAL HIP ARTHROPLASTY Left 2007    WRIST FRACTURE SURGERY Right 07/22/2014         CURRENT MEDICATIONS       Current Discharge Medication List        CONTINUE these medications which have NOT CHANGED     Details   DULoxetine (CYMBALTA) 30 MG extended release capsule Take 1 capsule by mouth daily  Qty: 30 capsule, Refills: 0      mirtazapine (REMERON SOL-TAB) 15 MG disintegrating tablet Take 1 tablet by mouth nightly for 21 days  Qty: 21 tablet, Refills: 0      rivaroxaban (XARELTO) 10 MG TABS tablet Take 1 tablet by mouth daily (with breakfast)  Qty: 30 tablet, Refills: 0      metoprolol tartrate (LOPRESSOR) 25 MG tablet Take 1 tablet by mouth 2 times daily Hold if BP <100  Qty: 60 tablet, Refills: 0      levothyroxine (SYNTHROID) 50 MCG tablet Take 1 tablet by mouth every morning (before breakfast)  Qty: 30 tablet, Refills: 0      OLANZapine (ZYPREXA) 2.5 MG tablet Take 1 tablet by mouth nightly for 21 days  Qty: 21 tablet, Refills: 0      diclofenac sodium (VOLTAREN) 1 % GEL Apply 2 g topically 4 times daily as needed for Pain      meloxicam (MOBIC) 15 MG tablet Take 1 tablet by mouth daily      albuterol sulfate HFA (PROVENTIL;VENTOLIN;PROAIR) 108 (90 Base) MCG/ACT inhaler Inhale 2 puffs into the lungs every 4 hours as needed for Shortness of Breath      atorvastatin (LIPITOR) 10 MG tablet Take 1 tablet by mouth daily      glycopyrrolate-formoterol (BEVESPI) 9-4.8 MCG/ACT AERO Inhale 2 puffs into the lungs 2 times daily      ipratropium-albuterol (DUONEB) 0.5-2.5 (3) MG/3ML SOLN nebulizer solution Inhale 3 mLs into the lungs every 4 hours as needed for Wheezing      metFORMIN (GLUCOPHAGE) 500 MG tablet Take 1 tablet by mouth daily      mometasone (ASMANEX) 100 MCG/ACT AERO inhaler Inhale 2 puffs into the lungs 2 times daily             ALLERGIES     Trazodone    FAMILY HISTORY       Family History   Problem Relation Age of Onset    Lung Disease Father     Cancer Father     Osteoarthritis Mother           SOCIAL HISTORY       Social History     Socioeconomic History    Marital status:    Tobacco Use    Smoking status: Former     Current packs/day: 0.00     Types: Cigarettes     Quit date: 4/30/1970

## 2025-04-16 ENCOUNTER — APPOINTMENT (OUTPATIENT)
Facility: HOSPITAL | Age: 80
DRG: 091 | End: 2025-04-16
Payer: MEDICARE

## 2025-04-16 LAB
AMMONIA PLAS-SCNC: 39 UMOL/L
ANION GAP SERPL CALC-SCNC: 5 MMOL/L (ref 2–12)
B PERT DNA SPEC QL NAA+PROBE: NOT DETECTED
BASOPHILS # BLD: 0.04 K/UL (ref 0–0.1)
BASOPHILS NFR BLD: 0.5 % (ref 0–1)
BORDETELLA PARAPERTUSSIS BY PCR: NOT DETECTED
BUN SERPL-MCNC: 20 MG/DL (ref 6–20)
BUN/CREAT SERPL: 23 (ref 12–20)
C PNEUM DNA SPEC QL NAA+PROBE: NOT DETECTED
CALCIUM SERPL-MCNC: 8.4 MG/DL (ref 8.5–10.1)
CHLORIDE SERPL-SCNC: 111 MMOL/L (ref 97–108)
CK SERPL-CCNC: 202 U/L (ref 39–308)
CO2 SERPL-SCNC: 23 MMOL/L (ref 21–32)
COMMENT:: NORMAL
CREAT SERPL-MCNC: 0.86 MG/DL (ref 0.7–1.3)
DIFFERENTIAL METHOD BLD: ABNORMAL
EOSINOPHIL # BLD: 0.45 K/UL (ref 0–0.4)
EOSINOPHIL NFR BLD: 5.8 % (ref 0–7)
ERYTHROCYTE [DISTWIDTH] IN BLOOD BY AUTOMATED COUNT: 13.6 % (ref 11.5–14.5)
FLUAV SUBTYP SPEC NAA+PROBE: NOT DETECTED
FLUBV RNA SPEC QL NAA+PROBE: NOT DETECTED
GLUCOSE BLD STRIP.AUTO-MCNC: 164 MG/DL (ref 65–117)
GLUCOSE SERPL-MCNC: 96 MG/DL (ref 65–100)
HADV DNA SPEC QL NAA+PROBE: NOT DETECTED
HCOV 229E RNA SPEC QL NAA+PROBE: NOT DETECTED
HCOV HKU1 RNA SPEC QL NAA+PROBE: NOT DETECTED
HCOV NL63 RNA SPEC QL NAA+PROBE: NOT DETECTED
HCOV OC43 RNA SPEC QL NAA+PROBE: NOT DETECTED
HCT VFR BLD AUTO: 34.9 % (ref 36.6–50.3)
HGB BLD-MCNC: 11 G/DL (ref 12.1–17)
HMPV RNA SPEC QL NAA+PROBE: NOT DETECTED
HPIV1 RNA SPEC QL NAA+PROBE: NOT DETECTED
HPIV2 RNA SPEC QL NAA+PROBE: NOT DETECTED
HPIV3 RNA SPEC QL NAA+PROBE: NOT DETECTED
HPIV4 RNA SPEC QL NAA+PROBE: NOT DETECTED
IMM GRANULOCYTES # BLD AUTO: 0.03 K/UL (ref 0–0.04)
IMM GRANULOCYTES NFR BLD AUTO: 0.4 % (ref 0–0.5)
LYMPHOCYTES # BLD: 1.86 K/UL (ref 0.8–3.5)
LYMPHOCYTES NFR BLD: 23.9 % (ref 12–49)
M PNEUMO DNA SPEC QL NAA+PROBE: NOT DETECTED
MCH RBC QN AUTO: 30.9 PG (ref 26–34)
MCHC RBC AUTO-ENTMCNC: 31.5 G/DL (ref 30–36.5)
MCV RBC AUTO: 98 FL (ref 80–99)
MONOCYTES # BLD: 0.76 K/UL (ref 0–1)
MONOCYTES NFR BLD: 9.8 % (ref 5–13)
NEUTS SEG # BLD: 4.65 K/UL (ref 1.8–8)
NEUTS SEG NFR BLD: 59.6 % (ref 32–75)
NRBC # BLD: 0 K/UL (ref 0–0.01)
NRBC BLD-RTO: 0 PER 100 WBC
PLATELET # BLD AUTO: 231 K/UL (ref 150–400)
PMV BLD AUTO: 10.2 FL (ref 8.9–12.9)
POTASSIUM SERPL-SCNC: 4.2 MMOL/L (ref 3.5–5.1)
RBC # BLD AUTO: 3.56 M/UL (ref 4.1–5.7)
RSV RNA SPEC QL NAA+PROBE: NOT DETECTED
RV+EV RNA SPEC QL NAA+PROBE: NOT DETECTED
SARS-COV-2 RNA RESP QL NAA+PROBE: NOT DETECTED
SERVICE CMNT-IMP: ABNORMAL
SODIUM SERPL-SCNC: 139 MMOL/L (ref 136–145)
SPECIMEN HOLD: NORMAL
TSH SERPL DL<=0.05 MIU/L-ACNC: 2.11 UIU/ML (ref 0.36–3.74)
WBC # BLD AUTO: 7.8 K/UL (ref 4.1–11.1)

## 2025-04-16 PROCEDURE — 0202U NFCT DS 22 TRGT SARS-COV-2: CPT

## 2025-04-16 PROCEDURE — 2500000003 HC RX 250 WO HCPCS: Performed by: FAMILY MEDICINE

## 2025-04-16 PROCEDURE — 6370000000 HC RX 637 (ALT 250 FOR IP): Performed by: FAMILY MEDICINE

## 2025-04-16 PROCEDURE — 6360000002 HC RX W HCPCS: Performed by: FAMILY MEDICINE

## 2025-04-16 PROCEDURE — 82550 ASSAY OF CK (CPK): CPT

## 2025-04-16 PROCEDURE — 97530 THERAPEUTIC ACTIVITIES: CPT

## 2025-04-16 PROCEDURE — 95957 EEG DIGITAL ANALYSIS: CPT

## 2025-04-16 PROCEDURE — 36415 COLL VENOUS BLD VENIPUNCTURE: CPT

## 2025-04-16 PROCEDURE — 82140 ASSAY OF AMMONIA: CPT

## 2025-04-16 PROCEDURE — 99223 1ST HOSP IP/OBS HIGH 75: CPT | Performed by: PSYCHIATRY & NEUROLOGY

## 2025-04-16 PROCEDURE — 97165 OT EVAL LOW COMPLEX 30 MIN: CPT

## 2025-04-16 PROCEDURE — 95816 EEG AWAKE AND DROWSY: CPT

## 2025-04-16 PROCEDURE — 94640 AIRWAY INHALATION TREATMENT: CPT

## 2025-04-16 PROCEDURE — 94761 N-INVAS EAR/PLS OXIMETRY MLT: CPT

## 2025-04-16 PROCEDURE — 80048 BASIC METABOLIC PNL TOTAL CA: CPT

## 2025-04-16 PROCEDURE — 97535 SELF CARE MNGMENT TRAINING: CPT

## 2025-04-16 PROCEDURE — 71045 X-RAY EXAM CHEST 1 VIEW: CPT

## 2025-04-16 PROCEDURE — 82962 GLUCOSE BLOOD TEST: CPT

## 2025-04-16 PROCEDURE — 97161 PT EVAL LOW COMPLEX 20 MIN: CPT

## 2025-04-16 PROCEDURE — 85025 COMPLETE CBC W/AUTO DIFF WBC: CPT

## 2025-04-16 PROCEDURE — 84443 ASSAY THYROID STIM HORMONE: CPT

## 2025-04-16 PROCEDURE — 1100000000 HC RM PRIVATE

## 2025-04-16 RX ORDER — ATORVASTATIN CALCIUM 10 MG/1
10 TABLET, FILM COATED ORAL NIGHTLY
Status: DISCONTINUED | OUTPATIENT
Start: 2025-04-16 | End: 2025-04-24 | Stop reason: HOSPADM

## 2025-04-16 RX ORDER — METOPROLOL TARTRATE 25 MG/1
12.5 TABLET, FILM COATED ORAL 2 TIMES DAILY
Status: DISCONTINUED | OUTPATIENT
Start: 2025-04-16 | End: 2025-04-24 | Stop reason: HOSPADM

## 2025-04-16 RX ADMIN — RIVAROXABAN 10 MG: 10 TABLET, FILM COATED ORAL at 08:25

## 2025-04-16 RX ADMIN — SODIUM CHLORIDE, PRESERVATIVE FREE 5 ML: 5 INJECTION INTRAVENOUS at 20:19

## 2025-04-16 RX ADMIN — ARFORMOTEROL TARTRATE 15 MCG: 15 SOLUTION RESPIRATORY (INHALATION) at 07:44

## 2025-04-16 RX ADMIN — BUDESONIDE 250 MCG: 0.5 INHALANT RESPIRATORY (INHALATION) at 07:50

## 2025-04-16 RX ADMIN — LEVOTHYROXINE SODIUM 50 MCG: 0.05 TABLET ORAL at 06:20

## 2025-04-16 RX ADMIN — DULOXETINE 30 MG: 30 CAPSULE, DELAYED RELEASE ORAL at 08:24

## 2025-04-16 RX ADMIN — SODIUM CHLORIDE, PRESERVATIVE FREE 10 ML: 5 INJECTION INTRAVENOUS at 08:25

## 2025-04-16 RX ADMIN — IPRATROPIUM BROMIDE 0.5 MG: 0.5 SOLUTION RESPIRATORY (INHALATION) at 07:44

## 2025-04-16 ASSESSMENT — PAIN SCALES - GENERAL: PAINLEVEL_OUTOF10: 0

## 2025-04-16 NOTE — PLAN OF CARE
Problem: Physical Therapy - Adult  Goal: By Discharge: Performs mobility at highest level of function for planned discharge setting.  See evaluation for individualized goals.  Description: FUNCTIONAL STATUS PRIOR TO ADMISSION: The patient was functional at the wheelchair level and required undetermined level of assistance for transfers to the chair via stand/squat pivot.  Patient lives at the [facility] but has paid caregivers daily that assist with transfers from bed to w/c as well as ADLs.  Once in w/c, patient is able to propel himself and takes his meals in the dining room.  He is alone in the evenings overnight.  Reports frequent falls but unable to relate cause or situation of falls.    Physical Therapy Goals  Initiated 4/16/2025  1.  Patient will move from supine to sit and sit to supine, scoot up and down, and roll side to side in bed with minimal assistance within 7 day(s).    2.  Patient will perform sit to stand with minimal assistance within 7 day(s).  3.  Patient will transfer from bed to chair and chair to bed with minimal assistance using the least restrictive device within 7 day(s).  5.  Patient will sit up in chair 2 hours at a time to improve OOB tolerance within 7 day(s).        Outcome: Progressing   PHYSICAL THERAPY EVALUATION    Patient: Jane Campbell (80 y.o. male)  Date: 4/16/2025  Primary Diagnosis: Syncope and collapse [R55]  Elevated CK [R74.8]  Failure to thrive in adult [R62.7]  Frequent falls [R29.6]  Fall, initial encounter [W19.XXXA]       Precautions:              ASSESSMENT :   DEFICITS/IMPAIRMENTS:   The patient is limited by decreased functional mobility, ROM, strength, activity tolerance, endurance, cognition, attention/concentration, balance, posture, LLE weakness from polio, and inability to ambulate s/p admission for syncope and collapse. Patient oriented x 4 (prompting for hospital vs \"[place]\") and very cooperative, some memory issues with overall history and

## 2025-04-16 NOTE — CARE COORDINATION
Case Management Assessment  Initial Evaluation    Date/Time of Evaluation: 4/16/2025 10:47 AM  Assessment Completed by: ZA LE    If patient is discharged prior to next notation, then this note serves as note for discharge by case management.    Patient Name: Jane Campbell                   YOB: 1945  Diagnosis: Syncope and collapse [R55]  Elevated CK [R74.8]  Failure to thrive in adult [R62.7]  Frequent falls [R29.6]  Fall, initial encounter [W19.XXXA]                   Date / Time: 4/15/2025  9:04 AM    Patient Admission Status: Inpatient   Readmission Risk (Low < 19, Mod (19-27), High > 27): Readmission Risk Score: 18.7    Current PCP: Gabriel Luevano MD  PCP verified by CM? Yes (Dr. Luevano)    Chart Reviewed: Yes      History Provided by: Medical Record  Patient Orientation: Other (see comment) (pt presents with confusion; poor historian)    Patient Cognition: Other (see comment) (confusion)    Hospitalization in the last 30 days (Readmission):  Yes    If yes, Readmission Assessment in CM Navigator will be completed.    Advance Directives:      Code Status: DNR   Patient's Primary Decision Maker is: Legal Next of Kin    Primary Decision Maker: Aurelio Campbell - Child - [phone]    Discharge Planning:    Patient lives with:   Type of Home:    Primary Care Giver: Private caregiver  Patient Support Systems include: Children   Current Financial resources: Medicare  Current community resources:    Current services prior to admission: [facility] Homecare            Current DME:              Type of Home Care services:  PT, OT    ADLS  Prior functional level: Assistance with the following:, Bathing, Dressing, Mobility, Toileting, Cooking  Current functional level: Assistance with the following:, Bathing, Dressing, Mobility, Cooking, Toileting    PT AM-PAC:   /24  OT AM-PAC:   /24    Family can provide assistance at DC: No  Would you like Case Management to discuss the discharge plan

## 2025-04-16 NOTE — CARE COORDINATION
Pt is currently receiving [facility] for PT and OT services.  Pt has caregiver assistance for ADLs and IADLs.     04/16/25 1039   Service Assessment   Patient Orientation Other (see comment)  (pt presents with confusion; poor historian)   Cognition Other (see comment)  (confusion)   History Provided By Medical Record   Primary Caregiver Private caregiver   Support Systems Children   Patient's Healthcare Decision Maker is: Legal Next of Kin   PCP Verified by CM Yes  (Dr. Luevano)   Last Visit to PCP   (unknown at this time)   Prior Functional Level Assistance with the following:;Bathing;Dressing;Mobility;Toileting;Cooking   Current Functional Level Assistance with the following:;Bathing;Dressing;Mobility;Cooking;Toileting   Can patient return to prior living arrangement Unknown at present   Ability to make needs known: Unable  (pt's cognition needs to clear)   Family able to assist with home care needs: No   Financial Resources Medicare   Social/Functional History   Lives With Other (Comment)  (indepn living at [facility])   Type of Home Senior housing apartment   Home Access Level entry   Home Equipment Wheelchair - Manual   Toileting Needs assistance   Prior Level of Assist for Homemaking Needs assistance   Ambulation Assistance Non-ambulatory  (pt is w/c depn)   Prior Level of Assist for Transfers Needs assistance   Active  No   Discharge Planning   Current Services Prior To Admission Private [facility] Homecare   DME Ordered? No   Potential Assistance Purchasing Medications No   Type of Home Care Services PT;OT   History of falls? 1   Services At/After Discharge   Services At/After Discharge Home Health;Aide services  (resumption of PT and OT as well as caregivers)   Mode of Transport at Discharge S

## 2025-04-16 NOTE — CONSULTS
INPATIENT NEUROLOGY CONSULT NOTE      NAME:     Jane Campbell    ADMIT DATE:    4/15/2025  9:04 AM    CONSULT DATE:  04/16/25      HPI: 80 y.o. male who  has a past medical history of Anxiety and depression, BCC (basal cell carcinoma of skin), Bronchitis, chronic (HCC), Chronic obstructive pulmonary disease (HCC), Chronic pain, Fibromyalgia, Generalized arthritis, Hyperlipidemia, Neuropathy, Post-polio syndrome (HCC), and Type 2 diabetes mellitus (HCC).    Neurology is asked to evaluate to patient for: altered mental status with frequent falls.  consider EEG     Pertinent home meds include: Olanzapine 2.5 mg QHS (\"x 21 days\"), Lipitor 10 mg QHS,Duloxetine 30 mg daily, Remeron 15 mg QHS x \"21 days,\" Xarelto 15 mg daily    From Admission H&P: Jane Campbell is a 80 y.o. male who presented with a fall that occurred at his [facility] facility sometime last night.  He was found this morning and does not remember falling states \"I just woke up and was on the floor\".  He has been having an increased number of falls recently.  He denies feeling dizzy or lightheaded.     Patient resting in bed, Sitter in room.  Eyes open.  Not a good historian but part of that is likely hearing loss, wearing hearing aid in left ear.  When I speak into left ear and ask him what caused him to come to hospital, he doesn't answer. When I ask him if he fell out of bed, he doesn't say.  When I ask him if he woke up on the floor, he says yes.  When I ask him if he was trying to get out of bed and fell, he doesn't answer. He is oriented to location/ Hospital, [facility], Month/ April, and calls year 1925 (I let him know it's 2025). He tells me he had polio earlier in life affecting his left leg causing weakness and he wears a brace from left mid thigh down.        EEG on 3- during last admission (altered mental status): Clinical Interpretation: This EEG, performed during wakefulness and drowsiness is mildly abnormal.  There is mild generalized slowing as seen in encephalopathies. There is no focal asymmetry, seizures or epileptiform discharges seen.      Brain MRI on 3-/ last admission (done for AMS, episode of unresponsiveness): IMPRESSION: 1. No acute intracranial abnormality. 2. Unchanged generalized parenchymal volume loss and minimal chronic microvascular ischemic disease. 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    I reviewed the most recent LAB DATA:      Total , prior  (mild elevated, reference range )  CBC with WBC 7.8, Hgb 11.0, HCT 34.9, platelet count 231,000  BMP with , K4.2, CR 0.86, GFR 86  UA negative for UTI    I reviewed the IMAGING RESULTS (excerpted from the formal reports and modified to fit body of this note):     CT Head w/o contrast 4-: FINDINGS: Generalized prominence of cerebral sulci and ventricles is increased but stable and consistent with advanced age.. There is no significant white matter disease. There is no intracranial hemorrhage, extra-axial collection, or mass effect. The basilar cisterns are open. No CT evidence of acute infarct.  The bone windows demonstrate no acute abnormalities. Posterior fixation hardware in the upper cervical spine. Partial opacification of the left mastoid air cells. Normal aeration of the paranasal sinuses otherwise with no air-fluid levels.  IMPRESSION:  No acute intracranial abnormality. Stable age-related change. Mild left mastoid opacification, stable.    MRI Brain w/o contrast on 3-: FINDINGS: Unchanged generalized parenchymal volume loss with commensurate dilation of the sulci and ventricular system. Unchanged few scattered periventricular and deep white matter T2/FLAIR hyperintensities, consistent with minimal chronic microvascular ischemic disease. 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 There is no acute infarct, hemorrhage, extra-axial fluid collection, or mass effect. There is no cerebellar tonsillar

## 2025-04-16 NOTE — PROGRESS NOTES
EEG completed    This Routine EEG was performed in real-time by an EEG technologist. Electrodes placed according to the 10-20 International System. Standard sensitivity 7uV/mm and high filter 70 Hz and low filter 1 Hz settings were set at initiation of the recording and adjustments, as appropriate, and noted on the recording. Baseline electrode impedances were at or below 10k Ohms. Per protocol, this recording data is uploaded/transferred from the EEG equipment to a central storage location in real time. Duration of this EEG was (23:14) minutes. Photic stimulation was not performed, and Hyperventilation was not performed. Digital analysis is recorded using a software tool that analyzes EEG data to identify seizures called Persyst.

## 2025-04-16 NOTE — PLAN OF CARE
Problem: Occupational Therapy - Adult  Goal: By Discharge: Performs self-care activities at highest level of function for planned discharge setting.  See evaluation for individualized goals.  Description: FUNCTIONAL STATUS PRIOR TO ADMISSION:  Patient reports assistance with ADL tasks and mobility at home.   Receives Help From: Personal care attendant (daytime hours only), Prior Level of Assist for ADLs: Needs assistance, Bath: Minimal assistance, Dressing: Minimal assistance, Grooming: Minimal assistance, Feeding: Supervision, Toileting: Needs assistance, Prior Level of Assist for Homemaking: Needs assistance, Ambulation Assistance: Non-ambulatory (pt is w/c depn), Prior Level of Assist for Transfers: Needs assistance, Active : No     HOME SUPPORT: Patient lived alone in [facility], but has caregivers in daily to assist for ADLs and transfers to wheelchair.  Patient reports home alone.    Occupational Therapy Goals:  Initiated 4/16/2025  1.  Patient will perform lower body dressing with Maximal Assist within 7 day(s).  2.  Patient will perform upper body dressing with Minimal Assist within 7 day(s).  3.  Patient will perform grooming with Contact Guard Assist within 7 day(s).  4.  Patient will participate in upper extremity therapeutic exercise/activities with Minimal Assist for 10 minutes within 7 day(s).      Outcome: Progressing   OCCUPATIONAL THERAPY EVALUATION    Patient: Jane Campbell (80 y.o. male)  Date: 4/16/2025  Primary Diagnosis: Syncope and collapse [R55]  Elevated CK [R74.8]  Failure to thrive in adult [R62.7]  Frequent falls [R29.6]  Fall, initial encounter [W19.XXXA]         Precautions:                    ASSESSMENT :  The patient is limited by decreased functional mobility, independence in ADLs, high-level IADLs, ROM, strength, body mechanics, activity tolerance, endurance, coordination, balance, posture, fine-motor control following admission for syncope and collapse, failure to thrive and  assistance  Scooting: Partial/Moderate assistance    Transfers:      Transfer Training  Transfer Training: Yes  Sit to Stand: Substantial/Maximal assistance;2 Person assistance  Stand to Sit: Substantial/Maximal assistance;2 Person assistance  Bed to Chair: Dependent/Total;2 Person assistance  Toilet Transfer: 2 Person assistance;Dependent/Total                     Balance:      Balance  Sitting: High guard  Standing: Impaired  Standing - Static: Poor      ADL Assessment:          Feeding: Setup       Grooming: Minimal assistance       UE Bathing: Moderate assistance            LE Bathing: Dependent/Total       UE Dressing: Moderate assistance       LE Dressing: Dependent/Total       Toileting: Dependent/Total                         ADL Intervention and task modifications:      Pain Rating:  Does not report pain/10   Pain Intervention(s):     Activity Tolerance:   Fair     After treatment:   Patient left in no apparent distress sitting up in chair, Call bell within reach, Bed/ chair alarm activated, and Caregiver / family present    COMMUNICATION/EDUCATION:   The patient's plan of care was discussed with: physical therapist and registered nurse         Thank you for this referral.  Coco Bar OTR/L  Minutes: 30    Occupational Therapy Evaluation Charge Determination   History Examination Decision-Making   LOW Complexity : Brief history review  LOW Complexity: 1-3 Performance deficits relating to physical, cognitive, or psychosocial skills that result in activity limitations and/or participation restrictions MEDIUM Complexity: Patient may present with comorbidities that affect occupational performance. Minimal to moderate modifications of tasks or assist (eg. physical or verbal) with assist is necessary to enable pt to complete eval   Based on the above components, the patient evaluation is determined to be of the following complexity level: Medium

## 2025-04-16 NOTE — PROGRESS NOTES
Physician Progress Note      PATIENT:               YADIRA TUCKER  CSN #:                  [id number]  :                       1945  ADMIT DATE:       4/15/2025 9:04 AM  DISCH DATE:  RESPONDING  PROVIDER #:        Brittani Salcedo MD          QUERY TEXT:    Based on your medical judgment, please clarify these findings and document if   any of the following are being evaluated and/or treated:    The clinical indicators include:  4/15-  H&P- Atrial fibrillation  - Continue metoprolol and Xarelto      age >  75;    male   diabetes;   CHF;  Options provided:  -- Secondary hypercoagulable state in a patient with atrial fibrillation  -- Other - I will add my own diagnosis  -- Disagree - Not applicable / Not valid  -- Disagree - Clinically unable to determine / Unknown  -- Refer to Clinical Documentation Reviewer    PROVIDER RESPONSE TEXT:    This patient has secondary hypercoagulable state in a patient with atrial   fibrillation.    Query created by: Chelsy Vaughan on 4/15/2025 2:40 PM      Electronically signed by:  Brittani Salcedo MD 4/15/2025 8:08 PM

## 2025-04-16 NOTE — PLAN OF CARE
Problem: Respiratory - Adult  Goal: Achieves optimal ventilation and oxygenation  4/16/2025 1954 by Marcela Hicks RN  Outcome: Progressing  Flowsheets (Taken 4/16/2025 1948)  Achieves optimal ventilation and oxygenation:   Assess for changes in respiratory status   Assess for changes in mentation and behavior  4/16/2025 1112 by Julia Hook RN  Outcome: Progressing  4/16/2025 0756 by Damico, Kevin J, RT  Outcome: Progressing     Problem: Chronic Conditions and Co-morbidities  Goal: Patient's chronic conditions and co-morbidity symptoms are monitored and maintained or improved  4/16/2025 1954 by Marcela Hicks RN  Outcome: Progressing  4/16/2025 1112 by Julia Hook RN  Outcome: Progressing     Problem: Safety - Adult  Goal: Free from fall injury  4/16/2025 1954 by Marcela Hicks RN  Outcome: Progressing  4/16/2025 1112 by Julia Hook RN  Outcome: Progressing  Flowsheets (Taken 4/16/2025 0800)  Free From Fall Injury: Instruct family/caregiver on patient safety     Problem: Skin/Tissue Integrity  Goal: Skin integrity remains intact  Description: 1.  Monitor for areas of redness and/or skin breakdown2.  Assess vascular access sites hourly3.  Every 4-6 hours minimum:  Change oxygen saturation probe site4.  Every 4-6 hours:  If on nasal continuous positive airway pressure, respiratory therapy assess nares and determine need for appliance change or resting period  4/16/2025 1954 by Marcela Hicks RN  Outcome: Progressing  Flowsheets (Taken 4/16/2025 1948)  Skin Integrity Remains Intact:   Assess vascular access sites hourly   Monitor for areas of redness and/or skin breakdown  4/16/2025 1112 by Julia Hook RN  Outcome: Progressing  Flowsheets (Taken 4/16/2025 0800)  Skin Integrity Remains Intact: Monitor for areas of redness and/or skin breakdown     Problem: Occupational Therapy - Adult  Goal: By Discharge: Performs self-care activities at highest  level of function for planned discharge setting.  See evaluation for individualized goals.  Description: FUNCTIONAL STATUS PRIOR TO ADMISSION:  Patient reports assistance with ADL tasks and mobility at home.   Receives Help From: Personal care attendant (daytime hours only), Prior Level of Assist for ADLs: Needs assistance, Bath: Minimal assistance, Dressing: Minimal assistance, Grooming: Minimal assistance, Feeding: Supervision, Toileting: Needs assistance, Prior Level of Assist for Homemaking: Needs assistance, Ambulation Assistance: Non-ambulatory (pt is w/c depn), Prior Level of Assist for Transfers: Needs assistance, Active : No     HOME SUPPORT: Patient lived alone in [facility], but has caregivers in daily to assist for ADLs and transfers to wheelchair.  Patient reports home alone.    Occupational Therapy Goals:  Initiated 4/16/2025  1.  Patient will perform lower body dressing with Maximal Assist within 7 day(s).  2.  Patient will perform upper body dressing with Minimal Assist within 7 day(s).  3.  Patient will perform grooming with Contact Guard Assist within 7 day(s).  4.  Patient will participate in upper extremity therapeutic exercise/activities with Minimal Assist for 10 minutes within 7 day(s).      4/16/2025 1204 by Coco Bar, OT  Outcome: Progressing  4/16/2025 1200 by Coco Bar, OT  Outcome: Progressing     Problem: Physical Therapy - Adult  Goal: By Discharge: Performs mobility at highest level of function for planned discharge setting.  See evaluation for individualized goals.  Description: FUNCTIONAL STATUS PRIOR TO ADMISSION: The patient was functional at the wheelchair level and required undetermined level of assistance for transfers to the chair via stand/squat pivot.  Patient lives at the [facility] but has paid caregivers daily that assist with transfers from bed to w/c as well as ADLs.  Once in w/c, patient is able to propel himself and takes his meals in the dining

## 2025-04-16 NOTE — PROGRESS NOTES
[facility]  [address] [place] [address] [phone]        Hospitalist Progress Note      NAME: Jane Campbell   :  1945  MRM:  [id number]    Date/Time of service: 2025  1:23 PM       Subjective:   Patient was personally seen and examined by me during this time period.  Chart reviewed.     He was alert and awake.  He stated that \"I am fine\".  He did not answer any other questions.     ROS: per history above       Objective:       Vitals:       Last 24hrs VS reviewed since prior progress note. Most recent are:    Vitals:    25 1100   BP: (!) 145/66   Pulse: 76   Resp: 28   Temp:    SpO2: 98%     SpO2 Readings from Last 6 Encounters:   25 98%   25 95%   24 98%   24 93%   08/10/24 96%   24 98%          Intake/Output Summary (Last 24 hours) at 2025 1323  Last data filed at 2025 0929  Gross per 24 hour   Intake 680 ml   Output 700 ml   Net -20 ml        Exam:     Physical Exam:    Gen:  Well-developed, well-nourished, in no acute distress  HEENT:  Pink conjunctivae, hearing intact to voice, moist mucous membranes  Neck:  Supple  Resp:  No accessory muscle use, clear breath sounds without wheezes rales or rhonchi  Card:  No murmurs, normal S1, S2 without thrills, bruits or peripheral edema  Abd:  Soft, non-tender, non-distended  Skin:  No rashes or ulcers, skin turgor is good  Neuro:  Cranial nerves 3-12 are grossly intact, does follows commands appropriately  Psych:  alert, unclear insight. Unclear orientation.        Medications Reviewed: (see below)    Lab Data Reviewed: (see below)    ______________________________________________________________________    Medications:     Current Facility-Administered Medications   Medication Dose Route Frequency    metoprolol tartrate (LOPRESSOR) tablet 12.5 mg  12.5 mg Oral BID    sodium chloride flush 0.9 % injection 5-40 mL  5-40 mL IntraVENous 2 times per day    sodium

## 2025-04-16 NOTE — CARE COORDINATION
CM is familiar with pt from previous hospital admission and hx from [facility] (Pat [phone]).   Per was admitted to  from 3/30 - 4/2.  He returned to [facility] where he resides.  Pt was readmitted to  on 4/15 with syncope and collapse.    Reportedly, pt is a resident of [facility] (indepn living).  He has caregivers for assistance.  Pt is w/c bound at baseline.  [facility] provides PT/OT services.   04/16/25 1031   Readmission Assessment   Number of Days since last admission? 8-30 days  (13 days)   Previous Disposition Other (comment)  (lives at [facility] (indepn living).  Also has caregivers)   Who is being Interviewed Unable to Complete  (pt presents with confusion)   What was the patient's/caregiver's perception as to why they think they needed to return back to the hospital?   (syncope/fall)   Did you visit your Primary Care Physician after you left the hospital, before you returned this time? No   Why weren't you able to visit your PCP? Other (Comment)  (pt unable to provide hx)   Did you see a specialist, such as Cardiac, Pulmonary, Orthopedic Physician, etc. after you left the hospital? No   Who advised the patient to return to the hospital? Caregiver   Does the patient report anything that got in the way of taking their medications? No

## 2025-04-16 NOTE — PROGRESS NOTES
2059 Code BELEN called due to patient severe agitation, screaming and pulling on the base of the bed. refusing to go lay back in bed and non redirectable. Gave Zyprexia IM as ordered. Afterwards patient calmed down and and resting comfortably in bed, not in distress with equal and unlabored respirations.

## 2025-04-16 NOTE — PLAN OF CARE
Problem: Respiratory - Adult  Goal: Achieves optimal ventilation and oxygenation  4/16/2025 1112 by Julia Hook RN  Outcome: Progressing  4/16/2025 0756 by Damico, Kevin J, RT  Outcome: Progressing     Problem: Chronic Conditions and Co-morbidities  Goal: Patient's chronic conditions and co-morbidity symptoms are monitored and maintained or improved  Outcome: Progressing     Problem: Safety - Adult  Goal: Free from fall injury  Outcome: Progressing  Flowsheets (Taken 4/16/2025 0800)  Free From Fall Injury: Instruct family/caregiver on patient safety     Problem: Skin/Tissue Integrity  Goal: Skin integrity remains intact  Description: 1.  Monitor for areas of redness and/or skin breakdown2.  Assess vascular access sites hourly3.  Every 4-6 hours minimum:  Change oxygen saturation probe site4.  Every 4-6 hours:  If on nasal continuous positive airway pressure, respiratory therapy assess nares and determine need for appliance change or resting period  Outcome: Progressing  Flowsheets (Taken 4/16/2025 0800)  Skin Integrity Remains Intact: Monitor for areas of redness and/or skin breakdown

## 2025-04-17 ENCOUNTER — APPOINTMENT (OUTPATIENT)
Facility: HOSPITAL | Age: 80
DRG: 091 | End: 2025-04-17
Payer: MEDICARE

## 2025-04-17 LAB
ALBUMIN SERPL-MCNC: 2.9 G/DL (ref 3.5–5)
ALBUMIN/GLOB SERPL: 1 (ref 1.1–2.2)
ALP SERPL-CCNC: 95 U/L (ref 45–117)
ALT SERPL-CCNC: 25 U/L (ref 12–78)
ANION GAP SERPL CALC-SCNC: 4 MMOL/L (ref 2–12)
AST SERPL-CCNC: 18 U/L (ref 15–37)
BASOPHILS # BLD: 0.04 K/UL (ref 0–0.1)
BASOPHILS NFR BLD: 0.5 % (ref 0–1)
BILIRUB SERPL-MCNC: 0.4 MG/DL (ref 0.2–1)
BUN SERPL-MCNC: 15 MG/DL (ref 6–20)
BUN/CREAT SERPL: 18 (ref 12–20)
CALCIUM SERPL-MCNC: 9 MG/DL (ref 8.5–10.1)
CHLORIDE SERPL-SCNC: 109 MMOL/L (ref 97–108)
CO2 SERPL-SCNC: 26 MMOL/L (ref 21–32)
CREAT SERPL-MCNC: 0.84 MG/DL (ref 0.7–1.3)
DIFFERENTIAL METHOD BLD: ABNORMAL
EOSINOPHIL # BLD: 0.34 K/UL (ref 0–0.4)
EOSINOPHIL NFR BLD: 4.2 % (ref 0–7)
ERYTHROCYTE [DISTWIDTH] IN BLOOD BY AUTOMATED COUNT: 13.5 % (ref 11.5–14.5)
GLOBULIN SER CALC-MCNC: 3 G/DL (ref 2–4)
GLUCOSE SERPL-MCNC: 103 MG/DL (ref 65–100)
HCT VFR BLD AUTO: 36.2 % (ref 36.6–50.3)
HGB BLD-MCNC: 11.3 G/DL (ref 12.1–17)
IMM GRANULOCYTES # BLD AUTO: 0.07 K/UL (ref 0–0.04)
IMM GRANULOCYTES NFR BLD AUTO: 0.9 % (ref 0–0.5)
LYMPHOCYTES # BLD: 1.93 K/UL (ref 0.8–3.5)
LYMPHOCYTES NFR BLD: 23.7 % (ref 12–49)
MCH RBC QN AUTO: 30.2 PG (ref 26–34)
MCHC RBC AUTO-ENTMCNC: 31.2 G/DL (ref 30–36.5)
MCV RBC AUTO: 96.8 FL (ref 80–99)
MONOCYTES # BLD: 0.85 K/UL (ref 0–1)
MONOCYTES NFR BLD: 10.4 % (ref 5–13)
NEUTS SEG # BLD: 4.93 K/UL (ref 1.8–8)
NEUTS SEG NFR BLD: 60.3 % (ref 32–75)
NRBC # BLD: 0 K/UL (ref 0–0.01)
NRBC BLD-RTO: 0 PER 100 WBC
PLATELET # BLD AUTO: 245 K/UL (ref 150–400)
PMV BLD AUTO: 9.9 FL (ref 8.9–12.9)
POTASSIUM SERPL-SCNC: 4.7 MMOL/L (ref 3.5–5.1)
PROT SERPL-MCNC: 5.9 G/DL (ref 6.4–8.2)
RBC # BLD AUTO: 3.74 M/UL (ref 4.1–5.7)
SODIUM SERPL-SCNC: 139 MMOL/L (ref 136–145)
WBC # BLD AUTO: 8.2 K/UL (ref 4.1–11.1)

## 2025-04-17 PROCEDURE — 6370000000 HC RX 637 (ALT 250 FOR IP): Performed by: FAMILY MEDICINE

## 2025-04-17 PROCEDURE — 95816 EEG AWAKE AND DROWSY: CPT | Performed by: PSYCHIATRY & NEUROLOGY

## 2025-04-17 PROCEDURE — 6360000002 HC RX W HCPCS: Performed by: FAMILY MEDICINE

## 2025-04-17 PROCEDURE — 80053 COMPREHEN METABOLIC PANEL: CPT

## 2025-04-17 PROCEDURE — 85025 COMPLETE CBC W/AUTO DIFF WBC: CPT

## 2025-04-17 PROCEDURE — 94640 AIRWAY INHALATION TREATMENT: CPT

## 2025-04-17 PROCEDURE — 6360000004 HC RX CONTRAST MEDICATION: Performed by: RADIOLOGY

## 2025-04-17 PROCEDURE — 70553 MRI BRAIN STEM W/O & W/DYE: CPT

## 2025-04-17 PROCEDURE — 2500000003 HC RX 250 WO HCPCS: Performed by: FAMILY MEDICINE

## 2025-04-17 PROCEDURE — A9579 GAD-BASE MR CONTRAST NOS,1ML: HCPCS | Performed by: RADIOLOGY

## 2025-04-17 PROCEDURE — 1100000000 HC RM PRIVATE

## 2025-04-17 PROCEDURE — 94761 N-INVAS EAR/PLS OXIMETRY MLT: CPT

## 2025-04-17 PROCEDURE — 99232 SBSQ HOSP IP/OBS MODERATE 35: CPT | Performed by: PSYCHIATRY & NEUROLOGY

## 2025-04-17 RX ORDER — DIAZEPAM 5 MG/1
5 TABLET ORAL
Status: COMPLETED | OUTPATIENT
Start: 2025-04-17 | End: 2025-04-17

## 2025-04-17 RX ORDER — BUDESONIDE 0.5 MG/2ML
0.25 INHALANT ORAL 2 TIMES DAILY PRN
Status: DISCONTINUED | OUTPATIENT
Start: 2025-04-17 | End: 2025-04-24 | Stop reason: HOSPADM

## 2025-04-17 RX ORDER — ARFORMOTEROL TARTRATE 15 UG/2ML
15 SOLUTION RESPIRATORY (INHALATION) 2 TIMES DAILY PRN
Status: DISCONTINUED | OUTPATIENT
Start: 2025-04-17 | End: 2025-04-24 | Stop reason: HOSPADM

## 2025-04-17 RX ADMIN — METOPROLOL TARTRATE 12.5 MG: 25 TABLET, FILM COATED ORAL at 21:43

## 2025-04-17 RX ADMIN — DIAZEPAM 5 MG: 5 TABLET ORAL at 12:37

## 2025-04-17 RX ADMIN — GADOTERIDOL 13 ML: 279.3 INJECTION, SOLUTION INTRAVENOUS at 13:27

## 2025-04-17 RX ADMIN — IPRATROPIUM BROMIDE 0.5 MG: 0.5 SOLUTION RESPIRATORY (INHALATION) at 20:22

## 2025-04-17 RX ADMIN — SODIUM CHLORIDE, PRESERVATIVE FREE 10 ML: 5 INJECTION INTRAVENOUS at 21:00

## 2025-04-17 RX ADMIN — MIRTAZAPINE 15 MG: 15 TABLET, FILM COATED ORAL at 21:43

## 2025-04-17 RX ADMIN — ATORVASTATIN CALCIUM 10 MG: 10 TABLET, FILM COATED ORAL at 21:43

## 2025-04-17 RX ADMIN — RIVAROXABAN 10 MG: 10 TABLET, FILM COATED ORAL at 10:06

## 2025-04-17 RX ADMIN — SODIUM CHLORIDE, PRESERVATIVE FREE 10 ML: 5 INJECTION INTRAVENOUS at 10:07

## 2025-04-17 RX ADMIN — METOPROLOL TARTRATE 12.5 MG: 25 TABLET, FILM COATED ORAL at 10:06

## 2025-04-17 RX ADMIN — LEVOTHYROXINE SODIUM 50 MCG: 0.05 TABLET ORAL at 06:38

## 2025-04-17 RX ADMIN — DULOXETINE 30 MG: 30 CAPSULE, DELAYED RELEASE ORAL at 10:06

## 2025-04-17 NOTE — CARE COORDINATION
Care Management Progress Note    Reason for Admission:   Syncope and collapse [R55]  Elevated CK [R74.8]  Failure to thrive in adult [R62.7]  Frequent falls [R29.6]  Fall, initial encounter [W19.XXXA]         Patient Admission Status: Inpatient  RUR:  19%  Hospitalization in the last 30 days (Readmission):  Yes        Reportedly, pt resides at [facility]  EDGAR Stewart ([phone]) is the primary family contact.     Transition Plan of Care:  Neurology is following for medical management  PT/OT evals complete; pt is mod to max assist with bed mobility and transfers to chair and depn for transfers to the toilet on 4/16.  SNF has been recommended and this was discussed with pt's EDGAR via phone.    EDGAR is out of town currently and [place].  Reportedly, pt only listens to her.  EDGAR gave CM permission to send out referrals to area SNFs to explore acceptance and bed availability.    Outpatient follow up  Ambulance transport will be needed     CM will continue to follow pt for SNF  Marleny

## 2025-04-17 NOTE — PROCEDURES
Routine EEG      [place], [place]        [phone] (Main)  [phone] (Medical Records)     Routine EEG (16-channel)    Date of Study:   4-16-25  Date of Interpretation: 4-16-25  Date of Report:   4-17-25    Indication:    80 y.o. male  Syncope and collapse [R55]  Elevated CK [R74.8]  Failure to thrive in adult [R62.7]  Frequent falls [R29.6]  Fall, initial encounter [W19.XXXA]    PSHx lists Cranial Surgery: No    Impression:      1) Mild generalized slowing during wakefulness, suggestive of an encephalopathic process, not specific as to cause.  Potential etiologies include (but not limited to): toxic, metabolic, infectious, neuro-degenerative, iatrogenic (ie sedating medication) processes, or a post-ictal state.  No epileptiform discharges were seen during this recording.     2) During EEG, patient exhibited agitated, impulsive behaviors to get out of bed/ throw himself out of bed, while awake (not a sleep disorder), and there were no accompanying seizure discharges.  He was admitted for being found on floor next to bed at assisted living, unclear how he got out of bed.  Most likely patient had similar episode at assisted living, throwing himself out of bed. These episodes may be due to some uncontrolled anxiety issue ( I note patient on multiple psychiatric medications).     3) Clinical and Neuro-Imaging correlation is necessary    =================================  Technical: 16-channel, multiple montages, digital EEG, 10-20 international placement system format.   Simultaneous video recording is performed.  The technical quality of the study was adequate.  Single lead EKG was recorded.     Interpretation:      At the beginning of the recording, the patient is described as: awake, eyes closed.    With eyes closed, the background is: symmetric, movement artifact   The PDR is 7 Hz on both sides when calm/ resting    EVENT: CALLED BY EEG Techs(s) to come to bedside as patient  holding pillow tightly to chest, eyes closed, kicking motion, throwing legs over side rail attempting to get out of bed, requiring 3 staff members (2 EEG techs and one Sitter) to keep patient from throwing himself out of bed.     Reviewed EEG at bedside during this behavior.  Awake EEG pattern, no seizure discharges.  Not sleeping to suggest a sleep disorder (REM Behavior Sleep disorder or other parasomnia).     Single lead EKG poorly recorded due to movement artifact      =================================    Home Meds    Medications Prior to Admission: DULoxetine (CYMBALTA) 30 MG extended release capsule, Take 1 capsule by mouth daily  mirtazapine (REMERON SOL-TAB) 15 MG disintegrating tablet, Take 1 tablet by mouth nightly for 21 days  rivaroxaban (XARELTO) 10 MG TABS tablet, Take 1 tablet by mouth daily (with breakfast)  metoprolol tartrate (LOPRESSOR) 25 MG tablet, Take 1 tablet by mouth 2 times daily Hold if BP <100  levothyroxine (SYNTHROID) 50 MCG tablet, Take 1 tablet by mouth every morning (before breakfast)  OLANZapine (ZYPREXA) 2.5 MG tablet, Take 1 tablet by mouth nightly for 21 days  diclofenac sodium (VOLTAREN) 1 % GEL, Apply 2 g topically 4 times daily as needed for Pain  meloxicam (MOBIC) 15 MG tablet, Take 1 tablet by mouth daily  albuterol sulfate HFA (PROVENTIL;VENTOLIN;PROAIR) 108 (90 Base) MCG/ACT inhaler, Inhale 2 puffs into the lungs every 4 hours as needed for Shortness of Breath  atorvastatin (LIPITOR) 10 MG tablet, Take 1 tablet by mouth daily  glycopyrrolate-formoterol (BEVESPI) 9-4.8 MCG/ACT AERO, Inhale 2 puffs into the lungs 2 times daily  ipratropium-albuterol (DUONEB) 0.5-2.5 (3) MG/3ML SOLN nebulizer solution, Inhale 3 mLs into the lungs every 4 hours as needed for Wheezing  metFORMIN (GLUCOPHAGE) 500 MG tablet, Take 1 tablet by mouth daily  mometasone (ASMANEX) 100 MCG/ACT AERO inhaler, Inhale 2 puffs into the lungs 2 times daily    Current Scheduled Meds        metoprolol tartrate,

## 2025-04-17 NOTE — PROGRESS NOTES
Physician Progress Note      PATIENT:               YADIRA TUCKER  CSN #:                  [id number]  :                       1945  ADMIT DATE:       4/15/2025 9:04 AM  DISCH DATE:  RESPONDING  PROVIDER #:        Juan José Flores MD        QUERY TEXT:    Type of Encephalopathy: Please provide further specificity, if known.    Clinical indicators include: infectious, acute, encephalopathy, infection,   ammonia levels  Options provided:  -- Anoxic/hypoxic encephalopathy  -- Metabolic encephalopathy  -- Toxic encephalopathy  -- Hepatic encephalopathy  -- Hypertensive encephalopathy  -- Other - I will add my own diagnosis  -- Disagree - Not applicable / Not valid  -- Disagree - Clinically Unable to determine / Unknown        PROVIDER RESPONSE TEXT:    Unclear what is causing encephalopathy at this time. Work up is underway.      Electronically signed by:  Juan José Flores MD 2025 6:20 PM

## 2025-04-17 NOTE — PROGRESS NOTES
[facility]  [address] [place] [address] [phone]        Hospitalist Progress Note      NAME: Jane Campbell   :  1945  MRM:  [id number]    Date/Time of service: 2025  2:27 PM       Subjective:   Patient was personally seen and examined by me during this time period.  Chart reviewed.     He was alert and awake.  He denies any concerns.  He only occasionally answers questions.     ROS: per history above       Objective:       Vitals:       Last 24hrs VS reviewed since prior progress note. Most recent are:    Vitals:    25 1215   BP: 110/62   Pulse: 70   Resp: 16   Temp: 97.9 °F (36.6 °C)   SpO2: 92%     SpO2 Readings from Last 6 Encounters:   25 92%   25 95%   24 98%   24 93%   08/10/24 96%   24 98%          Intake/Output Summary (Last 24 hours) at 2025 1427  Last data filed at 2025 0308  Gross per 24 hour   Intake 200 ml   Output 550 ml   Net -350 ml        Exam:     Physical Exam:    Gen:  Well-developed, well-nourished, in no acute distress  HEENT:  Pink conjunctivae, hearing intact to voice, moist mucous membranes  Neck:  Supple  Resp:  No accessory muscle use, clear breath sounds without wheezes rales or rhonchi  Card:  No murmurs, normal S1, S2 without thrills, bruits or peripheral edema  Abd:  Soft, non-tender, non-distended  Skin:  No rashes or ulcers, skin turgor is good  Neuro:  Cranial nerves 3-12 are grossly intact, does follows commands appropriately  Psych:  alert, unclear insight. Unclear orientation.        Medications Reviewed: (see below)    Lab Data Reviewed: (see below)    ______________________________________________________________________    Medications:     Current Facility-Administered Medications   Medication Dose Route Frequency    arformoterol tartrate (BROVANA) nebulizer solution 15 mcg  15 mcg Nebulization BID PRN    And    ipratropium (ATROVENT) 0.02 % nebulizer solution 0.5

## 2025-04-17 NOTE — PROGRESS NOTES
Physical and Occupational Therapy    Attempted to see patient, received asleep but woke immediately and alert.  Patient declining participation at this time, citing many reasons.  First fear of falling, assured him we would use [facility] like yesterday if concerned for the transfer.  Then concerned about falling out of the recliner.  Reminded him he did very well in recliner for the afternoon yesterday.  Then he stated, \"I'm just comfortable here, I'm lazy\".  Continued to decline despite education.  Will cont to follow as appropriate.    Lizzy Lyons, MS, PT

## 2025-04-17 NOTE — PROGRESS NOTES
INPATIENT NEUROLOGY FOLLOW-UP NOTE    NAME:  Jane Campbell  MRN:  [id number]  : 1945      ADMIT DATE:   4/15/2025  9:04 AM    REASON FOR FOLLOW UP: Falls, unwitnessed fall out of bed at assisted living    Impression/ Plan from Neurology Consult/ 25 : 80 y.o. 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  Report of recent, increased falling.  Blood pressure low at times (99/ 84 around 0800 today, 99/ 66 yesterday around 9 AM).  CT Head w/o acute intracranial abnormality.  Mild tremors on exam.  Ddx: possible orthostatic hypotension contributing to falls +/- falls due to left leg weakness/ left foot drop +/- falls due to Parkinsonism from Olanzapine/ Neuroleptic (has mild resting tremors on exam) +/- cervical myelopathy (MRI C-spine on 2020 described multi-level severe canal stenosis and possible subtle increase in signal within spinal cord/ ? cervical myelopathy). Unclear if he fell out of bed when trying to stand; he can't tell me if he was trying to stand up.  Would check EEG one more time to look for any seizure discharges that could have caused seizure while asleep then fall out of bed. Agree with Admitting Physician stopping the Zyprexa due to concerns about polypharmacy at patient's age.  Entered order for RN to check orthostatic VS    ===========    25  EEG performed yesterday and captured patient kicking legs and throwing them over side rails in attempt to get out of bed.  Patient was tightly holding pillow across chest, keeping eyes closed during this behavior. EEG showed awake pattern with no seizure discharges.  Suspect patient had similar event at assisted living, and threw himself out of bed. Suspect this is psychiatric (?anxiety) in nature.     Patient resting in bed, appears to be sleeping.  Sitter in room. He does wake up to voice, looks around.  I ask him if he's feeling tired, he nods yes.      Impression:      Causes of falls (while standing)  (ZOFRAN-ODT) disintegrating tablet 4 mg, 4 mg, Oral, Q8H PRN **OR** ondansetron (ZOFRAN) injection 4 mg, 4 mg, IntraVENous, Q6H PRN, Brittani Linares MD    polyethylene glycol (GLYCOLAX) packet 17 g, 17 g, Oral, Daily PRN, Brittani Linares MD    acetaminophen (TYLENOL) tablet 650 mg, 650 mg, Oral, Q6H PRN **OR** acetaminophen (TYLENOL) suppository 650 mg, 650 mg, Rectal, Q6H PRN, Brittani Linares MD    DULoxetine (CYMBALTA) extended release capsule 30 mg, 30 mg, Oral, Daily, Brittani Linares MD, 30 mg at 04/17/25 1006    ipratropium 0.5 mg-albuterol 2.5 mg (DUONEB) nebulizer solution 1 Dose, 1 Dose, Inhalation, Q4H PRN, Brittani Linares MD    levothyroxine (SYNTHROID) tablet 50 mcg, 50 mcg, Oral, QAM AC, Brittani Linares MD, 50 mcg at 04/17/25 0638    mirtazapine (REMERON) tablet 15 mg, 15 mg, Oral, Nightly, Brittani Linares MD, 15 mg at 04/15/25 2019    budesonide (PULMICORT) nebulizer suspension 250 mcg, 0.25 mg, Nebulization, BID, Brittani Linares MD, 250 mcg at 04/16/25 0750    rivaroxaban (XARELTO) tablet 10 mg, 10 mg, Oral, Daily with breakfast, Brittani Linares MD, 10 mg at 04/17/25 1006    arformoterol tartrate (BROVANA) nebulizer solution 15 mcg, 15 mcg, Nebulization, BID RT, 15 mcg at 04/16/25 0744 **AND** ipratropium (ATROVENT) 0.02 % nebulizer solution 0.5 mg, 0.5 mg, Nebulization, Q6H WA RT, Brittani Linares MD, 0.5 mg at 04/16/25 0744      =====================================    MEDICAL DECISION MAKING (2 of 3: A +/- B +/- C)    A) Number and Complexity of Problem Addressed:  [] 1 stable, acute illness (Low)  [] 1 stable, chronic illness (Low)  [] 1 acute illness with systemic symptoms (Moderate)  [x] 1 undiagnosed new problem with uncertain prognosis (Moderate)  [] 1 or more chronic illness with exacerbation, progression, or side effects of treatment (Moderate)  [] 1 or more chronic illnesses with severe exacerbation, progression, or side effects of treatment (High)  [] 1 acute or

## 2025-04-18 LAB
ALBUMIN SERPL-MCNC: 2.8 G/DL (ref 3.5–5)
ALBUMIN/GLOB SERPL: 0.8 (ref 1.1–2.2)
ALP SERPL-CCNC: 91 U/L (ref 45–117)
ALT SERPL-CCNC: 23 U/L (ref 12–78)
AMMONIA PLAS-SCNC: 30 UMOL/L
ANION GAP SERPL CALC-SCNC: 5 MMOL/L (ref 2–12)
AST SERPL-CCNC: 16 U/L (ref 15–37)
BASOPHILS # BLD: 0.05 K/UL (ref 0–0.1)
BASOPHILS NFR BLD: 0.6 % (ref 0–1)
BILIRUB SERPL-MCNC: 0.3 MG/DL (ref 0.2–1)
BUN SERPL-MCNC: 16 MG/DL (ref 6–20)
BUN/CREAT SERPL: 18 (ref 12–20)
CALCIUM SERPL-MCNC: 8.7 MG/DL (ref 8.5–10.1)
CHLORIDE SERPL-SCNC: 108 MMOL/L (ref 97–108)
CO2 SERPL-SCNC: 25 MMOL/L (ref 21–32)
CREAT SERPL-MCNC: 0.88 MG/DL (ref 0.7–1.3)
DIFFERENTIAL METHOD BLD: ABNORMAL
EOSINOPHIL # BLD: 0.42 K/UL (ref 0–0.4)
EOSINOPHIL NFR BLD: 5 % (ref 0–7)
ERYTHROCYTE [DISTWIDTH] IN BLOOD BY AUTOMATED COUNT: 13.6 % (ref 11.5–14.5)
GLOBULIN SER CALC-MCNC: 3.4 G/DL (ref 2–4)
GLUCOSE SERPL-MCNC: 132 MG/DL (ref 65–100)
HCT VFR BLD AUTO: 36 % (ref 36.6–50.3)
HGB BLD-MCNC: 11.5 G/DL (ref 12.1–17)
IMM GRANULOCYTES # BLD AUTO: 0.08 K/UL (ref 0–0.04)
IMM GRANULOCYTES NFR BLD AUTO: 0.9 % (ref 0–0.5)
LYMPHOCYTES # BLD: 2.1 K/UL (ref 0.8–3.5)
LYMPHOCYTES NFR BLD: 24.9 % (ref 12–49)
MCH RBC QN AUTO: 30.5 PG (ref 26–34)
MCHC RBC AUTO-ENTMCNC: 31.9 G/DL (ref 30–36.5)
MCV RBC AUTO: 95.5 FL (ref 80–99)
MONOCYTES # BLD: 0.72 K/UL (ref 0–1)
MONOCYTES NFR BLD: 8.5 % (ref 5–13)
NEUTS SEG # BLD: 5.06 K/UL (ref 1.8–8)
NEUTS SEG NFR BLD: 60.1 % (ref 32–75)
NRBC # BLD: 0 K/UL (ref 0–0.01)
NRBC BLD-RTO: 0 PER 100 WBC
PLATELET # BLD AUTO: 279 K/UL (ref 150–400)
PMV BLD AUTO: 10 FL (ref 8.9–12.9)
POTASSIUM SERPL-SCNC: 4 MMOL/L (ref 3.5–5.1)
PROT SERPL-MCNC: 6.2 G/DL (ref 6.4–8.2)
RBC # BLD AUTO: 3.77 M/UL (ref 4.1–5.7)
SODIUM SERPL-SCNC: 138 MMOL/L (ref 136–145)
WBC # BLD AUTO: 8.4 K/UL (ref 4.1–11.1)

## 2025-04-18 PROCEDURE — 6370000000 HC RX 637 (ALT 250 FOR IP): Performed by: FAMILY MEDICINE

## 2025-04-18 PROCEDURE — 82140 ASSAY OF AMMONIA: CPT

## 2025-04-18 PROCEDURE — 6360000002 HC RX W HCPCS: Performed by: FAMILY MEDICINE

## 2025-04-18 PROCEDURE — 80053 COMPREHEN METABOLIC PANEL: CPT

## 2025-04-18 PROCEDURE — 1100000000 HC RM PRIVATE

## 2025-04-18 PROCEDURE — 85025 COMPLETE CBC W/AUTO DIFF WBC: CPT

## 2025-04-18 PROCEDURE — 94761 N-INVAS EAR/PLS OXIMETRY MLT: CPT

## 2025-04-18 PROCEDURE — 36415 COLL VENOUS BLD VENIPUNCTURE: CPT

## 2025-04-18 PROCEDURE — 97110 THERAPEUTIC EXERCISES: CPT

## 2025-04-18 PROCEDURE — 94640 AIRWAY INHALATION TREATMENT: CPT

## 2025-04-18 PROCEDURE — 2500000003 HC RX 250 WO HCPCS: Performed by: FAMILY MEDICINE

## 2025-04-18 RX ADMIN — MIRTAZAPINE 15 MG: 15 TABLET, FILM COATED ORAL at 20:23

## 2025-04-18 RX ADMIN — SODIUM CHLORIDE, PRESERVATIVE FREE 10 ML: 5 INJECTION INTRAVENOUS at 20:24

## 2025-04-18 RX ADMIN — IPRATROPIUM BROMIDE 0.5 MG: 0.5 SOLUTION RESPIRATORY (INHALATION) at 13:15

## 2025-04-18 RX ADMIN — DULOXETINE 30 MG: 30 CAPSULE, DELAYED RELEASE ORAL at 09:48

## 2025-04-18 RX ADMIN — RIVAROXABAN 10 MG: 10 TABLET, FILM COATED ORAL at 09:48

## 2025-04-18 RX ADMIN — METOPROLOL TARTRATE 12.5 MG: 25 TABLET, FILM COATED ORAL at 09:49

## 2025-04-18 RX ADMIN — IPRATROPIUM BROMIDE 0.5 MG: 0.5 SOLUTION RESPIRATORY (INHALATION) at 08:00

## 2025-04-18 RX ADMIN — LEVOTHYROXINE SODIUM 50 MCG: 0.05 TABLET ORAL at 05:41

## 2025-04-18 RX ADMIN — ATORVASTATIN CALCIUM 10 MG: 10 TABLET, FILM COATED ORAL at 20:23

## 2025-04-18 NOTE — PLAN OF CARE
Problem: Physical Therapy - Adult  Goal: By Discharge: Performs mobility at highest level of function for planned discharge setting.  See evaluation for individualized goals.  Description: FUNCTIONAL STATUS PRIOR TO ADMISSION: The patient was functional at the wheelchair level and required undetermined level of assistance for transfers to the chair via stand/squat pivot.  Patient lives at the [facility] but has paid caregivers daily that assist with transfers from bed to w/c as well as ADLs.  Once in w/c, patient is able to propel himself and takes his meals in the dining room.  He is alone in the evenings overnight.  Reports frequent falls but unable to relate cause or situation of falls.    Physical Therapy Goals  Initiated 4/16/2025  1.  Patient will move from supine to sit and sit to supine, scoot up and down, and roll side to side in bed with minimal assistance within 7 day(s).    2.  Patient will perform sit to stand with minimal assistance within 7 day(s).  3.  Patient will transfer from bed to chair and chair to bed with minimal assistance using the least restrictive device within 7 day(s).  5.  Patient will sit up in chair 2 hours at a time to improve OOB tolerance within 7 day(s).        Outcome: Progressing   PHYSICAL THERAPY TREATMENT    Patient: Jane Campbell (80 y.o. male)  Date: 4/18/2025  Diagnosis: Syncope and collapse [R55]  Elevated CK [R74.8]  Failure to thrive in adult [R62.7]  Frequent falls [R29.6]  Fall, initial encounter [W19.XXXA] Frequent falls      Precautions:              ASSESSMENT:  Patient continues to benefit from skilled PT services.Pt agreeable to and performed LE AROM exercise with min assist and cues.Heel slides and hip abd/add.Pt tolerated treatment well.Continue goals.         PLAN:  Patient continues to benefit from skilled intervention to address the above impairments.  Continue treatment per established plan of care.        Recommendation for discharge: (in order

## 2025-04-18 NOTE — PLAN OF CARE
Problem: Respiratory - Adult  Goal: Achieves optimal ventilation and oxygenation  4/17/2025 2313 by Renata Gandhi, RN  Outcome: Progressing  4/17/2025 2103 by Elisabet Covarrubias RCP  Outcome: Progressing  4/17/2025 1157 by Jannet Poole, RN  Outcome: Progressing  4/17/2025 1156 by Jannet Poole, RN  Outcome: Progressing

## 2025-04-18 NOTE — CARE COORDINATION
Care Management Progress Note    Reason for Admission:   Syncope and collapse [R55]  Elevated CK [R74.8]  Failure to thrive in adult [R62.7]  Frequent falls [R29.6]  Fall, initial encounter [W19.XXXA]         Patient Admission Status: Inpatient  RUR:  18%  Hospitalization in the last 30 days (Readmission):  No        Reportedly, pt resides at [facility]  EDGAR Stewart ([phone]) is the primary family contact.     Transition Plan of Care:  Neurology is following for medical management  SNF - referrals were sent to [facility] (accepted), [facility] ([facility]), and [facility] ([facility]).   Pt has Medicare so no insurance auth is required  Pt must remain sitter free x 24 hrs (recommend removal on Sunday)  Outpatient follow up  Ambulance transport will be needed     CM will continue to follow pt for SNF (anticipate admission on Monday if sitter is removed successfully)  Marleny

## 2025-04-18 NOTE — PROGRESS NOTES
[facility]  [address] [place] [address] [phone]        Hospitalist Progress Note      NAME: Jane Campbell   :  1945  MRM:  [id number]    Date/Time of service: 2025  1:24 PM       Subjective:   Patient was personally seen and examined by me during this time period.  Chart reviewed.     He was alert and awake.  He denies any concerns.  More responsive to questions today.     ROS: per history above       Objective:       Vitals:       Last 24hrs VS reviewed since prior progress note. Most recent are:    Vitals:    25 1315   BP:    Pulse: 65   Resp: 16   Temp:    SpO2: 95%     SpO2 Readings from Last 6 Encounters:   25 95%   25 95%   24 98%   24 93%   08/10/24 96%   24 98%          Intake/Output Summary (Last 24 hours) at 2025 1324  Last data filed at 2025 1244  Gross per 24 hour   Intake 616 ml   Output 1075 ml   Net -459 ml        Exam:     Physical Exam:    Gen:  Well-developed, well-nourished, in no acute distress  HEENT:  Pink conjunctivae, hearing intact to voice, moist mucous membranes  Neck:  Supple  Resp:  No accessory muscle use, clear breath sounds without wheezes rales or rhonchi  Card:  No murmurs, normal S1, S2 without thrills, bruits or peripheral edema  Abd:  Soft, non-tender, non-distended  Skin:  No rashes or ulcers, skin turgor is good  Neuro:  Cranial nerves 3-12 are grossly intact, does follows commands appropriately  Psych:  alert, unclear insight. Unclear orientation.        Medications Reviewed: (see below)    Lab Data Reviewed: (see below)    ______________________________________________________________________    Medications:     Current Facility-Administered Medications   Medication Dose Route Frequency    arformoterol tartrate (BROVANA) nebulizer solution 15 mcg  15 mcg Nebulization BID PRN    And    ipratropium (ATROVENT) 0.02 % nebulizer solution 0.5 mg  0.5 mg Nebulization Q6H

## 2025-04-19 ENCOUNTER — APPOINTMENT (OUTPATIENT)
Facility: HOSPITAL | Age: 80
DRG: 091 | End: 2025-04-19
Payer: MEDICARE

## 2025-04-19 LAB
ALBUMIN SERPL-MCNC: 3 G/DL (ref 3.5–5)
ALBUMIN/GLOB SERPL: 0.9 (ref 1.1–2.2)
ALP SERPL-CCNC: 98 U/L (ref 45–117)
ALT SERPL-CCNC: 21 U/L (ref 12–78)
ANION GAP SERPL CALC-SCNC: 5 MMOL/L (ref 2–12)
AST SERPL-CCNC: 12 U/L (ref 15–37)
BASOPHILS # BLD: 0.04 K/UL (ref 0–0.1)
BASOPHILS NFR BLD: 0.4 % (ref 0–1)
BILIRUB SERPL-MCNC: 0.7 MG/DL (ref 0.2–1)
BUN SERPL-MCNC: 16 MG/DL (ref 6–20)
BUN/CREAT SERPL: 18 (ref 12–20)
CALCIUM SERPL-MCNC: 9.1 MG/DL (ref 8.5–10.1)
CHLORIDE SERPL-SCNC: 109 MMOL/L (ref 97–108)
CO2 SERPL-SCNC: 25 MMOL/L (ref 21–32)
CREAT SERPL-MCNC: 0.88 MG/DL (ref 0.7–1.3)
DIFFERENTIAL METHOD BLD: ABNORMAL
EOSINOPHIL # BLD: 0.48 K/UL (ref 0–0.4)
EOSINOPHIL NFR BLD: 4.9 % (ref 0–7)
ERYTHROCYTE [DISTWIDTH] IN BLOOD BY AUTOMATED COUNT: 13.8 % (ref 11.5–14.5)
GLOBULIN SER CALC-MCNC: 3.5 G/DL (ref 2–4)
GLUCOSE SERPL-MCNC: 105 MG/DL (ref 65–100)
HCT VFR BLD AUTO: 37.2 % (ref 36.6–50.3)
HGB BLD-MCNC: 11.9 G/DL (ref 12.1–17)
IMM GRANULOCYTES # BLD AUTO: 0.1 K/UL (ref 0–0.04)
IMM GRANULOCYTES NFR BLD AUTO: 1 % (ref 0–0.5)
LYMPHOCYTES # BLD: 2.12 K/UL (ref 0.8–3.5)
LYMPHOCYTES NFR BLD: 21.7 % (ref 12–49)
MCH RBC QN AUTO: 30.9 PG (ref 26–34)
MCHC RBC AUTO-ENTMCNC: 32 G/DL (ref 30–36.5)
MCV RBC AUTO: 96.6 FL (ref 80–99)
MONOCYTES # BLD: 0.86 K/UL (ref 0–1)
MONOCYTES NFR BLD: 8.8 % (ref 5–13)
NEUTS SEG # BLD: 6.17 K/UL (ref 1.8–8)
NEUTS SEG NFR BLD: 63.2 % (ref 32–75)
NRBC # BLD: 0 K/UL (ref 0–0.01)
NRBC BLD-RTO: 0 PER 100 WBC
PLATELET # BLD AUTO: 301 K/UL (ref 150–400)
PMV BLD AUTO: 9.8 FL (ref 8.9–12.9)
POTASSIUM SERPL-SCNC: 4.1 MMOL/L (ref 3.5–5.1)
PROT SERPL-MCNC: 6.5 G/DL (ref 6.4–8.2)
RBC # BLD AUTO: 3.85 M/UL (ref 4.1–5.7)
SODIUM SERPL-SCNC: 139 MMOL/L (ref 136–145)
WBC # BLD AUTO: 9.8 K/UL (ref 4.1–11.1)

## 2025-04-19 PROCEDURE — 2500000003 HC RX 250 WO HCPCS: Performed by: FAMILY MEDICINE

## 2025-04-19 PROCEDURE — 80053 COMPREHEN METABOLIC PANEL: CPT

## 2025-04-19 PROCEDURE — 6370000000 HC RX 637 (ALT 250 FOR IP): Performed by: FAMILY MEDICINE

## 2025-04-19 PROCEDURE — 72141 MRI NECK SPINE W/O DYE: CPT

## 2025-04-19 PROCEDURE — 1100000000 HC RM PRIVATE

## 2025-04-19 PROCEDURE — 36415 COLL VENOUS BLD VENIPUNCTURE: CPT

## 2025-04-19 PROCEDURE — 94761 N-INVAS EAR/PLS OXIMETRY MLT: CPT

## 2025-04-19 PROCEDURE — 85025 COMPLETE CBC W/AUTO DIFF WBC: CPT

## 2025-04-19 RX ADMIN — MIRTAZAPINE 15 MG: 15 TABLET, FILM COATED ORAL at 21:32

## 2025-04-19 RX ADMIN — ATORVASTATIN CALCIUM 10 MG: 10 TABLET, FILM COATED ORAL at 21:32

## 2025-04-19 RX ADMIN — SODIUM CHLORIDE, PRESERVATIVE FREE 10 ML: 5 INJECTION INTRAVENOUS at 10:56

## 2025-04-19 RX ADMIN — LEVOTHYROXINE SODIUM 50 MCG: 0.05 TABLET ORAL at 06:19

## 2025-04-19 RX ADMIN — SODIUM CHLORIDE, PRESERVATIVE FREE 10 ML: 5 INJECTION INTRAVENOUS at 21:32

## 2025-04-19 ASSESSMENT — PAIN SCALES - GENERAL
PAINLEVEL_OUTOF10: 0
PAINLEVEL_OUTOF10: 0

## 2025-04-19 NOTE — PROGRESS NOTES
[facility]  [address] [place] [address] [phone]        Hospitalist Progress Note      NAME: Jane Campbell   :  1945  MRM:  [id number]    Date/Time of service: 2025  12:00 PM       Subjective:   Patient was personally seen and examined by me during this time period.  Chart reviewed.     He was alert and awake.  He denies any concerns.    ROS: per history above       Objective:       Vitals:       Last 24hrs VS reviewed since prior progress note. Most recent are:    Vitals:    25 1048   BP: 116/72   Pulse: 78   Resp: 16   Temp: 98.6 °F (37 °C)   SpO2: 96%     SpO2 Readings from Last 6 Encounters:   25 96%   25 95%   24 98%   24 93%   08/10/24 96%   24 98%          Intake/Output Summary (Last 24 hours) at 2025 1200  Last data filed at 2025 0638  Gross per 24 hour   Intake 972 ml   Output 600 ml   Net 372 ml        Exam:     Physical Exam:    Gen:  Well-developed, well-nourished, in no acute distress  HEENT:  Pink conjunctivae, hearing intact to voice, moist mucous membranes  Neck:  Supple  Resp:  No accessory muscle use, clear breath sounds without wheezes rales or rhonchi  Card:  No murmurs, normal S1, S2 without thrills, bruits or peripheral edema  Abd:  Soft, non-tender, non-distended  Skin:  No rashes or ulcers, skin turgor is good  Neuro:  Cranial nerves 3-12 are grossly intact, does follows commands appropriately  Psych:  alert, unclear insight. Unclear orientation.  Does respond appropriately to questions.       Medications Reviewed: (see below)    Lab Data Reviewed: (see below)    ______________________________________________________________________    Medications:     Current Facility-Administered Medications   Medication Dose Route Frequency    arformoterol tartrate (BROVANA) nebulizer solution 15 mcg  15 mcg Nebulization BID PRN    budesonide (PULMICORT) nebulizer suspension 250 mcg  0.25 mg

## 2025-04-19 NOTE — PLAN OF CARE
Problem: Respiratory - Adult  Goal: Achieves optimal ventilation and oxygenation  4/18/2025 2247 by Jaja Garcia RN  Outcome: Progressing  4/18/2025 1101 by Elma Hamilton, RT  Outcome: Progressing     Problem: Chronic Conditions and Co-morbidities  Goal: Patient's chronic conditions and co-morbidity symptoms are monitored and maintained or improved  Outcome: Progressing     Problem: Safety - Adult  Goal: Free from fall injury  Outcome: Progressing

## 2025-04-19 NOTE — CARE COORDINATION
@ 1511 Case management follow up    Met with daughter in law/patient at bedside, per dtr request.    Discussed discharge plan, SNF referrals and acceptances.      Daughter in law states she feels the best option for the patient is to return home, states each time he has been discharged to a SNF he only stays for around 5 days and then wants to leave.   Daughter in law states she will be looking to set up in house therapy at [facility] with continued support from the current caregivers they pay OOP.     She would like to discuss with patient and follow up with case management once a final decision is made.    CM number left on board.     Patient remains with a sitter as of Saturday at 3:30 pm.  LASHELL

## 2025-04-20 LAB
ALBUMIN SERPL-MCNC: 3 G/DL (ref 3.5–5)
ALBUMIN/GLOB SERPL: 0.9 (ref 1.1–2.2)
ALP SERPL-CCNC: 99 U/L (ref 45–117)
ALT SERPL-CCNC: 21 U/L (ref 12–78)
ANION GAP SERPL CALC-SCNC: 5 MMOL/L (ref 2–12)
AST SERPL-CCNC: 13 U/L (ref 15–37)
BASOPHILS # BLD: 0.05 K/UL (ref 0–0.1)
BASOPHILS NFR BLD: 0.4 % (ref 0–1)
BILIRUB SERPL-MCNC: 0.4 MG/DL (ref 0.2–1)
BUN SERPL-MCNC: 21 MG/DL (ref 6–20)
BUN/CREAT SERPL: 21 (ref 12–20)
CALCIUM SERPL-MCNC: 9 MG/DL (ref 8.5–10.1)
CHLORIDE SERPL-SCNC: 106 MMOL/L (ref 97–108)
CO2 SERPL-SCNC: 26 MMOL/L (ref 21–32)
CREAT SERPL-MCNC: 0.99 MG/DL (ref 0.7–1.3)
DIFFERENTIAL METHOD BLD: ABNORMAL
EOSINOPHIL # BLD: 0.55 K/UL (ref 0–0.4)
EOSINOPHIL NFR BLD: 4.8 % (ref 0–7)
ERYTHROCYTE [DISTWIDTH] IN BLOOD BY AUTOMATED COUNT: 13.7 % (ref 11.5–14.5)
GLOBULIN SER CALC-MCNC: 3.5 G/DL (ref 2–4)
GLUCOSE BLD STRIP.AUTO-MCNC: 127 MG/DL (ref 65–117)
GLUCOSE SERPL-MCNC: 111 MG/DL (ref 65–100)
HCT VFR BLD AUTO: 36.5 % (ref 36.6–50.3)
HGB BLD-MCNC: 11.6 G/DL (ref 12.1–17)
IMM GRANULOCYTES # BLD AUTO: 0.11 K/UL (ref 0–0.04)
IMM GRANULOCYTES NFR BLD AUTO: 1 % (ref 0–0.5)
LYMPHOCYTES # BLD: 2.52 K/UL (ref 0.8–3.5)
LYMPHOCYTES NFR BLD: 22.1 % (ref 12–49)
MCH RBC QN AUTO: 30.9 PG (ref 26–34)
MCHC RBC AUTO-ENTMCNC: 31.8 G/DL (ref 30–36.5)
MCV RBC AUTO: 97.1 FL (ref 80–99)
MONOCYTES # BLD: 1.15 K/UL (ref 0–1)
MONOCYTES NFR BLD: 10.1 % (ref 5–13)
NEUTS SEG # BLD: 7.03 K/UL (ref 1.8–8)
NEUTS SEG NFR BLD: 61.6 % (ref 32–75)
NRBC # BLD: 0 K/UL (ref 0–0.01)
NRBC BLD-RTO: 0 PER 100 WBC
PLATELET # BLD AUTO: 298 K/UL (ref 150–400)
PMV BLD AUTO: 9.9 FL (ref 8.9–12.9)
POTASSIUM SERPL-SCNC: 4 MMOL/L (ref 3.5–5.1)
PROT SERPL-MCNC: 6.5 G/DL (ref 6.4–8.2)
RBC # BLD AUTO: 3.76 M/UL (ref 4.1–5.7)
SERVICE CMNT-IMP: ABNORMAL
SODIUM SERPL-SCNC: 137 MMOL/L (ref 136–145)
WBC # BLD AUTO: 11.4 K/UL (ref 4.1–11.1)

## 2025-04-20 PROCEDURE — 6370000000 HC RX 637 (ALT 250 FOR IP): Performed by: FAMILY MEDICINE

## 2025-04-20 PROCEDURE — 36415 COLL VENOUS BLD VENIPUNCTURE: CPT

## 2025-04-20 PROCEDURE — 82962 GLUCOSE BLOOD TEST: CPT

## 2025-04-20 PROCEDURE — 1100000000 HC RM PRIVATE

## 2025-04-20 PROCEDURE — 2500000003 HC RX 250 WO HCPCS: Performed by: FAMILY MEDICINE

## 2025-04-20 PROCEDURE — 80053 COMPREHEN METABOLIC PANEL: CPT

## 2025-04-20 PROCEDURE — 94761 N-INVAS EAR/PLS OXIMETRY MLT: CPT

## 2025-04-20 PROCEDURE — 99223 1ST HOSP IP/OBS HIGH 75: CPT | Performed by: PHYSICIAN ASSISTANT

## 2025-04-20 PROCEDURE — 85025 COMPLETE CBC W/AUTO DIFF WBC: CPT

## 2025-04-20 RX ADMIN — RIVAROXABAN 10 MG: 10 TABLET, FILM COATED ORAL at 08:59

## 2025-04-20 RX ADMIN — DULOXETINE 30 MG: 30 CAPSULE, DELAYED RELEASE ORAL at 08:59

## 2025-04-20 RX ADMIN — SODIUM CHLORIDE, PRESERVATIVE FREE 10 ML: 5 INJECTION INTRAVENOUS at 20:09

## 2025-04-20 ASSESSMENT — PAIN SCALES - GENERAL: PAINLEVEL_OUTOF10: 0

## 2025-04-20 NOTE — PROGRESS NOTES
[facility]  [address] [place] [address] [phone]        Hospitalist Progress Note      NAME: Jane Campbell   :  1945  MRM:  [id number]    Date/Time of service: 2025  10:36 AM       Subjective:   Seen and examined,, history limited, patient denies any discomfort  ROS: per history above       Objective:       Vitals:       Last 24hrs VS reviewed since prior progress note. Most recent are:    Vitals:    25 0711   BP: 138/83   Pulse: 86   Resp: 17   Temp: 98.2 °F (36.8 °C)   SpO2: 95%     SpO2 Readings from Last 6 Encounters:   25 95%   25 95%   24 98%   24 93%   08/10/24 96%   24 98%          Intake/Output Summary (Last 24 hours) at 2025 1036  Last data filed at 2025 2149  Gross per 24 hour   Intake 100 ml   Output --   Net 100 ml        Exam:     Physical Exam:    Gen:  Well-developed, well-nourished, in no acute distress  HEENT:  Pink conjunctivae, hearing intact to voice, moist mucous membranes  Neck:  Supple  Resp:  No accessory muscle use, clear breath sounds without wheezes rales or rhonchi  Card:  No murmurs, normal S1, S2 without thrills, bruits or peripheral edema  Abd:  Soft, non-tender, non-distended  Skin:  No rashes or ulcers, skin turgor is good  Neuro:  Cranial nerves 3-12 are grossly intact, does follows commands appropriately  Psych:  alert, unclear insight. Unclear orientation.  Does respond appropriately to questions.       Medications Reviewed: (see below)    Lab Data Reviewed: (see below)    ______________________________________________________________________    Medications:     Current Facility-Administered Medications   Medication Dose Route Frequency    arformoterol tartrate (BROVANA) nebulizer solution 15 mcg  15 mcg Nebulization BID PRN    budesonide (PULMICORT) nebulizer suspension 250 mcg  0.25 mg Nebulization BID PRN    [Held by provider] metoprolol tartrate (LOPRESSOR) tablet  for falls.    Frequent falls: unclear etiology.  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  Possibly from bradycardia. 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 Will rule out infectious causes and neurologic causes as well.   - PT/OT  - UA neg  - respiratory viral panel neg  - CXR neg  - stopped lopressor due to heart rate  - neurology consulted  - MRI c spine shows some cord issues.  Will put consult in to ortho.   --orthostatics    Acute encephalopathy: acute, present on admission.  Now improving, possibly back to baseline.  Per recent discharge, he was more responsive. Possibly 2/2 polypharmacy  --CT head neg  --MRI brain wwo neg  --EEG neg  --neurology consult  --TSH normal   -- ammonia levels neg  -- stopped zyprexa.   --respiratory viral panel neg     Elevated CK  - Mild  - ok to stop IVF as CK very low, well below 5000     Chronic HFrEF  - Recent EF 25%  - holding metoprolol due to low heart rate  - Caution with fluids     Atrial fibrillation  - Xarelto  - holding lopressor due to low heart rate  - May need to consider stopping Xarelto in setting of frequent falls    Chronic steroids use: unclear etiology.  Received 30 day prescription for daily prednisone 5 mg po daily from Cristobal Sneed NP on 4/9.  He was not discharged on prednisone on 4/2.  Will hold for now as it may complicate the picture and unclear reason for being on this medication.       Anxiety and depression  - Currently on Cymbalta, Remeron, Zyprexa.   - These are too many meds for elderly.  Need to consider cutting back.    - Stopped zyprexa     Hypothyroidism:  TSH normal.   - Continue Synthroid    COPD: chronic.  Not in exacerbation.  Continue formulary substitution for asmanex.      History of polio  -Supportive care     Hyperlipidemia: continue statin.     **Prior records, notes, labs, radiology, and medications reviewed in [facility] if needed**    Total time spent with patient care: 40 min **I  personally saw and examined the patient during this time period**                 Care Plan discussed with: Patient    Discussed:  Care Plan    Prophylaxis:   xarelto    Disposition:   LTC           ___________________________________________________    Attending Physician: Arjun Recio MD

## 2025-04-20 NOTE — PLAN OF CARE
Problem: Respiratory - Adult  Goal: Achieves optimal ventilation and oxygenation  4/19/2025 2006 by Jaja Garcia RN  Outcome: Progressing  4/19/2025 1050 by Jannet Poole RN  Outcome: Progressing     Problem: Chronic Conditions and Co-morbidities  Goal: Patient's chronic conditions and co-morbidity symptoms are monitored and maintained or improved  4/19/2025 2006 by Jaja Garcia RN  Outcome: Progressing  4/19/2025 1050 by Jannet Poole RN  Outcome: Progressing     Problem: Safety - Adult  Goal: Free from fall injury  4/19/2025 2006 by Jaja Garcia RN  Outcome: Progressing  4/19/2025 1050 by Jannet Poole RN  Outcome: Progressing     Problem: Skin/Tissue Integrity  Goal: Skin integrity remains intact  Description: 1.  Monitor for areas of redness and/or skin breakdown2.  Assess vascular access sites hourly3.  Every 4-6 hours minimum:  Change oxygen saturation probe site4.  Every 4-6 hours:  If on nasal continuous positive airway pressure, respiratory therapy assess nares and determine need for appliance change or resting period  4/19/2025 2006 by Jaja Garcia RN  Outcome: Progressing  4/19/2025 1050 by Jannet Poole RN  Outcome: Progressing     Problem: Pain  Goal: Verbalizes/displays adequate comfort level or baseline comfort level  4/19/2025 2006 by Jaja Garcia RN  Outcome: Progressing  4/19/2025 1050 by Jannet Poole RN  Outcome: Progressing

## 2025-04-20 NOTE — CARE COORDINATION
Care Management Progress Note    Reason for Admission:   Syncope and collapse [R55]  Elevated CK [R74.8]  Failure to thrive in adult [R62.7]  Frequent falls [R29.6]  Fall, initial encounter [W19.XXXA]         Patient Admission Status: Inpatient  RUR: 18% Moderate  Hospitalization in the last 30 days (Readmission):  Yes        Transition of care plan:  CM received call from unit nurse reporting family would like to have [place] return [place] at [facility]. CM attempted phone call to patient's EDGAR Pat but no answer. Assuming family has care giving in place, patient should be able to return to his home. All new medications need to be sent to [facility].  Discharge plan communicated with patient and/or discharge caregiver: Yes    Date 1st IMM letter given: 4/15/25  Outpatient follow-up: PCP, Specialist  Transport at discharge: JADEN Krishnan, MS  [phone]

## 2025-04-21 ENCOUNTER — APPOINTMENT (OUTPATIENT)
Facility: HOSPITAL | Age: 80
DRG: 091 | End: 2025-04-21
Payer: MEDICARE

## 2025-04-21 LAB
APPEARANCE UR: CLEAR
BACTERIA URNS QL MICRO: NEGATIVE /HPF
BILIRUB UR QL: NEGATIVE
COLOR UR: ABNORMAL
EPITH CASTS URNS QL MICRO: ABNORMAL /LPF
GLUCOSE UR STRIP.AUTO-MCNC: NEGATIVE MG/DL
HGB UR QL STRIP: NEGATIVE
KETONES UR QL STRIP.AUTO: ABNORMAL MG/DL
LEUKOCYTE ESTERASE UR QL STRIP.AUTO: ABNORMAL
NITRITE UR QL STRIP.AUTO: NEGATIVE
PH UR STRIP: 6 (ref 5–8)
PROT UR STRIP-MCNC: NEGATIVE MG/DL
RBC #/AREA URNS HPF: ABNORMAL /HPF (ref 0–5)
SP GR UR REFRACTOMETRY: 1.02 (ref 1–1.03)
SPECIMEN HOLD: NORMAL
URINE CULTURE IF INDICATED: ABNORMAL
UROBILINOGEN UR QL STRIP.AUTO: 1 EU/DL (ref 0.2–1)
WBC URNS QL MICRO: ABNORMAL /HPF (ref 0–4)

## 2025-04-21 PROCEDURE — 81001 URINALYSIS AUTO W/SCOPE: CPT

## 2025-04-21 PROCEDURE — 97530 THERAPEUTIC ACTIVITIES: CPT

## 2025-04-21 PROCEDURE — 6370000000 HC RX 637 (ALT 250 FOR IP): Performed by: FAMILY MEDICINE

## 2025-04-21 PROCEDURE — 94761 N-INVAS EAR/PLS OXIMETRY MLT: CPT

## 2025-04-21 PROCEDURE — 2500000003 HC RX 250 WO HCPCS: Performed by: FAMILY MEDICINE

## 2025-04-21 PROCEDURE — 1100000000 HC RM PRIVATE

## 2025-04-21 PROCEDURE — 97535 SELF CARE MNGMENT TRAINING: CPT

## 2025-04-21 PROCEDURE — 71045 X-RAY EXAM CHEST 1 VIEW: CPT

## 2025-04-21 RX ADMIN — MIRTAZAPINE 15 MG: 15 TABLET, FILM COATED ORAL at 20:04

## 2025-04-21 RX ADMIN — SODIUM CHLORIDE, PRESERVATIVE FREE 10 ML: 5 INJECTION INTRAVENOUS at 08:30

## 2025-04-21 RX ADMIN — ATORVASTATIN CALCIUM 10 MG: 10 TABLET, FILM COATED ORAL at 20:04

## 2025-04-21 RX ADMIN — POLYETHYLENE GLYCOL 3350 17 G: 17 POWDER, FOR SOLUTION ORAL at 20:04

## 2025-04-21 RX ADMIN — ACETAMINOPHEN 650 MG: 325 TABLET ORAL at 20:04

## 2025-04-21 ASSESSMENT — PAIN SCALES - GENERAL: PAINLEVEL_OUTOF10: 0

## 2025-04-21 NOTE — CARE COORDINATION
Care Management Progress Note    Reason for Admission:   Syncope and collapse [R55]  Elevated CK [R74.8]  Failure to thrive in adult [R62.7]  Frequent falls [R29.6]  Fall, initial encounter [W19.XXXA]         Patient Admission Status: Inpatient  RUR: 18%; LOS 5 days  Hospitalization in the last 30 days (Readmission):  Yes        Reportedly, pt resides at [facility] (indepn living).  EDGAR Pat ([phone]) is the primary family contact.     Transition Plan of Care:  Neurology, Ortho following for medical management  SNF was initial plan and pt was accepted by three rehab facilities.  EDGAR met with weekend  and plan is now for pt to return to [facility]   Outpatient follow up  Ambulance transport will be needed     CM will continue to follow pt until discharged.  Marleny

## 2025-04-21 NOTE — PLAN OF CARE
Problem: Respiratory - Adult  Goal: Achieves optimal ventilation and oxygenation  4/20/2025 2243 by Maya Madrigal RN  Outcome: Progressing  4/20/2025 1332 by Jannet Poole RN  Outcome: Progressing     Problem: Chronic Conditions and Co-morbidities  Goal: Patient's chronic conditions and co-morbidity symptoms are monitored and maintained or improved  4/20/2025 2243 by Maya Madrigal RN  Outcome: Progressing  4/20/2025 1332 by Jannet Poole RN  Outcome: Progressing     Problem: Safety - Adult  Goal: Free from fall injury  4/20/2025 2243 by Maya Madrigal RN  Outcome: Progressing  4/20/2025 1332 by Jannet Poole RN  Outcome: Progressing     Problem: Skin/Tissue Integrity  Goal: Skin integrity remains intact  Description: 1.  Monitor for areas of redness and/or skin breakdown2.  Assess vascular access sites hourly3.  Every 4-6 hours minimum:  Change oxygen saturation probe site4.  Every 4-6 hours:  If on nasal continuous positive airway pressure, respiratory therapy assess nares and determine need for appliance change or resting period  4/20/2025 2243 by Maya Madrigal RN  Outcome: Progressing  4/20/2025 1332 by Jannet Poole RN  Outcome: Progressing     Problem: Pain  Goal: Verbalizes/displays adequate comfort level or baseline comfort level  4/20/2025 2243 by Maya Madrigal RN  Outcome: Progressing  Flowsheets (Taken 4/20/2025 2005)  Verbalizes/displays adequate comfort level or baseline comfort level: Encourage patient to monitor pain and request assistance  4/20/2025 1332 by Jannet Poole RN  Outcome: Progressing

## 2025-04-21 NOTE — PROGRESS NOTES
[facility]  [address] [place] [address] [phone]        Hospitalist Progress Note      NAME: Jane Campbell   :  1945  MRM:  [id number]    Date/Time of service: 2025  12:57 PM       Subjective:   Seen and examined,, history limited, Patient does not answer verbally to questions.  He attempted to kick and hit provider when provider asked to examine patient.     ROS: per history above       Objective:       Vitals:       Last 24hrs VS reviewed since prior progress note. Most recent are:    Vitals:    25 1133   BP: 125/78   Pulse: 89   Resp: 16   Temp: 99.1 °F (37.3 °C)   SpO2: 97%     SpO2 Readings from Last 6 Encounters:   25 97%   25 95%   24 98%   24 93%   08/10/24 96%   24 98%          Intake/Output Summary (Last 24 hours) at 2025 1257  Last data filed at 2025 2321  Gross per 24 hour   Intake --   Output 650 ml   Net -650 ml        Exam:     Physical Exam:    Gen:  Well-developed, agitated.  HEENT:  Pink conjunctivae, hearing intact to voice, dry mucous membranes  Neck:  Supple  Resp:  No accessory muscle use, clear breath sounds without wheezes rales or rhonchi  Card:  No murmurs, normal S1, S2 without thrills, bruits or peripheral edema  Abd:  Soft, non-tender, non-distended  Skin:  No rashes or ulcers, skin turgor is good  Neuro:  Cranial nerves 3-12 are grossly intact, does follows commands appropriately  Psych:  alert, unclear insight. Unclear orientation.  agitated      Medications Reviewed: (see below)    Lab Data Reviewed: (see below)    ______________________________________________________________________    Medications:     Current Facility-Administered Medications   Medication Dose Route Frequency    arformoterol tartrate (BROVANA) nebulizer solution 15 mcg  15 mcg Nebulization BID PRN    budesonide (PULMICORT) nebulizer suspension 250 mcg  0.25 mg Nebulization BID PRN    [Held by provider]  metoprolol tartrate (LOPRESSOR) tablet 12.5 mg  12.5 mg Oral BID    atorvastatin (LIPITOR) tablet 10 mg  10 mg Oral Nightly    sodium chloride flush 0.9 % injection 5-40 mL  5-40 mL IntraVENous 2 times per day    sodium chloride flush 0.9 % injection 5-40 mL  5-40 mL IntraVENous PRN    0.9 % sodium chloride infusion   IntraVENous PRN    potassium chloride (KLOR-CON) extended release tablet 40 mEq  40 mEq Oral PRN    Or    potassium bicarb-citric acid (EFFER-K) effervescent tablet 40 mEq  40 mEq Oral PRN    Or    potassium chloride 10 mEq/100 mL IVPB (Peripheral Line)  10 mEq IntraVENous PRN    magnesium sulfate 2000 mg in 50 mL IVPB premix  2,000 mg IntraVENous PRN    ondansetron (ZOFRAN-ODT) disintegrating tablet 4 mg  4 mg Oral Q8H PRN    Or    ondansetron (ZOFRAN) injection 4 mg  4 mg IntraVENous Q6H PRN    polyethylene glycol (GLYCOLAX) packet 17 g  17 g Oral Daily PRN    acetaminophen (TYLENOL) tablet 650 mg  650 mg Oral Q6H PRN    Or    acetaminophen (TYLENOL) suppository 650 mg  650 mg Rectal Q6H PRN    DULoxetine (CYMBALTA) extended release capsule 30 mg  30 mg Oral Daily    ipratropium 0.5 mg-albuterol 2.5 mg (DUONEB) nebulizer solution 1 Dose  1 Dose Inhalation Q4H PRN    levothyroxine (SYNTHROID) tablet 50 mcg  50 mcg Oral QAM AC    mirtazapine (REMERON) tablet 15 mg  15 mg Oral Nightly    rivaroxaban (XARELTO) tablet 10 mg  10 mg Oral Daily with breakfast          Lab Review:     Recent Labs     04/19/25  0621 04/20/25  0242   WBC 9.8 11.4*   HGB 11.9* 11.6*   HCT 37.2 36.5*    298     Recent Labs     04/19/25  0621 04/20/25  0242    137   K 4.1 4.0   * 106   CO2 25 26   BUN 16 21*   ALT 21 21     No results found for: \"GLUCPOC\"       Assessment / Plan:     Principal Problem:    Frequent falls  Resolved Problems:    * No resolved hospital problems. *      Jane Campbell is an 80 y.o. male with Hx of anxiety, depression, COPD, polio, DM2 who is admitted for falls.    Frequent falls:

## 2025-04-21 NOTE — PLAN OF CARE
Problem: Occupational Therapy - Adult  Goal: By Discharge: Performs self-care activities at highest level of function for planned discharge setting.  See evaluation for individualized goals.  Description: FUNCTIONAL STATUS PRIOR TO ADMISSION:  Patient reports assistance with ADL tasks and mobility at home.   Receives Help From: Personal care attendant (daytime hours only), Prior Level of Assist for ADLs: Needs assistance, Bath: Minimal assistance, Dressing: Minimal assistance, Grooming: Minimal assistance, Feeding: Supervision, Toileting: Needs assistance, Prior Level of Assist for Homemaking: Needs assistance, Ambulation Assistance: Non-ambulatory (pt is w/c depn), Prior Level of Assist for Transfers: Needs assistance, Active : No     HOME SUPPORT: Patient lived alone in [facility], but has caregivers in daily to assist for ADLs and transfers to wheelchair.  Patient reports home alone.    Occupational Therapy Goals:  Initiated 4/16/2025  1.  Patient will perform lower body dressing with Maximal Assist within 7 day(s).  2.  Patient will perform upper body dressing with Minimal Assist within 7 day(s).  3.  Patient will perform grooming with Contact Guard Assist within 7 day(s).  4.  Patient will participate in upper extremity therapeutic exercise/activities with Minimal Assist for 10 minutes within 7 day(s).      Outcome: Not Progressing   OCCUPATIONAL THERAPY TREATMENT  Patient: Jane Campbell (80 y.o. male)  Date: 4/21/2025  Primary Diagnosis: Syncope and collapse [R55]  Elevated CK [R74.8]  Failure to thrive in adult [R62.7]  Frequent falls [R29.6]  Fall, initial encounter [W19.XXXA]       Precautions:                  Chart, occupational therapy assessment, plan of care, and goals were reviewed.    ASSESSMENT  Patient continues to benefit from skilled OT services and is progressing towards goals. Patient received supine in bed, minimally interactive with therapist.  Patient keeps eyes closed most of session  but does respond to offer for light grooming tasks. Patient participates in washing face only.  He uses R hand to perform while in supine.  He declines all offers for upright sitting, EOB, or OOB. Patient able to wash face with cues for reaching far left side for thoroughness of task. Meal noted in room, patient initially declines offers for food then with no additional responses, closing eyes.   Patient left in NAD in bed.             PLAN :  Patient continues to benefit from skilled intervention to address the above impairments.  Continue treatment per established plan of care to address goals.    Recommend with staff: ADLs and OOB as able    Recommend next OT session: continue goals     Recommendation for discharge: (in order for the patient to meet his/her long term goals):   Moderate intensity short-term skilled occupational therapy up to 5x/week  Vs return home with increased caregiver assistance    Other factors to consider for discharge: high risk for falls and concern for safely navigating or managing the home environment    IF patient discharges home will need the following DME: continuing to assess with progress       SUBJECTIVE:   Patient stated “no.”    OBJECTIVE DATA SUMMARY:   Cognitive/Behavioral Status:          Functional Mobility and Transfers for ADLs:  Bed Mobility:        Transfers:               Balance:            ADL Intervention:                   Grooming: Setup   Grooming Skilled Clinical Factors: simple face washing only          Activity Tolerance:   Fair   Please refer to the flowsheet for vital signs taken during this treatment.    After treatment:   Patient left in no apparent distress in bed, Call bell within reach, Bed/ chair alarm activated, and Side rails x3    COMMUNICATION/EDUCATION:   The patient's plan of care was discussed with: registered nurse         Thank you for this referral.  Coco Bar, OTR/L  Minutes: 8

## 2025-04-21 NOTE — CONSULTS
ORTHOPAEDIC SURGERY CONSULT NOTE    Subjective:     Date of Consultation:  April 20, 2025      Jane Campbell is a 80 y.o. male who is being seen for weakness.  He has a significant past medical history of polio, atrial fibrillation, anxiety, depression, CHF, and HTN.  He presented to the ER on 4/15 and has been inpatient since that time.  He is very difficult to arouse and he does not answer my questions today.  Exam and history is very limited.  From what I gather the patient has been having frequent falls.  He had a C2-T2 posterior revision fusion in 2021 by Dr. Godfrey.  He has chronic cord findings on MRI and we were asked to opine on this.  Patient is followed by Dr. Dorantes with PMR at [facility].  His notes document chronic myelopathic findings in his LE with use of a KAFO for chronic left foot drop.  There has been no changes to his PE since that time per notes.      Patient Active Problem List    Diagnosis Date Noted    Frequent falls 04/15/2025    Acute metabolic encephalopathy 03/30/2025    Severe protein-calorie malnutrition 09/20/2024    Confusion 09/20/2024    Generalized weakness 09/20/2024    Palliative care encounter 09/20/2024    Dizziness 09/19/2024    AMS (altered mental status) 09/19/2024    Chronic systolic CHF (congestive heart failure) (HCC) 09/18/2024    Altered mental state 09/17/2024    Altered mental status 08/06/2024    Cardiomyopathy 08/06/2024    Paroxysmal atrial fibrillation (HCC) 08/06/2024    Hallucinations 08/03/2024    Anemia 01/22/2021    Type 2 diabetes mellitus     Post-polio syndrome (HCC)     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)     Chronic obstructive pulmonary disease (HCC)     Anxiety and depression     Dementia (HCC)     Neuropathy     Hyperlipidemia     Chronic pain     Cervical myelopathy (HCC) 01/21/2021    Leukocytosis 06/20/2016    Osteoarthritis 08/29/2015    Fibromyalgia 08/29/2015    Spinal stenosis of lumbar region 03/20/2014     Family History   Problem  08/06/2024    Paroxysmal atrial fibrillation (HCC) 08/06/2024    Hallucinations 08/03/2024    Anemia 01/22/2021    Type 2 diabetes mellitus     Post-polio syndrome (HCC)     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 *    A: 1. Chronic progressive weakness in setting of post polio syndrome   2. S/P C2-T2 posterior fusion with hyperkyphosis and adjacent segmental disease  Plan:   P:  It is very difficult to get an accurate neurological examination on him due to his hearing and confusion.  He has worsening kyphosis at the level of his distal cervicothoracic fusion and certainly has some adjacent segmental disease there.  His cord signal changes are chronic and not acute.  I do not think this is causing his weakness as described.  He has left leg weakness due to post polio syndrome.  If there are further concerns while inpatient with regards to his weakness, a thoracic and lumbar MRI would be warranted.  For now, I recommend therapy for OOB and gait training if able.  He needs his KAFO as prescribed from [facility].  Outpatient followup with Dr. Godfrey as he previously managed his cervical stenosis.  If he worsens while inpatient obtain MRI of thoracic and lumbar spine and reconsult our service.  No surgical needs at this time and we will sign off.      Discussed case with Dr. Ramos who agrees with plan.    Omi Valerio, PA  Orthopaedic Surgery PA  [facility]  [facility]

## 2025-04-21 NOTE — PLAN OF CARE
Problem: Physical Therapy - Adult  Goal: By Discharge: Performs mobility at highest level of function for planned discharge setting.  See evaluation for individualized goals.  Description: FUNCTIONAL STATUS PRIOR TO ADMISSION: The patient was functional at the wheelchair level and required undetermined level of assistance for transfers to the chair via stand/squat pivot.  Patient lives at the [facility] but has paid caregivers daily that assist with transfers from bed to w/c as well as ADLs.  Once in w/c, patient is able to propel himself and takes his meals in the dining room.  He is alone in the evenings overnight.  Reports frequent falls but unable to relate cause or situation of falls.    Physical Therapy Goals  Initiated 4/16/2025  1.  Patient will move from supine to sit and sit to supine, scoot up and down, and roll side to side in bed with minimal assistance within 7 day(s).    2.  Patient will perform sit to stand with minimal assistance within 7 day(s).  3.  Patient will transfer from bed to chair and chair to bed with minimal assistance using the least restrictive device within 7 day(s).  5.  Patient will sit up in chair 2 hours at a time to improve OOB tolerance within 7 day(s).        Outcome: Progressing     Problem: Occupational Therapy - Adult  Goal: By Discharge: Performs self-care activities at highest level of function for planned discharge setting.  See evaluation for individualized goals.  Description: FUNCTIONAL STATUS PRIOR TO ADMISSION:  Patient reports assistance with ADL tasks and mobility at home.   Receives Help From: Personal care attendant (daytime hours only), Prior Level of Assist for ADLs: Needs assistance, Bath: Minimal assistance, Dressing: Minimal assistance, Grooming: Minimal assistance, Feeding: Supervision, Toileting: Needs assistance, Prior Level of Assist for Homemaking: Needs assistance, Ambulation Assistance: Non-ambulatory (pt is w/c depn), Prior Level of Assist for

## 2025-04-21 NOTE — PROGRESS NOTES
Patient has been turning and repositioning himself. He also was noted doing leg exercises. He refused to take his medications and spit them out of his mouth. When attempting to give them again patient closed his eyes and refused to acknowledge the nurse. Medication not given.

## 2025-04-22 LAB
ALBUMIN SERPL-MCNC: 2.9 G/DL (ref 3.5–5)
ALBUMIN/GLOB SERPL: 0.8 (ref 1.1–2.2)
ALP SERPL-CCNC: 108 U/L (ref 45–117)
ALT SERPL-CCNC: 17 U/L (ref 12–78)
ANION GAP SERPL CALC-SCNC: 7 MMOL/L (ref 2–12)
AST SERPL-CCNC: 11 U/L (ref 15–37)
B PERT DNA SPEC QL NAA+PROBE: NOT DETECTED
BASOPHILS # BLD: 0.03 K/UL (ref 0–0.1)
BASOPHILS NFR BLD: 0.2 % (ref 0–1)
BILIRUB SERPL-MCNC: 0.7 MG/DL (ref 0.2–1)
BORDETELLA PARAPERTUSSIS BY PCR: NOT DETECTED
BUN SERPL-MCNC: 20 MG/DL (ref 6–20)
BUN/CREAT SERPL: 21 (ref 12–20)
C PNEUM DNA SPEC QL NAA+PROBE: NOT DETECTED
CALCIUM SERPL-MCNC: 9 MG/DL (ref 8.5–10.1)
CHLORIDE SERPL-SCNC: 105 MMOL/L (ref 97–108)
CO2 SERPL-SCNC: 25 MMOL/L (ref 21–32)
CREAT SERPL-MCNC: 0.97 MG/DL (ref 0.7–1.3)
DIFFERENTIAL METHOD BLD: ABNORMAL
EOSINOPHIL # BLD: 0.22 K/UL (ref 0–0.4)
EOSINOPHIL NFR BLD: 1.8 % (ref 0–7)
ERYTHROCYTE [DISTWIDTH] IN BLOOD BY AUTOMATED COUNT: 13.4 % (ref 11.5–14.5)
FLUAV SUBTYP SPEC NAA+PROBE: NOT DETECTED
FLUBV RNA SPEC QL NAA+PROBE: NOT DETECTED
GLOBULIN SER CALC-MCNC: 3.7 G/DL (ref 2–4)
GLUCOSE SERPL-MCNC: 122 MG/DL (ref 65–100)
HADV DNA SPEC QL NAA+PROBE: NOT DETECTED
HCOV 229E RNA SPEC QL NAA+PROBE: NOT DETECTED
HCOV HKU1 RNA SPEC QL NAA+PROBE: NOT DETECTED
HCOV NL63 RNA SPEC QL NAA+PROBE: NOT DETECTED
HCOV OC43 RNA SPEC QL NAA+PROBE: NOT DETECTED
HCT VFR BLD AUTO: 36.2 % (ref 36.6–50.3)
HGB BLD-MCNC: 11.6 G/DL (ref 12.1–17)
HMPV RNA SPEC QL NAA+PROBE: NOT DETECTED
HPIV1 RNA SPEC QL NAA+PROBE: NOT DETECTED
HPIV2 RNA SPEC QL NAA+PROBE: NOT DETECTED
HPIV3 RNA SPEC QL NAA+PROBE: NOT DETECTED
HPIV4 RNA SPEC QL NAA+PROBE: NOT DETECTED
IMM GRANULOCYTES # BLD AUTO: 0.09 K/UL (ref 0–0.04)
IMM GRANULOCYTES NFR BLD AUTO: 0.7 % (ref 0–0.5)
LACTATE SERPL-SCNC: 1.4 MMOL/L (ref 0.4–2)
LYMPHOCYTES # BLD: 1.61 K/UL (ref 0.8–3.5)
LYMPHOCYTES NFR BLD: 13.2 % (ref 12–49)
M PNEUMO DNA SPEC QL NAA+PROBE: NOT DETECTED
MCH RBC QN AUTO: 30.8 PG (ref 26–34)
MCHC RBC AUTO-ENTMCNC: 32 G/DL (ref 30–36.5)
MCV RBC AUTO: 96 FL (ref 80–99)
MONOCYTES # BLD: 1.93 K/UL (ref 0–1)
MONOCYTES NFR BLD: 15.8 % (ref 5–13)
NEUTS SEG # BLD: 8.33 K/UL (ref 1.8–8)
NEUTS SEG NFR BLD: 68.3 % (ref 32–75)
NRBC # BLD: 0 K/UL (ref 0–0.01)
NRBC BLD-RTO: 0 PER 100 WBC
PLATELET # BLD AUTO: 253 K/UL (ref 150–400)
PMV BLD AUTO: 9.6 FL (ref 8.9–12.9)
POTASSIUM SERPL-SCNC: 3.6 MMOL/L (ref 3.5–5.1)
PROT SERPL-MCNC: 6.6 G/DL (ref 6.4–8.2)
RBC # BLD AUTO: 3.77 M/UL (ref 4.1–5.7)
RSV RNA SPEC QL NAA+PROBE: NOT DETECTED
RV+EV RNA SPEC QL NAA+PROBE: NOT DETECTED
SARS-COV-2 RNA RESP QL NAA+PROBE: NOT DETECTED
SODIUM SERPL-SCNC: 137 MMOL/L (ref 136–145)
WBC # BLD AUTO: 12.2 K/UL (ref 4.1–11.1)

## 2025-04-22 PROCEDURE — 36415 COLL VENOUS BLD VENIPUNCTURE: CPT

## 2025-04-22 PROCEDURE — 83605 ASSAY OF LACTIC ACID: CPT

## 2025-04-22 PROCEDURE — 2500000003 HC RX 250 WO HCPCS: Performed by: FAMILY MEDICINE

## 2025-04-22 PROCEDURE — 6360000002 HC RX W HCPCS: Performed by: FAMILY MEDICINE

## 2025-04-22 PROCEDURE — 2580000003 HC RX 258: Performed by: FAMILY MEDICINE

## 2025-04-22 PROCEDURE — 97530 THERAPEUTIC ACTIVITIES: CPT

## 2025-04-22 PROCEDURE — 6370000000 HC RX 637 (ALT 250 FOR IP): Performed by: FAMILY MEDICINE

## 2025-04-22 PROCEDURE — 0202U NFCT DS 22 TRGT SARS-COV-2: CPT

## 2025-04-22 PROCEDURE — 87040 BLOOD CULTURE FOR BACTERIA: CPT

## 2025-04-22 PROCEDURE — 1100000000 HC RM PRIVATE

## 2025-04-22 PROCEDURE — 85025 COMPLETE CBC W/AUTO DIFF WBC: CPT

## 2025-04-22 PROCEDURE — 94761 N-INVAS EAR/PLS OXIMETRY MLT: CPT

## 2025-04-22 PROCEDURE — 97535 SELF CARE MNGMENT TRAINING: CPT

## 2025-04-22 PROCEDURE — 80053 COMPREHEN METABOLIC PANEL: CPT

## 2025-04-22 RX ORDER — VANCOMYCIN 1.75 GRAM/500 ML IN 0.9 % SODIUM CHLORIDE INTRAVENOUS
1750 ONCE
Status: COMPLETED | OUTPATIENT
Start: 2025-04-22 | End: 2025-04-22

## 2025-04-22 RX ADMIN — LEVOTHYROXINE SODIUM 50 MCG: 0.05 TABLET ORAL at 05:57

## 2025-04-22 RX ADMIN — MIRTAZAPINE 15 MG: 15 TABLET, FILM COATED ORAL at 20:18

## 2025-04-22 RX ADMIN — ACETAMINOPHEN 650 MG: 325 TABLET ORAL at 21:31

## 2025-04-22 RX ADMIN — ATORVASTATIN CALCIUM 10 MG: 10 TABLET, FILM COATED ORAL at 20:18

## 2025-04-22 RX ADMIN — SODIUM CHLORIDE 3000 MG: 9 INJECTION, SOLUTION INTRAVENOUS at 09:34

## 2025-04-22 RX ADMIN — Medication 1750 MG: at 15:39

## 2025-04-22 RX ADMIN — DULOXETINE 30 MG: 30 CAPSULE, DELAYED RELEASE ORAL at 09:30

## 2025-04-22 RX ADMIN — SODIUM CHLORIDE, PRESERVATIVE FREE 10 ML: 5 INJECTION INTRAVENOUS at 20:15

## 2025-04-22 RX ADMIN — SODIUM CHLORIDE, PRESERVATIVE FREE 10 ML: 5 INJECTION INTRAVENOUS at 09:30

## 2025-04-22 RX ADMIN — SODIUM CHLORIDE 3000 MG: 9 INJECTION, SOLUTION INTRAVENOUS at 15:00

## 2025-04-22 RX ADMIN — RIVAROXABAN 10 MG: 10 TABLET, FILM COATED ORAL at 09:30

## 2025-04-22 RX ADMIN — SODIUM CHLORIDE 3000 MG: 9 INJECTION, SOLUTION INTRAVENOUS at 20:16

## 2025-04-22 ASSESSMENT — PAIN SCALES - GENERAL
PAINLEVEL_OUTOF10: 0
PAINLEVEL_OUTOF10: 0

## 2025-04-22 NOTE — PLAN OF CARE
Problem: Respiratory - Adult  Goal: Achieves optimal ventilation and oxygenation  Outcome: Progressing     Problem: Chronic Conditions and Co-morbidities  Goal: Patient's chronic conditions and co-morbidity symptoms are monitored and maintained or improved  Outcome: Progressing  Flowsheets     Problem: Safety - Adult  Goal: Free from fall injury  Outcome: Progressing     Problem: Skin/Tissue Integrity  Goal: Skin integrity remains intact  Description: 1.  Monitor for areas of redness and/or skin breakdown2.  Assess vascular access sites hourly3.  Every 4-6 hours minimum:  Change oxygen saturation probe site4.  Every 4-6 hours:  If on nasal continuous positive airway pressure, respiratory therapy assess nares and determine need for appliance change or resting period  Outcome: Progressing  Flowsheets     Problem: Pain  Goal: Verbalizes/displays adequate comfort level or baseline comfort level  Outcome: Progressing  Flowsheets  Taken 4/21/2025 1959 by Mark Taylor, RN  Verbalizes/displays adequate comfort level or baseline comfort level:   Encourage patient to monitor pain and request assistance   Assess pain using appropriate pain scale   Implement non-pharmacological measures as appropriate and evaluate response   Consider cultural and social influences on pain and pain management   Notify Licensed Independent Practitioner if interventions unsuccessful or patient reports new pain   Administer analgesics based on type and severity of pain and evaluate response

## 2025-04-22 NOTE — PLAN OF CARE
Problem: Occupational Therapy - Adult  Goal: By Discharge: Performs self-care activities at highest level of function for planned discharge setting.  See evaluation for individualized goals.  Description: FUNCTIONAL STATUS PRIOR TO ADMISSION:  Patient reports assistance with ADL tasks and mobility at home.   Receives Help From: Personal care attendant (daytime hours only), Prior Level of Assist for ADLs: Needs assistance, Bath: Minimal assistance, Dressing: Minimal assistance, Grooming: Minimal assistance, Feeding: Supervision, Toileting: Needs assistance, Prior Level of Assist for Homemaking: Needs assistance, Ambulation Assistance: Non-ambulatory (pt is w/c depn), Prior Level of Assist for Transfers: Needs assistance, Active : No     HOME SUPPORT: Patient lived alone in [facility], but has caregivers in daily to assist for ADLs and transfers to wheelchair.  Patient reports home alone.    Occupational Therapy Goals:  Initiated 4/16/2025  1.  Patient will perform lower body dressing with Maximal Assist within 7 day(s).  2.  Patient will perform upper body dressing with Minimal Assist within 7 day(s).  3.  Patient will perform grooming with Contact Guard Assist within 7 day(s).  4.  Patient will participate in upper extremity therapeutic exercise/activities with Minimal Assist for 10 minutes within 7 day(s).      4/22/2025 1505 by Coco Bar, OT  Outcome: Not Progressing  4/22/2025 1505 by Coco Bar, OT  Outcome: Progressing     OCCUPATIONAL THERAPY TREATMENT  Patient: Jane Campbell (80 y.o. male)  Date: 4/22/2025  Primary Diagnosis: Syncope and collapse [R55]  Elevated CK [R74.8]  Failure to thrive in adult [R62.7]  Frequent falls [R29.6]  Fall, initial encounter [W19.XXXA]       Precautions:                  Chart, occupational therapy assessment, plan of care, and goals were reviewed.    ASSESSMENT  Patient continues to benefit from skilled OT services and is not progressing towards goals.  Patient continues to benefit from OT services, however with minimal participation. Patient awake and alert with improved interaction over last OT session.  Patient offered mobility, EOB sitting and ADL activity.  Patient noted lunch tray present in room and requesting set up.  Patient repositioned in bed and set up with meal tray.  Patient self feeding with set up.         PLAN :  Patient continues to benefit from skilled intervention to address the above impairments.  Continue treatment per established plan of care to address goals.    Recommend with staff: ADLs as able    Recommend next OT session: progression of goals    Recommendation for discharge: (in order for the patient to meet his/her long term goals):   Moderate intensity short-term skilled occupational therapy up to 5x/week    Other factors to consider for discharge: high risk for falls, not safe to be alone, and concern for safely navigating or managing the home environment    IF patient discharges home will need the following DME: continuing to assess with progress       SUBJECTIVE:   Patient stated “Bring that and put it here.”  Patient tapping lap for tray   OBJECTIVE DATA SUMMARY:   Cognitive/Behavioral Status:          Functional Mobility and Transfers for ADLs:  Bed Mobility:  Bed Mobility Training  Supine to Sit: Substantial/Maximal assistance     Transfers:               Balance:            ADL Intervention:         Feeding: Setup          Pain Rating:  Does not report/10   Pain Intervention(s):       Activity Tolerance:     Please refer to the flowsheet for vital signs taken during this treatment.    After treatment:   Patient left in no apparent distress in bed, Call bell within reach, Bed/ chair alarm activated, and Side rails x3    COMMUNICATION/EDUCATION:   The patient's plan of care was discussed with: physical therapist and registered nurse         Thank you for this referral.  Coco Bar OTR/L  Minutes: 12

## 2025-04-22 NOTE — PLAN OF CARE
Problem: Physical Therapy - Adult  Goal: By Discharge: Performs mobility at highest level of function for planned discharge setting.  See evaluation for individualized goals.  Description: FUNCTIONAL STATUS PRIOR TO ADMISSION: The patient was functional at the wheelchair level and required undetermined level of assistance for transfers to the chair via stand/squat pivot.  Patient lives at the [facility] but has paid caregivers daily that assist with transfers from bed to w/c as well as ADLs.  Once in w/c, patient is able to propel himself and takes his meals in the dining room.  He is alone in the evenings overnight.  Reports frequent falls but unable to relate cause or situation of falls.    Physical Therapy Goals  Initiated 4/16/2025  1.  Patient will move from supine to sit and sit to supine, scoot up and down, and roll side to side in bed with minimal assistance within 7 day(s).    2.  Patient will perform sit to stand with minimal assistance within 7 day(s).  3.  Patient will transfer from bed to chair and chair to bed with minimal assistance using the least restrictive device within 7 day(s).  5.  Patient will sit up in chair 2 hours at a time to improve OOB tolerance within 7 day(s).        4/22/2025 1458 by Mercedes Winston, PTA  Outcome: Not Progressing  PHYSICAL THERAPY TREATMENT    Patient: Jane Campbell (80 y.o. male)  Date: 4/22/2025  Diagnosis: Syncope and collapse [R55]  Elevated CK [R74.8]  Failure to thrive in adult [R62.7]  Frequent falls [R29.6]  Fall, initial encounter [W19.XXXA] Frequent falls      Precautions:              ASSESSMENT:  Patient continues to benefit from skilled PT services and is not progressing towards goals. Pt received in bed resting, rouse easily. Eager to eat lunch once placed in front of patient, encouraged to sit EOB/ attempt transfer to chair. Pt wanting only to eat lunch. Required Max A for repositioning in bed.          PLAN:  Patient continues to benefit  from skilled intervention to address the above impairments.  Continue treatment per established plan of care.    Recommendations for staff mobility and toileting assistance:  Recommend that staff assists the patient to meet their mobility and care needs at the bed level until progress has been made with therapy.    Recommend for next PT session: sit to stand transfers and bed to bedside chair transfers    Recommendation for discharge: (in order for the patient to meet his/her long term goals):   Moderate intensity short-term skilled physical therapy up to 5x/week    Other factors to consider for discharge: impaired cognition, high risk for falls, and concern for safely navigating or managing the home environment    IF patient discharges home will need the following DME: continuing to assess with progress       SUBJECTIVE:   Patient stated, \"no just bring it (lunch) right here.\"    OBJECTIVE DATA SUMMARY:   Critical Behavior:          Functional Mobility Training:  Bed Mobility:  Bed Mobility Training  Supine to Sit: Substantial/Maximal assistance  Transfers:     Balance:      Ambulation/Gait Training:              Neuro Re-Education:                    Pain Rating:  -/10   Pain Intervention(s):       Activity Tolerance:   Good    After treatment:   Patient left in no apparent distress in bed and placed in chair position., Call bell within reach, and Bed/ chair alarm activated      COMMUNICATION/EDUCATION:   The patient's plan of care was discussed with: occupational therapist and registered nurse           Mercedes Winston, [facility]  Minutes: 12

## 2025-04-22 NOTE — PROGRESS NOTES
[facility]  [address] [place] [address] [phone]        Hospitalist Progress Note      NAME: Jane Campbell   :  1945  MRM:  [id number]    Date/Time of service: 2025  2:17 PM       Subjective:   Seen and examined,, history limited, Patient does not answer verbally to questions.  Awake and alert. Denies any complaints.     ROS: per history above       Objective:       Vitals:       Last 24hrs VS reviewed since prior progress note. Most recent are:    Vitals:    25 1131   BP: 128/73   Pulse: 84   Resp: 14   Temp: 98.4 °F (36.9 °C)   SpO2: 98%     SpO2 Readings from Last 6 Encounters:   25 98%   25 95%   24 98%   24 93%   08/10/24 96%   24 98%          Intake/Output Summary (Last 24 hours) at 2025 1417  Last data filed at 2025 2210  Gross per 24 hour   Intake 150 ml   Output 750 ml   Net -600 ml        Exam:     Physical Exam:    Gen:  Well-developed, calm  HEENT:  Pink conjunctivae, hearing intact to voice, Neck:  Supple  Resp:  No accessory muscle use, clear breath sounds without wheezes rales or rhonchi  Card:  No murmurs, normal S1, S2 without thrills, bruits or peripheral edema  Abd:  Soft, non-tender, non-distended  Skin:  No rashes or ulcers, skin turgor is good  Neuro:  Cranial nerves 3-12 are grossly intact, does follows commands appropriately  Psych:  alert, unclear insight. Unclear orientation.        Medications Reviewed: (see below)    Lab Data Reviewed: (see below)    ______________________________________________________________________    Medications:     Current Facility-Administered Medications   Medication Dose Route Frequency    ampicillin-sulbactam (UNASYN) 3,000 mg in sodium chloride 0.9 % 100 mL IVPB (addEASE)  3,000 mg IntraVENous Q6H    arformoterol tartrate (BROVANA) nebulizer solution 15 mcg  15 mcg Nebulization BID PRN    budesonide (PULMICORT) nebulizer suspension 250 mcg  0.25 mg  who is admitted for falls.    Frequent falls: unclear etiology.  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  Possibly from bradycardia. 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 Will rule out infectious causes and neurologic causes as well.   - PT/OT  - UA neg  - respiratory viral panel neg  - CXR neg  - stopped lopressor due to heart rate  - neurology consulted  - MRI c spine shows some cord issues.  Ortho recommends outpatient follow up with neurosurgery Dr. Godfrey.   --orthostatics neg from supine to sitting.     Acute encephalopathy: acute, present on admission.  Has waxed and waned this admission. Possibly 2/2 polypharmacy with component of delirium.   --CT head neg  --MRI brain wwo neg  --EEG neg  --neurology consulted and signed off  --TSH normal   -- ammonia levels neg  -- stopped zyprexa.   --respiratory viral panel neg    ?Sepsis: Leukocytosis with fever noted yesterday evening.  Unclear source. MRI of the brain did note possible mastoiditis.   --UA neg  --CXR neg  --will check echo  -- check blood cultures  --ID consult  -- lactate neg.   --Unasyn and vancomycin  --MRSA nares     Elevated CK  - Mild  - now resolved     Chronic HFrEF  - Recent EF 25%  - holding metoprolol due to low heart rate  - Caution with fluids     Atrial fibrillation  - Xarelto  - holding lopressor due to low heart rate  - May need to consider stopping Xarelto in setting of frequent falls    Chronic steroids use: unclear etiology.  Received 30 day prescription for daily prednisone 5 mg po daily from Cristobal Sneed NP on 4/9.  He was not discharged on prednisone on 4/2.  Will hold for now as it may complicate the picture and unclear reason for being on this medication.       Anxiety and depression  - Currently on Cymbalta, Remeron, Zyprexa.   - These are too many meds for elderly.  Need to consider cutting back.    - Stopped zyprexa     Hypothyroidism:  TSH normal.   - Continue Synthroid    COPD:

## 2025-04-22 NOTE — CARE COORDINATION
Care Management Progress Note    Reason for Admission:   Syncope and collapse [R55]  Elevated CK [R74.8]  Failure to thrive in adult [R62.7]  Frequent falls [R29.6]  Fall, initial encounter [W19.XXXA]         Patient Admission Status: Inpatient  RUR: 18%; LOS 6 days  Hospitalization in the last 30 days (Readmission):  No        Reportedly, pt resides at [facility] ([facility]   EDGAR Stewart ([phone]) is the primary family contact.     Transition Plan of Care:  Neurology, Ortho following for medical management  SNF was initial plan and pt was accepted by three rehab facilities.  EDGAR met with weekend  and plan is now for pt to return to [facility] with home PT/OT services  Pt had [facility] ([phone]) PT/OT previously  Pt also has caregivers  Outpatient follow up  Ambulance transport will be needed     CM will continue to follow pt until discharged.  Marleny

## 2025-04-22 NOTE — PROGRESS NOTES
[facility] Dosing Services: Antimicrobial Stewardship Daily Doc  Consult for antibiotic dosing of vancomycin by Dr. Flores  Indication: Mastoiditis  Day of Therapy: 1    Ht Readings from Last 1 Encounters:   04/15/25 1.803 m (5' 11\")        Wt Readings from Last 1 Encounters:   04/15/25 66.2 kg (146 lb)     Vancomycin therapy:  Loading dose: Vancomycin 1,750 mg x1 dose scheduled for 4/22 at 1600  Maintenance dose: Vancomycin 750 mg IV every 12 hours scheduled for 4/23 at 0400    Dose calculated to approximate a           a. Target AUC/LEANNA of 400-600          b. Trough of 15-20 mcg/mL     Last level: N/A mcg/mL    Plan:   - Febrile up to 38.1 on 4/21, Scr stable, WBC increasing since 4/19  - Vancomycin 1,750mg x1 ordered followed by vancomycin 750mg q12 to start 12 hours after  - Predicted AUCss 555, Ctr 18.3, t1/2 16.3  - Level should be ~36-72 hours post maintenance dose, not yet ordered  - CBC & CMP daily ordered by provider through 5/7    Other Antimicrobial   (not dosed by pharmacist) Ampicillin-sulbactam 3,000mg q6   Cultures 4/22 Blood x2: in process  4/22 MRSA screen: in process   Serum Creatinine Lab Results   Component Value Date/Time    CREATININE 0.97 04/22/2025 05:23 AM        Creatinine Clearance Estimated Creatinine Clearance: 55 mL/min (based on SCr of 0.97 mg/dL).     Temp Temp: 98.4 °F (36.9 °C) (Oral)       WBC Lab Results   Component Value Date/Time    WBC 12.2 04/22/2025 05:23 AM        Procalcitonin Lab Results   Component Value Date/Time    PROCAL 0.75 09/17/2024 01:43 PM      For Antifungals, Metronidazole and Nafcillin: Lab Results   Component Value Date/Time    ALT 17 04/22/2025 05:23 AM    AST 11 04/22/2025 05:23 AM        Joya Childress, PharmD  [phone]

## 2025-04-23 ENCOUNTER — APPOINTMENT (OUTPATIENT)
Facility: HOSPITAL | Age: 80
DRG: 091 | End: 2025-04-23
Attending: FAMILY MEDICINE
Payer: MEDICARE

## 2025-04-23 PROBLEM — R50.9 FEVER: Status: ACTIVE | Noted: 2025-04-23

## 2025-04-23 LAB
ALBUMIN SERPL-MCNC: 2.6 G/DL (ref 3.5–5)
ALBUMIN/GLOB SERPL: 0.8 (ref 1.1–2.2)
ALP SERPL-CCNC: 96 U/L (ref 45–117)
ALT SERPL-CCNC: 18 U/L (ref 12–78)
ANION GAP SERPL CALC-SCNC: 6 MMOL/L (ref 2–12)
AST SERPL-CCNC: 15 U/L (ref 15–37)
BACTERIA SPEC CULT: NORMAL
BACTERIA SPEC CULT: NORMAL
BASOPHILS # BLD: 0.04 K/UL (ref 0–0.1)
BASOPHILS NFR BLD: 0.4 % (ref 0–1)
BILIRUB SERPL-MCNC: 0.5 MG/DL (ref 0.2–1)
BUN SERPL-MCNC: 17 MG/DL (ref 6–20)
BUN/CREAT SERPL: 21 (ref 12–20)
CALCIUM SERPL-MCNC: 8.6 MG/DL (ref 8.5–10.1)
CHLORIDE SERPL-SCNC: 107 MMOL/L (ref 97–108)
CO2 SERPL-SCNC: 26 MMOL/L (ref 21–32)
CREAT SERPL-MCNC: 0.81 MG/DL (ref 0.7–1.3)
DIFFERENTIAL METHOD BLD: ABNORMAL
ECHO AO ARCH DIAM: 2.3 CM
ECHO AO ASC DIAM: 3.6 CM
ECHO AO ASCENDING AORTA INDEX: 1.96 CM/M2
ECHO AO ROOT DIAM: 4.3 CM
ECHO AO ROOT INDEX: 2.34 CM/M2
ECHO AV AREA PEAK VELOCITY: 3.3 CM2
ECHO AV AREA PEAK VELOCITY: 3.7 CM2
ECHO AV AREA VTI: 3.2 CM2
ECHO AV AREA/BSA VTI: 1.7 CM2/M2
ECHO AV MEAN GRADIENT: 3 MMHG
ECHO AV MEAN VELOCITY: 0.8 M/S
ECHO AV PEAK GRADIENT: 5 MMHG
ECHO AV PEAK GRADIENT: 6 MMHG
ECHO AV PEAK VELOCITY: 1.1 M/S
ECHO AV PEAK VELOCITY: 1.2 M/S
ECHO AV VTI: 19.7 CM
ECHO BSA: 1.82 M2
ECHO LA DIAMETER INDEX: 1.58 CM/M2
ECHO LA DIAMETER: 2.9 CM
ECHO LA TO AORTIC ROOT RATIO: 0.67
ECHO LA VOL A-L A2C: 61 ML (ref 18–58)
ECHO LA VOL A-L A4C: 36 ML (ref 18–58)
ECHO LA VOL BP: 46 ML (ref 18–58)
ECHO LA VOL MOD A2C: 60 ML (ref 18–58)
ECHO LA VOL MOD A4C: 31 ML (ref 18–58)
ECHO LA VOL/BSA BIPLANE: 25 ML/M2 (ref 16–34)
ECHO LA VOLUME AREA LENGTH: 51 ML
ECHO LA VOLUME INDEX A-L A2C: 33 ML/M2 (ref 16–34)
ECHO LA VOLUME INDEX A-L A4C: 20 ML/M2 (ref 16–34)
ECHO LA VOLUME INDEX AREA LENGTH: 28 ML/M2 (ref 16–34)
ECHO LA VOLUME INDEX MOD A2C: 33 ML/M2 (ref 16–34)
ECHO LA VOLUME INDEX MOD A4C: 17 ML/M2 (ref 16–34)
ECHO LV E' LATERAL VELOCITY: 4.28 CM/S
ECHO LV E' SEPTAL VELOCITY: 4.28 CM/S
ECHO LV EDV A2C: 73 ML
ECHO LV EDV A4C: 107 ML
ECHO LV EDV BP: 89 ML (ref 67–155)
ECHO LV EDV INDEX A4C: 58 ML/M2
ECHO LV EDV INDEX BP: 48 ML/M2
ECHO LV EDV NDEX A2C: 40 ML/M2
ECHO LV EF PHYSICIAN: 36 %
ECHO LV EJECTION FRACTION A2C: 32 %
ECHO LV EJECTION FRACTION A4C: 41 %
ECHO LV EJECTION FRACTION BIPLANE: 36 % (ref 55–100)
ECHO LV ESV A2C: 50 ML
ECHO LV ESV A4C: 63 ML
ECHO LV ESV BP: 57 ML (ref 22–58)
ECHO LV ESV INDEX A2C: 27 ML/M2
ECHO LV ESV INDEX A4C: 34 ML/M2
ECHO LV ESV INDEX BP: 31 ML/M2
ECHO LV FRACTIONAL SHORTENING: 10 % (ref 28–44)
ECHO LV INTERNAL DIMENSION DIASTOLE INDEX: 2.12 CM/M2
ECHO LV INTERNAL DIMENSION DIASTOLIC: 3.9 CM (ref 4.2–5.9)
ECHO LV INTERNAL DIMENSION SYSTOLIC INDEX: 1.9 CM/M2
ECHO LV INTERNAL DIMENSION SYSTOLIC: 3.5 CM
ECHO LV IVSD: 0.7 CM (ref 0.6–1)
ECHO LV MASS 2D: 75.1 G (ref 88–224)
ECHO LV MASS INDEX 2D: 40.8 G/M2 (ref 49–115)
ECHO LV POSTERIOR WALL DIASTOLIC: 0.7 CM (ref 0.6–1)
ECHO LV RELATIVE WALL THICKNESS RATIO: 0.36
ECHO LVOT AREA: 4.2 CM2
ECHO LVOT AV VTI INDEX: 0.8
ECHO LVOT DIAM: 2.3 CM
ECHO LVOT MEAN GRADIENT: 2 MMHG
ECHO LVOT PEAK GRADIENT: 4 MMHG
ECHO LVOT PEAK VELOCITY: 1 M/S
ECHO LVOT STROKE VOLUME INDEX: 35.7 ML/M2
ECHO LVOT SV: 65.6 ML
ECHO LVOT VTI: 15.8 CM
ECHO MV A VELOCITY: 1.31 M/S
ECHO MV E DECELERATION TIME (DT): 284.2 MS
ECHO MV E VELOCITY: 0.57 M/S
ECHO MV E/A RATIO: 0.44
ECHO MV E/E' LATERAL: 13.32
ECHO MV E/E' RATIO (AVERAGED): 13.32
ECHO MV E/E' SEPTAL: 13.32
ECHO PULMONARY ARTERY END DIASTOLIC PRESSURE: 6 MMHG
ECHO PV MAX VELOCITY: 0.8 M/S
ECHO PV PEAK GRADIENT: 2 MMHG
ECHO PV REGURGITANT MAX VELOCITY: 1.2 M/S
ECHO RV FREE WALL PEAK S': 7.8 CM/S
ECHO RV INTERNAL DIMENSION: 3.9 CM
ECHO RV TAPSE: 2.3 CM (ref 1.7–?)
ECHO TV REGURGITANT MAX VELOCITY: 2.33 M/S
ECHO TV REGURGITANT PEAK GRADIENT: 22 MMHG
EOSINOPHIL # BLD: 0.25 K/UL (ref 0–0.4)
EOSINOPHIL NFR BLD: 2.2 % (ref 0–7)
ERYTHROCYTE [DISTWIDTH] IN BLOOD BY AUTOMATED COUNT: 13.3 % (ref 11.5–14.5)
GLOBULIN SER CALC-MCNC: 3.1 G/DL (ref 2–4)
GLUCOSE SERPL-MCNC: 123 MG/DL (ref 65–100)
HCT VFR BLD AUTO: 32.4 % (ref 36.6–50.3)
HGB BLD-MCNC: 10.4 G/DL (ref 12.1–17)
IMM GRANULOCYTES # BLD AUTO: 0.06 K/UL (ref 0–0.04)
IMM GRANULOCYTES NFR BLD AUTO: 0.5 % (ref 0–0.5)
LYMPHOCYTES # BLD: 1.82 K/UL (ref 0.8–3.5)
LYMPHOCYTES NFR BLD: 15.9 % (ref 12–49)
MCH RBC QN AUTO: 30.4 PG (ref 26–34)
MCHC RBC AUTO-ENTMCNC: 32.1 G/DL (ref 30–36.5)
MCV RBC AUTO: 94.7 FL (ref 80–99)
MONOCYTES # BLD: 1.38 K/UL (ref 0–1)
MONOCYTES NFR BLD: 12.1 % (ref 5–13)
NEUTS SEG # BLD: 7.87 K/UL (ref 1.8–8)
NEUTS SEG NFR BLD: 68.9 % (ref 32–75)
NRBC # BLD: 0 K/UL (ref 0–0.01)
NRBC BLD-RTO: 0 PER 100 WBC
PLATELET # BLD AUTO: 225 K/UL (ref 150–400)
PMV BLD AUTO: 9.9 FL (ref 8.9–12.9)
POTASSIUM SERPL-SCNC: 3.6 MMOL/L (ref 3.5–5.1)
PROT SERPL-MCNC: 5.7 G/DL (ref 6.4–8.2)
RBC # BLD AUTO: 3.42 M/UL (ref 4.1–5.7)
SERVICE CMNT-IMP: NORMAL
SODIUM SERPL-SCNC: 139 MMOL/L (ref 136–145)
WBC # BLD AUTO: 11.4 K/UL (ref 4.1–11.1)

## 2025-04-23 PROCEDURE — 2580000003 HC RX 258: Performed by: FAMILY MEDICINE

## 2025-04-23 PROCEDURE — 99223 1ST HOSP IP/OBS HIGH 75: CPT | Performed by: INTERNAL MEDICINE

## 2025-04-23 PROCEDURE — 85025 COMPLETE CBC W/AUTO DIFF WBC: CPT

## 2025-04-23 PROCEDURE — 1100000000 HC RM PRIVATE

## 2025-04-23 PROCEDURE — 6360000002 HC RX W HCPCS: Performed by: FAMILY MEDICINE

## 2025-04-23 PROCEDURE — 6370000000 HC RX 637 (ALT 250 FOR IP): Performed by: FAMILY MEDICINE

## 2025-04-23 PROCEDURE — 2500000003 HC RX 250 WO HCPCS: Performed by: FAMILY MEDICINE

## 2025-04-23 PROCEDURE — 80053 COMPREHEN METABOLIC PANEL: CPT

## 2025-04-23 PROCEDURE — 93306 TTE W/DOPPLER COMPLETE: CPT

## 2025-04-23 PROCEDURE — 93306 TTE W/DOPPLER COMPLETE: CPT | Performed by: INTERNAL MEDICINE

## 2025-04-23 PROCEDURE — 94761 N-INVAS EAR/PLS OXIMETRY MLT: CPT

## 2025-04-23 RX ADMIN — DULOXETINE 30 MG: 30 CAPSULE, DELAYED RELEASE ORAL at 08:01

## 2025-04-23 RX ADMIN — MIRTAZAPINE 15 MG: 15 TABLET, FILM COATED ORAL at 20:02

## 2025-04-23 RX ADMIN — SODIUM CHLORIDE, PRESERVATIVE FREE 10 ML: 5 INJECTION INTRAVENOUS at 08:01

## 2025-04-23 RX ADMIN — LEVOTHYROXINE SODIUM 50 MCG: 0.05 TABLET ORAL at 06:31

## 2025-04-23 RX ADMIN — VANCOMYCIN HYDROCHLORIDE 750 MG: 750 INJECTION, POWDER, LYOPHILIZED, FOR SOLUTION INTRAVENOUS at 04:05

## 2025-04-23 RX ADMIN — SODIUM CHLORIDE, PRESERVATIVE FREE 10 ML: 5 INJECTION INTRAVENOUS at 20:04

## 2025-04-23 RX ADMIN — SODIUM CHLORIDE: 0.9 INJECTION, SOLUTION INTRAVENOUS at 20:06

## 2025-04-23 RX ADMIN — RIVAROXABAN 10 MG: 10 TABLET, FILM COATED ORAL at 08:01

## 2025-04-23 RX ADMIN — SODIUM CHLORIDE 3000 MG: 9 INJECTION, SOLUTION INTRAVENOUS at 02:06

## 2025-04-23 RX ADMIN — SODIUM CHLORIDE 3000 MG: 9 INJECTION, SOLUTION INTRAVENOUS at 08:01

## 2025-04-23 RX ADMIN — ATORVASTATIN CALCIUM 10 MG: 10 TABLET, FILM COATED ORAL at 20:02

## 2025-04-23 RX ADMIN — SODIUM CHLORIDE 3000 MG: 9 INJECTION, SOLUTION INTRAVENOUS at 14:19

## 2025-04-23 RX ADMIN — SODIUM CHLORIDE 3000 MG: 9 INJECTION, SOLUTION INTRAVENOUS at 20:08

## 2025-04-23 ASSESSMENT — PAIN SCALES - GENERAL
PAINLEVEL_OUTOF10: 0

## 2025-04-23 ASSESSMENT — PAIN SCALES - PAIN ASSESSMENT IN ADVANCED DEMENTIA (PAINAD)
TOTALSCORE: 0
BODYLANGUAGE: RELAXED
CONSOLABILITY: NO NEED TO CONSOLE
BREATHING: NORMAL
TOTALSCORE: 0
CONSOLABILITY: NO NEED TO CONSOLE
BREATHING: NORMAL
BODYLANGUAGE: RELAXED
FACIALEXPRESSION: SMILING OR INEXPRESSIVE
BREATHING: NORMAL
FACIALEXPRESSION: SMILING OR INEXPRESSIVE
FACIALEXPRESSION: SMILING OR INEXPRESSIVE
TOTALSCORE: 0
BODYLANGUAGE: RELAXED
CONSOLABILITY: NO NEED TO CONSOLE

## 2025-04-23 NOTE — CARE COORDINATION
Care Management Progress Note    Reason for Admission:   Syncope and collapse [R55]  Elevated CK [R74.8]  Failure to thrive in adult [R62.7]  Frequent falls [R29.6]  Fall, initial encounter [W19.XXXA]         Patient Admission Status: Inpatient  RUR: 20%; LOS 7 days  Hospitalization in the last 30 days (Readmission):  No        Reportedly, pt resides at [facility] (indepn living).   EDGAR Stewart ([phone]) is the primary family contact.     Transition Plan of Care:  Neurology, Ortho following for medical management  Plan is for pt to return to [facility] (SNF was declined by family)  Pt had [facility] ([phone]) PT/OT previously.  Order will be needed to resume therapies  Pt also has private duty caregivers  Outpatient follow up  Ambulance transport will be needed     CM will continue to follow pt until discharged.  Marleny

## 2025-04-23 NOTE — PLAN OF CARE
Problem: Chronic Conditions and Co-morbidities  Goal: Patient's chronic conditions and co-morbidity symptoms are monitored and maintained or improved  Outcome: Progressing  Flowsheets (Taken 4/22/2025 2000)  Care Plan - Patient's Chronic Conditions and Co-Morbidity Symptoms are Monitored and Maintained or Improved: Monitor and assess patient's chronic conditions and comorbid symptoms for stability, deterioration, or improvement     Problem: Safety - Adult  Goal: Free from fall injury  Outcome: Progressing     Problem: Skin/Tissue Integrity  Goal: Skin integrity remains intact  Description: 1.  Monitor for areas of redness and/or skin breakdown2.  Assess vascular access sites hourly3.  Every 4-6 hours minimum:  Change oxygen saturation probe site4.  Every 4-6 hours:  If on nasal continuous positive airway pressure, respiratory therapy assess nares and determine need for appliance change or resting period  Outcome: Progressing  Flowsheets (Taken 4/22/2025 2000)  Skin Integrity Remains Intact: Monitor for areas of redness and/or skin breakdown     Problem: Pain  Goal: Verbalizes/displays adequate comfort level or baseline comfort level  Outcome: Progressing  Flowsheets (Taken 4/22/2025 2000)  Verbalizes/displays adequate comfort level or baseline comfort level: Encourage patient to monitor pain and request assistance    Assist within 7 day(s).  4.  Patient will participate in upper extremity therapeutic exercise/activities with Minimal Assist for 10 minutes within 7 day(s).      4/22/2025 1505 by Coco Bar, OT  Outcome: Not Progressing  4/22/2025 1505 by Coco Bar, OT  Outcome: Progressing

## 2025-04-23 NOTE — PROGRESS NOTES
[facility]  [address] [place] [address] [phone]        Hospitalist Progress Note      NAME: Jane Campbell   :  1945  MRM:  [id number]    Date/Time of service: 2025  2:20 PM       Subjective:   Seen and examined,, history limited, Patient does not answer verbally to questions.  Awake and alert.    ROS: per history above       Objective:       Vitals:       Last 24hrs VS reviewed since prior progress note. Most recent are:    Vitals:    25 1156   BP: 123/63   Pulse: 79   Resp: 16   Temp: 98.1 °F (36.7 °C)   SpO2: 96%     SpO2 Readings from Last 6 Encounters:   25 96%   25 95%   24 98%   24 93%   08/10/24 96%   24 98%          Intake/Output Summary (Last 24 hours) at 2025 1420  Last data filed at 2025 0800  Gross per 24 hour   Intake --   Output 0 ml   Net 0 ml        Exam:     Physical Exam:    Gen:  Well-developed, calm  HEENT:  Pink conjunctivae, hearing intact to voice, Neck:  Supple  Resp:  No accessory muscle use, clear breath sounds without wheezes rales or rhonchi  Card:  No murmurs, normal S1, S2 without thrills, bruits or peripheral edema  Abd:  Soft, non-tender, non-distended  Skin:  No rashes or ulcers, skin turgor is good  Neuro:  Cranial nerves 3-12 are grossly intact, does follows commands appropriately  Psych:  alert, unclear insight. Unclear orientation.        Medications Reviewed: (see below)    Lab Data Reviewed: (see below)    ______________________________________________________________________    Medications:     Current Facility-Administered Medications   Medication Dose Route Frequency    ampicillin-sulbactam (UNASYN) 3,000 mg in sodium chloride 0.9 % 100 mL IVPB (addEASE)  3,000 mg IntraVENous Q6H    vancomycin (VANCOCIN) 750 mg in sodium chloride 0.9 % 250 mL IVPB ([id number])  750 mg IntraVENous Q12H    arformoterol tartrate (BROVANA) nebulizer solution 15 mcg  15 mcg Nebulization  BID PRN    budesonide (PULMICORT) nebulizer suspension 250 mcg  0.25 mg Nebulization BID PRN    [Held by provider] metoprolol tartrate (LOPRESSOR) tablet 12.5 mg  12.5 mg Oral BID    atorvastatin (LIPITOR) tablet 10 mg  10 mg Oral Nightly    sodium chloride flush 0.9 % injection 5-40 mL  5-40 mL IntraVENous 2 times per day    sodium chloride flush 0.9 % injection 5-40 mL  5-40 mL IntraVENous PRN    0.9 % sodium chloride infusion   IntraVENous PRN    potassium chloride (KLOR-CON) extended release tablet 40 mEq  40 mEq Oral PRN    Or    potassium bicarb-citric acid (EFFER-K) effervescent tablet 40 mEq  40 mEq Oral PRN    Or    potassium chloride 10 mEq/100 mL IVPB (Peripheral Line)  10 mEq IntraVENous PRN    magnesium sulfate 2000 mg in 50 mL IVPB premix  2,000 mg IntraVENous PRN    ondansetron (ZOFRAN-ODT) disintegrating tablet 4 mg  4 mg Oral Q8H PRN    Or    ondansetron (ZOFRAN) injection 4 mg  4 mg IntraVENous Q6H PRN    polyethylene glycol (GLYCOLAX) packet 17 g  17 g Oral Daily PRN    acetaminophen (TYLENOL) tablet 650 mg  650 mg Oral Q6H PRN    Or    acetaminophen (TYLENOL) suppository 650 mg  650 mg Rectal Q6H PRN    DULoxetine (CYMBALTA) extended release capsule 30 mg  30 mg Oral Daily    ipratropium 0.5 mg-albuterol 2.5 mg (DUONEB) nebulizer solution 1 Dose  1 Dose Inhalation Q4H PRN    levothyroxine (SYNTHROID) tablet 50 mcg  50 mcg Oral QAM AC    mirtazapine (REMERON) tablet 15 mg  15 mg Oral Nightly    rivaroxaban (XARELTO) tablet 10 mg  10 mg Oral Daily with breakfast          Lab Review:     Recent Labs     04/22/25 0523 04/23/25 0227   WBC 12.2* 11.4*   HGB 11.6* 10.4*   HCT 36.2* 32.4*    225     Recent Labs     04/22/25 0523 04/23/25 0227    139   K 3.6 3.6    107   CO2 25 26   BUN 20 17   ALT 17 18     No results found for: \"GLUCPOC\"       Assessment / Plan:     Principal Problem:    Frequent falls  Active Problems:    Severe protein-calorie malnutrition    Fever  Resolved

## 2025-04-23 NOTE — PROGRESS NOTES
Comprehensive Nutrition Assessment    Type and Reason for Visit: Initial, LOS    Nutrition Recommendations/Plan:   Continue with Easy to Chew, 2gm Na+ diet  Provide Ensure Plus High Protein once daily (350 kcal, 41 g carbs, 20 g protein) to aid in meeting kcal/protein needs.        Malnutrition Assessment:  Malnutrition Status:  Severe malnutrition (04/23/25 1103)    Context:  Acute Illness     Findings of the 6 clinical characteristics of malnutrition:  Energy Intake:  Unable to assess  Weight Loss:  Mild weight loss     Body Fat Loss:  Moderate body fat loss Orbital, Buccal region   Muscle Mass Loss:  Moderate muscle mass loss Temples (temporalis)  Fluid Accumulation:  No fluid accumulation     Strength:  Not Performed       Nutrition Assessment:    79 yo male admitted for syncope and collapse.  Pmhx: chronic bronchitis, COPD, HLD, post-polio syndrome, DM.    Seeing pt for LOS. Attempted to speak with pt at bedside. He did not answer any of my questions, just looked at me.  Breakfast tray in front of pt, observed that he ate 100% eggs and banana.  Spoke with RN who states she set up the tray for him and handed him the fork and he was able to feed himself.  Intakes are documented as % meals.  Weight hx in EMR indicates 5 lb loss in last 2 weeks but overall seems to be up from weight 4 months ago.  Will add Ensure Plus High Protein daily for additional kcals. No teeth but seems to be tolerating Easy to Chew consistency based on reported intakes.      Nutritionally Significant Medications:  Synthroid, Remeron      Estimated Daily Nutrient Needs:  Energy Requirements Based On: Kcal/kg  Weight Used for Energy Requirements: Current  Energy (kcal/day): 1986 (30 kcals/kg)  Weight Used for Protein Requirements: Current  Protein (g/day): 93 (1.4 gm/kg)  Method Used for Fluid Requirements: 1 ml/kcal  Fluid (ml/day): 1986    Nutrition Related Findings:   Edema: None                    Recent Labs     04/22/25  1477

## 2025-04-23 NOTE — CONSULTS
Infectious Disease Consult    Impression/Plan   Fever and leukocytosis.  This began 6 days into the hospitalization so suspect nosocomial etiology.  UA bland.  Blood cultures sterile to date.  Chest x-ray clear.  RVP negative.  No obvious infection at IV and previous phlebotomy sites.  Fevers appear to be resolving on empiric antibiotics.  Stop vancomycin if MRSA screen negative.  Continue Unasyn.  May de-escalate to Augmentin to complete 5 to 7-day therapy based on patient's clinical course.  Low threshold for CT chest, abdomen, and pelvis if fevers persist  AMS.  Patient was alert and appropriate at time of examination today.  Able to tell me his date of birth and place of residence.  No headache or nuchal rigidity.  MRI of the brain with no concerns for infectious process.  Low suspicion for CNS infection.  Would not pursue LP at this time  Mastoid effusion.  Physical exam is unremarkable and this is stable per radiologist reading.  Consider nonemergent outpatient ENT examination  Chronic steroid use.  It is not clear why patient was on steroids prior to admission.  Steroids have been stopped  Postpolio syndrome  Diabetes mellitus.  Hemoglobin A1c 5.9    Anti-infectives:   Vancomycin  Unasyn    Subjective:   Date of Consultation:  April 23, 2025  Date of Admission: 4/15/2025   Referring Physician:     Patient is a 80 y.o. male with a past medical history significant for diabetes mellitus, postpolio syndrome, and COPD who is being seen for fever.     Patient was just recently admitted [facility] from 3/33/2/25 where he was treated for acute metabolic encephalopathy thought to be due to UTI.  Urine culture grew coagulase-negative Staphylococcus at that admission and patient was treated with ceftriaxone followed by Augmentin at discharge    Patient returns to [facility] 4/15/25 with complaints of a ground-level fall.  The patient does not recall the events surrounding the fall.  There    glycopyrrolate-formoterol (BEVESPI) 9-4.8 MCG/ACT AERO Inhale 2 puffs into the lungs 2 times daily    Automatic Reconciliation, Ar   ipratropium-albuterol (DUONEB) 0.5-2.5 (3) MG/3ML SOLN nebulizer solution Inhale 3 mLs into the lungs every 4 hours as needed for Wheezing    Automatic Reconciliation, Ar   metFORMIN (GLUCOPHAGE) 500 MG tablet Take 1 tablet by mouth daily    Automatic Reconciliation, Ar   mometasone (ASMANEX) 100 MCG/ACT AERO inhaler Inhale 2 puffs into the lungs 2 times daily    Automatic Reconciliation, Ar     Allergies   Allergen Reactions    Trazodone Other (See Comments)     Caused penile erection that needed surgical correction        Review of Systems:  Pertinent items are noted in the History of Present Illness.    Objective:   Blood pressure 129/73, pulse 72, temperature 98.4 °F (36.9 °C), temperature source Oral, resp. rate 16, height 1.803 m (5' 11\"), weight 66.2 kg (146 lb), SpO2 97%.  Temp (24hrs), Av.7 °F (37.6 °C), Min:97.9 °F (36.6 °C), Max:101.8 °F (38.8 °C)       Exam:    General:  Alert and cooperative   Eyes:  Sclera anicteric.    Mouth/Throat: Mucous membranes normal   Neck: Supple   Lungs:   no distress   CV:     Abdomen:   non-distended   Extremities: Moves all   Skin: No acute rash on exposed skin   Lymph nodes:    Musculoskeletal:    Lines/Devices:  Peripheral   Psych:        Data Review:   Recent Results (from the past 24 hours)   CBC with Auto Differential    Collection Time: 25  2:27 AM   Result Value Ref Range    WBC 11.4 (H) 4.1 - 11.1 K/uL    RBC 3.42 (L) 4.10 - 5.70 M/uL    Hemoglobin 10.4 (L) 12.1 - 17.0 g/dL    Hematocrit 32.4 (L) 36.6 - 50.3 %    MCV 94.7 80.0 - 99.0 FL    MCH 30.4 26.0 - 34.0 PG    MCHC 32.1 30.0 - 36.5 g/dL    RDW 13.3 11.5 - 14.5 %    Platelets 225 150 - 400 K/uL    MPV 9.9 8.9 - 12.9 FL    Nucleated RBCs 0.0 0  WBC    nRBC 0.00 0.00 - 0.01 K/uL    Neutrophils % 68.9 32.0 - 75.0 %    Lymphocytes % 15.9 12.0 - 49.0 %    Monocytes

## 2025-04-24 VITALS
HEART RATE: 50 BPM | OXYGEN SATURATION: 96 % | DIASTOLIC BLOOD PRESSURE: 76 MMHG | WEIGHT: 146 LBS | TEMPERATURE: 98.1 F | RESPIRATION RATE: 14 BRPM | BODY MASS INDEX: 20.44 KG/M2 | HEIGHT: 71 IN | SYSTOLIC BLOOD PRESSURE: 155 MMHG

## 2025-04-24 LAB
ALBUMIN SERPL-MCNC: 2.5 G/DL (ref 3.5–5)
ALBUMIN/GLOB SERPL: 0.9 (ref 1.1–2.2)
ALP SERPL-CCNC: 94 U/L (ref 45–117)
ALT SERPL-CCNC: 26 U/L (ref 12–78)
ANION GAP SERPL CALC-SCNC: 7 MMOL/L (ref 2–12)
AST SERPL-CCNC: 19 U/L (ref 15–37)
BASOPHILS # BLD: 0.04 K/UL (ref 0–0.1)
BASOPHILS NFR BLD: 0.4 % (ref 0–1)
BILIRUB SERPL-MCNC: 0.4 MG/DL (ref 0.2–1)
BUN SERPL-MCNC: 15 MG/DL (ref 6–20)
BUN/CREAT SERPL: 21 (ref 12–20)
CALCIUM SERPL-MCNC: 8.4 MG/DL (ref 8.5–10.1)
CHLORIDE SERPL-SCNC: 106 MMOL/L (ref 97–108)
CO2 SERPL-SCNC: 25 MMOL/L (ref 21–32)
CREAT SERPL-MCNC: 0.72 MG/DL (ref 0.7–1.3)
DIFFERENTIAL METHOD BLD: ABNORMAL
EOSINOPHIL # BLD: 0.31 K/UL (ref 0–0.4)
EOSINOPHIL NFR BLD: 3 % (ref 0–7)
ERYTHROCYTE [DISTWIDTH] IN BLOOD BY AUTOMATED COUNT: 13.3 % (ref 11.5–14.5)
GLOBULIN SER CALC-MCNC: 2.9 G/DL (ref 2–4)
GLUCOSE SERPL-MCNC: 114 MG/DL (ref 65–100)
HCT VFR BLD AUTO: 31.2 % (ref 36.6–50.3)
HGB BLD-MCNC: 10 G/DL (ref 12.1–17)
IMM GRANULOCYTES # BLD AUTO: 0.05 K/UL (ref 0–0.04)
IMM GRANULOCYTES NFR BLD AUTO: 0.5 % (ref 0–0.5)
LYMPHOCYTES # BLD: 1.38 K/UL (ref 0.8–3.5)
LYMPHOCYTES NFR BLD: 13.2 % (ref 12–49)
MCH RBC QN AUTO: 30.9 PG (ref 26–34)
MCHC RBC AUTO-ENTMCNC: 32.1 G/DL (ref 30–36.5)
MCV RBC AUTO: 96.3 FL (ref 80–99)
MONOCYTES # BLD: 0.96 K/UL (ref 0–1)
MONOCYTES NFR BLD: 9.2 % (ref 5–13)
NEUTS SEG # BLD: 7.74 K/UL (ref 1.8–8)
NEUTS SEG NFR BLD: 73.7 % (ref 32–75)
NRBC # BLD: 0 K/UL (ref 0–0.01)
NRBC BLD-RTO: 0 PER 100 WBC
PLATELET # BLD AUTO: 223 K/UL (ref 150–400)
PMV BLD AUTO: 10.3 FL (ref 8.9–12.9)
POTASSIUM SERPL-SCNC: 3.7 MMOL/L (ref 3.5–5.1)
PROT SERPL-MCNC: 5.4 G/DL (ref 6.4–8.2)
RBC # BLD AUTO: 3.24 M/UL (ref 4.1–5.7)
SODIUM SERPL-SCNC: 138 MMOL/L (ref 136–145)
WBC # BLD AUTO: 10.5 K/UL (ref 4.1–11.1)

## 2025-04-24 PROCEDURE — 6370000000 HC RX 637 (ALT 250 FOR IP): Performed by: FAMILY MEDICINE

## 2025-04-24 PROCEDURE — 94761 N-INVAS EAR/PLS OXIMETRY MLT: CPT

## 2025-04-24 PROCEDURE — 6360000002 HC RX W HCPCS: Performed by: FAMILY MEDICINE

## 2025-04-24 PROCEDURE — 2580000003 HC RX 258: Performed by: FAMILY MEDICINE

## 2025-04-24 PROCEDURE — 2500000003 HC RX 250 WO HCPCS: Performed by: FAMILY MEDICINE

## 2025-04-24 PROCEDURE — 85025 COMPLETE CBC W/AUTO DIFF WBC: CPT

## 2025-04-24 PROCEDURE — 99232 SBSQ HOSP IP/OBS MODERATE 35: CPT | Performed by: INTERNAL MEDICINE

## 2025-04-24 PROCEDURE — 80053 COMPREHEN METABOLIC PANEL: CPT

## 2025-04-24 RX ADMIN — SODIUM CHLORIDE 3000 MG: 9 INJECTION, SOLUTION INTRAVENOUS at 13:48

## 2025-04-24 RX ADMIN — LEVOTHYROXINE SODIUM 50 MCG: 0.05 TABLET ORAL at 06:28

## 2025-04-24 RX ADMIN — SODIUM CHLORIDE 3000 MG: 9 INJECTION, SOLUTION INTRAVENOUS at 02:07

## 2025-04-24 RX ADMIN — RIVAROXABAN 10 MG: 10 TABLET, FILM COATED ORAL at 08:15

## 2025-04-24 RX ADMIN — SODIUM CHLORIDE, PRESERVATIVE FREE 10 ML: 5 INJECTION INTRAVENOUS at 08:15

## 2025-04-24 RX ADMIN — SODIUM CHLORIDE 3000 MG: 9 INJECTION, SOLUTION INTRAVENOUS at 08:19

## 2025-04-24 RX ADMIN — DULOXETINE 30 MG: 30 CAPSULE, DELAYED RELEASE ORAL at 08:15

## 2025-04-24 ASSESSMENT — PAIN SCALES - PAIN ASSESSMENT IN ADVANCED DEMENTIA (PAINAD)
BODYLANGUAGE: RELAXED
CONSOLABILITY: NO NEED TO CONSOLE
FACIALEXPRESSION: SMILING OR INEXPRESSIVE
TOTALSCORE: 0
CONSOLABILITY: NO NEED TO CONSOLE
BODYLANGUAGE: RELAXED
BODYLANGUAGE: RELAXED
FACIALEXPRESSION: SMILING OR INEXPRESSIVE
FACIALEXPRESSION: SMILING OR INEXPRESSIVE
TOTALSCORE: 0
CONSOLABILITY: NO NEED TO CONSOLE
BREATHING: NORMAL
BREATHING: NORMAL
TOTALSCORE: 0
BREATHING: NORMAL

## 2025-04-24 ASSESSMENT — PAIN SCALES - GENERAL
PAINLEVEL_OUTOF10: 0

## 2025-04-24 NOTE — PLAN OF CARE
Problem: Respiratory - Adult  Goal: Achieves optimal ventilation and oxygenation  Outcome: Progressing  Flowsheets (Taken 4/23/2025 2000)  Achieves optimal ventilation and oxygenation: Assess for changes in respiratory status     Problem: Chronic Conditions and Co-morbidities  Goal: Patient's chronic conditions and co-morbidity symptoms are monitored and maintained or improved  Outcome: Progressing  Flowsheets (Taken 4/23/2025 2000)  Care Plan - Patient's Chronic Conditions and Co-Morbidity Symptoms are Monitored and Maintained or Improved: Monitor and assess patient's chronic conditions and comorbid symptoms for stability, deterioration, or improvement     Problem: Safety - Adult  Goal: Free from fall injury  Outcome: Progressing     Problem: Skin/Tissue Integrity  Goal: Skin integrity remains intact  Description: 1.  Monitor for areas of redness and/or skin breakdown2.  Assess vascular access sites hourly3.  Every 4-6 hours minimum:  Change oxygen saturation probe site4.  Every 4-6 hours:  If on nasal continuous positive airway pressure, respiratory therapy assess nares and determine need for appliance change or resting period  Outcome: Progressing  Flowsheets (Taken 4/23/2025 2000)  Skin Integrity Remains Intact: Monitor for areas of redness and/or skin breakdown     Problem: Pain  Goal: Verbalizes/displays adequate comfort level or baseline comfort level  Outcome: Progressing  Flowsheets (Taken 4/23/2025 2000)  Verbalizes/displays adequate comfort level or baseline comfort level: Assess pain using appropriate pain scale     Problem: Nutrition Deficit:  Goal: Optimize nutritional status  Outcome: Progressing

## 2025-04-24 NOTE — PROGRESS NOTES
Spiritual Health History and Assessment/Progress Note  [facility]    Initial Encounter,  ,  ,      Name: Jane Campbell MRN: [id number]    Age: 80 y.o.     Sex: male   Language: English   Restoration: Tenriism   Frequent falls     Date: 4/24/2025            Total Time Calculated: 11 min              Spiritual Assessment began in [facility] MULTI-SPECIALTY ONCOLOGY 2        Referral/Consult From: Rounding   Encounter Overview/Reason: Initial Encounter  Service Provided For: Patient    Eveline, Belief, Meaning:   Patient is connected with a eveline tradition or spiritual practice  Family/Friends No family/friends present      Importance and Influence:  Patient unable to assess at this time  Family/Friends No family/friends present    Community:  Patient Other: unknown  Family/Friends No family/friends present    Assessment and Plan of Care:   Patient Interventions include: Provided sacramental/Congregation ritual  Family/Friends Interventions include: No family/friends present    Patient Plan of Care: Spiritual Care available upon further referral  Family/Friends Plan of Care: No family/friends present    Chart review. Checked in with nurse. Met and conversed with patient. Patient is of the Tenriism Buddhist per patient and chart. Patient confirmed his name was Jane Campbell. Patient nodded head throughout encounter. Patient did not answer verbally questions but by nodding. Patient has no family or friends present at bedside. Patient appeared somber, stoic, and sad. Tried to converse with patient. Patient briefly answered via nodding. Provided short prayer of peace, encouragement, and healing over patient. Please contact spiritual health services for further assistance needed.    Electronically signed by Chaplain Gladys on 4/24/2025 at 4:03 PM

## 2025-04-24 NOTE — DISCHARGE SUMMARY
Patient ID:  Jane Campbell  [id number]  80 y.o.  1945    Admit date: 4/15/2025    Discharge date and time: 4/24/2025    Admission Diagnoses: Syncope and collapse [R55]  Elevated CK [R74.8]  Failure to thrive in adult [R62.7]  Frequent falls [R29.6]  Fall, initial encounter [W19.XXXA]    Discharge Diagnoses:    Principal Problem:    Frequent falls  Encephalopathy  Active Problems:    Severe protein-calorie malnutrition    Fever  Resolved Problems:    * No resolved hospital problems. *       RECOMMENDATIONS FOR PCP:   Ensure good follow up with cardiology and neurosurgery.     Admission HPI:  Jane Campbell is a 80 y.o. male who presented with a fall that occurred at his [facility] independent living facility sometime last night.  He was found this morning and does not remember falling states \"I just woke up and was on the floor\".  He has been having an increased number of falls recently.  He denies feeling dizzy or lightheaded.     Hospital Course by problem addressed:  Jane Campbell is an 80 y.o. male with Hx of anxiety, depression, COPD, polio, DM2 who is admitted for falls.     Sepsis: Leukocytosis with fever noted.  Unclear source possibly noscocomial. MRI of the brain did note possible mastoiditis though this is likely chronic.  Patient improved with unasyn. MRSA nares were negative so vancomycin was stopped.  Infectious disease was consulted. Patient was transition to augmentin for approximately a 7 day total course.     Frequent falls: unclear etiology.  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  Possibly from bradycardia. 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   - PT/OT  - UA neg  - respiratory viral panel neg  - CXR neg  - stopped lopressor due to heart rate  - neurology consulted  - MRI c spine shows some cord issues.  Ortho recommends outpatient follow up with neurosurgery Dr. Godfrey.   --orthostatics neg from supine to sitting.      Acute

## 2025-04-24 NOTE — PROGRESS NOTES
[facility] Progress Note  Adam Gallardo DO  [phone] Office  [phone] Fax    2025      Assessment & Plan:     Fever and leukocytosis.  Resolved.  This began 6 days into the hospitalization so suspect nosocomial etiology.  UA bland.  Blood cultures sterile to date.  Chest x-ray clear.  RVP negative.  No obvious infection at IV and previous phlebotomy sites.   Stop vancomycin as MRSA screen negative.  Continue Unasyn.  May de-escalate to Augmentin to complete 5 to 7-day therapy based on patient's clinical course.  Low threshold for CT chest, abdomen, and pelvis if fevers persist  AMS.  Patient was alert and appropriate at time of examination today.   No headache or nuchal rigidity.  MRI of the brain with no concerns for infectious process.  Low suspicion for CNS infection.  Would not pursue LP at this time  Mastoid effusion.  Physical exam is unremarkable and this is stable per radiologist reading.  Consider nonemergent outpatient ENT examination  Chronic steroid use.  It is not clear why patient was on steroids prior to admission.  Steroids have been stopped  Postpolio syndrome  Diabetes mellitus.  Hemoglobin A1c 5.9          Subjective:     No new complaints    Objective:     Vitals: BP (!) 140/70 Comment: RN notified  Pulse 62   Temp 98.2 °F (36.8 °C) (Oral)   Resp 14   Ht 1.803 m (5' 11\")   Wt 66.2 kg (146 lb)   SpO2 95%   BMI 20.36 kg/m²      Tmax:  Temp (24hrs), Av.4 °F (36.9 °C), Min:98.1 °F (36.7 °C), Max:98.8 °F (37.1 °C)      Exam:   Patient is intubated:  no    Physical Examination:   General:  Alert, cooperative, no distress   Head:  Normocephalic, atraumatic.   Eyes:  Conjunctivae clear   Neck: Supple       Lungs:   No distress.     Chest wall:     Heart:     Abdomen:    non-distended   Extremities: Moves all.   Skin: No acute rash on exposed skin   Neurologic: CNII-XII intact. Normal strength     Labs:        Invalid input(s): \"ITNL\"   No results for  input(s): \"CPK\", \"CKMB\" in the last 72 hours.    Invalid input(s): \"TROIQ\"  Recent Labs     04/22/25  0523 04/23/25  0227 04/24/25  0320    139 138   K 3.6 3.6 3.7    107 106   CO2 25 26 25   BUN 20 17 15   WBC 12.2* 11.4* 10.5   HGB 11.6* 10.4* 10.0*   HCT 36.2* 32.4* 31.2*    225 223     No results for input(s): \"INR\", \"APTT\" in the last 72 hours.    Invalid input(s): \"PTP\"  Needs: urine analysis, urine sodium, protein and creatinine  No results found for: \"KU\"      Cultures:     Lab Results   Component Value Date/Time    SDES URINE 03/20/2014 11:21 AM    SDES VOIDED URINE 02/01/2010 09:00 PM     No components found for: \"CULT\"    Radiology:     Medications       [unfilled]        Case discussed with:    A total time of 35 minutes was spent on today's encounter.  Greater than 50% of the time was spent on the following:  Preparing for visit and chart review.  Obtaining and/or reviewing separately obtained history  Performing a medically appropriate exam and/or evaluation  Counseling and educating a patient/family/caregiver as noted above  Placing relevant orders  Referring and communicating with other professionals (not separately reported)  Independently interpreting results (not separately reported) and communicating results to the patient/family/caregiver  Care coordination (not separately reported) as noted above  Documenting clinical information in the electronic health records (e.g. problem list, visit note) on the day of the encounter       Adam Gallardo DO

## 2025-04-24 NOTE — DISCHARGE INSTRUCTIONS
HOSPITALIST DISCHARGE INSTRUCTIONS  NAME:  Jane Campbell   :  1945   MRN:  [id number]     Date/Time:  2025 11:45 AM    ADMIT DATE: 4/15/2025     DISCHARGE DATE: 2025     DISCHARGE DIAGNOSIS:  ***    DISCHARGE INSTRUCTIONS:  Thank you for allowing us to participate in your care. Your discharging Hospitalist is Juan José Flores MD. You were admitted for evaluation and treatment of the above. ***    --Please give a copy of this document to your primary care provider.  Please follow up with them within one week.       MEDICATIONS:    It is important that you take the medication exactly as they are prescribed.   Keep your medication in the bottles provided by the pharmacist and keep a list of the medication names, dosages, and times to be taken in your wallet.   Do not take other medications without consulting your doctor.             If you experience any of the following symptoms then please call your primary care physician or return to the emergency room if you cannot get hold of your doctor:  Fever, chills, nausea, vomiting, diarrhea, change in mentation, falling, bleeding, shortness of breath    Follow Up:  Please call the below provider to arrange hospital follow up appointment      Xiang Godfrey MD  [address]  [address]  [facility] [address] [phone]    Follow up in 2 week(s)  xrays or imaging    Gabriel Luevano MD  [address] [facility] [address] [phone]    Schedule an appointment as soon as possible for a visit          Information obtained by :  I understand that if any problems occur once I am at home I am to contact my physician.    I understand and acknowledge receipt of the instructions indicated above.                                                                                                                                           Physician's or R.N.'s Signature                                                                   neg  --MRI brain wwo neg  --EEG neg  --neurology consulted and signed off  --TSH normal   -- ammonia levels neg  -- stopped zyprexa.   --respiratory viral panel neg     Elevated CK  - Mild  - now resolved     Chronic HFrEF  - Recent EF 36%  - holding metoprolol due to low heart rate     Atrial fibrillation  - Xarelto  - holding lopressor due to low heart rate.  Can restart per PCP.   - May need to consider stopping Xarelto in setting of frequent falls     Chronic steroids use: unclear etiology.  Received 30 day prescription for daily prednisone 5 mg po daily from Cristobal Sneed NP on 4/9.  He was not discharged on prednisone on 4/2.  Will hold for now as it may complicate the picture and unclear reason for being on this medication.       Anxiety and depression  - Currently on Cymbalta, Remeron, Zyprexa.   - These are too many meds for elderly.  Need to consider cutting back.    - Stopped zyprexa     Hypothyroidism:  TSH normal.   - Continue Synthroid     COPD: chronic.  Not in exacerbation.  Continue formulary substitution for asmanex.      History of polio  -Supportive care     Hyperlipidemia: continue statin.     Discharge Exam:    Physical Exam:    Gen:  Well-developed, calm  HEENT:  Pink conjunctivae, hearing intact to voice,   Neck:  Supple  Resp:  No accessory muscle use, clear breath sounds without wheezes rales or rhonchi  Card:  No murmurs, normal S1, S2 without thrills, bruits or peripheral edema  Abd:  Soft, non-tender, non-distended  Skin:  No rashes or ulcers, skin turgor is good  Neuro:  Cranial nerves 3-12 are grossly intact, does follows commands appropriately  Psych:  alert, unclear insight. Unclear orientation.

## 2025-04-24 NOTE — CARE COORDINATION
Care Management Progress Note    Reason for Admission:   Syncope and collapse [R55]  Elevated CK [R74.8]  Failure to thrive in adult [R62.7]  Frequent falls [R29.6]  Fall, initial encounter [W19.XXXA]         Patient Admission Status: Inpatient  RUR: 20%; LOS 8 days  Hospitalization in the last 30 days (Readmission):  Yes     READMISSION:  Pt was admitted to  from 3/30 - 4/2 with acute met enceph. He was readmitted to  on 4/15 with syncope and collapse.   Reportedly, pt resides at [facility] (indepn living).   EDGAR Stewart ([phone]) is the primary family contact.     Transition Plan of Care:  Neurology, Ortho, ID following for medical management  Plan is for pt to return to [facility] (family declined SNF).   Pt had [facility] ([phone]) PT/OT previously.  CM called and left a vm for Ray Sneed and/or Allyn Hernandez (option 14) to inform them of pt's discharge today as well as the need for rehab therapies. Allyn returned call.  Order for PT/OT sent in discharge packet.  Pt also has private duty caregivers and they will be resumed upon pt's return  Outpatient follow up  Ambulance transport was set up for 7 p.m.  EDGAR is aware and agreeable.     No further discharge needs indicated.  Marleny

## 2025-04-27 LAB
BACTERIA SPEC CULT: NORMAL
BACTERIA SPEC CULT: NORMAL
SERVICE CMNT-IMP: NORMAL
SERVICE CMNT-IMP: NORMAL

## 2025-05-01 NOTE — PROGRESS NOTES
Physician Progress Note      PATIENT:               YADIRA TUCKER  CSN #:                  [id number]  :                       1945  ADMIT DATE:       4/15/2025 9:04 AM  DISCH DATE:        2025 7:40 PM  RESPONDING  PROVIDER #:        Juan José Flores MD          QUERY TEXT:    Encephalopathy is documented in the medical record     forward.  Please   specify type:    The clinical indicators include:  -   Acute encephalopathy: acute, present on admission.  Patient only   occasionally responds to voice today.  Per recent discharge, he was more   responsive. DDX includes seizure, poly pharmacy, infection, structural brain   issue. Unclear baseline.    -    first  query  for  type of  encephalopathy sent- response  was    Unclear what is causing encephalopathy at this time. Work up is underway.    -  Acute encephalopathy: acute, present on admission.  Has waxed and   waned this admission. Possibly 2/2 polypharmacy with component of delirium.  CT head neg  --MRI brain wwo neg  --EEG neg  Options provided:  -- Related possibly  to polypharmacy  -- encephalopathy   due to other  cause  -- Other - I will add my own diagnosis  -- Disagree - Not applicable / Not valid  -- Disagree - Clinically unable to determine / Unknown  -- Refer to Clinical Documentation Reviewer    PROVIDER RESPONSE TEXT:    The patient has encephalopathy related possibly to polypharmacy    Query created by: Chelsy Vaughan on 2025 2:05 PM      QUERY TEXT:    Sepsis is documented in the medical record   .    Please clarify the   source of sepsis  or  rule  out  in  favor  of  SIRS.    The clinical indicators include:  -   labs  12.2   temp max  101.8;    ID-  Fever and leukocytosis.  Resolved.  This began 6 days into the   hospitalization so suspect nosocomial etiology.  UA bland.  Blood cultures sterile to date.  Chest x-ray clear.  RVP negative.    No obvious infection at IV and previous phlebotomy

## (undated) DEVICE — SPONGE: SPECIALTY PEANUT XR 100/CS: Brand: MEDICAL ACTION INDUSTRIES

## (undated) DEVICE — DRAPE,REIN 53X77,STERILE: Brand: MEDLINE

## (undated) DEVICE — TAPE,CLOTH/SILK,CURAD,3"X10YD,LF,40/CS: Brand: CURAD

## (undated) DEVICE — SURGICAL PROCEDURE PACK BASIN MAJ SET CUST NO CAUT

## (undated) DEVICE — ALCOHOL RUBBING ISO 16OZ 70%

## (undated) DEVICE — COVER,TABLE,60X90,STERILE: Brand: MEDLINE

## (undated) DEVICE — SYR 20ML LL STRL LF --

## (undated) DEVICE — TOTAL TRAY, 16FR 10ML SIL FOLEY, URN: Brand: MEDLINE

## (undated) DEVICE — DRAPE,LAPAROTOMY,T,PEDI,STERILE: Brand: MEDLINE

## (undated) DEVICE — BONE WAX WHITE: Brand: BONE WAX WHITE

## (undated) DEVICE — PACKING 8004000 NEURAY 200PK 13X13MM: Brand: NEURAY ®

## (undated) DEVICE — STERILE POLYISOPRENE POWDER-FREE SURGICAL GLOVES: Brand: PROTEXIS

## (undated) DEVICE — SUTURE VCRL SZ 0 L18IN ABSRB VLT L36MM CT-1 1/2 CIR J740D

## (undated) DEVICE — SUTURE VCRL SZ 4-0 L27IN ABSRB UD L19MM PS-2 3/8 CIR PRIM J426H

## (undated) DEVICE — SPONGE GZ W4XL4IN COT 12 PLY TYP VII WVN C FLD DSGN

## (undated) DEVICE — COVER,MAYO STAND,XL,STERILE: Brand: MEDLINE

## (undated) DEVICE — PACK,BASIC,SIRUS,V: Brand: MEDLINE

## (undated) DEVICE — XTW CERVICAL COLLAR: Brand: DEROYAL

## (undated) DEVICE — TOWEL SURG W17XL27IN STD BLU COT NONFENESTRATED PREWASHED

## (undated) DEVICE — STRIP,CLOSURE,WOUND,MEDI-STRIP,1/2X4: Brand: MEDLINE

## (undated) DEVICE — ROCKER SWITCH PENCIL BLADE ELECTRODE, HOLSTER: Brand: EDGE

## (undated) DEVICE — MAGNETIC INSTR DRAPE 20X16: Brand: MEDLINE INDUSTRIES, INC.

## (undated) DEVICE — TUBING, SUCTION, 1/4" X 12', STRAIGHT: Brand: MEDLINE

## (undated) DEVICE — SEALANT HEMOSTAT W/THROM 8ML -- SURGIFLO MATRIX

## (undated) DEVICE — Z DUP USE 2701075 SYSTEM SKIN CLSR 42CM DERMBND PRINEO

## (undated) DEVICE — INFECTION CONTROL KIT SYS

## (undated) DEVICE — KIT,1200CC CANISTER,3/16"X6' TUBING: Brand: MEDLINE INDUSTRIES, INC.

## (undated) DEVICE — 3M™ IOBAN™ 2 ANTIMICROBIAL INCISE DRAPE 6648EZ: Brand: IOBAN™ 2

## (undated) DEVICE — ASTOUND STANDARD SURGICAL GOWN, XXL: Brand: CONVERTORS

## (undated) DEVICE — DRAPE XR C ARM 41X74IN LF --

## (undated) DEVICE — NEEDLE HYPO 18GA L1.5IN PNK S STL HUB POLYPR SHLD REG BVL

## (undated) DEVICE — STRAP,POSITIONING,KNEE/BODY,FOAM,4X60": Brand: MEDLINE

## (undated) DEVICE — STERILE POLYISOPRENE POWDER-FREE SURGICAL GLOVES WITH EMOLLIENT COATING: Brand: PROTEXIS

## (undated) DEVICE — TELFA ADHESIVE ISLAND DRESSING: Brand: TELFA

## (undated) DEVICE — NEEDLE HYPO 25GA L1.5IN BVL ORIENTED ECLIPSE

## (undated) DEVICE — CONTAINER,SPECIMEN,3OZ,OR STRL: Brand: MEDLINE

## (undated) DEVICE — INTENDED FOR TISSUE SEPARATION, AND OTHER PROCEDURES THAT REQUIRE A SHARP SURGICAL BLADE TO PUNCTURE OR CUT.: Brand: BARD-PARKER ® CARBON RIB-BACK BLADES

## (undated) DEVICE — REM POLYHESIVE ADULT PATIENT RETURN ELECTRODE: Brand: VALLEYLAB

## (undated) DEVICE — SYR 10ML LUER LOK 1/5ML GRAD --

## (undated) DEVICE — BLADE ASSEMB CLP HAIR COARSE --

## (undated) DEVICE — TOOL DC SP12MH30 LGD 12CM PROX 3.0 MH: Brand: MIDAS REX CLEARVIEW™

## (undated) DEVICE — DRAPE MICSCP XLN W48XL118IN 2 BRDG STEREO OBS ZEISS

## (undated) DEVICE — SUTURE VCRL SZ 2-0 L18IN ABSRB UD L26MM CP-2 1/2 CIR REV J762D

## (undated) DEVICE — SOLUTION IV 1000ML 0.9% SOD CHL

## (undated) DEVICE — TIP CLEANER: Brand: VALLEYLAB